# Patient Record
Sex: FEMALE | Race: WHITE | NOT HISPANIC OR LATINO | Employment: OTHER | URBAN - METROPOLITAN AREA
[De-identification: names, ages, dates, MRNs, and addresses within clinical notes are randomized per-mention and may not be internally consistent; named-entity substitution may affect disease eponyms.]

---

## 2017-01-12 RX ORDER — RIVAROXABAN 20 MG/1
TABLET, FILM COATED ORAL
Qty: 90 TABLET | Refills: 3 | Status: SHIPPED | OUTPATIENT
Start: 2017-01-12 | End: 2018-01-08 | Stop reason: SDUPTHER

## 2017-03-09 RX ORDER — RAMIPRIL 5 MG/1
CAPSULE ORAL
Qty: 180 CAPSULE | Refills: 1 | Status: SHIPPED | OUTPATIENT
Start: 2017-03-09 | End: 2019-06-17 | Stop reason: SDUPTHER

## 2017-03-29 ENCOUNTER — OFFICE VISIT (OUTPATIENT)
Dept: CARDIOLOGY | Facility: CLINIC | Age: 82
End: 2017-03-29

## 2017-03-29 VITALS
DIASTOLIC BLOOD PRESSURE: 76 MMHG | BODY MASS INDEX: 29.55 KG/M2 | SYSTOLIC BLOOD PRESSURE: 118 MMHG | HEIGHT: 68 IN | HEART RATE: 70 BPM | WEIGHT: 195 LBS

## 2017-03-29 DIAGNOSIS — I50.22 CHRONIC SYSTOLIC CONGESTIVE HEART FAILURE (HCC): ICD-10-CM

## 2017-03-29 DIAGNOSIS — I44.30 AVB (ATRIOVENTRICULAR BLOCK): ICD-10-CM

## 2017-03-29 DIAGNOSIS — I10 ESSENTIAL HYPERTENSION: ICD-10-CM

## 2017-03-29 DIAGNOSIS — I48.20 CHRONIC ATRIAL FIBRILLATION (HCC): Primary | ICD-10-CM

## 2017-03-29 PROCEDURE — 99214 OFFICE O/P EST MOD 30 MIN: CPT | Performed by: INTERNAL MEDICINE

## 2017-03-29 PROCEDURE — 93288 INTERROG EVL PM/LDLS PM IP: CPT | Performed by: INTERNAL MEDICINE

## 2017-03-29 RX ORDER — AMLODIPINE BESYLATE 2.5 MG/1
2.5 TABLET ORAL DAILY
COMMUNITY
End: 2017-10-11

## 2017-03-29 NOTE — PROGRESS NOTES
Jayda Lopes  1935  [phone]      03/29/2017    [facility] CARDIOLOGY     Akilah Rueda MD  [address] [id number]    Chief Complaint   Patient presents with   • Atrial Fibrillation   • Congestive Heart Failure       Problem List:  PROBLEM LIST:  1. Atrial fibrillation:  a. Hospitalization with external cardioversion on 07/26/2004 with initiation of Rythmol therapy.   b. Palpitations with event recorder, December 2005, consistent with PACs.   c. Episode of atrial fibrillation approximately 60-90 minutes on Christmas 2009 with subsequent ER visit.  d. External cardioversion to normal sinus rhythm, 06/08/2012.   e. Hospitalization with initiation of Tikosyn, August 2012.   f. Pulmonary vein isolation procedure with extensive ablation of multiple sites and subsequent significant bradyarrhythmias after internal cardioversion to normal sinus rhythm with discontinuation of Tikosyn on 03/25/2014.  g. Unsuccessful external cardioversion, 05/15/2014, and subsequent successful internal cardioversion with early recurrence of atrial fibrillation, 05/15/2014.   h. Echocardiogram, 08/12/2014: EF less than 20% with severe global hypokinesis. Right ventricle mild to moderately dilated. Mild MR and mild TR.   i. 09/08/2014: Implantation of a [employer] Allure Quadra biventricular pacemaker, serial #[id number], and AV node ablation.   2. CHF class III:  a. Holter monitor in February 2002 with frequent PACs and PVCs.   b. Echocardiogram in March 2000 with normal LV size and function: LVEF of 60%. Mild TR, mild MR.   c. Echocardiogram on 07/26/2004: Normal LV size and function. No significant regurgitation from the mitral valve or tricuspid valve.   d. Echocardiogram, 02/06/2008: Normal LV size and function, mild MR.   e. Echocardiogram, 08/22/2012: Left ventricular ejection fraction 35% to 40%. Moderate MR, mild TR.  f. Echocardiogram, 02/13/2013: Left ventricular  ejection fraction of 50% to 55%. Mild TR, mild-to-moderate MR. LA 5.2.  g. Echocardiogram, 03/23/2015: EF 35%. Mild-to-moderate AI. Mild MR.  h. Echocardiogram, 07/22/2015: Limited for EF only 50% to 55%.  3. Atypical chest pain:  a. Acceptable nuclear GXT with normal LV size and function and no ischemia in August 2000 and April 2004.  b. UMAIR, 03/25/2014: Ejection fraction of 50% to 55%. Mild mitral regurgitation.  4. Hypertension.   5. Hyperlipidemia.   6. Mitral valve prolapse.   7. Anxiety.   8. Gastroesophageal reflux disease.   9. Macular degeneration.   10. Surgical history:  a. Hysterectomy.  b. Partial thyroidectomy.  c. Ovarian cystectomy.     Allergies  No Known Allergies    Current Medications    Current Outpatient Prescriptions:   •  Aflibercept (EYLEA IO), Inject  into the eye as needed., Disp: , Rfl:   •  amLODIPine (NORVASC) 2.5 MG tablet, Take 2.5 mg by mouth Daily., Disp: , Rfl:   •  atorvastatin (LIPITOR) 20 MG tablet, Take 20 mg by mouth daily., Disp: , Rfl:   •  azelastine (ASTELIN) 0.1 % nasal spray, 2 sprays into each nostril 2 (two) times a day. Use in each nostril as directed, Disp: , Rfl:   •  carvedilol (COREG) 25 MG tablet, TAKE 1 TABLET TWICE A DAY, Disp: 180 tablet, Rfl: 1  •  cholecalciferol (VITAMIN D3) 1000 UNITS tablet, Take 1,000 Units by mouth daily., Disp: , Rfl:   •  esomeprazole (NexIUM) 20 MG capsule, Take 20 mg by mouth every morning before breakfast., Disp: , Rfl:   •  furosemide (LASIX) 20 MG tablet, TAKE 1 TABLET DAILY AS DIRECTED, Disp: 90 tablet, Rfl: 3  •  Multiple Vitamins-Minerals (PRESERVISION AREDS 2 PO), Take  by mouth daily., Disp: , Rfl:   •  potassium chloride (K-DUR) 10 MEQ CR tablet, TAKE 1 TABLET DAILY, Disp: 90 tablet, Rfl: 2  •  promethazine (PHENERGAN) 6.25 MG/5ML syrup, Take 6.25 mg by mouth 4 (four) times a day as needed for nausea or vomiting., Disp: , Rfl:   •  ramipril (ALTACE) 5 MG capsule, TAKE 1 CAPSULE TWICE A DAY (Patient taking differently: 5mg  "in the morning, 10mg nightly), Disp: 180 capsule, Rfl: 1  •  sertraline (ZOLOFT) 100 MG tablet, Take 100 mg by mouth daily., Disp: , Rfl:   •  XARELTO 20 MG tablet, TAKE 1 TABLET DAILY AS DIRECTED, Disp: 90 tablet, Rfl: 3    History of Present Illness   HPI    Pt presents for follow up of AF/HTN/CHF .Since the pt has seen us in clinic last, pt denies any syncope, SOB, CP, LH, and dizziness. Denies any hospitalizations, ER visits, bleeding, or TIA/CVA symptoms. Overall feels well. BP better after meds adjusted by Dr Rueda.    ROS:  General:  + fatigue, No weight gain or loss  Cardiovascular:  Denies CP, PND, syncope, near syncope, edema or palpitations.  Pulmonary:  Denies ZAFAR, cough, or wheezing    Vitals:    03/29/17 1438   BP: 118/76   BP Location: Right arm   Patient Position: Sitting   Pulse: 70   Weight: 195 lb (88.5 kg)   Height: 68\" (172.7 cm)       PE:  General: NAD  Neck: no JVD, no carotid bruits, no TM  Heart RRR, NL S1, S2, no rubs, murmurs  Lungs: CTA, no wheezes, rhonchi, or rales  Abd: soft, non-tender, NL BS  Ext: No musculoskeletal deformities, no edema, cyanosis, or clubbing  Psych: normal mood and affect    Diagnostic Data:  Procedures      1. Chronic atrial fibrillation    2. AVB (atrioventricular block)    3. Chronic systolic congestive heart failure    4. Essential hypertension        PM Interrogation: NL PM fxn, Nl battery fxn, CAF, 99% BiV paced     Plan:  1) CHF class I doing well with NL Biv PM  Continue present medications. NL pacemaker function.  2) CAF/AVB  3) Anticoagulation  Continue NOAC  4) HTN: better on meds  Wt loss, exercise, salt reduction    F/up in 6 months      "

## 2017-06-05 RX ORDER — CARVEDILOL 25 MG/1
TABLET ORAL
Qty: 180 TABLET | Refills: 3 | Status: SHIPPED | OUTPATIENT
Start: 2017-06-05 | End: 2018-05-31 | Stop reason: SDUPTHER

## 2017-07-12 ENCOUNTER — TRANSCRIBE ORDERS (OUTPATIENT)
Dept: BONE DENSITY | Facility: HOSPITAL | Age: 82
End: 2017-07-12

## 2017-07-12 DIAGNOSIS — N95.9 MENOPAUSAL AND POSTMENOPAUSAL DISORDER: Primary | ICD-10-CM

## 2017-08-14 ENCOUNTER — HOSPITAL ENCOUNTER (OUTPATIENT)
Dept: BONE DENSITY | Facility: HOSPITAL | Age: 82
Discharge: HOME | End: 2017-08-14
Attending: INTERNAL MEDICINE | Admitting: INTERNAL MEDICINE

## 2017-08-14 DIAGNOSIS — N95.9 MENOPAUSAL AND POSTMENOPAUSAL DISORDER: ICD-10-CM

## 2017-08-14 PROCEDURE — 77080 DXA BONE DENSITY AXIAL: CPT

## 2017-08-14 RX ORDER — FUROSEMIDE 20 MG/1
TABLET ORAL
Qty: 90 TABLET | Refills: 2 | Status: SHIPPED | OUTPATIENT
Start: 2017-08-14 | End: 2019-06-24 | Stop reason: SDUPTHER

## 2017-10-11 ENCOUNTER — OFFICE VISIT (OUTPATIENT)
Dept: CARDIOLOGY | Facility: CLINIC | Age: 82
End: 2017-10-11

## 2017-10-11 VITALS
HEIGHT: 68 IN | HEART RATE: 79 BPM | BODY MASS INDEX: 30.01 KG/M2 | SYSTOLIC BLOOD PRESSURE: 148 MMHG | WEIGHT: 198 LBS | DIASTOLIC BLOOD PRESSURE: 89 MMHG

## 2017-10-11 DIAGNOSIS — I10 ESSENTIAL HYPERTENSION: ICD-10-CM

## 2017-10-11 DIAGNOSIS — I50.22 CHRONIC SYSTOLIC CONGESTIVE HEART FAILURE (HCC): ICD-10-CM

## 2017-10-11 DIAGNOSIS — I48.20 CHRONIC ATRIAL FIBRILLATION (HCC): Primary | ICD-10-CM

## 2017-10-11 DIAGNOSIS — I44.30 AVB (ATRIOVENTRICULAR BLOCK): ICD-10-CM

## 2017-10-11 PROCEDURE — 93288 INTERROG EVL PM/LDLS PM IP: CPT | Performed by: INTERNAL MEDICINE

## 2017-10-11 PROCEDURE — 99213 OFFICE O/P EST LOW 20 MIN: CPT | Performed by: INTERNAL MEDICINE

## 2017-10-11 NOTE — PROGRESS NOTES
Jayda Lopes  1935  [phone]      10/11/2017    [facility] CARDIOLOGY     Akilah Rueda MD  [address] [address]    Chief Complaint   Patient presents with   • Atrial Fibrillation   • Congestive Heart Failure       Problem List:  PROBLEM LIST:  1. Atrial fibrillation:  a. Hospitalization with external cardioversion on 07/26/2004 with initiation of Rythmol therapy.   b. Palpitations with event recorder, December 2005, consistent with PACs.   c. Episode of atrial fibrillation approximately 60-90 minutes on Christmas 2009 with subsequent ER visit.  d. External cardioversion to normal sinus rhythm, 06/08/2012.   e. Hospitalization with initiation of Tikosyn, August 2012.   f. Pulmonary vein isolation procedure with extensive ablation of multiple sites and subsequent significant bradyarrhythmias after internal cardioversion to normal sinus rhythm with discontinuation of Tikosyn on 03/25/2014.  g. Unsuccessful external cardioversion, 05/15/2014, and subsequent successful internal cardioversion with early recurrence of atrial fibrillation, 05/15/2014.   h. Echocardiogram, 08/12/2014: EF less than 20% with severe global hypokinesis. Right ventricle mild to moderately dilated. Mild MR and mild TR.   i. 09/08/2014: Implantation of a [employer] Allure Quadra biventricular pacemaker, serial #[id number], and AV node ablation.   2. CHF class III:  a. Holter monitor in February 2002 with frequent PACs and PVCs.   b. Echocardiogram in March 2000 with normal LV size and function: LVEF of 60%. Mild TR, mild MR.   c. Echocardiogram on 07/26/2004: Normal LV size and function. No significant regurgitation from the mitral valve or tricuspid valve.   d. Echocardiogram, 02/06/2008: Normal LV size and function, mild MR.   e. Echocardiogram, 08/22/2012: Left ventricular ejection fraction 35% to 40%. Moderate MR, mild TR.  f. Echocardiogram, 02/13/2013: Left ventricular  ejection fraction of 50% to 55%. Mild TR, mild-to-moderate MR. LA 5.2.  g. Echocardiogram, 03/23/2015: EF 35%. Mild-to-moderate AI. Mild MR.  h. Echocardiogram, 07/22/2015: Limited for EF only 50% to 55%.  3. Atypical chest pain:  a. Acceptable nuclear GXT with normal LV size and function and no ischemia in August 2000 and April 2004.  b. UMAIR, 03/25/2014: Ejection fraction of 50% to 55%. Mild mitral regurgitation.  4. Hypertension.   5. Hyperlipidemia.   6. Mitral valve prolapse.   7. Anxiety.   8. Gastroesophageal reflux disease.   9. Macular degeneration.   10. Surgical history:  a. Hysterectomy.  b. Partial thyroidectomy.  c. Ovarian cystectomy.     Allergies  No Known Allergies    Current Medications    Current Outpatient Prescriptions:   •  Aflibercept (EYLEA IO), Inject  into the eye as needed., Disp: , Rfl:   •  atorvastatin (LIPITOR) 20 MG tablet, Take 20 mg by mouth daily., Disp: , Rfl:   •  azelastine (ASTELIN) 0.1 % nasal spray, 2 sprays into each nostril 2 (two) times a day. Use in each nostril as directed, Disp: , Rfl:   •  carvedilol (COREG) 25 MG tablet, TAKE 1 TABLET TWICE A DAY, Disp: 180 tablet, Rfl: 3  •  cholecalciferol (VITAMIN D3) 1000 UNITS tablet, Take 1,000 Units by mouth daily., Disp: , Rfl:   •  esomeprazole (NexIUM) 20 MG capsule, Take 20 mg by mouth every morning before breakfast., Disp: , Rfl:   •  furosemide (LASIX) 20 MG tablet, TAKE 1 TABLET DAILY AS DIRECTED, Disp: 90 tablet, Rfl: 2  •  Multiple Vitamins-Minerals (PRESERVISION AREDS 2 PO), Take  by mouth daily., Disp: , Rfl:   •  potassium chloride (K-DUR) 10 MEQ CR tablet, TAKE 1 TABLET DAILY, Disp: 90 tablet, Rfl: 2  •  promethazine (PHENERGAN) 6.25 MG/5ML syrup, Take 6.25 mg by mouth 4 (four) times a day as needed for nausea or vomiting., Disp: , Rfl:   •  ramipril (ALTACE) 5 MG capsule, TAKE 1 CAPSULE TWICE A DAY (Patient taking differently: 5mg in the morning, 10mg nightly), Disp: 180 capsule, Rfl: 1  •  sertraline (ZOLOFT) 100  "MG tablet, Take 100 mg by mouth daily., Disp: , Rfl:   •  XARELTO 20 MG tablet, TAKE 1 TABLET DAILY AS DIRECTED, Disp: 90 tablet, Rfl: 3    History of Present Illness   HPI    Pt presents for follow up of AF/AVB/HTN. Since the pt has seen us in clinic last, pt denies any syncope,  CP, LH, and dizziness. Denies any hospitalizations, ER visits, bleeding, or TIA/CVA symptoms. Overall feels well. Mild ZAFAR.    ROS:  General:  + fatigue, No weight gain or loss  Cardiovascular:  Denies CP, PND, syncope, near syncope, edema + palpitations.  Pulmonary:  Mild ZAFAR, No cough, or wheezing    Vitals:    10/11/17 1501   BP: 148/89   BP Location: Left arm   Patient Position: Sitting   Pulse: 79   Weight: 198 lb (89.8 kg)   Height: 68\" (172.7 cm)       PE:  General: NAD  Neck: no JVD, no carotid bruits, no TM  Heart RRR, NL S1, S2, S4 present, no rubs, murmurs  Lungs: CTA, no wheezes, rhonchi, or rales  Abd: soft, non-tender, NL BS  Ext: No musculoskeletal deformities, no edema, cyanosis, or clubbing  Psych: normal mood and affect    Diagnostic Data:  Procedures      1. Chronic atrial fibrillation    2. AVB (atrioventricular block)    3. Chronic systolic congestive heart failure    4. Essential hypertension        PM Interrogation: NL PM fxn, Nl battery fxn, No AF     Plan:  1) CHF class I doing well with NL BiV PM  Continue present medications. NL pacemaker function.  2) CAF/AVB  3) Anticoagulation  Continue NOAC  4) HTN: better on meds  Wt loss, exercise, salt reduction       F/up in 6 months      "

## 2017-10-18 ENCOUNTER — TRANSCRIBE ORDERS (OUTPATIENT)
Dept: ADMINISTRATIVE | Facility: HOSPITAL | Age: 82
End: 2017-10-18

## 2017-10-18 DIAGNOSIS — Z12.31 VISIT FOR SCREENING MAMMOGRAM: Primary | ICD-10-CM

## 2017-11-15 ENCOUNTER — OFFICE VISIT (OUTPATIENT)
Dept: CARDIOLOGY | Facility: HOSPITAL | Age: 82
End: 2017-11-15

## 2017-11-15 ENCOUNTER — HOSPITAL ENCOUNTER (OUTPATIENT)
Dept: CARDIOLOGY | Facility: HOSPITAL | Age: 82
Discharge: HOME | End: 2017-11-15

## 2017-11-15 VITALS
HEIGHT: 68 IN | DIASTOLIC BLOOD PRESSURE: 88 MMHG | OXYGEN SATURATION: 96 % | TEMPERATURE: 97.6 F | WEIGHT: 195 LBS | RESPIRATION RATE: 19 BRPM | BODY MASS INDEX: 29.55 KG/M2 | SYSTOLIC BLOOD PRESSURE: 158 MMHG | HEART RATE: 71 BPM

## 2017-11-15 DIAGNOSIS — I48.20 CHRONIC ATRIAL FIBRILLATION (HCC): Primary | ICD-10-CM

## 2017-11-15 DIAGNOSIS — I48.20 CHRONIC ATRIAL FIBRILLATION (HCC): ICD-10-CM

## 2017-11-15 DIAGNOSIS — I10 ESSENTIAL HYPERTENSION: ICD-10-CM

## 2017-11-15 PROCEDURE — 99214 OFFICE O/P EST MOD 30 MIN: CPT | Performed by: NURSE PRACTITIONER

## 2017-11-15 NOTE — PROGRESS NOTES
Encounter Date:11/15/2017      Patient ID: Jayda Lopes is a 82 y.o. female.        Subjective:     Chief Complaint: Follow-up and Atrial Fibrillation     History of Present Illness  [facility] with complaints of heart rate going up to 90 when she walks. She has a history of persistent AF s/p AVN ablation and dual chamber PPP in 2014. She says at rest her HR is 70. Last device check was normal. She is planning to go to [place] for the winter and wanted to be checked out. Otherwise she has no other cardiac complaints and feels well. She does report that BP has been higher lately. She says at home this morning SBP was 148 which is average for her. It has gotten as high as 170-180 but this is not common for her.     Patient Active Problem List   Diagnosis   • Atrial fibrillation   • Palpitation   • Atypical chest pain   • Hypertension   • Hyperlipidemia   • Mitral valve prolapse   • GERD (gastroesophageal reflux disease)   • Macular degeneration         Past Surgical History:   Procedure Laterality Date   • CYSTECTOMY      h/o Ovarian   • HYSTERECTOMY Bilateral    • THYROIDECTOMY      h/o thyroid surgery sub-total        No Known Allergies      Current Outpatient Prescriptions:   •  Aflibercept (EYLEA IO), Inject  into the eye as needed., Disp: , Rfl:   •  atorvastatin (LIPITOR) 20 MG tablet, Take 20 mg by mouth daily., Disp: , Rfl:   •  carvedilol (COREG) 25 MG tablet, TAKE 1 TABLET TWICE A DAY, Disp: 180 tablet, Rfl: 3  •  cholecalciferol (VITAMIN D3) 1000 UNITS tablet, Take 2,000 Units by mouth Daily., Disp: , Rfl:   •  esomeprazole (NexIUM) 20 MG capsule, Take 20 mg by mouth every morning before breakfast., Disp: , Rfl:   •  furosemide (LASIX) 20 MG tablet, TAKE 1 TABLET DAILY AS DIRECTED (Patient taking differently: TAKE 1 TABLET DAILY AS NEEDED), Disp: 90 tablet, Rfl: 2  •  Multiple Vitamins-Minerals (PRESERVISION AREDS 2 PO), Take 2 tablets by mouth Daily., Disp: , Rfl:   •   potassium chloride (K-DUR) 10 MEQ CR tablet, TAKE 1 TABLET DAILY (Patient taking differently: TAKE 1 TABLET DAILY AS NEEDED), Disp: 90 tablet, Rfl: 2  •  ramipril (ALTACE) 5 MG capsule, TAKE 1 CAPSULE TWICE A DAY (Patient taking differently: 5mg in the morning, 10mg nightly), Disp: 180 capsule, Rfl: 1  •  sertraline (ZOLOFT) 100 MG tablet, Take 100 mg by mouth daily., Disp: , Rfl:   •  XARELTO 20 MG tablet, TAKE 1 TABLET DAILY AS DIRECTED, Disp: 90 tablet, Rfl: 3    The following portions of the chart were reviewed and updated as appropriate: Allergies, current medications, past family history, social history, past medical history.     Review of Systems   Constitution: Negative for chills, decreased appetite, diaphoresis, fever, weakness, malaise/fatigue, night sweats, weight gain and weight loss.   HENT: Negative for congestion, hearing loss, hoarse voice and nosebleeds.    Eyes: Negative for blurred vision, visual disturbance and visual halos.   Cardiovascular: Negative for chest pain, claudication, cyanosis, dyspnea on exertion, irregular heartbeat, leg swelling, near-syncope, orthopnea, palpitations, paroxysmal nocturnal dyspnea and syncope.   Respiratory: Positive for cough and sputum production. Negative for hemoptysis, shortness of breath, sleep disturbances due to breathing, snoring and wheezing.    Hematologic/Lymphatic: Negative for bleeding problem. Does not bruise/bleed easily.   Skin: Negative for dry skin, itching and rash.   Musculoskeletal: Negative for arthritis, joint pain, joint swelling and myalgias.   Gastrointestinal: Negative for bloating, abdominal pain, constipation, diarrhea, flatus, heartburn, hematemesis, hematochezia, melena, nausea and vomiting.   Genitourinary: Positive for nocturia. Negative for dysuria, frequency, hematuria and urgency.        Tachypalpitations   Neurological: Positive for excessive daytime sleepiness. Negative for dizziness, headaches, light-headedness and loss of  "balance.   Psychiatric/Behavioral: Negative for depression. The patient is nervous/anxious. The patient does not have insomnia.            Objective:     Vitals:    11/15/17 1131 11/15/17 1135 11/15/17 1136   BP: 170/99 173/87 171/87   BP Location: Right arm Left arm Left arm   Patient Position: Sitting Sitting Standing   Cuff Size: Adult     Pulse: 70 72 71   Resp: 19     Temp: 97.6 °F (36.4 °C)     TempSrc: Temporal Artery      SpO2: 96%     Weight: 195 lb (88.5 kg)     Height: 68\" (172.7 cm)           Physical Exam   Constitutional: She is oriented to person, place, and time. She appears well-developed and well-nourished. She is active and cooperative. No distress.   HENT:   Head: Normocephalic and atraumatic.   Mouth/Throat: Oropharynx is clear and moist.   Eyes: Conjunctivae and EOM are normal. Pupils are equal, round, and reactive to light.   Neck: Normal range of motion. Neck supple. No JVD present. No tracheal deviation present. No thyromegaly present.   Cardiovascular: Normal rate, regular rhythm, normal heart sounds and intact distal pulses.    Pulmonary/Chest: Effort normal and breath sounds normal.   Abdominal: Soft. Bowel sounds are normal. She exhibits no distension. There is no tenderness.   Musculoskeletal: Normal range of motion.   Neurological: She is alert and oriented to person, place, and time.   Skin: Skin is warm, dry and intact.   Psychiatric: She has a normal mood and affect. Her behavior is normal.   Nursing note and vitals reviewed.      Lab and Diagnostic Review:    Reviewed device today in clinic and she has had no high ventricular rates since her last interrogation and no other alerts    Assessment and Plan:         1. Chronic atrial fibrillation  - s/p AVN ablation and Bi-V PPM  - Continue Xarelto  - Reassured her that HR increase when she walks is normal finding. No abnormal findings on device interrogation    2. Essential hypertension  - Elevated today but she is nervous  -Advised to " check BP twice a day at home and call if SBP consistently >140    It has been a pleasure to participate in the care of this patient.  Patient was instructed to call the [facility] with any questions, concerns, or worsening symptoms.          * Please note that portions of this note were completed with a voice recognition program. Efforts were made to edit the dictation but occasionally words are transcribed.

## 2017-12-04 ENCOUNTER — HOSPITAL ENCOUNTER (OUTPATIENT)
Dept: MAMMOGRAPHY | Facility: HOSPITAL | Age: 82
Discharge: HOME | End: 2017-12-04
Attending: INTERNAL MEDICINE | Admitting: INTERNAL MEDICINE

## 2017-12-04 DIAGNOSIS — Z12.31 VISIT FOR SCREENING MAMMOGRAM: ICD-10-CM

## 2017-12-04 PROCEDURE — G0202 SCR MAMMO BI INCL CAD: HCPCS | Performed by: RADIOLOGY

## 2017-12-04 PROCEDURE — 77063 BREAST TOMOSYNTHESIS BI: CPT

## 2017-12-04 PROCEDURE — G0202 SCR MAMMO BI INCL CAD: HCPCS

## 2017-12-04 PROCEDURE — 77063 BREAST TOMOSYNTHESIS BI: CPT | Performed by: RADIOLOGY

## 2017-12-25 ENCOUNTER — HOSPITAL ENCOUNTER (EMERGENCY)
Facility: HOSPITAL | Age: 82
Discharge: HOME | End: 2017-12-25
Attending: EMERGENCY MEDICINE | Admitting: EMERGENCY MEDICINE

## 2017-12-25 ENCOUNTER — APPOINTMENT (OUTPATIENT)
Dept: GENERAL RADIOLOGY | Facility: HOSPITAL | Age: 82
End: 2017-12-25

## 2017-12-25 VITALS
HEIGHT: 68 IN | TEMPERATURE: 99.4 F | SYSTOLIC BLOOD PRESSURE: 178 MMHG | WEIGHT: 195 LBS | BODY MASS INDEX: 29.55 KG/M2 | DIASTOLIC BLOOD PRESSURE: 74 MMHG | OXYGEN SATURATION: 93 % | RESPIRATION RATE: 22 BRPM | HEART RATE: 69 BPM

## 2017-12-25 DIAGNOSIS — J11.1 INFLUENZA: Primary | ICD-10-CM

## 2017-12-25 LAB
FLUAV AG NPH QL: POSITIVE
FLUBV AG NPH QL IA: NEGATIVE

## 2017-12-25 PROCEDURE — 99283 EMERGENCY DEPT VISIT LOW MDM: CPT

## 2017-12-25 PROCEDURE — 87804 INFLUENZA ASSAY W/OPTIC: CPT | Performed by: PHYSICIAN ASSISTANT

## 2017-12-25 PROCEDURE — 71020 HC CHEST PA AND LATERAL: CPT

## 2017-12-25 RX ORDER — BENZONATATE 200 MG/1
200 CAPSULE ORAL 3 TIMES DAILY PRN
Qty: 15 CAPSULE | Refills: 0 | Status: SHIPPED | OUTPATIENT
Start: 2017-12-25 | End: 2019-02-21

## 2017-12-25 RX ORDER — AMLODIPINE BESYLATE 2.5 MG/1
2.5 TABLET ORAL DAILY
COMMUNITY
End: 2019-05-22 | Stop reason: SDUPTHER

## 2018-01-08 RX ORDER — RIVAROXABAN 20 MG/1
TABLET, FILM COATED ORAL
Qty: 90 TABLET | Refills: 3 | Status: SHIPPED | OUTPATIENT
Start: 2018-01-08 | End: 2019-01-06 | Stop reason: SDUPTHER

## 2018-03-14 ENCOUNTER — CLINICAL SUPPORT NO REQUIREMENTS (OUTPATIENT)
Dept: CARDIOLOGY | Facility: CLINIC | Age: 83
End: 2018-03-14

## 2018-03-14 DIAGNOSIS — I48.20 CHRONIC ATRIAL FIBRILLATION (HCC): Primary | ICD-10-CM

## 2018-03-14 PROCEDURE — 93296 REM INTERROG EVL PM/IDS: CPT | Performed by: INTERNAL MEDICINE

## 2018-03-14 PROCEDURE — 93294 REM INTERROG EVL PM/LDLS PM: CPT | Performed by: INTERNAL MEDICINE

## 2018-03-30 ENCOUNTER — HOSPITAL ENCOUNTER (OUTPATIENT)
Dept: GENERAL RADIOLOGY | Facility: HOSPITAL | Age: 83
Discharge: HOME | End: 2018-03-30
Attending: INTERNAL MEDICINE | Admitting: INTERNAL MEDICINE

## 2018-03-30 ENCOUNTER — TRANSCRIBE ORDERS (OUTPATIENT)
Dept: ADMINISTRATIVE | Facility: HOSPITAL | Age: 83
End: 2018-03-30

## 2018-03-30 DIAGNOSIS — M54.50 ACUTE MIDLINE LOW BACK PAIN WITHOUT SCIATICA: Primary | ICD-10-CM

## 2018-03-30 PROCEDURE — 72220 X-RAY EXAM SACRUM TAILBONE: CPT

## 2018-03-30 PROCEDURE — 72110 X-RAY EXAM L-2 SPINE 4/>VWS: CPT

## 2018-05-31 RX ORDER — CARVEDILOL 25 MG/1
TABLET ORAL
Qty: 180 TABLET | Refills: 3 | Status: SHIPPED | OUTPATIENT
Start: 2018-05-31 | End: 2019-05-27 | Stop reason: SDUPTHER

## 2018-06-13 ENCOUNTER — OFFICE VISIT (OUTPATIENT)
Dept: CARDIOLOGY | Facility: CLINIC | Age: 83
End: 2018-06-13

## 2018-06-13 VITALS
DIASTOLIC BLOOD PRESSURE: 90 MMHG | HEART RATE: 70 BPM | WEIGHT: 189 LBS | HEIGHT: 68 IN | BODY MASS INDEX: 28.64 KG/M2 | SYSTOLIC BLOOD PRESSURE: 150 MMHG

## 2018-06-13 DIAGNOSIS — I10 ESSENTIAL HYPERTENSION: ICD-10-CM

## 2018-06-13 DIAGNOSIS — I44.30 AVB (ATRIOVENTRICULAR BLOCK): ICD-10-CM

## 2018-06-13 DIAGNOSIS — I48.20 CHRONIC ATRIAL FIBRILLATION (HCC): Primary | ICD-10-CM

## 2018-06-13 DIAGNOSIS — I50.22 CHRONIC SYSTOLIC CONGESTIVE HEART FAILURE (HCC): ICD-10-CM

## 2018-06-13 PROBLEM — R00.1 BRADYCARDIA: Status: ACTIVE | Noted: 2018-06-13

## 2018-06-13 PROCEDURE — 99213 OFFICE O/P EST LOW 20 MIN: CPT | Performed by: INTERNAL MEDICINE

## 2018-06-13 PROCEDURE — 93281 PM DEVICE PROGR EVAL MULTI: CPT | Performed by: INTERNAL MEDICINE

## 2018-06-13 RX ORDER — LATANOPROST 50 UG/ML
SOLUTION/ DROPS OPHTHALMIC DAILY
COMMUNITY
Start: 2018-06-12 | End: 2019-02-10 | Stop reason: SDUPTHER

## 2018-06-13 NOTE — PROGRESS NOTES
Jayda Lopes  1935  [phone]      06/13/2018    [facility] CARDIOLOGY     Akilah Rueda MD  [address] [id number]    Chief Complaint   Patient presents with   • Atrial Fibrillation       Problem List:   1. Atrial fibrillation:  a. Hospitalization with external cardioversion on 07/26/2004 with initiation of Rythmol therapy.   b. Palpitations with event recorder, December 2005, consistent with PACs.   c. Episode of atrial fibrillation approximately 60-90 minutes on Christmas 2009 with subsequent ER visit.  d. External cardioversion to normal sinus rhythm, 06/08/2012.   e. Hospitalization with initiation of Tikosyn, August 2012.   f. Pulmonary vein isolation procedure with extensive ablation of multiple sites and subsequent significant bradyarrhythmias after internal cardioversion to normal sinus rhythm with discontinuation of Tikosyn on 03/25/2014.  g. Unsuccessful external cardioversion, 05/15/2014, and subsequent successful internal cardioversion with early recurrence of atrial fibrillation, 05/15/2014.   h. Echocardiogram, 08/12/2014: EF less than 20% with severe global hypokinesis. Right ventricle mild to moderately dilated. Mild MR and mild TR.   i. 09/08/2014: Implantation of a [employer] Allure Quadra biventricular pacemaker, serial #[id number], and AV node ablation.   2. CHF class III:  a. Holter monitor in February 2002 with frequent PACs and PVCs.   b. Echocardiogram in March 2000 with normal LV size and function: LVEF of 60%. Mild TR, mild MR.   c. Echocardiogram on 07/26/2004: Normal LV size and function. No significant regurgitation from the mitral valve or tricuspid valve.   d. Echocardiogram, 02/06/2008: Normal LV size and function, mild MR.   e. Echocardiogram, 08/22/2012: Left ventricular ejection fraction 35% to 40%. Moderate MR, mild TR.  f. Echocardiogram, 02/13/2013: Left ventricular ejection fraction of 50% to 55%. Mild TR,  mild-to-moderate MR. LA 5.2.  g. Echocardiogram, 03/23/2015: EF 35%. Mild-to-moderate AI. Mild MR.  h. Echocardiogram, 07/22/2015: Limited for EF only 50% to 55%.  3. Atypical chest pain:  a. Acceptable nuclear GXT with normal LV size and function and no ischemia in August 2000 and April 2004.  b. UMAIR, 03/25/2014: Ejection fraction of 50% to 55%. Mild mitral regurgitation.  4. Hypertension.   5. Hyperlipidemia.   6. Mitral valve prolapse.   7. Anxiety.   8. Gastroesophageal reflux disease.   9. Macular degeneration.   10. Surgical history:  a. Hysterectomy.  b. Partial thyroidectomy.  c. Ovarian cystectomy.   d. Cataract surgery    Allergies  No Known Allergies    Current Medications    Current Outpatient Prescriptions:   •  Aflibercept (EYLEA IO), Inject  into the eye as needed., Disp: , Rfl:   •  amLODIPine (NORVASC) 2.5 MG tablet, Take 2.5 mg by mouth Daily., Disp: , Rfl:   •  atorvastatin (LIPITOR) 20 MG tablet, Take 20 mg by mouth daily., Disp: , Rfl:   •  carvedilol (COREG) 25 MG tablet, TAKE 1 TABLET TWICE A DAY, Disp: 180 tablet, Rfl: 3  •  cholecalciferol (VITAMIN D3) 1000 UNITS tablet, Take 2,000 Units by mouth Daily., Disp: , Rfl:   •  esomeprazole (NexIUM) 20 MG capsule, Take 20 mg by mouth every morning before breakfast., Disp: , Rfl:   •  furosemide (LASIX) 20 MG tablet, TAKE 1 TABLET DAILY AS DIRECTED (Patient taking differently: TAKE 1 TABLET DAILY AS NEEDED), Disp: 90 tablet, Rfl: 2  •  Multiple Vitamins-Calcium (VIACTIV MULTI-VITAMIN) chewable tablet, Chew 2 tablets Daily., Disp: , Rfl:   •  Multiple Vitamins-Minerals (PRESERVISION AREDS 2 PO), Take 2 tablets by mouth Daily., Disp: , Rfl:   •  potassium chloride (K-DUR) 10 MEQ CR tablet, TAKE 1 TABLET DAILY (Patient taking differently: TAKE 1 TABLET DAILY AS NEEDED), Disp: 90 tablet, Rfl: 2  •  ramipril (ALTACE) 5 MG capsule, TAKE 1 CAPSULE TWICE A DAY (Patient taking differently: 5mg in the morning, 10mg nightly), Disp: 180 capsule, Rfl: 1  •   "sertraline (ZOLOFT) 100 MG tablet, Take 100 mg by mouth daily., Disp: , Rfl:   •  XARELTO 20 MG tablet, TAKE 1 TABLET DAILY AS DIRECTED, Disp: 90 tablet, Rfl: 3  •  benzonatate (TESSALON) 200 MG capsule, Take 1 capsule by mouth 3 (Three) Times a Day As Needed for Cough., Disp: 15 capsule, Rfl: 0  •  latanoprost (XALATAN) 0.005 % ophthalmic solution, Daily., Disp: , Rfl:     History of Present Illness   HPI    Pt presents for follow up of atrial fibrillation s/p AVN/Bi-V PPM, CHF. Since we last saw the pt, pt denies any SOB, CP, LH, and dizziness. Denies any hospitalizations, ER visits, bleeding on Xarelto, or TIA/CVA symptoms. Staying very active and overall feels well.  Blood pressure normally runs 115-120/60's.      ROS:  General:  + fatigue, No weight gain or loss  Cardiovascular:  Denies CP, PND, syncope, near syncope, edema or palpitations.  Pulmonary:  Denies ZAFAR, cough, or wheezing      Vitals:    06/13/18 1506   BP: 150/90   BP Location: Left arm   Patient Position: Sitting   Pulse: 70   Weight: 85.7 kg (189 lb)   Height: 172.7 cm (68\")     PE:  General: NAD  Neck: no JVD, no carotid bruits, no TM  Heart RRR, NL S1, S2, no rubs, murmurs  Lungs: CTA, no wheezes, rhonchi, or rales  Abd: soft, non-tender, NL BS  Ext: No musculoskeletal deformities, no edema, cyanosis, or clubbing  Psych: normal mood and affect    Diagnostic Data:  Device interrogation showing Bi-V PPM with normal function, 99% Bi-V paced, 9 years battery life    Procedures    1. Chronic atrial fibrillation    2. Chronic systolic congestive heart failure    3. AVB (atrioventricular block)    4. Essential hypertension          Plan:  1) CAF:  - Chronic in nature, rate controlled and asymptomatic s/p AVN ablation  2) Anticoagulation  - Continue Xarelto  3) CHF: prsently Class I on meds  4) AVB:  - S/p AVN, Bi-V PPM with normal function  5) HTN  - Well controlled on current regimen  - Wt loss, exercise, salt reduction    F/up in 6 months    Scribed " for Hiren Kaufman MD by Maren Prather, ZULAY. 6/13/2018  3:51 PM    I, Hiren Kaufman MD, personally performed the services described in this documentation as scribed by the above named individual in my presence, and it is both accurate and complete.  6/13/2018  3:51 PM

## 2018-09-26 ENCOUNTER — CLINICAL SUPPORT NO REQUIREMENTS (OUTPATIENT)
Dept: CARDIOLOGY | Facility: CLINIC | Age: 83
End: 2018-09-26

## 2018-09-26 DIAGNOSIS — I48.91 ATRIAL FIBRILLATION, UNSPECIFIED TYPE (HCC): ICD-10-CM

## 2018-09-26 PROCEDURE — 93296 REM INTERROG EVL PM/IDS: CPT | Performed by: INTERNAL MEDICINE

## 2018-09-26 PROCEDURE — 93294 REM INTERROG EVL PM/LDLS PM: CPT | Performed by: INTERNAL MEDICINE

## 2018-12-20 ENCOUNTER — HOSPITAL ENCOUNTER (OUTPATIENT)
Dept: GENERAL RADIOLOGY | Facility: HOSPITAL | Age: 83
Discharge: HOME | End: 2018-12-20
Attending: INTERNAL MEDICINE | Admitting: INTERNAL MEDICINE

## 2018-12-20 ENCOUNTER — TRANSCRIBE ORDERS (OUTPATIENT)
Dept: ADMINISTRATIVE | Facility: HOSPITAL | Age: 83
End: 2018-12-20

## 2018-12-20 DIAGNOSIS — R05.9 COUGH: Primary | ICD-10-CM

## 2018-12-20 PROCEDURE — 71046 X-RAY EXAM CHEST 2 VIEWS: CPT

## 2019-01-03 ENCOUNTER — TELEPHONE (OUTPATIENT)
Dept: INTERNAL MEDICINE | Facility: CLINIC | Age: 84
End: 2019-01-03

## 2019-01-03 NOTE — TELEPHONE ENCOUNTER
----- Message from Akilah Rueda MD sent at 12/29/2018  1:18 PM EST -----  Let pt know that CXR is acceptable. BC

## 2019-01-07 ENCOUNTER — TELEPHONE (OUTPATIENT)
Dept: INTERNAL MEDICINE | Facility: CLINIC | Age: 84
End: 2019-01-07

## 2019-01-07 RX ORDER — RIVAROXABAN 20 MG/1
TABLET, FILM COATED ORAL
Qty: 90 TABLET | Refills: 3 | Status: SHIPPED | OUTPATIENT
Start: 2019-01-07 | End: 2020-01-06

## 2019-01-10 PROBLEM — M19.90 OSTEOARTHRITIS: Status: ACTIVE | Noted: 2019-01-10

## 2019-01-10 PROBLEM — J20.9 ACUTE BRONCHITIS DUE TO INFECTION: Status: ACTIVE | Noted: 2019-01-10

## 2019-01-10 PROBLEM — I83.93 VARICOSE VEINS OF BOTH LOWER EXTREMITIES WITHOUT ULCER OR INFLAMMATION: Status: ACTIVE | Noted: 2019-01-10

## 2019-01-10 PROBLEM — F10.90 ALCOHOL USE: Status: ACTIVE | Noted: 2019-01-10

## 2019-01-10 PROBLEM — F41.1 GENERALIZED ANXIETY DISORDER: Status: ACTIVE | Noted: 2019-01-10

## 2019-01-10 PROBLEM — I42.0 DILATED CARDIOMYOPATHY: Status: ACTIVE | Noted: 2019-01-10

## 2019-01-10 PROBLEM — E53.8 B12 DEFICIENCY: Status: ACTIVE | Noted: 2019-01-10

## 2019-01-10 PROBLEM — F33.1 MODERATE EPISODE OF RECURRENT MAJOR DEPRESSIVE DISORDER (HCC): Status: ACTIVE | Noted: 2019-01-10

## 2019-01-10 PROBLEM — I10 BENIGN ESSENTIAL HYPERTENSION: Status: ACTIVE | Noted: 2019-01-10

## 2019-01-10 PROBLEM — J30.1 SEASONAL ALLERGIC RHINITIS DUE TO POLLEN: Status: ACTIVE | Noted: 2019-01-10

## 2019-01-10 PROBLEM — H25.013 CORTICAL AGE-RELATED CATARACT OF BOTH EYES: Status: ACTIVE | Noted: 2019-01-10

## 2019-01-10 PROBLEM — I34.0 NON-RHEUMATIC MITRAL REGURGITATION: Status: ACTIVE | Noted: 2019-01-10

## 2019-01-10 PROBLEM — Z78.9 ALCOHOL USE: Status: ACTIVE | Noted: 2019-01-10

## 2019-01-10 PROBLEM — E55.9 VITAMIN D DEFICIENCY: Status: ACTIVE | Noted: 2019-01-10

## 2019-01-10 PROBLEM — I50.9 CONGESTIVE HEART FAILURE, NYHA CLASS 2 AND ACC/AHA STAGE C (HCC): Status: ACTIVE | Noted: 2019-01-10

## 2019-01-10 PROBLEM — M85.89 OSTEOPENIA OF MULTIPLE SITES: Status: ACTIVE | Noted: 2019-01-10

## 2019-01-10 PROBLEM — R05.3 CHRONIC COUGH: Status: ACTIVE | Noted: 2019-01-10

## 2019-01-10 PROBLEM — H40.1131 PRIMARY OPEN ANGLE GLAUCOMA (POAG) OF BOTH EYES, MILD STAGE: Status: ACTIVE | Noted: 2019-01-10

## 2019-02-09 ENCOUNTER — CLINICAL SUPPORT NO REQUIREMENTS (OUTPATIENT)
Dept: CARDIOLOGY | Facility: CLINIC | Age: 84
End: 2019-02-09

## 2019-02-09 DIAGNOSIS — R00.1 BRADYCARDIA: ICD-10-CM

## 2019-02-09 DIAGNOSIS — I42.0 DILATED CARDIOMYOPATHY (HCC): ICD-10-CM

## 2019-02-09 PROCEDURE — 93294 REM INTERROG EVL PM/LDLS PM: CPT | Performed by: INTERNAL MEDICINE

## 2019-02-09 PROCEDURE — 93296 REM INTERROG EVL PM/IDS: CPT | Performed by: INTERNAL MEDICINE

## 2019-02-13 PROBLEM — F32.1 MAJOR DEPRESSIVE DISORDER, SINGLE EPISODE, MODERATE: Status: ACTIVE | Noted: 2019-02-13

## 2019-02-13 PROBLEM — F32.A DEPRESSIVE DISORDER: Status: ACTIVE | Noted: 2019-02-13

## 2019-02-18 PROBLEM — I44.30 AV BLOCK: Status: ACTIVE | Noted: 2019-02-18

## 2019-02-18 NOTE — PROGRESS NOTES
Jayda ROSALIA Lopes  1935    There is no work phone number on file.      02/21/2019    [facility] CARDIOLOGY     Akilah Rueda MD  [address] [id number]    Chief Complaint   Patient presents with   • Atrial Fibrillation         Problem List:   1. Atrial fibrillation:  a. Hospitalization with external cardioversion on 07/26/2004 with initiation of Rythmol therapy.   b. Palpitations with event recorder, December 2005, consistent with PACs.   c. Episode of atrial fibrillation approximately 60-90 minutes on Christmas 2009 with subsequent ER visit.  d. External cardioversion to normal sinus rhythm, 06/08/2012.   e. Hospitalization with initiation of Tikosyn, August 2012.   f. Pulmonary vein isolation procedure with extensive ablation of multiple sites and subsequent significant bradyarrhythmias after internal cardioversion to normal sinus rhythm with discontinuation of Tikosyn on 03/25/2014.  g. Unsuccessful external cardioversion, 05/15/2014, and subsequent successful internal cardioversion with early recurrence of atrial fibrillation, 05/15/2014.   h. Echocardiogram, 08/12/2014: EF less than 20% with severe global hypokinesis. Right ventricle mild to moderately dilated. Mild MR and mild TR.   i. 09/08/2014: Implantation of a [employer] Allure Quadra biventricular pacemaker, serial #[id number], and AV node ablation.   2. CHF class III:  a. Holter monitor in February 2002 with frequent PACs and PVCs.   b. Echocardiogram in March 2000 with normal LV size and function: LVEF of 60%. Mild TR, mild MR.   c. Echocardiogram on 07/26/2004: Normal LV size and function. No significant regurgitation from the mitral valve or tricuspid valve.   d. Echocardiogram, 02/06/2008: Normal LV size and function, mild MR.   e. Echocardiogram, 08/22/2012: Left ventricular ejection fraction 35% to 40%. Moderate MR, mild TR.  f. Echocardiogram, 02/13/2013: Left ventricular ejection fraction  of 50% to 55%. Mild TR, mild-to-moderate MR. LA 5.2.  g. Echocardiogram, 03/23/2015: EF 30-35%. Mild concentric LVH. Mild-to-moderate AI. Mild MR. Mild to moderate TR  h. Echocardiogram, 07/22/2015: Limited for EF only 50% to 55%.  3. Atypical chest pain:  a. Acceptable nuclear GXT with normal LV size and function and no ischemia in August 2000 and April 2004.  b. UMAIR, 03/25/2014: Ejection fraction of 50% to 55%. Mild mitral regurgitation.  4. Hypertension.   5. Hyperlipidemia.   6. Mitral valve prolapse.   7. Anxiety.   8. Gastroesophageal reflux disease.   9. Macular degeneration.   10. Surgical history:  a. Hysterectomy.  b. Partial thyroidectomy.  c. Ovarian cystectomy.   d. Cataract surgery      Allergies  Allergies   Allergen Reactions   • Phenazopyridine Hcl Unknown (See Comments)     Pyridium       Current Medications    Current Outpatient Medications:   •  Aflibercept (EYLEA IO), Inject  into the eye Every 3 (Three) Months., Disp: , Rfl:   •  amLODIPine (NORVASC) 2.5 MG tablet, Take 2.5 mg by mouth Daily., Disp: , Rfl:   •  atorvastatin (LIPITOR) 20 MG tablet, Take 20 mg by mouth daily., Disp: , Rfl:   •  carvedilol (COREG) 25 MG tablet, TAKE 1 TABLET TWICE A DAY, Disp: 180 tablet, Rfl: 3  •  Cholecalciferol (VITAMIN D3) 2000 units capsule, Take 1 tablet by mouth Daily., Disp: , Rfl:   •  esomeprazole (NexIUM) 20 MG capsule, Take 22.3 mg by mouth Every Morning Before Breakfast., Disp: , Rfl:   •  furosemide (LASIX) 20 MG tablet, TAKE 1 TABLET DAILY AS DIRECTED (Patient taking differently: TAKE 1 TABLET AS NEEDED), Disp: 90 tablet, Rfl: 2  •  latanoprost (XALATAN) 0.005 % ophthalmic solution, Apply 1 drop to eye(s) as directed by provider Daily., Disp: , Rfl:   •  Multiple Vitamins-Calcium (VIACTIV MULTI-VITAMIN) chewable tablet, Chew 2 tablets Daily., Disp: , Rfl:   •  Multiple Vitamins-Minerals (PRESERVISION AREDS 2 PO), Take 2 tablets by mouth Daily., Disp: , Rfl:   •  potassium chloride (K-DUR) 10 MEQ CR  "tablet, TAKE 1 TABLET DAILY (Patient taking differently: TAKE 1 TABLET AS NEEDED), Disp: 90 tablet, Rfl: 2  •  ramipril (ALTACE) 5 MG capsule, TAKE 1 CAPSULE TWICE A DAY (Patient taking differently: 5mg in the morning, 10mg nightly), Disp: 180 capsule, Rfl: 1  •  sertraline (ZOLOFT) 100 MG tablet, Take 1 tablet by mouth Daily., Disp: , Rfl:   •  XARELTO 20 MG tablet, TAKE 1 TABLET DAILY AS DIRECTED, Disp: 90 tablet, Rfl: 3    History of Present Illness   HPI    Pt presents for follow up of atrial fibrillation s/p AVN ablation/Bi-V PPM implant, CHF, HTN. Since we last saw the pt, pt denies any awareness of AF episodes, SOB, CP, LH, or dizziness. BP's running 120's systolic at home.  Has been under more stress lately due to the passing of her daughter in law and with her  fracturing his hip a few months ago.  Considering, she feels quite well.  Did develop a right thigh hematoma/bruising secondary to a fall as she was trying to sit down on the toilet.  No dizziness/LH/syncope.  Reports the toilet was shorter than she thought and fell on her way down.  This occurred approximately 2 weeks ago and her right leg bruising and swelling is improving.  She states she did not hold her Xarelto at all during this time period.  X-ray negative for fracture.      ROS:  General:  Denies fatigue, weight gain or loss  Cardiovascular:  Denies CP, PND, syncope, near syncope, edema or palpitations.  Pulmonary:  Denies ZAFAR, cough, or wheezing      Vitals:    02/21/19 1354   BP: 154/74   BP Location: Left arm   Patient Position: Sitting   Pulse: 73   SpO2: 98%   Weight: 88 kg (194 lb)   Height: 172.7 cm (68\")     PE:  General: NAD  Neck: no JVD, no carotid bruits, no TM  Heart RRR, NL S1, S2, no rubs, murmurs  Lungs: CTA, no wheezes, rhonchi, or rales  Abd: soft, non-tender, NL BS  Ext: No musculoskeletal deformities, no edema, cyanosis, or clubbing.  Right leg ecchymosis.  Psych: normal mood and affect    Diagnostic " Data:      Procedures    1. Chronic atrial fibrillation (CMS/HCC)    2. Chronic systolic (congestive) heart failure (CMS/HCC)    3. Dilated cardiomyopathy (CMS/HCC)    4. AV block    5. Benign essential hypertension      Device interrogation 2/21/19: [employer] Bi-V PPM with normal function, set at VVIR, chronic atrial fibrillation, Bi-V paced >99%, 8.6-9.4 years battery life    Plan:  1) Chronic atrial fibrillation:  - S/p AVN ablation, asymptomatic and rate controlled.  2) Anticoagulation:  - CHADSVASc = 5, on Xarelto.  Did discuss if she ever has any future bleeding issues while on Xarelto, she should call us and we can instruct her whether to hold the medication.  At this time, injury occurred 2 weeks ago and bruising is improving so will continue Xarelto at this time.  3) Chronic systolic CHF, NYHA class I/DCM:  - EF improved to 50-55% in 2015  - On BBL/ACE  4) AV block:  - S/p AVN ablation, Bi-V PPM implant, device with normal function.  5) HTN:  - Slightly elevated today, normal BP readings at home.  Continue to monitor.  - Wt loss, exercise, salt reduction    F/up in 6 months    [facility]  2/21/2019  2:17 PM

## 2019-02-19 ENCOUNTER — OFFICE VISIT (OUTPATIENT)
Dept: INTERNAL MEDICINE | Facility: CLINIC | Age: 84
End: 2019-02-19

## 2019-02-19 VITALS
SYSTOLIC BLOOD PRESSURE: 144 MMHG | HEIGHT: 68 IN | DIASTOLIC BLOOD PRESSURE: 84 MMHG | TEMPERATURE: 97.6 F | HEART RATE: 81 BPM | BODY MASS INDEX: 29.4 KG/M2 | WEIGHT: 194 LBS

## 2019-02-19 DIAGNOSIS — E78.2 MIXED HYPERLIPIDEMIA: Chronic | ICD-10-CM

## 2019-02-19 DIAGNOSIS — I50.9 CONGESTIVE HEART FAILURE, NYHA CLASS 2 AND ACC/AHA STAGE C (HCC): ICD-10-CM

## 2019-02-19 DIAGNOSIS — I48.20 CHRONIC ATRIAL FIBRILLATION (HCC): ICD-10-CM

## 2019-02-19 DIAGNOSIS — I10 BENIGN ESSENTIAL HYPERTENSION: ICD-10-CM

## 2019-02-19 DIAGNOSIS — F33.1 MODERATE EPISODE OF RECURRENT MAJOR DEPRESSIVE DISORDER (HCC): ICD-10-CM

## 2019-02-19 DIAGNOSIS — S76.301A HAMSTRING INJURY, RIGHT, INITIAL ENCOUNTER: Primary | ICD-10-CM

## 2019-02-19 DIAGNOSIS — K21.9 GERD WITHOUT ESOPHAGITIS: ICD-10-CM

## 2019-02-19 DIAGNOSIS — M79.89 RIGHT LEG SWELLING: ICD-10-CM

## 2019-02-19 LAB
ALBUMIN SERPL-MCNC: 3.52 G/DL (ref 3.2–4.8)
ALBUMIN/GLOB SERPL: 1.7 G/DL (ref 1.5–2.5)
ALP SERPL-CCNC: 80 U/L (ref 25–100)
ALT SERPL W P-5'-P-CCNC: 19 U/L (ref 7–40)
ANION GAP SERPL CALCULATED.3IONS-SCNC: 6 MMOL/L (ref 3–11)
ARTICHOKE IGE QN: 91 MG/DL (ref 0–130)
AST SERPL-CCNC: 19 U/L (ref 0–33)
BILIRUB SERPL-MCNC: 1.9 MG/DL (ref 0.3–1.2)
BUN BLD-MCNC: 15 MG/DL (ref 9–23)
BUN/CREAT SERPL: 21.7 (ref 7–25)
CALCIUM SPEC-SCNC: 8.5 MG/DL (ref 8.7–10.4)
CHLORIDE SERPL-SCNC: 107 MMOL/L (ref 99–109)
CHOLEST SERPL-MCNC: 170 MG/DL (ref 0–200)
CO2 SERPL-SCNC: 31 MMOL/L (ref 20–31)
CREAT BLD-MCNC: 0.69 MG/DL (ref 0.6–1.3)
GFR SERPL CREATININE-BSD FRML MDRD: 81 ML/MIN/1.73
GLOBULIN UR ELPH-MCNC: 2.1 GM/DL
GLUCOSE BLD-MCNC: 97 MG/DL (ref 70–100)
HDLC SERPL-MCNC: 56 MG/DL (ref 40–60)
POTASSIUM BLD-SCNC: 4 MMOL/L (ref 3.5–5.5)
PROT SERPL-MCNC: 5.6 G/DL (ref 5.7–8.2)
SODIUM BLD-SCNC: 144 MMOL/L (ref 132–146)
TRIGL SERPL-MCNC: 77 MG/DL (ref 0–150)

## 2019-02-19 PROCEDURE — 99214 OFFICE O/P EST MOD 30 MIN: CPT | Performed by: INTERNAL MEDICINE

## 2019-02-19 PROCEDURE — 82043 UR ALBUMIN QUANTITATIVE: CPT | Performed by: INTERNAL MEDICINE

## 2019-02-19 PROCEDURE — 36415 COLL VENOUS BLD VENIPUNCTURE: CPT | Performed by: INTERNAL MEDICINE

## 2019-02-19 PROCEDURE — 82570 ASSAY OF URINE CREATININE: CPT | Performed by: INTERNAL MEDICINE

## 2019-02-19 PROCEDURE — 80061 LIPID PANEL: CPT | Performed by: INTERNAL MEDICINE

## 2019-02-19 PROCEDURE — 80053 COMPREHEN METABOLIC PANEL: CPT | Performed by: INTERNAL MEDICINE
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
## 2019-02-19 NOTE — PATIENT INSTRUCTIONS
For right hamstring injury, ecchymoses and swelling of the leg, apply moist heat.  Continue to elevate the legs when sitting.  Order was sent to [facility] at [place].  Would like therapy to start next week.  May take Advil occasionally for more severe pain.  Also may take Tylenol.    Continue current medications.  Continue to monitor blood pressure at home.  Continue regular follow-up with Dr. Kaufman.  Continue low-fat and low sugar diet.  Gradually increase exercise as the right hamstring improves.    Continue current medication for GERD.  Continue reflux precautions including avoiding eating close to bedtime.    Continue medication for depression.  Getting out of the house and seeing friends helps with stress and depression symptoms.  Exercise also helps.

## 2019-02-20 ENCOUNTER — TELEPHONE (OUTPATIENT)
Dept: INTERNAL MEDICINE | Facility: CLINIC | Age: 84
End: 2019-02-20

## 2019-02-20 LAB
CREAT 24H UR-MCNC: 159.5 MG/DL
MICROALBUMIN UR-MCNC: 32.4 UG/ML
MICROALBUMIN/CREAT UR: 20.3 MG/G CREAT (ref 0–30)

## 2019-02-20 NOTE — TELEPHONE ENCOUNTER
Reason for Call:   PT CALLED CONCERNED ABOUT ON HER  AVS IT STATES SHE HAS CHF HER CARDIOLOGIST HAS NEVER TOLD HER THAT SO WANTED TO KNOW WHAT THAT MEANS  PT STATES SHE HAS HAD AFIB AND HAS A PACEMAKER SHES SAYS ITS A LITTLE ALARMING

## 2019-02-20 NOTE — TELEPHONE ENCOUNTER
She does not have active CHF now.  She does have diastolic dysfunction (heart does not relax as well as it used to do between beats). It is a DX that we will always note

## 2019-02-21 ENCOUNTER — OFFICE VISIT (OUTPATIENT)
Dept: CARDIOLOGY | Facility: CLINIC | Age: 84
End: 2019-02-21

## 2019-02-21 VITALS
DIASTOLIC BLOOD PRESSURE: 74 MMHG | SYSTOLIC BLOOD PRESSURE: 154 MMHG | BODY MASS INDEX: 29.4 KG/M2 | OXYGEN SATURATION: 98 % | WEIGHT: 194 LBS | HEIGHT: 68 IN | HEART RATE: 73 BPM

## 2019-02-21 DIAGNOSIS — I10 BENIGN ESSENTIAL HYPERTENSION: ICD-10-CM

## 2019-02-21 DIAGNOSIS — I50.22 CHRONIC SYSTOLIC (CONGESTIVE) HEART FAILURE (HCC): ICD-10-CM

## 2019-02-21 DIAGNOSIS — I42.0 DILATED CARDIOMYOPATHY (HCC): ICD-10-CM

## 2019-02-21 DIAGNOSIS — I44.30 AV BLOCK: ICD-10-CM

## 2019-02-21 DIAGNOSIS — I48.20 CHRONIC ATRIAL FIBRILLATION (HCC): Primary | ICD-10-CM

## 2019-02-21 PROCEDURE — 93280 PM DEVICE PROGR EVAL DUAL: CPT | Performed by: NURSE PRACTITIONER

## 2019-02-21 PROCEDURE — 99213 OFFICE O/P EST LOW 20 MIN: CPT | Performed by: NURSE PRACTITIONER

## 2019-03-18 RX ORDER — SERTRALINE HYDROCHLORIDE 100 MG/1
TABLET, FILM COATED ORAL
Qty: 90 TABLET | Refills: 1 | Status: SHIPPED | OUTPATIENT
Start: 2019-03-18 | End: 2019-09-14 | Stop reason: SDUPTHER

## 2019-04-08 RX ORDER — ATORVASTATIN CALCIUM 20 MG/1
TABLET, FILM COATED ORAL
Qty: 90 TABLET | Refills: 2 | Status: SHIPPED | OUTPATIENT
Start: 2019-04-08 | End: 2020-01-06

## 2019-05-14 ENCOUNTER — CLINICAL SUPPORT NO REQUIREMENTS (OUTPATIENT)
Dept: CARDIOLOGY | Facility: CLINIC | Age: 84
End: 2019-05-14

## 2019-05-14 DIAGNOSIS — I50.22 CHRONIC SYSTOLIC (CONGESTIVE) HEART FAILURE (HCC): ICD-10-CM

## 2019-05-14 DIAGNOSIS — I48.20 CHRONIC ATRIAL FIBRILLATION (HCC): ICD-10-CM

## 2019-05-14 PROCEDURE — 93296 REM INTERROG EVL PM/IDS: CPT | Performed by: INTERNAL MEDICINE

## 2019-05-14 PROCEDURE — 93294 REM INTERROG EVL PM/LDLS PM: CPT | Performed by: INTERNAL MEDICINE

## 2019-05-20 ENCOUNTER — TELEPHONE (OUTPATIENT)
Dept: INTERNAL MEDICINE | Facility: CLINIC | Age: 84
End: 2019-05-20

## 2019-05-20 ENCOUNTER — OFFICE VISIT (OUTPATIENT)
Dept: INTERNAL MEDICINE | Facility: CLINIC | Age: 84
End: 2019-05-20

## 2019-05-20 VITALS
TEMPERATURE: 97.9 F | BODY MASS INDEX: 28.64 KG/M2 | SYSTOLIC BLOOD PRESSURE: 140 MMHG | DIASTOLIC BLOOD PRESSURE: 80 MMHG | HEART RATE: 76 BPM | WEIGHT: 189 LBS | HEIGHT: 68 IN

## 2019-05-20 DIAGNOSIS — I10 BENIGN ESSENTIAL HYPERTENSION: ICD-10-CM

## 2019-05-20 DIAGNOSIS — J20.9 ACUTE BRONCHITIS DUE TO INFECTION: ICD-10-CM

## 2019-05-20 DIAGNOSIS — J30.1 SEASONAL ALLERGIC RHINITIS DUE TO POLLEN: Primary | ICD-10-CM

## 2019-05-20 PROCEDURE — 99213 OFFICE O/P EST LOW 20 MIN: CPT | Performed by: NURSE PRACTITIONER

## 2019-05-20 RX ORDER — PROMETHAZINE HYDROCHLORIDE AND PHENYLEPHRINE HYDROCHLORIDE 6.25; 5 MG/5ML; MG/5ML
5 SYRUP ORAL EVERY 8 HOURS PRN
Qty: 90 ML | Refills: 0 | Status: SHIPPED | OUTPATIENT
Start: 2019-05-20 | End: 2019-05-20 | Stop reason: SDUPTHER

## 2019-05-20 RX ORDER — AMLODIPINE BESYLATE 2.5 MG/1
TABLET ORAL
Qty: 120 TABLET | Refills: 0 | Status: CANCELLED | OUTPATIENT
Start: 2019-05-20

## 2019-05-20 RX ORDER — GUAIFENESIN AND DEXTROMETHORPHAN HYDROBROMIDE 600; 30 MG/1; MG/1
2 TABLET, EXTENDED RELEASE ORAL 2 TIMES DAILY PRN
Qty: 30 EACH | Refills: 0 | Status: SHIPPED | OUTPATIENT
Start: 2019-05-20 | End: 2019-05-20 | Stop reason: SDUPTHER

## 2019-05-20 RX ORDER — AMOXICILLIN AND CLAVULANATE POTASSIUM 875; 125 MG/1; MG/1
1 TABLET, FILM COATED ORAL 2 TIMES DAILY
Qty: 14 TABLET | Refills: 0 | Status: SHIPPED | OUTPATIENT
Start: 2019-05-20 | End: 2019-05-20 | Stop reason: SDUPTHER

## 2019-05-20 RX ORDER — PROMETHAZINE HYDROCHLORIDE AND PHENYLEPHRINE HYDROCHLORIDE 6.25; 5 MG/5ML; MG/5ML
5 SYRUP ORAL EVERY 8 HOURS PRN
Qty: 90 ML | Refills: 0 | Status: SHIPPED | OUTPATIENT
Start: 2019-05-20 | End: 2019-07-01

## 2019-05-20 RX ORDER — GUAIFENESIN AND DEXTROMETHORPHAN HYDROBROMIDE 600; 30 MG/1; MG/1
2 TABLET, EXTENDED RELEASE ORAL 2 TIMES DAILY PRN
Qty: 30 EACH | Refills: 0 | Status: SHIPPED | OUTPATIENT
Start: 2019-05-20 | End: 2019-06-03

## 2019-05-20 RX ORDER — AMOXICILLIN AND CLAVULANATE POTASSIUM 875; 125 MG/1; MG/1
1 TABLET, FILM COATED ORAL 2 TIMES DAILY
Qty: 14 TABLET | Refills: 0 | Status: SHIPPED | OUTPATIENT
Start: 2019-05-20 | End: 2019-06-03

## 2019-05-20 NOTE — TELEPHONE ENCOUNTER
PATIENT 'S  AT LOCAL PHARMACY [facility] WAITING FOR RX.  THEY WERE SENT TO [facility] BY MISTAKE.    RESENT RX TO [facility] AND CALLED PATIENT OF THE ERROR AND THEY WERE BEING RESENT TO LOCAL PHARMACY.

## 2019-05-20 NOTE — PATIENT INSTRUCTIONS
If new or worse symptoms return to office and we will re-eval and consider cxr.    Continue medications as prescribed.  Check BP at home and keep a log for your next visit.    •In general, adults should engage in aerobic physical activity 3-4 times a week with each session lasting an average of 40 minutes.  Goal for 150 minutes per week total.  •Moderate (brisk walking or jogging) to vigorous (running or biking) physical activity is recommended.  •There are many helpful strategies for heart-healthy eating, including the DASH diet and the [employer] Choose My Plate.   •Patients with high blood pressure should consume no more than 2,400 mg of sodium a day, ideally reducing sodium intake to 1,500 mg a day. However, even reducing sodium intake in one's current diet by 1,000 mg each day can help lower blood pressure.    Limit alcohol consumption.    Information obtained from the American College of Cardiology and American Heart Association.

## 2019-05-20 NOTE — PROGRESS NOTES
Jayda Lopes  1935  [id number]  Patient Care Team:  Akilah Rueda MD as PCP - General  Akilah Rueda MD as PCP - Family Medicine    Jayda Lopes is a pleasant 83 y.o. female who presents for evaluation of Cough (productive x7days) and Nasal Congestion      Chief Complaint   Patient presents with   • Cough     productive x7days   • Nasal Congestion       HPI:   Started Tuesday with cough  Dyspnea not uncommon with her cardiac hx.  No home tx PTA    Cough  Patient complains of dyspnea, nasal congestion, productive cough with sputum described as clear, rhinorrhea ., sneezing and wheezing. Symptoms began 6 day ago. Symptoms have been stable since that time.The cough is productive and is aggravated by  lying down. Associated symptoms include: postnasal drip, sputum production and wheezing. Patient does not have new pets. Patient does not have a history of asthma. Patient does have a history of environmental allergens. Patient has not traveled recently. Patient does not have a history of smoking. Patient has not had a previous chest x-ray.     Has been given inhalers in the past but never used them.    Elevated bp today:  Normal at home with checks, compliant with meds    Past Medical History:   Diagnosis Date   • Anxiety    • Atrial fibrillation (CMS/HCC)    • Atypical chest pain    • Bradycardia    • GERD (gastroesophageal reflux disease)    • Hyperlipidemia    • Hypertension    • Macular degeneration    • Mitral valve prolapse    • Palpitation        Past Surgical History:   Procedure Laterality Date   • CYSTECTOMY      h/o Ovarian   • HYSTERECTOMY Bilateral    • OOPHORECTOMY Bilateral 1993   • THYROIDECTOMY      h/o thyroid surgery sub-total        Family History   Problem Relation Age of Onset   • Colon cancer Mother    • Other Mother         cardiac arrhythmia   • Heart failure Mother    • Cancer Mother    • Lung cancer Father    • Cancer Father    • No Known Problems Brother    • Hypertension  "Maternal Grandmother    • Breast cancer Neg Hx    • Ovarian cancer Neg Hx        Social History     Tobacco Use   Smoking Status Never Smoker   Smokeless Tobacco Never Used       Allergies   Allergen Reactions   • Phenazopyridine Hcl Unknown (See Comments)     Pyridium       Review of Systems   Constitutional: Positive for fatigue. Negative for chills and fever.   HENT: Positive for sore throat. Negative for congestion, ear pain and sinus pressure.    Respiratory: Positive for cough and wheezing. Negative for chest tightness and shortness of breath.    Cardiovascular: Negative for chest pain and palpitations.   Gastrointestinal: Negative for abdominal pain, blood in stool and constipation.   Skin: Negative for color change.   Allergic/Immunologic: Negative for environmental allergies.   Neurological: Negative for dizziness, speech difficulty and headache.   Psychiatric/Behavioral: Negative for decreased concentration. The patient is not nervous/anxious.        Vitals:    05/20/19 1339 05/20/19 1401   BP: 150/90 140/80   BP Location: Left arm    Patient Position: Sitting    Cuff Size: Adult    Pulse: 76    Temp: 97.9 °F (36.6 °C)    TempSrc: Temporal    Weight: 85.7 kg (189 lb)  Comment: verbal per patient    Height: 172.7 cm (68\")          Current Outpatient Medications:   •  Aflibercept (EYLEA IO), Inject  into the eye Every 3 (Three) Months., Disp: , Rfl:   •  amLODIPine (NORVASC) 2.5 MG tablet, Take 2.5 mg by mouth Daily., Disp: , Rfl:   •  atorvastatin (LIPITOR) 20 MG tablet, TAKE 1 TABLET EVERY EVENING, Disp: 90 tablet, Rfl: 2  •  carvedilol (COREG) 25 MG tablet, TAKE 1 TABLET TWICE A DAY, Disp: 180 tablet, Rfl: 3  •  Cholecalciferol (VITAMIN D3) 2000 units capsule, Take 1 tablet by mouth Daily., Disp: , Rfl:   •  esomeprazole (NexIUM) 20 MG capsule, Take 22.3 mg by mouth Every Morning Before Breakfast., Disp: , Rfl:   •  furosemide (LASIX) 20 MG tablet, TAKE 1 TABLET DAILY AS DIRECTED (Patient taking " differently: TAKE 1 TABLET AS NEEDED), Disp: 90 tablet, Rfl: 2  •  Multiple Vitamins-Calcium (VIACTIV MULTI-VITAMIN) chewable tablet, Chew 2 tablets Daily., Disp: , Rfl:   •  Multiple Vitamins-Minerals (PRESERVISION AREDS 2 PO), Take 2 tablets by mouth Daily., Disp: , Rfl:   •  potassium chloride (K-DUR) 10 MEQ CR tablet, TAKE 1 TABLET DAILY (Patient taking differently: TAKE 1 TABLET AS NEEDED), Disp: 90 tablet, Rfl: 2  •  ramipril (ALTACE) 5 MG capsule, TAKE 1 CAPSULE TWICE A DAY (Patient taking differently: 5mg in the morning, 10mg nightly), Disp: 180 capsule, Rfl: 1  •  sertraline (ZOLOFT) 100 MG tablet, TAKE 1 TABLET DAILY, Disp: 90 tablet, Rfl: 1  •  XARELTO 20 MG tablet, TAKE 1 TABLET DAILY AS DIRECTED, Disp: 90 tablet, Rfl: 3  •  amoxicillin-clavulanate (AUGMENTIN) 875-125 MG per tablet, Take 1 tablet by mouth 2 (Two) Times a Day., Disp: 14 tablet, Rfl: 0  •  guaifenesin-dextromethorphan (MUCINEX DM)  MG tablet sustained-release 12 hour tablet, Take 2 tablets by mouth 2 (Two) Times a Day As Needed (congestion and drainage)., Disp: 30 each, Rfl: 0  •  promethazine-phenylephrine (promethazine-phenylephrine) 6.25-5 MG/5ML syrup syrup, Take 5 mL by mouth Every 8 (Eight) Hours As Needed (cough)., Disp: 90 mL, Rfl: 0    Physical Exam   Constitutional: She appears well-developed and well-nourished.   HENT:   Head: Normocephalic and atraumatic.   Right Ear: External ear normal.   Left Ear: External ear normal. Tympanic membrane mobility is abnormal.   Mouth/Throat: Mucous membranes are normal. Oropharyngeal exudate and posterior oropharyngeal erythema present.   Eyes: EOM are normal. Pupils are equal, round, and reactive to light. Right eye exhibits discharge. Left eye exhibits discharge. Right conjunctiva is injected. Left conjunctiva is injected.   Clear eye d/c bilat   Neck: Normal range of motion. Neck supple.   Cardiovascular: Normal rate, regular rhythm and normal heart sounds.   Pulmonary/Chest: Effort  normal and breath sounds normal.   Abdominal: Soft. Bowel sounds are normal.   Musculoskeletal: Normal range of motion.   Lymphadenopathy:     She has no cervical adenopathy.   Neurological: She is alert.   Skin: Skin is warm and dry.   Psychiatric: She has a normal mood and affect. Her behavior is normal. Thought content normal.       Results Review:    None    Assessment/Plan:    Problem List Items Addressed This Visit        Cardiovascular and Mediastinum    Benign essential hypertension    Relevant Medications    ramipril (ALTACE) 5 MG capsule    furosemide (LASIX) 20 MG tablet    amLODIPine (NORVASC) 2.5 MG tablet    carvedilol (COREG) 25 MG tablet       Respiratory    Acute bronchitis due to infection    Relevant Medications    promethazine-phenylephrine (promethazine-phenylephrine) 6.25-5 MG/5ML syrup syrup    amoxicillin-clavulanate (AUGMENTIN) 875-125 MG per tablet    guaifenesin-dextromethorphan (MUCINEX DM)  MG tablet sustained-release 12 hour tablet    Seasonal allergic rhinitis due to pollen - Primary          Plan of care reviewed with patient at the conclusion of today's visit. Education was provided regarding diagnosis, management and any prescribed or recommended OTC medications.  Patient verbalizes understanding of and agreement with management plan.    Return in about 2 weeks (around 6/3/2019).    *Note that portions of this note were completed with a voice recognition program.  Efforts were made to edit the dictation but occasionally words are transcribed.    ZULAY Salinas

## 2019-05-22 DIAGNOSIS — I10 BENIGN ESSENTIAL HYPERTENSION: Primary | ICD-10-CM

## 2019-05-22 RX ORDER — AMLODIPINE BESYLATE 2.5 MG/1
2.5 TABLET ORAL 2 TIMES DAILY
Qty: 180 TABLET | Refills: 1 | Status: SHIPPED | OUTPATIENT
Start: 2019-05-22 | End: 2019-12-09 | Stop reason: SDUPTHER

## 2019-05-28 RX ORDER — CARVEDILOL 25 MG/1
TABLET ORAL
Qty: 180 TABLET | Refills: 3 | Status: SHIPPED | OUTPATIENT
Start: 2019-05-28 | End: 2020-05-22

## 2019-06-03 ENCOUNTER — OFFICE VISIT (OUTPATIENT)
Dept: INTERNAL MEDICINE | Facility: CLINIC | Age: 84
End: 2019-06-03

## 2019-06-03 VITALS — HEART RATE: 72 BPM | SYSTOLIC BLOOD PRESSURE: 156 MMHG | DIASTOLIC BLOOD PRESSURE: 84 MMHG

## 2019-06-03 DIAGNOSIS — J20.9 ACUTE BRONCHITIS DUE TO INFECTION: Primary | ICD-10-CM

## 2019-06-03 PROCEDURE — 99213 OFFICE O/P EST LOW 20 MIN: CPT | Performed by: NURSE PRACTITIONER

## 2019-06-03 NOTE — PROGRESS NOTES
Jayda Lopes  1935  [id number]  Patient Care Team:  Akilah Rueda MD as PCP - General  Akilah Rueda MD as PCP - Family Medicine    Jayda Lopes is a pleasant 83 y.o. female who presents for evaluation of Hypertension (Patient states BP is up and down at home) and Bronchitis (Productive cough, but improved )        Chief Complaint   Patient presents with   • Hypertension     Patient states BP is up and down at home   • Bronchitis     Productive cough, but improved        HPI:   Overall improved.  Reports she is 75% better than 2 weeks ago.  Still having periodic coughing epidsodes with mucous production throughout the day.  No nighttime cough, out of promethazine cough syrup.  No fevver, no sig whezing.  Has stopped the mucinex D  Finished Augmentin    HTN:  Home BP avg 118/67, compliant with meds.  Usually high here.    Past Medical History:   Diagnosis Date   • Anxiety    • Atrial fibrillation (CMS/HCC)    • Atypical chest pain    • Bradycardia    • GERD (gastroesophageal reflux disease)    • Hyperlipidemia    • Hypertension    • Macular degeneration    • Mitral valve prolapse    • Palpitation        Past Surgical History:   Procedure Laterality Date   • CYSTECTOMY      h/o Ovarian   • HYSTERECTOMY Bilateral    • OOPHORECTOMY Bilateral 1993   • THYROIDECTOMY      h/o thyroid surgery sub-total        Family History   Problem Relation Age of Onset   • Colon cancer Mother    • Other Mother         cardiac arrhythmia   • Heart failure Mother    • Cancer Mother    • Lung cancer Father    • Cancer Father    • No Known Problems Brother    • Hypertension Maternal Grandmother    • Breast cancer Neg Hx    • Ovarian cancer Neg Hx        Social History     Tobacco Use   Smoking Status Never Smoker   Smokeless Tobacco Never Used       Allergies   Allergen Reactions   • Phenazopyridine Hcl Unknown (See Comments)     Pyridium       Review of Systems   Constitutional: Negative for chills, fatigue and fever.    HENT: Negative for congestion, ear pain and sinus pressure.    Respiratory: Positive for cough, shortness of breath and wheezing. Negative for chest tightness.    Cardiovascular: Negative for chest pain and palpitations.   Gastrointestinal: Negative for abdominal pain, blood in stool and constipation.   Skin: Negative for color change.   Allergic/Immunologic: Negative for environmental allergies.   Neurological: Negative for dizziness, speech difficulty and headache.   Psychiatric/Behavioral: Negative for decreased concentration. The patient is not nervous/anxious.        Vitals:    06/03/19 1108   BP: 156/84   BP Location: Left arm   Patient Position: Sitting   Cuff Size: Adult   Pulse: 72   Weight: Comment: patient refused         Current Outpatient Medications:   •  Aflibercept (EYLEA IO), Inject  into the eye Every 3 (Three) Months., Disp: , Rfl:   •  amLODIPine (NORVASC) 2.5 MG tablet, Take 1 tablet by mouth 2 (Two) Times a Day., Disp: 180 tablet, Rfl: 1  •  atorvastatin (LIPITOR) 20 MG tablet, TAKE 1 TABLET EVERY EVENING, Disp: 90 tablet, Rfl: 2  •  carvedilol (COREG) 25 MG tablet, TAKE 1 TABLET TWICE A DAY, Disp: 180 tablet, Rfl: 3  •  Cholecalciferol (VITAMIN D3) 2000 units capsule, Take 1 tablet by mouth Daily., Disp: , Rfl:   •  esomeprazole (NexIUM) 20 MG capsule, Take 22.3 mg by mouth Every Morning Before Breakfast., Disp: , Rfl:   •  furosemide (LASIX) 20 MG tablet, TAKE 1 TABLET DAILY AS DIRECTED (Patient taking differently: TAKE 1 TABLET AS NEEDED), Disp: 90 tablet, Rfl: 2  •  Multiple Vitamins-Calcium (VIACTIV MULTI-VITAMIN) chewable tablet, Chew 2 tablets Daily., Disp: , Rfl:   •  Multiple Vitamins-Minerals (PRESERVISION AREDS 2 PO), Take 2 tablets by mouth Daily., Disp: , Rfl:   •  potassium chloride (K-DUR) 10 MEQ CR tablet, TAKE 1 TABLET DAILY (Patient taking differently: TAKE 1 TABLET AS NEEDED), Disp: 90 tablet, Rfl: 2  •  ramipril (ALTACE) 5 MG capsule, TAKE 1 CAPSULE TWICE A DAY (Patient  taking differently: 5mg in the morning, 10mg nightly), Disp: 180 capsule, Rfl: 1  •  sertraline (ZOLOFT) 100 MG tablet, TAKE 1 TABLET DAILY, Disp: 90 tablet, Rfl: 1  •  XARELTO 20 MG tablet, TAKE 1 TABLET DAILY AS DIRECTED, Disp: 90 tablet, Rfl: 3  •  promethazine-phenylephrine (promethazine-phenylephrine) 6.25-5 MG/5ML syrup syrup, Take 5 mL by mouth Every 8 (Eight) Hours As Needed (cough)., Disp: 90 mL, Rfl: 0    Physical Exam   Constitutional: She appears well-developed and well-nourished.   HENT:   Head: Normocephalic and atraumatic.   Right Ear: External ear and ear canal normal.   Left Ear: External ear and ear canal normal.   Mouth/Throat: Oropharynx is clear and moist.   Eyes: Conjunctivae and EOM are normal.   Neck: Normal range of motion. Neck supple.   Cardiovascular: Normal rate, regular rhythm and normal heart sounds.   Pulmonary/Chest: Effort normal and breath sounds normal.   Abdominal: Soft. Bowel sounds are normal.   Musculoskeletal: Normal range of motion.   Lymphadenopathy:     She has no cervical adenopathy.   Neurological: She is alert.   Skin: Skin is warm and dry.   Psychiatric: She has a normal mood and affect. Her behavior is normal. Thought content normal.       Results Review:    None    Assessment/Plan:    Problem List Items Addressed This Visit        Respiratory    Acute bronchitis due to infection - Primary    Relevant Medications    promethazine-phenylephrine (promethazine-phenylephrine) 6.25-5 MG/5ML syrup syrup      Ok to continue plain Mucinex for congestion and drainage.    Plan of care reviewed with patient at the conclusion of today's visit. Education was provided regarding diagnosis, management and any prescribed or recommended OTC medications.  Patient verbalizes understanding of and agreement with management plan.    Return if symptoms worsen or fail to improve.    *Note that portions of this note were completed with a voice recognition program.  Efforts were made to edit the  dictation but occasionally words are transcribed.    Yoanna Howard, APRN

## 2019-06-17 RX ORDER — RAMIPRIL 5 MG/1
CAPSULE ORAL
Qty: 270 CAPSULE | Refills: 4 | Status: SHIPPED | OUTPATIENT
Start: 2019-06-17 | End: 2020-09-09

## 2019-06-17 NOTE — TELEPHONE ENCOUNTER
The sig on RF request is different from what was already in [facility] but is the same as what is in [facility]. Please send.

## 2019-06-24 ENCOUNTER — OFFICE VISIT (OUTPATIENT)
Dept: INTERNAL MEDICINE | Facility: CLINIC | Age: 84
End: 2019-06-24

## 2019-06-24 ENCOUNTER — HOSPITAL ENCOUNTER (OUTPATIENT)
Dept: GENERAL RADIOLOGY | Facility: HOSPITAL | Age: 84
Discharge: HOME | End: 2019-06-24
Admitting: INTERNAL MEDICINE

## 2019-06-24 ENCOUNTER — TELEPHONE (OUTPATIENT)
Dept: INTERNAL MEDICINE | Facility: CLINIC | Age: 84
End: 2019-06-24

## 2019-06-24 VITALS
SYSTOLIC BLOOD PRESSURE: 140 MMHG | HEART RATE: 74 BPM | OXYGEN SATURATION: 96 % | DIASTOLIC BLOOD PRESSURE: 84 MMHG | BODY MASS INDEX: 28.74 KG/M2 | HEIGHT: 68 IN

## 2019-06-24 DIAGNOSIS — Z12.11 ENCOUNTER FOR SCREENING COLONOSCOPY: ICD-10-CM

## 2019-06-24 DIAGNOSIS — I10 BENIGN ESSENTIAL HYPERTENSION: ICD-10-CM

## 2019-06-24 DIAGNOSIS — K13.0 LESION OF LIP: ICD-10-CM

## 2019-06-24 DIAGNOSIS — K59.01 CONSTIPATION, SLOW TRANSIT: ICD-10-CM

## 2019-06-24 DIAGNOSIS — R05.3 CHRONIC COUGH: Primary | ICD-10-CM

## 2019-06-24 DIAGNOSIS — I48.20 CHRONIC ATRIAL FIBRILLATION (HCC): ICD-10-CM

## 2019-06-24 DIAGNOSIS — R10.30 LOWER ABDOMINAL PAIN: ICD-10-CM

## 2019-06-24 DIAGNOSIS — R04.2 BLOOD-TINGED SPUTUM: ICD-10-CM

## 2019-06-24 DIAGNOSIS — R05.3 CHRONIC COUGH: ICD-10-CM

## 2019-06-24 PROBLEM — J20.9 ACUTE BRONCHITIS DUE TO INFECTION: Status: RESOLVED | Noted: 2019-01-10 | Resolved: 2019-06-24

## 2019-06-24 PROCEDURE — 99214 OFFICE O/P EST MOD 30 MIN: CPT | Performed by: INTERNAL MEDICINE

## 2019-06-24 PROCEDURE — 71046 X-RAY EXAM CHEST 2 VIEWS: CPT

## 2019-06-24 RX ORDER — POTASSIUM CHLORIDE 750 MG/1
10 TABLET, FILM COATED, EXTENDED RELEASE ORAL DAILY PRN
Qty: 30 TABLET | Refills: 5
Start: 2019-06-24 | End: 2019-07-18

## 2019-06-24 RX ORDER — FUROSEMIDE 20 MG/1
20 TABLET ORAL DAILY PRN
Qty: 30 TABLET | Refills: 5
Start: 2019-06-24 | End: 2019-07-18

## 2019-06-24 NOTE — TELEPHONE ENCOUNTER
Patient called to inquire about the information that was given to her about drug interactions for her most recent prescription.

## 2019-06-24 NOTE — PROGRESS NOTES
"[facility]     Jayda Lopes  1935   [id number]      Patient Care Team:  Akilah Rueda MD as PCP - General  Akilah Rueda MD as PCP - Family Medicine  Bernadette Almodovar MD as Consulting Physician (Dermatology)    Chief Complaint;:   Chief Complaint   Patient presents with   • Cough     this has been going on for about 6 weeks, starting yesterday there are spots of red in the mucus that comes up   • Wheezing     more so at night            HPI  Patient is a 83 y.o. female presents with cough productive. Onset of symptoms was gradual starting 6 weeks ago.  Chronicityacute. Severity moderate .  Symptoms are associated with grayish sputum with pin tinged areas and now looking redder but not bright red. Also wheezing and mild shortness of breath.Also sneezing and runny nose.hoarseness Pertinent negatives no fever/chills/head congestion/sore throat.   Symptoms are aggravated by  Worse at night, kept her awake and had to sit up at night.   Symptoms improve with Promethazine VC syrup helped a little.  She remembers taking Tussionex in the past and it had helped her more with the nighttime cough..     HPI#2  Lower abdominal pain for several months related to constipation. Pain and constipation are helped by eating more fiber and drinking lots of fluids.  She has not tried anything else for constipation.  She is worried since her mother had colon cancer and she has personally had polyps.    HPI#3  \"blood blisters\" on lower lip for several months.  She does not feel they are changing.  They have not improved either.  Nothing aggravates and nothing helps.  No pain or irritation.  No known injury.    CHRONIC CONDITIONS  For hypertension, she takes her medications regularly in the blood pressures have been controlled.    Atrial fibrillation has been fairly well controlled on current medications.  She also takes her Xarelto anticoagulant regularly.    Past Medical History:   Diagnosis Date   • " Acute bronchitis due to infection 1/10/2019   • Anxiety    • Atrial fibrillation (CMS/HCC)    • Atypical chest pain    • Bradycardia    • GERD (gastroesophageal reflux disease)    • Hyperlipidemia    • Hypertension    • Macular degeneration    • Mitral valve prolapse    • Palpitation        Past Surgical History:   Procedure Laterality Date   • CYSTECTOMY      h/o Ovarian   • HYSTERECTOMY Bilateral    • OOPHORECTOMY Bilateral 1993   • THYROIDECTOMY      h/o thyroid surgery sub-total        Family History   Problem Relation Age of Onset   • Colon cancer Mother    • Other Mother         cardiac arrhythmia   • Heart failure Mother    • Cancer Mother    • Lung cancer Father    • Cancer Father    • No Known Problems Brother    • Hypertension Maternal Grandmother    • Breast cancer Neg Hx    • Ovarian cancer Neg Hx        Social History     Socioeconomic History   • Marital status:      Spouse name: Not on file   • Number of children: Not on file   • Years of education: Not on file   • Highest education level: Not on file   Tobacco Use   • Smoking status: Never Smoker   • Smokeless tobacco: Never Used   Substance and Sexual Activity   • Alcohol use: Yes     Comment: occasional    • Drug use: Defer   • Sexual activity: Defer       Allergies   Allergen Reactions   • Phenazopyridine Hcl Unknown (See Comments)     Pyridium       Review of Systems:     Review of Systems   Constitutional: Negative for chills, fatigue and fever.   HENT: Positive for congestion, postnasal drip and voice change. Negative for ear pain, sinus pressure and sore throat.    Respiratory: Positive for cough and wheezing. Negative for chest tightness and shortness of breath.    Cardiovascular: Negative for chest pain, palpitations and leg swelling.   Gastrointestinal: Positive for abdominal pain and constipation. Negative for blood in stool and diarrhea.   Genitourinary: Positive for frequency. Negative for dysuria.   Musculoskeletal: Negative for  "arthralgias and back pain.       Vital Signs  Vitals:    06/24/19 1245   BP: 140/84   BP Location: Left arm   Patient Position: Sitting   Cuff Size: Adult   Pulse: 74   SpO2: 96%   Weight: Comment: pt refused   Height: 172.7 cm (68\")     Body mass index is 28.74 kg/m².      Current Outpatient Medications:   •  Aflibercept (EYLEA IO), Inject  into the eye Every 3 (Three) Months., Disp: , Rfl:   •  amLODIPine (NORVASC) 2.5 MG tablet, Take 1 tablet by mouth 2 (Two) Times a Day., Disp: 180 tablet, Rfl: 1  •  atorvastatin (LIPITOR) 20 MG tablet, TAKE 1 TABLET EVERY EVENING, Disp: 90 tablet, Rfl: 2  •  carvedilol (COREG) 25 MG tablet, TAKE 1 TABLET TWICE A DAY, Disp: 180 tablet, Rfl: 3  •  Cholecalciferol (VITAMIN D3) 2000 units capsule, Take 1 tablet by mouth Daily., Disp: , Rfl:   •  esomeprazole (nexIUM) 20 MG capsule, TAKE 1 CAPSULE DAILY, Disp: 90 capsule, Rfl: 1  •  furosemide (LASIX) 20 MG tablet, Take 1 tablet by mouth Daily As Needed (swelling feet)., Disp: 30 tablet, Rfl: 5  •  Multiple Vitamins-Calcium (VIACTIV MULTI-VITAMIN) chewable tablet, Chew 2 tablets Daily., Disp: , Rfl:   •  Multiple Vitamins-Minerals (PRESERVISION AREDS 2 PO), Take 2 tablets by mouth Daily., Disp: , Rfl:   •  potassium chloride (K-DUR) 10 MEQ CR tablet, Take 1 tablet by mouth Daily As Needed (as needed with lasix)., Disp: 30 tablet, Rfl: 5  •  promethazine-phenylephrine (promethazine-phenylephrine) 6.25-5 MG/5ML syrup syrup, Take 5 mL by mouth Every 8 (Eight) Hours As Needed (cough)., Disp: 90 mL, Rfl: 0  •  ramipril (ALTACE) 5 MG capsule, TAKE 1 CAPSULE EVERY MORNING AND 2 CAPSULES EVERY EVENING, Disp: 270 capsule, Rfl: 4  •  sertraline (ZOLOFT) 100 MG tablet, TAKE 1 TABLET DAILY, Disp: 90 tablet, Rfl: 1  •  XARELTO 20 MG tablet, TAKE 1 TABLET DAILY AS DIRECTED, Disp: 90 tablet, Rfl: 3  •  HYDROcod Polst-CPM Polst ER (TUSSIONEX PENNKINETIC ER) 10-8 MG/5ML ER suspension, Take 5 mL by mouth Every 12 (Twelve) Hours As Needed for Cough., " Disp: 115 mL, Rfl: 0    Physical Exam:    Physical Exam   Constitutional: She is oriented to person, place, and time. She appears well-developed and well-nourished.   HENT:   Head: Normocephalic.   Right Ear: Tympanic membrane and ear canal normal.   Left Ear: Tympanic membrane and ear canal normal.   Nose: Mucosal edema present.   Mouth/Throat: Uvula is midline and oropharynx is clear and moist.   Eyes: Conjunctivae and EOM are normal. Pupils are equal, round, and reactive to light.   Neck: Normal range of motion. Neck supple. No thyromegaly present.   Cardiovascular: Normal rate, regular rhythm, normal heart sounds and intact distal pulses.   Pulmonary/Chest: Effort normal and breath sounds normal.   Abdominal: Soft. Normal appearance and bowel sounds are normal. There is no hepatosplenomegaly. There is no tenderness.   Musculoskeletal: Normal range of motion. She exhibits no edema.   Lymphadenopathy:     She has no cervical adenopathy.   Neurological: She is alert and oriented to person, place, and time.   Skin: Lesion noted.   To bluish-black lesions on the lower lip that do not mariola to pressure.  Not tender.   Psychiatric: She has a normal mood and affect. Thought content normal.   Nursing note and vitals reviewed.       ACE III MINI        Results Review:    None    CMP:  Lab Results   Component Value Date    BUN 15 02/19/2019    CREATININE 0.69 02/19/2019    EGFRIFNONA 81 02/19/2019    BCR 21.7 02/19/2019     02/19/2019    K 4.0 02/19/2019    CO2 31.0 02/19/2019    CALCIUM 8.5 (L) 02/19/2019    ALBUMIN 3.52 02/19/2019    BILITOT 1.9 (H) 02/19/2019    ALKPHOS 80 02/19/2019    AST 19 02/19/2019    ALT 19 02/19/2019     HbA1c:  Lab Results   Component Value Date    HGBA1C 6.0 09/07/2014    HGBA1C 6.1 (H) 05/14/2014     Microalbumin:  Lab Results   Component Value Date    MICROALBUR 32.4 02/19/2019     Lipid Panel  Lab Results   Component Value Date    CHOL 170 02/19/2019    TRIG 77 02/19/2019    HDL 56  02/19/2019    LDL 91 02/19/2019    AST 19 02/19/2019    ALT 19 02/19/2019       Medication Review: Medications reviewed and noted    Problem List Items Addressed This Visit        Cardiovascular and Mediastinum    Chronic atrial fibrillation (CMS/HCC)    Relevant Medications    amLODIPine (NORVASC) 2.5 MG tablet    carvedilol (COREG) 25 MG tablet    Benign essential hypertension    Relevant Medications    amLODIPine (NORVASC) 2.5 MG tablet    carvedilol (COREG) 25 MG tablet    ramipril (ALTACE) 5 MG capsule    furosemide (LASIX) 20 MG tablet       Respiratory    Chronic cough - Primary    Relevant Medications    HYDROcod Polst-CPM Polst ER (TUSSIONEX PENNKINETIC ER) 10-8 MG/5ML ER suspension    Other Relevant Orders    XR Chest PA & Lateral (Completed)       Digestive    Constipation, slow transit      Other Visit Diagnoses     Blood-tinged sputum        Lower abdominal pain        She has a family history of colon cancer in her mother and a personal history of tubular adenomatous polyps in 2015 which was her last colonoscopy.      Lesion of lip        2 lesions on lower lip that appear to be vascular but I think we should rule out melanoma.  The patient sees Dr. Almodovar and will call her office for an appointme    Encounter for screening colonoscopy        Relevant Orders    Ambulatory Referral For Screening Colonoscopy (Completed)           Patient Instructions   For chronic cough with what sounds like blood-tinged sputum, we will get a chest x-ray today.  Try Tussionex syrup in the evenings to try to help the cough so you can sleep.    For lower abdominal pain with constipation, continue using your high-fiber cereal and also use Citrucel every day.  If constipation is not improving, add a dose of MiraLAX every day and hot coffee or tea.  We will refer to Dr. Marie for colonoscopy considering the family history of colon cancer in your mother and the personal history of tubular adenomatous colon polyps on the last  colonoscopy in 2015.    For the bluish-black lesions on the lower lip, make an appointment with Dr. humphrey so to check them out.  They are probably vascular but I would like for her to make sure they are not melanoma.    For atrial fibrillation, continue current medications and Xarelto.    For hypertension, continue current medications including amlodipine and carvedilol and ramipril and low-salt diet.        Plan of care reviewed with patient at the conclusion of today's visit. Education was provided regarding diagnosis, management, and any prescribed or recommended OTC medications.Patient verbalizes understanding of and agreement with management plan.         Akilah Rueda MD

## 2019-07-01 ENCOUNTER — OFFICE VISIT (OUTPATIENT)
Dept: INTERNAL MEDICINE | Facility: CLINIC | Age: 84
End: 2019-07-01

## 2019-07-01 VITALS
SYSTOLIC BLOOD PRESSURE: 122 MMHG | DIASTOLIC BLOOD PRESSURE: 74 MMHG | TEMPERATURE: 98.7 F | HEIGHT: 68 IN | HEART RATE: 72 BPM | BODY MASS INDEX: 28.64 KG/M2 | WEIGHT: 189 LBS

## 2019-07-01 DIAGNOSIS — M54.31 RIGHT SIDED SCIATICA: Primary | ICD-10-CM

## 2019-07-01 DIAGNOSIS — I48.20 CHRONIC ATRIAL FIBRILLATION (HCC): ICD-10-CM

## 2019-07-01 DIAGNOSIS — I83.93 VARICOSE VEINS OF BOTH LOWER EXTREMITIES WITHOUT ULCER OR INFLAMMATION: ICD-10-CM

## 2019-07-01 PROCEDURE — 96372 THER/PROPH/DIAG INJ SC/IM: CPT | Performed by: NURSE PRACTITIONER

## 2019-07-01 PROCEDURE — 99213 OFFICE O/P EST LOW 20 MIN: CPT | Performed by: NURSE PRACTITIONER

## 2019-07-01 RX ORDER — TRIAMCINOLONE ACETONIDE 40 MG/ML
80 INJECTION, SUSPENSION INTRA-ARTICULAR; INTRAMUSCULAR ONCE
Status: COMPLETED | OUTPATIENT
Start: 2019-07-01 | End: 2019-07-01

## 2019-07-01 RX ORDER — PREDNISONE 10 MG/1
TABLET ORAL
Qty: 30 TABLET | Refills: 0 | Status: SHIPPED | OUTPATIENT
Start: 2019-07-01 | End: 2019-07-18

## 2019-07-01 RX ADMIN — TRIAMCINOLONE ACETONIDE 80 MG: 40 INJECTION, SUSPENSION INTRA-ARTICULAR; INTRAMUSCULAR at 16:39

## 2019-07-01 NOTE — PATIENT INSTRUCTIONS
Kenalog injection given in office today.  Start prednisone taper tomorrow.  Use generic Tylenol arthritis 2 tablets 3 times a day.  If not improving by the end of the week call and we will order physical therapy.  Do not take ibuprofen (Advil)    Sciatica Rehab  Ask your health care provider which exercises are safe for you. Do exercises exactly as told by your health care provider and adjust them as directed. It is normal to feel mild stretching, pulling, tightness, or discomfort as you do these exercises, but you should stop right away if you feel sudden pain or your pain gets worse. Do not begin these exercises until told by your health care provider.  Stretching and range of motion exercises  These exercises warm up your muscles and joints and improve the movement and flexibility of your hips and your back. These exercises also help to relieve pain, numbness, and tingling.  Exercise A: Sciatic nerve glide  1. Sit in a chair with your head facing down toward your chest. Place your hands behind your back. Let your shoulders slump forward.  2. Slowly straighten one of your knees while you tilt your head back as if you are looking toward the ceiling. Only straighten your leg as far as you can without making your symptoms worse.  3. Hold for __________ seconds.  4. Slowly return to the starting position.  5. Repeat with your other leg.  Repeat __________ times. Complete this exercise __________ times a day.  Exercise B: Knee to chest with hip adduction and internal rotation    1. Lie on your back on a firm surface with both legs straight.  2. Bend one of your knees and move it up toward your chest until you feel a gentle stretch in your lower back and buttock. Then, move your knee toward the shoulder that is on the opposite side from your leg.  ? Hold your leg in this position by holding onto the front of your knee.  3. Hold for __________ seconds.  4. Slowly return to the starting position.  5. Repeat with your  other leg.  Repeat __________ times. Complete this exercise __________ times a day.  Exercise C: Prone extension on elbows    1. Lie on your abdomen on a firm surface. A bed may be too soft for this exercise.  2. Prop yourself up on your elbows.  3. Use your arms to help lift your chest up until you feel a gentle stretch in your abdomen and your lower back.  ? This will place some of your body weight on your elbows. If this is uncomfortable, try stacking pillows under your chest.  ? Your hips should stay down, against the surface that you are lying on. Keep your hip and back muscles relaxed.  4. Hold for __________ seconds.  5. Slowly relax your upper body and return to the starting position.  Repeat __________ times. Complete this exercise __________ times a day.  Strengthening exercises  These exercises build strength and endurance in your back. Endurance is the ability to use your muscles for a long time, even after they get tired.  Exercise D: Pelvic tilt  1. Lie on your back on a firm surface. Bend your knees and keep your feet flat.  2. Tense your abdominal muscles. Tip your pelvis up toward the ceiling and flatten your lower back into the floor.  ? To help with this exercise, you may place a small towel under your lower back and try to push your back into the towel.  3. Hold for __________ seconds.  4. Let your muscles relax completely before you repeat this exercise.  Repeat __________ times. Complete this exercise __________ times a day.  Exercise E: Alternating arm and leg raises    1. Get on your hands and knees on a firm surface. If you are on a hard floor, you may want to use padding to cushion your knees, such as an exercise mat.  2. Line up your arms and legs. Your hands should be below your shoulders, and your knees should be below your hips.  3. Lift your left leg behind you. At the same time, raise your right arm and straighten it in front of you.  ? Do not lift your leg higher than your hip.  ? Do  not lift your arm higher than your shoulder.  ? Keep your abdominal and back muscles tight.  ? Keep your hips facing the ground.  ? Do not arch your back.  ? Keep your balance carefully, and do not hold your breath.  4. Hold for __________ seconds.  5. Slowly return to the starting position and repeat with your right leg and your left arm.  Repeat __________ times. Complete this exercise __________ times a day.  Posture and body mechanics    Body mechanics refers to the movements and positions of your body while you do your daily activities. Posture is part of body mechanics. Good posture and healthy body mechanics can help to relieve stress in your body's tissues and joints. Good posture means that your spine is in its natural S-curve position (your spine is neutral), your shoulders are pulled back slightly, and your head is not tipped forward. The following are general guidelines for applying improved posture and body mechanics to your everyday activities.  Standing    · When standing, keep your spine neutral and your feet about hip-width apart. Keep a slight bend in your knees. Your ears, shoulders, and hips should line up.  · When you do a task in which you  one place for a long time, place one foot up on a stable object that is 2-4 inches (5-10 cm) high, such as a footstool. This helps keep your spine neutral.  Sitting    · When sitting, keep your spine neutral and keep your feet flat on the floor. Use a footrest, if necessary, and keep your thighs parallel to the floor. Avoid rounding your shoulders, and avoid tilting your head forward.  · When working at a desk or a computer, keep your desk at a height where your hands are slightly lower than your elbows. Slide your chair under your desk so you are close enough to maintain good posture.  · When working at a computer, place your monitor at a height where you are looking straight ahead and you do not have to tilt your head forward or downward to look at  the screen.  Resting    · When lying down and resting, avoid positions that are most painful for you.  · If you have pain with activities such as sitting, bending, stooping, or squatting (flexion-based activities), lie in a position in which your body does not bend very much. For example, avoid curling up on your side with your arms and knees near your chest (fetal position).  · If you have pain with activities such as standing for a long time or reaching with your arms (extension-based activities), lie with your spine in a neutral position and bend your knees slightly. Try the following positions:  ? Lying on your side with a pillow between your knees.  ? Lying on your back with a pillow under your knees.  Lifting    · When lifting objects, keep your feet at least shoulder-width apart and tighten your abdominal muscles.  · Bend your knees and hips and keep your spine neutral. It is important to lift using the strength of your legs, not your back. Do not lock your knees straight out.  · Always ask for help to lift heavy or awkward objects.  This information is not intended to replace advice given to you by your health care provider. Make sure you discuss any questions you have with your health care provider.  Document Released: 12/18/2006 Document Revised: 08/24/2017 Document Reviewed: 09/02/2016  [employer] Interactive Patient Education © 2019 [employer]

## 2019-07-01 NOTE — PROGRESS NOTES
"Jayda Lopes  1935  [id number]  Patient Care Team:  Akilah Rueda MD as PCP - General  Akilah Rueda MD as PCP - Family Medicine  Bernadette Almodovar MD as Consulting Physician (Dermatology)    Jayda Lopes is a pleasant 83 y.o. female who presents for evaluation of Pain (Pain that starts on the right hip and shoots down her leg )      Chief Complaint   Patient presents with   • Pain     Pain that starts on the right hip and shoots down her leg        HPI:   Sharp, burning pain in right buttocks started Friday.  No new activity or injury. Radiates to posterior leg and calf, no swelling, redness, heat worse with weight bearing, sometimes in calf sensation fells like someone is pouring \"ice cold water on her leg\".  Has used her 's cane the last couple of days.  Tried Advil 400 mg 2 different times on Saturday with no improvement.  Denies back pain.  Turning over on right side in bed is more uncomfortable.  She reports this is not similar to any arthritis pain she has had in the past.    Past Medical History:   Diagnosis Date   • Acute bronchitis due to infection 1/10/2019   • Anxiety    • Atrial fibrillation (CMS/HCC)    • Atypical chest pain    • Bradycardia    • GERD (gastroesophageal reflux disease)    • Hyperlipidemia    • Hypertension    • Macular degeneration    • Mitral valve prolapse    • Palpitation      Past Surgical History:   Procedure Laterality Date   • CYSTECTOMY      h/o Ovarian   • HYSTERECTOMY Bilateral    • OOPHORECTOMY Bilateral 1993   • THYROIDECTOMY      h/o thyroid surgery sub-total      Family History   Problem Relation Age of Onset   • Colon cancer Mother    • Other Mother         cardiac arrhythmia   • Heart failure Mother    • Cancer Mother    • Lung cancer Father    • Cancer Father    • No Known Problems Brother    • Hypertension Maternal Grandmother    • Breast cancer Neg Hx    • Ovarian cancer Neg Hx      Social History     Tobacco Use   Smoking Status Never " Smoker   Smokeless Tobacco Never Used     Allergies   Allergen Reactions   • Phenazopyridine Hcl Unknown (See Comments)     Pyridium       Current Outpatient Medications:   •  Aflibercept (EYLEA IO), Inject  into the eye Every 3 (Three) Months., Disp: , Rfl:   •  amLODIPine (NORVASC) 2.5 MG tablet, Take 1 tablet by mouth 2 (Two) Times a Day., Disp: 180 tablet, Rfl: 1  •  atorvastatin (LIPITOR) 20 MG tablet, TAKE 1 TABLET EVERY EVENING, Disp: 90 tablet, Rfl: 2  •  carvedilol (COREG) 25 MG tablet, TAKE 1 TABLET TWICE A DAY, Disp: 180 tablet, Rfl: 3  •  Cholecalciferol (VITAMIN D3) 2000 units capsule, Take 1 tablet by mouth Daily., Disp: , Rfl:   •  esomeprazole (nexIUM) 20 MG capsule, TAKE 1 CAPSULE DAILY, Disp: 90 capsule, Rfl: 1  •  furosemide (LASIX) 20 MG tablet, Take 1 tablet by mouth Daily As Needed (swelling feet)., Disp: 30 tablet, Rfl: 5  •  Multiple Vitamins-Calcium (VIACTIV MULTI-VITAMIN) chewable tablet, Chew 2 tablets Daily., Disp: , Rfl:   •  Multiple Vitamins-Minerals (PRESERVISION AREDS 2 PO), Take 2 tablets by mouth Daily., Disp: , Rfl:   •  potassium chloride (K-DUR) 10 MEQ CR tablet, Take 1 tablet by mouth Daily As Needed (as needed with lasix)., Disp: 30 tablet, Rfl: 5  •  ramipril (ALTACE) 5 MG capsule, TAKE 1 CAPSULE EVERY MORNING AND 2 CAPSULES EVERY EVENING, Disp: 270 capsule, Rfl: 4  •  sertraline (ZOLOFT) 100 MG tablet, TAKE 1 TABLET DAILY, Disp: 90 tablet, Rfl: 1  •  XARELTO 20 MG tablet, TAKE 1 TABLET DAILY AS DIRECTED, Disp: 90 tablet, Rfl: 3  •  predniSONE (DELTASONE) 10 MG tablet, Take 4 tablets every am for 4 days, then 3 tablets every am for 3 days, then 2 tablets every am for 2 days, then 1 tablet the last am., Disp: 30 tablet, Rfl: 0  No current facility-administered medications for this visit.     Review of Systems   Constitutional: Negative for chills, fatigue and fever.   HENT: Negative for congestion, ear pain and sinus pressure.    Respiratory: Positive for cough. Negative for  "chest tightness, shortness of breath and wheezing.    Cardiovascular: Negative for chest pain and palpitations.   Gastrointestinal: Negative for abdominal pain, blood in stool and constipation.   Skin: Negative for color change.   Allergic/Immunologic: Negative for environmental allergies.   Neurological: Negative for dizziness, speech difficulty and headache.   Psychiatric/Behavioral: Negative for decreased concentration. The patient is not nervous/anxious.      /74 (BP Location: Left arm, Patient Position: Sitting, Cuff Size: Adult)   Pulse 72   Temp 98.7 °F (37.1 °C) (Temporal)   Ht 172.7 cm (67.99\")   Wt 85.7 kg (189 lb) Comment: Patient reported  LMP  (LMP Unknown)   BMI 28.74 kg/m²     Physical Exam   Constitutional: She appears well-developed and well-nourished.   Pulmonary/Chest: Effort normal.   Musculoskeletal:   Using cane.  No pain on palpation, right mid buttocks pain reported.  No leg swelling.  Palpable pedal pulse.   Skin: Skin is warm and dry.   Psychiatric: She has a normal mood and affect. Her behavior is normal.   Vitals reviewed.    Assessment/Plan:  Jayda was seen today for pain.    Diagnoses and all orders for this visit:    Right sided sciatica  -     triamcinolone acetonide (KENALOG-40) injection 80 mg  -     predniSONE (DELTASONE) 10 MG tablet; Take 4 tablets every am for 4 days, then 3 tablets every am for 3 days, then 2 tablets every am for 2 days, then 1 tablet the last am.    Chronic atrial fibrillation (CMS/HCC)  Comments:  On Xarelto.    Varicose veins of both lower extremities without ulcer or inflammation       Patient Instructions   Kenalog injection given in office today.  Start prednisone taper tomorrow.  Use generic Tylenol arthritis 2 tablets 3 times a day.  If not improving by the end of the week call and we will order physical therapy.  Do not take ibuprofen (Advil)    Sciatica Rehab  Ask your health care provider which exercises are safe for you. Do exercises " exactly as told by your health care provider and adjust them as directed. It is normal to feel mild stretching, pulling, tightness, or discomfort as you do these exercises, but you should stop right away if you feel sudden pain or your pain gets worse. Do not begin these exercises until told by your health care provider.  Stretching and range of motion exercises  These exercises warm up your muscles and joints and improve the movement and flexibility of your hips and your back. These exercises also help to relieve pain, numbness, and tingling.  Exercise A: Sciatic nerve glide  1. Sit in a chair with your head facing down toward your chest. Place your hands behind your back. Let your shoulders slump forward.  2. Slowly straighten one of your knees while you tilt your head back as if you are looking toward the ceiling. Only straighten your leg as far as you can without making your symptoms worse.  3. Hold for __________ seconds.  4. Slowly return to the starting position.  5. Repeat with your other leg.  Repeat __________ times. Complete this exercise __________ times a day.  Exercise B: Knee to chest with hip adduction and internal rotation    1. Lie on your back on a firm surface with both legs straight.  2. Bend one of your knees and move it up toward your chest until you feel a gentle stretch in your lower back and buttock. Then, move your knee toward the shoulder that is on the opposite side from your leg.  ? Hold your leg in this position by holding onto the front of your knee.  3. Hold for __________ seconds.  4. Slowly return to the starting position.  5. Repeat with your other leg.  Repeat __________ times. Complete this exercise __________ times a day.  Exercise C: Prone extension on elbows    1. Lie on your abdomen on a firm surface. A bed may be too soft for this exercise.  2. Prop yourself up on your elbows.  3. Use your arms to help lift your chest up until you feel a gentle stretch in your abdomen and  your lower back.  ? This will place some of your body weight on your elbows. If this is uncomfortable, try stacking pillows under your chest.  ? Your hips should stay down, against the surface that you are lying on. Keep your hip and back muscles relaxed.  4. Hold for __________ seconds.  5. Slowly relax your upper body and return to the starting position.  Repeat __________ times. Complete this exercise __________ times a day.  Strengthening exercises  These exercises build strength and endurance in your back. Endurance is the ability to use your muscles for a long time, even after they get tired.  Exercise D: Pelvic tilt  1. Lie on your back on a firm surface. Bend your knees and keep your feet flat.  2. Tense your abdominal muscles. Tip your pelvis up toward the ceiling and flatten your lower back into the floor.  ? To help with this exercise, you may place a small towel under your lower back and try to push your back into the towel.  3. Hold for __________ seconds.  4. Let your muscles relax completely before you repeat this exercise.  Repeat __________ times. Complete this exercise __________ times a day.  Exercise E: Alternating arm and leg raises    1. Get on your hands and knees on a firm surface. If you are on a hard floor, you may want to use padding to cushion your knees, such as an exercise mat.  2. Line up your arms and legs. Your hands should be below your shoulders, and your knees should be below your hips.  3. Lift your left leg behind you. At the same time, raise your right arm and straighten it in front of you.  ? Do not lift your leg higher than your hip.  ? Do not lift your arm higher than your shoulder.  ? Keep your abdominal and back muscles tight.  ? Keep your hips facing the ground.  ? Do not arch your back.  ? Keep your balance carefully, and do not hold your breath.  4. Hold for __________ seconds.  5. Slowly return to the starting position and repeat with your right leg and your left  arm.  Repeat __________ times. Complete this exercise __________ times a day.  Posture and body mechanics    Body mechanics refers to the movements and positions of your body while you do your daily activities. Posture is part of body mechanics. Good posture and healthy body mechanics can help to relieve stress in your body's tissues and joints. Good posture means that your spine is in its natural S-curve position (your spine is neutral), your shoulders are pulled back slightly, and your head is not tipped forward. The following are general guidelines for applying improved posture and body mechanics to your everyday activities.  Standing    · When standing, keep your spine neutral and your feet about hip-width apart. Keep a slight bend in your knees. Your ears, shoulders, and hips should line up.  · When you do a task in which you  one place for a long time, place one foot up on a stable object that is 2-4 inches (5-10 cm) high, such as a footstool. This helps keep your spine neutral.  Sitting    · When sitting, keep your spine neutral and keep your feet flat on the floor. Use a footrest, if necessary, and keep your thighs parallel to the floor. Avoid rounding your shoulders, and avoid tilting your head forward.  · When working at a desk or a computer, keep your desk at a height where your hands are slightly lower than your elbows. Slide your chair under your desk so you are close enough to maintain good posture.  · When working at a computer, place your monitor at a height where you are looking straight ahead and you do not have to tilt your head forward or downward to look at the screen.  Resting    · When lying down and resting, avoid positions that are most painful for you.  · If you have pain with activities such as sitting, bending, stooping, or squatting (flexion-based activities), lie in a position in which your body does not bend very much. For example, avoid curling up on your side with your arms  and knees near your chest (fetal position).  · If you have pain with activities such as standing for a long time or reaching with your arms (extension-based activities), lie with your spine in a neutral position and bend your knees slightly. Try the following positions:  ? Lying on your side with a pillow between your knees.  ? Lying on your back with a pillow under your knees.  Lifting    · When lifting objects, keep your feet at least shoulder-width apart and tighten your abdominal muscles.  · Bend your knees and hips and keep your spine neutral. It is important to lift using the strength of your legs, not your back. Do not lock your knees straight out.  · Always ask for help to lift heavy or awkward objects.  This information is not intended to replace advice given to you by your health care provider. Make sure you discuss any questions you have with your health care provider.  Document Released: 12/18/2006 Document Revised: 08/24/2017 Document Reviewed: 09/02/2016  [employer] Patient Education © 2019 [employer]      Plan of care reviewed with patient at the conclusion of today's visit. Education was provided regarding diagnosis, management and any prescribed or recommended OTC medications.  Patient verbalizes understanding of and agreement with management plan.    Return if symptoms worsen or fail to improve.    *Note that portions of this note were completed with a voice recognition program.  Efforts were made to edit the dictation but occasionally words are transcribed.    ZULAY Salinas

## 2019-07-18 ENCOUNTER — LAB (OUTPATIENT)
Dept: LAB | Facility: HOSPITAL | Age: 84
End: 2019-07-18

## 2019-07-18 ENCOUNTER — OFFICE VISIT (OUTPATIENT)
Dept: INTERNAL MEDICINE | Facility: CLINIC | Age: 84
End: 2019-07-18

## 2019-07-18 ENCOUNTER — HOSPITAL ENCOUNTER (OUTPATIENT)
Dept: GENERAL RADIOLOGY | Facility: HOSPITAL | Age: 84
Discharge: HOME | End: 2019-07-18
Admitting: PHYSICIAN ASSISTANT

## 2019-07-18 VITALS
BODY MASS INDEX: 28.64 KG/M2 | HEIGHT: 68 IN | DIASTOLIC BLOOD PRESSURE: 80 MMHG | WEIGHT: 189 LBS | SYSTOLIC BLOOD PRESSURE: 130 MMHG | HEART RATE: 70 BPM | TEMPERATURE: 98.4 F

## 2019-07-18 DIAGNOSIS — E78.2 MIXED HYPERLIPIDEMIA: Chronic | ICD-10-CM

## 2019-07-18 DIAGNOSIS — E55.9 VITAMIN D DEFICIENCY: ICD-10-CM

## 2019-07-18 DIAGNOSIS — M54.31 SCIATICA OF RIGHT SIDE: ICD-10-CM

## 2019-07-18 DIAGNOSIS — E53.8 B12 DEFICIENCY: ICD-10-CM

## 2019-07-18 DIAGNOSIS — M25.551 RIGHT HIP PAIN: ICD-10-CM

## 2019-07-18 DIAGNOSIS — I48.20 CHRONIC ATRIAL FIBRILLATION (HCC): ICD-10-CM

## 2019-07-18 DIAGNOSIS — M54.31 SCIATICA OF RIGHT SIDE: Primary | ICD-10-CM

## 2019-07-18 LAB
25(OH)D3 SERPL-MCNC: 53.4 NG/ML (ref 30–100)
ALBUMIN SERPL-MCNC: 4.2 G/DL (ref 3.5–5.2)
ALBUMIN UR-MCNC: 5.7 MG/L
ALBUMIN/GLOB SERPL: 1.4 G/DL
ALP SERPL-CCNC: 83 U/L (ref 39–117)
ALT SERPL W P-5'-P-CCNC: 22 U/L (ref 1–33)
ANION GAP SERPL CALCULATED.3IONS-SCNC: 12 MMOL/L (ref 5–15)
AST SERPL-CCNC: 25 U/L (ref 1–32)
BASOPHILS # BLD AUTO: 0.07 10*3/MM3 (ref 0–0.2)
BASOPHILS NFR BLD AUTO: 0.7 % (ref 0–1.5)
BILIRUB SERPL-MCNC: 1.3 MG/DL (ref 0.2–1.2)
BUN BLD-MCNC: 15 MG/DL (ref 8–23)
BUN/CREAT SERPL: 19.2 (ref 7–25)
CALCIUM SPEC-SCNC: 9.4 MG/DL (ref 8.6–10.5)
CHLORIDE SERPL-SCNC: 102 MMOL/L (ref 98–107)
CHOLEST SERPL-MCNC: 204 MG/DL (ref 0–200)
CO2 SERPL-SCNC: 28 MMOL/L (ref 22–29)
CREAT BLD-MCNC: 0.78 MG/DL (ref 0.57–1)
DEPRECATED RDW RBC AUTO: 62 FL (ref 37–54)
EOSINOPHIL # BLD AUTO: 0.13 10*3/MM3 (ref 0–0.4)
EOSINOPHIL NFR BLD AUTO: 1.3 % (ref 0.3–6.2)
ERYTHROCYTE [DISTWIDTH] IN BLOOD BY AUTOMATED COUNT: 17.9 % (ref 12.3–15.4)
ERYTHROCYTE [SEDIMENTATION RATE] IN BLOOD: 12 MM/HR (ref 0–30)
GFR SERPL CREATININE-BSD FRML MDRD: 71 ML/MIN/1.73
GLOBULIN UR ELPH-MCNC: 3 GM/DL
GLUCOSE BLD-MCNC: 97 MG/DL (ref 65–99)
HCT VFR BLD AUTO: 43.6 % (ref 34–46.6)
HDLC SERPL-MCNC: 76 MG/DL (ref 40–60)
HGB BLD-MCNC: 13.3 G/DL (ref 12–15.9)
IMM GRANULOCYTES # BLD AUTO: 0.04 10*3/MM3 (ref 0–0.05)
IMM GRANULOCYTES NFR BLD AUTO: 0.4 % (ref 0–0.5)
LDLC SERPL CALC-MCNC: 112 MG/DL (ref 0–100)
LDLC/HDLC SERPL: 1.47 {RATIO}
LYMPHOCYTES # BLD AUTO: 1.61 10*3/MM3 (ref 0.7–3.1)
LYMPHOCYTES NFR BLD AUTO: 15.9 % (ref 19.6–45.3)
MCH RBC QN AUTO: 28.9 PG (ref 26.6–33)
MCHC RBC AUTO-ENTMCNC: 30.5 G/DL (ref 31.5–35.7)
MCV RBC AUTO: 94.8 FL (ref 79–97)
MONOCYTES # BLD AUTO: 0.73 10*3/MM3 (ref 0.1–0.9)
MONOCYTES NFR BLD AUTO: 7.2 % (ref 5–12)
NEUTROPHILS # BLD AUTO: 7.55 10*3/MM3 (ref 1.7–7)
NEUTROPHILS NFR BLD AUTO: 74.5 % (ref 42.7–76)
NRBC BLD AUTO-RTO: 0 /100 WBC (ref 0–0.2)
PLATELET # BLD AUTO: 211 10*3/MM3 (ref 140–450)
PMV BLD AUTO: 10.9 FL (ref 6–12)
POTASSIUM BLD-SCNC: 3.8 MMOL/L (ref 3.5–5.2)
PROT SERPL-MCNC: 7.2 G/DL (ref 6–8.5)
RBC # BLD AUTO: 4.6 10*6/MM3 (ref 3.77–5.28)
SODIUM BLD-SCNC: 142 MMOL/L (ref 136–145)
TRIGL SERPL-MCNC: 80 MG/DL (ref 0–150)
VIT B12 BLD-MCNC: 646 PG/ML (ref 211–946)
VLDLC SERPL-MCNC: 16 MG/DL (ref 5–40)
WBC NRBC COR # BLD: 10.13 10*3/MM3 (ref 3.4–10.8)

## 2019-07-18 PROCEDURE — 72100 X-RAY EXAM L-S SPINE 2/3 VWS: CPT

## 2019-07-18 PROCEDURE — 80061 LIPID PANEL: CPT

## 2019-07-18 PROCEDURE — G0439 PPPS, SUBSEQ VISIT: HCPCS | Performed by: PHYSICIAN ASSISTANT

## 2019-07-18 PROCEDURE — 82306 VITAMIN D 25 HYDROXY: CPT

## 2019-07-18 PROCEDURE — 82607 VITAMIN B-12: CPT

## 2019-07-18 PROCEDURE — 82043 UR ALBUMIN QUANTITATIVE: CPT

## 2019-07-18 PROCEDURE — 73502 X-RAY EXAM HIP UNI 2-3 VIEWS: CPT

## 2019-07-18 PROCEDURE — 85025 COMPLETE CBC W/AUTO DIFF WBC: CPT

## 2019-07-18 PROCEDURE — 85652 RBC SED RATE AUTOMATED: CPT

## 2019-07-18 PROCEDURE — 80053 COMPREHEN METABOLIC PANEL: CPT

## 2019-07-18 NOTE — PATIENT INSTRUCTIONS
Advance Directive  Advance directives are legal documents that let you make choices ahead of time about your health care and medical treatment in case you become unable to communicate for yourself. Advance directives are a way for you to communicate your wishes to family, friends, and health care providers. This can help convey your decisions about end-of-life care if you become unable to communicate.  Discussing and writing advance directives should happen over time rather than all at once. Advance directives can be changed depending on your situation and what you want, even after you have signed the advance directives.  If you do not have an advance directive, some states assign family decision makers to act on your behalf based on how closely you are related to them. Each state has its own laws regarding advance directives. You may want to check with your health care provider, , or state representative about the laws in your state. There are different types of advance directives, such as:  · Medical power of .  · Living will.  · Do not resuscitate (DNR) or do not attempt resuscitation (DNAR) order.    Health care proxy and medical power of   A health care proxy, also called a health care agent, is a person who is appointed to make medical decisions for you in cases in which you are unable to make the decisions yourself. Generally, people choose someone they know well and trust to represent their preferences. Make sure to ask this person for an agreement to act as your proxy. A proxy may have to exercise judgment in the event of a medical decision for which your wishes are not known.  A medical power of  is a legal document that names your health care proxy. Depending on the laws in your state, after the document is written, it may also need to be:  · Signed.  · Notarized.  · Dated.  · Copied.  · Witnessed.  · Incorporated into your medical record.    You may also want to appoint  someone to manage your financial affairs in a situation in which you are unable to do so. This is called a durable power of  for finances. It is a separate legal document from the durable power of  for health care. You may choose the same person or someone different from your health care proxy to act as your agent in financial matters.  If you do not appoint a proxy, or if there is a concern that the proxy is not acting in your best interests, a court-appointed guardian may be designated to act on your behalf.  Living will  A living will is a set of instructions documenting your wishes about medical care when you cannot express them yourself. Health care providers should keep a copy of your living will in your medical record. You may want to give a copy to family members or friends. To alert caregivers in case of an emergency, you can place a card in your wallet to let them know that you have a living will and where they can find it. A living will is used if you become:  · Terminally ill.  · Incapacitated.  · Unable to communicate or make decisions.    Items to consider in your living will include:  · The use or non-use of life-sustaining equipment, such as dialysis machines and breathing machines (ventilators).  · A DNR or DNAR order, which is the instruction not to use cardiopulmonary resuscitation (CPR) if breathing or heartbeat stops.  · The use or non-use of tube feeding.  · Withholding of food and fluids.  · Comfort (palliative) care when the goal becomes comfort rather than a cure.  · Organ and tissue donation.    A living will does not give instructions for distributing your money and property if you should pass away. It is recommended that you seek the advice of a  when writing a will. Decisions about taxes, beneficiaries, and asset distribution will be legally binding. This process can relieve your family and friends of any concerns surrounding disputes or questions that may come up  about the distribution of your assets.  DNR or DNAR  A DNR or DNAR order is a request not to have CPR in the event that your heart stops beating or you stop breathing. If a DNR or DNAR order has not been made and shared, a health care provider will try to help any patient whose heart has stopped or who has stopped breathing. If you plan to have surgery, talk with your health care provider about how your DNR or DNAR order will be followed if problems occur.  Summary  · Advance directives are the legal documents that allow you to make choices ahead of time about your health care and medical treatment in case you become unable to communicate for yourself.  · The process of discussing and writing advance directives should happen over time. You can change the advance directives, even after you have signed them.  · Advance directives include DNR or DNAR orders, living bermeo, and designating an agent as your medical power of .  This information is not intended to replace advice given to you by your health care provider. Make sure you discuss any questions you have with your health care provider.  Document Released: 03/26/2009 Document Revised: 11/06/2017 Document Reviewed: 11/06/2017  [employer] Interactive Patient Education © 2019 [employer]    Fall Prevention in the Home, Adult  Falls can cause injuries. They can happen to people of all ages. There are many things you can do to make your home safe and to help prevent falls. Ask for help when making these changes, if needed.  What actions can I take to prevent falls?  General Instructions  · Use good lighting in all rooms. Replace any light bulbs that burn out.  · Turn on the lights when you go into a dark area. Use night-lights.  · Keep items that you use often in easy-to-reach places. Lower the shelves around your home if necessary.  · Set up your furniture so you have a clear path. Avoid moving your furniture around.  · Do not have throw rugs and other things  on the floor that can make you trip.  · Avoid walking on wet floors.  · If any of your floors are uneven, fix them.  · Add color or contrast paint or tape to clearly fito and help you see:  ? Any grab bars or handrails.  ? First and last steps of stairways.  ? Where the edge of each step is.  · If you use a stepladder:  ? Make sure that it is fully opened. Do not climb a closed stepladder.  ? Make sure that both sides of the stepladder are locked into place.  ? Ask someone to hold the stepladder for you while you use it.  · If there are any pets around you, be aware of where they are.  What can I do in the bathroom?  · Keep the floor dry. Clean up any water that spills onto the floor as soon as it happens.  · Remove soap buildup in the tub or shower regularly.  · Use non-skid mats or decals on the floor of the tub or shower.  · Attach bath mats securely with double-sided, non-slip rug tape.  · If you need to sit down in the shower, use a plastic, non-slip stool.  · Install grab bars by the toilet and in the tub and shower. Do not use towel bars as grab bars.  What can I do in the bedroom?  · Make sure that you have a light by your bed that is easy to reach.  · Do not use any sheets or blankets that are too big for your bed. They should not hang down onto the floor.  · Have a firm chair that has side arms. You can use this for support while you get dressed.  What can I do in the kitchen?  · Clean up any spills right away.  · If you need to reach something above you, use a strong step stool that has a grab bar.  · Keep electrical cords out of the way.  · Do not use floor polish or wax that makes floors slippery. If you must use wax, use non-skid floor wax.  What can I do with my stairs?  · Do not leave any items on the stairs.  · Make sure that you have a light switch at the top of the stairs and the bottom of the stairs. If you do not have them, ask someone to add them for you.  · Make sure that there are handrails  on both sides of the stairs, and use them. Fix handrails that are broken or loose. Make sure that handrails are as long as the stairways.  · Install non-slip stair treads on all stairs in your home.  · Avoid having throw rugs at the top or bottom of the stairs. If you do have throw rugs, attach them to the floor with carpet tape.  · Choose a carpet that does not hide the edge of the steps on the stairway.  · Check any carpeting to make sure that it is firmly attached to the stairs. Fix any carpet that is loose or worn.  What can I do on the outside of my home?  · Use bright outdoor lighting.  · Regularly fix the edges of walkways and driveways and fix any cracks.  · Remove anything that might make you trip as you walk through a door, such as a raised step or threshold.  · Trim any bushes or trees on the path to your home.  · Regularly check to see if handrails are loose or broken. Make sure that both sides of any steps have handrails.  · Install guardrails along the edges of any raised decks and porches.  · Clear walking paths of anything that might make someone trip, such as tools or rocks.  · Have any leaves, snow, or ice cleared regularly.  · Use sand or salt on walking paths during winter.  · Clean up any spills in your garage right away. This includes grease or oil spills.  What other actions can I take?  · Wear shoes that:  ? Have a low heel. Do not wear high heels.  ? Have rubber bottoms.  ? Are comfortable and fit you well.  ? Are closed at the toe. Do not wear open-toe sandals.  · Use tools that help you move around (mobility aids) if they are needed. These include:  ? Canes.  ? Walkers.  ? Scooters.  ? Crutches.  · Review your medicines with your doctor. Some medicines can make you feel dizzy. This can increase your chance of falling.  Ask your doctor what other things you can do to help prevent falls.  Where to find more information  · Centers for Disease Control and PreventionKAYA:  https://cdc.gov  · [employer]: https://nr5byxv.rudolph.nih.gov  Contact a doctor if:  · You are afraid of falling at home.  · You feel weak, drowsy, or dizzy at home.  · You fall at home.  Summary  · There are many simple things that you can do to make your home safe and to help prevent falls.  · Ways to make your home safe include removing tripping hazards and installing grab bars in the bathroom.  · Ask for help when making these changes in your home.  This information is not intended to replace advice given to you by your health care provider. Make sure you discuss any questions you have with your health care provider.  Document Released: 10/14/2010 Document Revised: 08/02/2018 Document Reviewed: 08/02/2018  [employer] Interactive Patient Education © 2019 [employer]  BMI for Adults  Body mass index (BMI) is a number that is calculated from a person's weight and height. In most adults, the number is used to find how much of an adult's weight is made up of fat. BMI is not as accurate as a direct measure of body fat.  How is BMI calculated?  BMI is calculated by dividing weight in kilograms by height in meters squared. It can also be calculated by dividing weight in pounds by height in inches squared, then multiplying the resulting number by 703. Charts are available to help you find your BMI quickly and easily without doing this calculation.  How is BMI interpreted?  Health care professionals use BMI charts to identify whether an adult is underweight, at a normal weight, or overweight based on the following guidelines:  · Underweight: BMI less than 18.5.  · Normal weight: BMI between 18.5 and 24.9.  · Overweight: BMI between 25 and 29.9.  · Obese: BMI of 30 and above.    BMI is usually interpreted the same for males and females.  Weight includes both fat and muscle, so someone with a muscular build, such as an athlete, may have a BMI that is higher than 24.9. In cases like these, BMI may not  accurately depict body fat. To determine if excess body fat is the cause of a BMI of 25 or higher, further assessments may need to be done by a health care provider.  Why is BMI a useful tool?  BMI is used to identify a possible weight problem that may be related to a medical problem or may increase the risk for medical problems. BMI can also be used to promote changes to reach a healthy weight.  This information is not intended to replace advice given to you by your health care provider. Make sure you discuss any questions you have with your health care provider.  Document Released: 2005 Document Revised: 2017 Document Reviewed: 05/15/2015  [employer] Interactive Patient Education © 2018 [employer]    Medicare Wellness  Personal Prevention Plan of Service     Date of Office Visit:  2019  Encounter Provider:  Dilma Baires PA-C  Place of Service:  [facility]  Patient Name: Jayda Lopes  :  1935    As part of the Medicare Wellness portion of your visit today, we are providing you with this personalized preventive plan of services (PPPS). This plan is based upon recommendations of the [place] Preventive Services Task Force (USPSTF) and the Advisory Committee on Immunization Practices (ACIP).    This lists the preventive care services that should be considered, and provides dates of when you are due. Items listed as completed are up-to-date and do not require any further intervention.    Health Maintenance   Topic Date Due   • ZOSTER VACCINE (2 of 3) 2015   • MEDICARE ANNUAL WELLNESS  2019   • INFLUENZA VACCINE  2019   • LIPID PANEL  2020   • DXA SCAN  2020   • TDAP/TD VACCINES (2 - Td) 12/15/2020   • PNEUMOCOCCAL VACCINES (65+ LOW/MEDIUM RISK)  Completed       Orders Placed This Encounter   Procedures   • XR Spine Lumbar 2 or 3 View     Standing Status:   Future     Standing Expiration Date:   2020      Order Specific Question:   Reason for Exam:     Answer:   sciatica   • XR Hip With or Without Pelvis 2 - 3 View Right     Standing Status:   Future     Standing Expiration Date:   7/18/2020     Order Specific Question:   Reason for Exam:     Answer:   right hip pain   • Comprehensive Metabolic Panel     Standing Status:   Future     Standing Expiration Date:   7/18/2020   • Lipid Panel     Standing Status:   Future     Standing Expiration Date:   7/18/2020   • MicroAlbumin, Urine, Random - Urine, Clean Catch     Standing Status:   Future     Standing Expiration Date:   7/18/2020   • Vitamin D 25 Hydroxy     Standing Status:   Future     Standing Expiration Date:   7/18/2020   • Vitamin B12     Standing Status:   Future     Standing Expiration Date:   7/18/2020   • CBC & Differential     Standing Status:   Future     Standing Expiration Date:   7/18/2020     Order Specific Question:   Manual Differential     Answer:   No       Return for Next scheduled follow up.

## 2019-07-18 NOTE — PROGRESS NOTES
QUICK REFERENCE INFORMATION:  The ABCs of the Annual Wellness Visit    Subsequent Medicare Wellness Visit    HEALTH RISK ASSESSMENT    1935    Recent Hospitalizations:  No hospitalization(s) within the last year..        Current Medical Providers:  Patient Care Team:  Akilah Rueda MD as PCP - General  Akilah Rueda MD as PCP - Family Medicine  Bernadette Almodovar MD as Consulting Physician (Dermatology)        Smoking Status:  Social History     Tobacco Use   Smoking Status Never Smoker   Smokeless Tobacco Never Used       Alcohol Consumption:  Social History     Substance and Sexual Activity   Alcohol Use Yes    Comment: occasional        Depression Screen:   No flowsheet data found.    Health Habits and Functional and Cognitive Screening:  Functional & Cognitive Status 7/18/2019   Do you have difficulty preparing food and eating? No   Do you have difficulty bathing yourself, getting dressed or grooming yourself? No   Do you have difficulty using the toilet? No   Do you have difficulty moving around from place to place? No   Do you have trouble with steps or getting out of a bed or a chair? No   Current Diet Well Balanced Diet   Dental Exam Up to date   Eye Exam Up to date   Exercise (times per week) 0 times per week   Current Exercise Activities Include Walking   Do you need help using the phone?  No   Are you deaf or do you have serious difficulty hearing?  No   Do you need help with transportation? No   Do you need help shopping? No   Do you need help preparing meals?  No   Do you need help with housework?  No   Do you need help with laundry? No   Do you need help taking your medications? No   Do you need help managing money? No   Do you ever drive or ride in a car without wearing a seat belt? No   Have you felt unusual stress, anger or loneliness in the last month? No   Who do you live with? Spouse   If you need help, do you have trouble finding someone available to you? No   Have you been  bothered in the last four weeks by sexual problems? No   Do you have difficulty concentrating, remembering or making decisions? No           Does the patient have evidence of cognitive impairment? No    ATTENTION  What is the year: correct  What is the month of the year: correct  What is the day of the week?: correct  What is the date?: correct  MEMORY  Repeat address three times, only score third attempt: [address], [place]: 6  HOW MANY ANIMALS DID THE PATIENT NAME  Verbal Fluency -- Animal Names (0-25): 22+  CLOCK DRAWING  Clock Drawing: All Correct  MEMORY RECALL  Tell me what you remember about that name and address we were repeating at the beginnin  ACE TOTAL SCORE  Total ACE Score - <25/30 strongly suggests cognitive impairment; <21/30 almost certainly shows dementia: 29       Aspirin use counseling: Does not need ASA (and currently is not on it)      Recent Lab Results:  CMP:  Lab Results   Component Value Date    BUN 15 2019    CREATININE 0.69 2019    EGFRIFNONA 81 2019    BCR 21.7 2019     2019    K 4.0 2019    CO2 31.0 2019    CALCIUM 8.5 (L) 2019    ALBUMIN 3.52 2019    BILITOT 1.9 (H) 2019    ALKPHOS 80 2019    AST 2019    ALT 2019     HbA1c:  Lab Results   Component Value Date    HGBA1C 6.0 2014    HGBA1C 6.1 (H) 2014     Microalbumin:  Lab Results   Component Value Date    MICROALBUR 32.4 2019     Lipid Panel  Lab Results   Component Value Date    CHOL 170 2019    TRIG 77 2019    HDL 56 2019    LDL 91 2019    AST 2019    ALT 2019       Visual Acuity:  No exam data present    Age-appropriate Screening Schedule:  Refer to the list below for future screening recommendations based on patient's age, sex and/or medical conditions. Orders for these recommended tests are listed in the plan section. The patient has been provided  with a written plan.    Health Maintenance   Topic Date Due   • ZOSTER VACCINE (2 of 3) 09/18/2015   • INFLUENZA VACCINE  08/01/2019   • LIPID PANEL  02/19/2020   • DXA SCAN  08/14/2020   • TDAP/TD VACCINES (2 - Td) 12/15/2020   • PNEUMOCOCCAL VACCINES (65+ LOW/MEDIUM RISK)  Completed        Subjective   History of Present Illness    Jayda Lopes is a 83 y.o. female who presents for a Subsequent Wellness Visit.    CHRONIC CONDITIONS    The following portions of the patient's history were reviewed and updated as appropriate: allergies, current medications, past family history, past medical history, past social history, past surgical history and problem list.    Outpatient Medications Prior to Visit   Medication Sig Dispense Refill   • Aflibercept (EYLEA IO) Inject  into the eye Every 3 (Three) Months.     • amLODIPine (NORVASC) 2.5 MG tablet Take 1 tablet by mouth 2 (Two) Times a Day. 180 tablet 1   • atorvastatin (LIPITOR) 20 MG tablet TAKE 1 TABLET EVERY EVENING 90 tablet 2   • carvedilol (COREG) 25 MG tablet TAKE 1 TABLET TWICE A  tablet 3   • Cholecalciferol (VITAMIN D3) 2000 units capsule Take 1 tablet by mouth Daily.     • esomeprazole (nexIUM) 20 MG capsule TAKE 1 CAPSULE DAILY 90 capsule 1   • Multiple Vitamins-Calcium (VIACTIV MULTI-VITAMIN) chewable tablet Chew 2 tablets Daily.     • Multiple Vitamins-Minerals (PRESERVISION AREDS 2 PO) Take 2 tablets by mouth Daily.     • ramipril (ALTACE) 5 MG capsule TAKE 1 CAPSULE EVERY MORNING AND 2 CAPSULES EVERY EVENING 270 capsule 4   • sertraline (ZOLOFT) 100 MG tablet TAKE 1 TABLET DAILY 90 tablet 1   • XARELTO 20 MG tablet TAKE 1 TABLET DAILY AS DIRECTED 90 tablet 3   • furosemide (LASIX) 20 MG tablet Take 1 tablet by mouth Daily As Needed (swelling feet). 30 tablet 5   • potassium chloride (K-DUR) 10 MEQ CR tablet Take 1 tablet by mouth Daily As Needed (as needed with lasix). 30 tablet 5   • predniSONE (DELTASONE) 10 MG tablet Take 4 tablets every am for  4 days, then 3 tablets every am for 3 days, then 2 tablets every am for 2 days, then 1 tablet the last am. 30 tablet 0     No facility-administered medications prior to visit.        Patient Active Problem List   Diagnosis   • Chronic atrial fibrillation (CMS/HCC)   • Palpitation   • Atypical chest pain   • GERD without esophagitis   • Macular degeneration (senile) of retina   • Bradycardia   • Alcohol use   • Cortical age-related cataract of both eyes   • Chronic cough   • Non-rheumatic mitral regurgitation   • Seasonal allergic rhinitis due to pollen   • Chronic systolic (congestive) heart failure (CMS/HCC)   • Dilated cardiomyopathy (CMS/HCC)   • Primary open angle glaucoma (POAG) of both eyes, mild stage   • Moderate episode of recurrent major depressive disorder (CMS/HCC)   • Generalized anxiety disorder   • B12 deficiency   • Osteopenia of multiple sites   • Osteoarthritis   • Varicose veins of both lower extremities without ulcer or inflammation   • Vitamin D deficiency   • Benign essential hypertension   • Major depressive disorder, single episode, moderate (CMS/HCC)   • AV block   • Mixed hyperlipidemia   • Constipation, slow transit       Advance Care Planning:  Patient does not have an advance directive - information provided to the patient today    Identification of Risk Factors:  Risk factors include: Fall Risk.    Review of Systems   Constitutional: Negative for chills, fatigue and fever.   HENT: Negative for congestion, ear pain and sinus pressure.    Respiratory: Negative for chest tightness, shortness of breath and wheezing.    Cardiovascular: Negative for chest pain and palpitations.   Gastrointestinal: Negative for abdominal pain and constipation.   Allergic/Immunologic: Negative for environmental allergies.   Neurological: Positive for dizziness.       Compared to one year ago, the patient feels her physical health is the same.  Compared to one year ago, the patient feels her mental health is the  "same.    Objective     Physical Exam     Procedures     Vitals:    07/18/19 1015   BP: 130/80   BP Location: Left arm   Patient Position: Sitting   Cuff Size: Adult   Pulse: 70   Temp: 98.4 °F (36.9 °C)   TempSrc: Temporal   Weight: 85.7 kg (189 lb)  Comment: patient reported.  refused to weigh   Height: 172.7 cm (67.99\")   PainSc: 8  Comment: leg pain       Patient's Body mass index is 28.74 kg/m². BMI is within normal parameters. No follow-up required..      Assessment/Plan   Problem List Items Addressed This Visit        Cardiovascular and Mediastinum    Mixed hyperlipidemia (Chronic)    Relevant Medications    atorvastatin (LIPITOR) 20 MG tablet    Other Relevant Orders    Comprehensive Metabolic Panel    Lipid Panel    MicroAlbumin, Urine, Random - Urine, Clean Catch    Chronic atrial fibrillation (CMS/HCC)    Relevant Medications    amLODIPine (NORVASC) 2.5 MG tablet    carvedilol (COREG) 25 MG tablet    Other Relevant Orders    CBC & Differential    MicroAlbumin, Urine, Random - Urine, Clean Catch       Digestive    B12 deficiency    Relevant Orders    Vitamin B12    Vitamin D deficiency    Relevant Orders    Vitamin D 25 Hydroxy      Other Visit Diagnoses     Sciatica of right side    -  Primary    Relevant Orders    XR Spine Lumbar 2 or 3 View    Sedimentation Rate    Right hip pain        Relevant Orders    XR Hip With or Without Pelvis 2 - 3 View Right        Patient Self-Management and Personalized Health Advice  The patient has been provided with information about: fall prevention and preventive services including:   · Annual Wellness Visit (AWV).    Outpatient Encounter Medications as of 7/18/2019   Medication Sig Dispense Refill   • Aflibercept (EYLEA IO) Inject  into the eye Every 3 (Three) Months.     • amLODIPine (NORVASC) 2.5 MG tablet Take 1 tablet by mouth 2 (Two) Times a Day. 180 tablet 1   • atorvastatin (LIPITOR) 20 MG tablet TAKE 1 TABLET EVERY EVENING 90 tablet 2   • carvedilol (COREG) 25 " MG tablet TAKE 1 TABLET TWICE A  tablet 3   • Cholecalciferol (VITAMIN D3) 2000 units capsule Take 1 tablet by mouth Daily.     • esomeprazole (nexIUM) 20 MG capsule TAKE 1 CAPSULE DAILY 90 capsule 1   • Multiple Vitamins-Calcium (VIACTIV MULTI-VITAMIN) chewable tablet Chew 2 tablets Daily.     • Multiple Vitamins-Minerals (PRESERVISION AREDS 2 PO) Take 2 tablets by mouth Daily.     • ramipril (ALTACE) 5 MG capsule TAKE 1 CAPSULE EVERY MORNING AND 2 CAPSULES EVERY EVENING 270 capsule 4   • sertraline (ZOLOFT) 100 MG tablet TAKE 1 TABLET DAILY 90 tablet 1   • XARELTO 20 MG tablet TAKE 1 TABLET DAILY AS DIRECTED 90 tablet 3   • furosemide (LASIX) 20 MG tablet Take 1 tablet by mouth Daily As Needed (swelling feet). 30 tablet 5   • potassium chloride (K-DUR) 10 MEQ CR tablet Take 1 tablet by mouth Daily As Needed (as needed with lasix). 30 tablet 5   • predniSONE (DELTASONE) 10 MG tablet Take 4 tablets every am for 4 days, then 3 tablets every am for 3 days, then 2 tablets every am for 2 days, then 1 tablet the last am. 30 tablet 0     No facility-administered encounter medications on file as of 7/18/2019.        Reviewed use of high risk medication in the elderly: yes  Reviewed for potential of harmful drug interactions in the elderly: yes    Follow Up:  Return for Next scheduled follow up.     An After Visit Summary and PPPS with all of these plans were given to the patient.

## 2019-07-31 ENCOUNTER — OFFICE VISIT (OUTPATIENT)
Dept: INTERNAL MEDICINE | Facility: CLINIC | Age: 84
End: 2019-07-31

## 2019-07-31 VITALS
HEIGHT: 68 IN | BODY MASS INDEX: 28.64 KG/M2 | SYSTOLIC BLOOD PRESSURE: 140 MMHG | DIASTOLIC BLOOD PRESSURE: 80 MMHG | WEIGHT: 189 LBS | HEART RATE: 82 BPM

## 2019-07-31 DIAGNOSIS — M85.89 OSTEOPENIA OF MULTIPLE SITES: Chronic | ICD-10-CM

## 2019-07-31 DIAGNOSIS — I10 BENIGN ESSENTIAL HYPERTENSION: Chronic | ICD-10-CM

## 2019-07-31 DIAGNOSIS — I83.93 VARICOSE VEINS OF BOTH LOWER EXTREMITIES WITHOUT ULCER OR INFLAMMATION: Chronic | ICD-10-CM

## 2019-07-31 DIAGNOSIS — E78.2 MIXED HYPERLIPIDEMIA: Chronic | ICD-10-CM

## 2019-07-31 DIAGNOSIS — K21.9 GERD WITHOUT ESOPHAGITIS: Chronic | ICD-10-CM

## 2019-07-31 DIAGNOSIS — M54.41 ACUTE RIGHT-SIDED LOW BACK PAIN WITH RIGHT-SIDED SCIATICA: Primary | ICD-10-CM

## 2019-07-31 DIAGNOSIS — F32.1 MAJOR DEPRESSIVE DISORDER, SINGLE EPISODE, MODERATE (HCC): Chronic | ICD-10-CM

## 2019-07-31 PROCEDURE — 99214 OFFICE O/P EST MOD 30 MIN: CPT | Performed by: INTERNAL MEDICINE

## 2019-07-31 NOTE — PROGRESS NOTES
[facility]     Jayda Lopes  1935   [id number]      Patient Care Team:  Akilah Rueda MD as PCP - Internal Medicine (Internal Medicine)  Bernadette Almodovar MD as Consulting Physician (Dermatology)  Hiren Kaufman MD as Consulting Physician (Cardiac Electrophysiology)    Chief Complaint;:   Chief Complaint   Patient presents with   • Hyperlipidemia     f/u   • Hypertension     f/u            HPI  Patient is a 83 y.o. female presents with Right low back pain with sciatica with pain down to R lateral ankle. Cold helps some. Tylenol no help.Worse if lie on R side.worse with walking.       CHRONIC CONDITIONS  Usually walks and uses bike for exercise. None over last 4 wks since back pain started.  Takes atorvastatin every evening for hyperlipidemia.  Tries to lose weight.    BP's about 120/70. Takes meds regularly.      No afib episodes and palpitations. No chest pain/increase in dyspnea/or edema.  Takes carvedilol and Xarelto regularly.    GERD controlled on nexium.     Constipation controlled by eating fiber.     Depression and anxiety controlled with sertraline.     Past Medical History:   Diagnosis Date   • Acute bronchitis due to infection 1/10/2019   • Anxiety    • Atrial fibrillation (CMS/HCC)    • Atypical chest pain    • Bradycardia    • GERD (gastroesophageal reflux disease)    • Hyperlipidemia    • Hypertension    • Macular degeneration    • Mitral valve prolapse    • Palpitation        Past Surgical History:   Procedure Laterality Date   • CYSTECTOMY      h/o Ovarian   • HYSTERECTOMY Bilateral    • OOPHORECTOMY Bilateral 1993   • THYROIDECTOMY      h/o thyroid surgery sub-total        Family History   Problem Relation Age of Onset   • Colon cancer Mother    • Other Mother         cardiac arrhythmia   • Heart failure Mother    • Cancer Mother    • Lung cancer Father    • Cancer Father    • No Known Problems Brother    • Hypertension Maternal Grandmother    • Breast cancer Neg Hx  "   • Ovarian cancer Neg Hx        Social History     Socioeconomic History   • Marital status:      Spouse name: Not on file   • Number of children: Not on file   • Years of education: Not on file   • Highest education level: Not on file   Tobacco Use   • Smoking status: Never Smoker   • Smokeless tobacco: Never Used   Substance and Sexual Activity   • Alcohol use: Yes     Comment: occasional    • Drug use: Defer   • Sexual activity: Defer       Allergies   Allergen Reactions   • Phenazopyridine Hcl Unknown (See Comments)     Pyridium       Review of Systems:     Review of Systems   Constitutional: Positive for fatigue. Negative for chills and fever.   HENT: Negative for sore throat.    Eyes: Negative for visual disturbance.   Respiratory: Negative for cough, shortness of breath and wheezing.    Cardiovascular: Negative for chest pain, palpitations and leg swelling.   Gastrointestinal: Negative for abdominal pain, blood in stool, constipation, diarrhea and GERD.   Endocrine: Negative for cold intolerance, heat intolerance and polydipsia.   Genitourinary: Negative for dysuria, frequency and hematuria.   Musculoskeletal: Positive for back pain. Negative for arthralgias and gait problem.   Allergic/Immunologic: Positive for environmental allergies. Negative for food allergies and immunocompromised state.   Neurological: Negative for weakness, light-headedness and numbness.   Hematological: Negative for adenopathy. Does not bruise/bleed easily.   Psychiatric/Behavioral: Positive for stress. Negative for dysphoric mood and suicidal ideas. The patient is not nervous/anxious.        Vital Signs  Vitals:    07/31/19 1029   BP: 140/80   BP Location: Left arm   Patient Position: Sitting   Cuff Size: Adult   Pulse: 82   Weight: 85.7 kg (189 lb)   Height: 172.7 cm (67.99\")   PainSc:   5   PainLoc: Ankle   Body mass index is 28.75 kg/m².      Physical Exam:    Physical Exam   Constitutional: She is oriented to person, " place, and time. She appears well-developed and well-nourished. She appears overweight.   Eyes: Conjunctivae and EOM are normal. Pupils are equal, round, and reactive to light.   Neck: Normal range of motion. Neck supple. No thyromegaly present.   Cardiovascular: Normal rate, regular rhythm, normal heart sounds and intact distal pulses.   No murmur heard.  Bilateral lower extremity varicose veins.   Pulmonary/Chest: Effort normal and breath sounds normal. She has no wheezes. Right breast exhibits no inverted nipple, no mass, no nipple discharge, no skin change and no tenderness. Left breast exhibits no inverted nipple, no mass, no nipple discharge, no skin change and no tenderness.   Abdominal: Soft. Bowel sounds are normal. She exhibits no distension and no mass. There is no tenderness.   Musculoskeletal: Normal range of motion. She exhibits no edema or tenderness.        Lumbar back: She exhibits no tenderness and no spasm.   Lymphadenopathy:        Head (right side): No submental, no submandibular, no preauricular, no posterior auricular and no occipital adenopathy present.        Head (left side): No submental, no submandibular, no preauricular, no posterior auricular and no occipital adenopathy present.     She has no cervical adenopathy.     She has no axillary adenopathy.   Neurological: She is alert and oriented to person, place, and time. She has normal strength. No cranial nerve deficit or sensory deficit. Coordination and gait normal.   Skin: Skin is warm and dry. No rash noted.   Psychiatric: She has a normal mood and affect. Her speech is normal and behavior is normal. Judgment and thought content normal. Cognition and memory are normal.   Nursing note and vitals reviewed.       ACE III MINI        Results Review:    I reviewed the patient's new clinical results.  Reviewed her lab results from last week in detail.    CMP:  Lab Results   Component Value Date    BUN 15 07/18/2019    CREATININE 0.78  07/18/2019    EGFRIFNONA 71 07/18/2019    BCR 19.2 07/18/2019     07/18/2019    K 3.8 07/18/2019    CO2 28.0 07/18/2019    CALCIUM 9.4 07/18/2019    ALBUMIN 4.20 07/18/2019    BILITOT 1.3 (H) 07/18/2019    ALKPHOS 83 07/18/2019    AST 25 07/18/2019    ALT 22 07/18/2019     HbA1c:  Lab Results   Component Value Date    HGBA1C 6.0 09/07/2014    HGBA1C 6.1 (H) 05/14/2014     Microalbumin:  Lab Results   Component Value Date    MICROALBUR 5.7 07/18/2019     Lipid Panel  Lab Results   Component Value Date    CHOL 204 (H) 07/18/2019    TRIG 80 07/18/2019    HDL 76 (H) 07/18/2019     (H) 07/18/2019    AST 25 07/18/2019    ALT 22 07/18/2019       Medication Review: Medications reviewed and noted    Problem List Items Addressed This Visit        Cardiovascular and Mediastinum    Mixed hyperlipidemia (Chronic)    Relevant Medications    atorvastatin (LIPITOR) 20 MG tablet    Varicose veins of both lower extremities without ulcer or inflammation    Benign essential hypertension    Relevant Medications    amLODIPine (NORVASC) 2.5 MG tablet    carvedilol (COREG) 25 MG tablet    ramipril (ALTACE) 5 MG capsule       Digestive    GERD without esophagitis    Relevant Medications    esomeprazole (nexIUM) 20 MG capsule       Musculoskeletal and Integument    Osteopenia of multiple sites       Other    Major depressive disorder, single episode, moderate (CMS/HCC)    Relevant Medications    sertraline (ZOLOFT) 100 MG tablet      Other Visit Diagnoses     Acute right-sided low back pain with right-sided sciatica    -  Primary    Referred to [facility] [place].  Do some back stretches every day.  Use moist heat on the low back and buttock area to decrease muscle spasm.    Relevant Orders    Ambulatory Referral to Physical Therapy Evaluate and treat            Diagnosis Plan   1. Acute right-sided low back pain with right-sided sciatica  Ambulatory Referral to Physical Therapy Evaluate and treat    Referred to [facility] at  [place].  Do some back stretches every day.  Use moist heat on the low back and buttock area to decrease muscle spasm.   2. Benign essential hypertension      Continue amlodipine, ramipril, carvedilol.  Continue to avoid salt in the diet.   3. Mixed hyperlipidemia      Continue atorvastatin every evening.  Continue to improve your low-fat diet and regular exercise.   4. Varicose veins of both lower extremities without ulcer or inflammation      Elevate feet when sitting.  Drink plenty of fluids and avoid salt in the diet.  Wear support hose whenever possible.   5. GERD without esophagitis      Continue use omeprazole daily.  To avoid eating close to bedtime and avoid large meals.   6. Osteopenia of multiple sites      Continue weightbearing exercises daily with hand weights at home and with walking.  Continue vitamin D.   7. Major depressive disorder, single episode, moderate (CMS/HCC)      Continue sertraline daily.  Exercise and walking also helps with stress and depression and anxiety.       Patient Instructions       Mediterranean Diet  A Mediterranean diet refers to food and lifestyle choices that are based on the traditions of countries located on the [place]. This way of eating has been shown to help prevent certain conditions and improve outcomes for people who have chronic diseases, like kidney disease and heart disease.  What are tips for following this plan?  Lifestyle  · Cook and eat meals together with your family, when possible.  · Drink enough fluid to keep your urine clear or pale yellow.  · Be physically active every day. This includes:  ? Aerobic exercise like running or swimming.  ? Leisure activities like gardening, walking, or housework.  · Get 7-8 hours of sleep each night.  · If recommended by your health care provider, drink red wine in moderation. This means 1 glass a day for nonpregnant women and 2 glasses a day for men. A glass of wine equals 5 oz (150 mL).  Reading food  labels    · Check the serving size of packaged foods. For foods such as rice and pasta, the serving size refers to the amount of cooked product, not dry.  · Check the total fat in packaged foods. Avoid foods that have saturated fat or trans fats.  · Check the ingredients list for added sugars, such as corn syrup.  Shopping  · At the grocery store, buy most of your food from the areas near the walls of the store. This includes:  ? Fresh fruits and vegetables (produce).  ? Grains, beans, nuts, and seeds. Some of these may be available in unpackaged forms or large amounts (in bulk).  ? Fresh seafood.  ? Poultry and eggs.  ? Low-fat dairy products.  · Buy whole ingredients instead of prepackaged foods.  · Buy fresh fruits and vegetables in-season from local [employer] markets.  · Buy frozen fruits and vegetables in resealable bags.  · If you do not have access to quality fresh seafood, buy precooked frozen shrimp or canned fish, such as tuna, salmon, or sardines.  · Buy small amounts of raw or cooked vegetables, salads, or olives from the deli or salad bar at your store.  · Stock your pantry so you always have certain foods on hand, such as olive oil, canned tuna, canned tomatoes, rice, pasta, and beans.  Cooking  · Cook foods with extra-virgin olive oil instead of using butter or other vegetable oils.  · Have meat as a side dish, and have vegetables or grains as your main dish. This means having meat in small portions or adding small amounts of meat to foods like pasta or stew.  · Use beans or vegetables instead of meat in common dishes like chili or lasagna.  · Throop with different cooking methods. Try roasting or broiling vegetables instead of steaming or sautéeing them.  · Add frozen vegetables to soups, stews, pasta, or rice.  · Add nuts or seeds for added healthy fat at each meal. You can add these to yogurt, salads, or vegetable dishes.  · Marinate fish or vegetables using olive oil, lemon juice, garlic, and  fresh herbs.  Meal planning    · Plan to eat 1 vegetarian meal one day each week. Try to work up to 2 vegetarian meals, if possible.  · Eat seafood 2 or more times a week.  · Have healthy snacks readily available, such as:  ? Vegetable sticks with hummus.  ? Greek yogurt.  ? Fruit and nut trail mix.  · Eat balanced meals throughout the week. This includes:  ? Fruit: 2-3 servings a day  ? Vegetables: 4-5 servings a day  ? Low-fat dairy: 2 servings a day  ? Fish, poultry, or lean meat: 1 serving a day  ? Beans and legumes: 2 or more servings a week  ? Nuts and seeds: 1-2 servings a day  ? Whole grains: 6-8 servings a day  ? Extra-virgin olive oil: 3-4 servings a day  · Limit red meat and sweets to only a few servings a month  What are my food choices?  · Mediterranean diet  ? Recommended  ? Grains: Whole-grain pasta. Brown rice. Bulgar wheat. Polenta. Couscous. Whole-wheat bread. Oatmeal. Quinoa.  ? Vegetables: Artichokes. Beets. Broccoli. Cabbage. Carrots. Eggplant. Green beans. Chard. Kale. Spinach. Onions. Leeks. Peas. Squash. Tomatoes. Peppers. Radishes.  ? Fruits: Apples. Apricots. Avocado. Berries. Bananas. Cherries. Dates. Figs. Grapes. Yesika. Melon. Oranges. Peaches. Plums. Pomegranate.  ? Meats and other protein foods: Beans. Almonds. Sunflower seeds. Pine nuts. Peanuts. Cod. [place]. Scallops. Shrimp. Tuna. Tilapia. Clams. Oysters. Eggs.  ? Dairy: Low-fat milk. Cheese. Greek yogurt.  ? Beverages: Water. Red wine. Herbal tea.  ? Fats and oils: Extra virgin olive oil. Avocado oil. Grape seed oil.  ? Sweets and desserts: Greek yogurt with honey. Baked apples. Poached pears. Trail mix.  ? Seasoning and other foods: Basil. Cilantro. Coriander. Cumin. Mint. Parsley. [place]. Rosemary. Tarragon. Garlic. Oregano. Thyme. Pepper. Balsalmic vinegar. Tahini. Hummus. Tomato sauce. Olives. Mushrooms.  ? Limit these  ? Grains: Prepackaged pasta or rice dishes. Prepackaged cereal with added sugar.  ? Vegetables: Deep fried  potatoes (french fries).  ? Fruits: Fruit canned in syrup.  ? Meats and other protein foods: Beef. Pork. Lamb. Poultry with skin. Hot dogs. Vuong.  ? Dairy: Ice cream. Sour cream. Whole milk.  ? Beverages: Juice. Sugar-sweetened soft drinks. Beer. Liquor and spirits.  ? Fats and oils: Butter. Canola oil. Vegetable oil. Beef fat (tallow). Lard.  ? Sweets and desserts: Cookies. Cakes. Pies. Candy.  ? Seasoning and other foods: Mayonnaise. Premade sauces and marinades.  ? The items listed may not be a complete list. Talk with your dietitian about what dietary choices are right for you.  Summary  · The Mediterranean diet includes both food and lifestyle choices.  · Eat a variety of fresh fruits and vegetables, beans, nuts, seeds, and whole grains.  · Limit the amount of red meat and sweets that you eat.  · Talk with your health care provider about whether it is safe for you to drink red wine in moderation. This means 1 glass a day for nonpregnant women and 2 glasses a day for men. A glass of wine equals 5 oz (150 mL).  This information is not intended to replace advice given to you by your health care provider. Make sure you discuss any questions you have with your health care provider.  Document Released: 08/10/2017 Document Revised: 09/12/2017 Document Reviewed: 08/10/2017  [employer] Interactive Patient Education © 2019 [employer]    Exercising to Stay Healthy  To become healthy and stay healthy, it is recommended that you do moderate-intensity and vigorous-intensity exercise. You can tell that you are exercising at a moderate intensity if your heart starts beating faster and you start breathing faster but can still hold a conversation. You can tell that you are exercising at a vigorous intensity if you are breathing much harder and faster and cannot hold a conversation while exercising.  Exercising regularly is important. It has many health benefits, such as:  · Improving overall fitness, flexibility, and  endurance.  · Increasing bone density.  · Helping with weight control.  · Decreasing body fat.  · Increasing muscle strength.  · Reducing stress and tension.  · Improving overall health.  How often should I exercise?  Choose an activity that you enjoy, and set realistic goals. Your health care provider can help you make an activity plan that works for you.  Exercise regularly as told by your health care provider. This may include:  · Doing strength training two times a week, such as:  ? Lifting weights.  ? Using resistance bands.  ? Push-ups.  ? Sit-ups.  ? Yoga.  · Doing a certain intensity of exercise for a given amount of time. Choose from these options:  ? A total of 150 minutes of moderate-intensity exercise every week.  ? A total of 75 minutes of vigorous-intensity exercise every week.  ? A mix of moderate-intensity and vigorous-intensity exercise every week.  Children, pregnant women, people who have not exercised regularly, people who are overweight, and older adults may need to talk with a health care provider about what activities are safe to do. If you have a medical condition, be sure to talk with your health care provider before you start a new exercise program.  What are some exercise ideas?  Moderate-intensity exercise ideas include:  · Walking 1 mile (1.6 km) in about 15 minutes.  · Biking.  · Hiking.  · Golfing.  · Dancing.  · Water aerobics.  Vigorous-intensity exercise ideas include:  · Walking 4.5 miles (7.2 km) or more in about 1 hour.  · Jogging or running 5 miles (8 km) in about 1 hour.  · Biking 10 miles (16.1 km) or more in about 1 hour.  · Lap swimming.  · Roller-skating or in-line skating.  · Cross-country skiing.  · Vigorous competitive sports, such as football, basketball, and soccer.  · Jumping rope.  · Aerobic dancing.  What are some everyday activities that can help me to get exercise?  · Yard work, such as:  ? Pushing a .  ? Raking and bagging leaves.  · Washing your  car.  · Pushing a stroller.  · Shoveling snow.  · Gardening.  · Washing windows or floors.  How can I be more active in my day-to-day activities?  · Use stairs instead of an elevator.  · Take a walk during your lunch break.  · If you drive, park your car farther away from your work or school.  · If you take public transportation, get off one stop early and walk the rest of the way.  · Stand up or walk around during all of your indoor phone calls.  · Get up, stretch, and walk around every 30 minutes throughout the day.  · Enjoy exercise with a friend. Support to continue exercising will help you keep a regular routine of activity.  What guidelines can I follow while exercising?  · Before you start a new exercise program, talk with your health care provider.  · Do not exercise so much that you hurt yourself, feel dizzy, or get very short of breath.  · Wear comfortable clothes and wear shoes with good support.  · Drink plenty of water while you exercise to prevent dehydration or heat stroke.  · Work out until your breathing and your heartbeat get faster.  Where to find more information  · [place] Department of Health and Human Services: www.hhs.gov  · Centers for Disease Control and Prevention (CDC): www.cdc.gov  Summary  · Exercising regularly is important. It will improve your overall fitness, flexibility, and endurance.  · Regular exercise also will improve your overall health. It can help you control your weight, reduce stress, and improve your bone density.  · Do not exercise so much that you hurt yourself, feel dizzy, or get very short of breath.  · Before you start a new exercise program, talk with your health care provider.  This information is not intended to replace advice given to you by your health care provider. Make sure you discuss any questions you have with your health care provider.  Document Released: 01/20/2012 Document Revised: 11/08/2018 Document Reviewed: 11/08/2018  [employer] Interactive Patient  Education © 2019 [employer]        Plan of care reviewed with patient at the conclusion of today's visit. Education was provided regarding diagnosis, management, and any prescribed or recommended OTC medications.Patient verbalizes understanding of and agreement with management plan.         Akilah Rueda MD

## 2019-08-13 ENCOUNTER — CLINICAL SUPPORT NO REQUIREMENTS (OUTPATIENT)
Dept: CARDIOLOGY | Facility: CLINIC | Age: 84
End: 2019-08-13

## 2019-08-13 DIAGNOSIS — I44.30 AV BLOCK: ICD-10-CM

## 2019-08-13 PROCEDURE — 93294 REM INTERROG EVL PM/LDLS PM: CPT | Performed by: INTERNAL MEDICINE

## 2019-08-13 PROCEDURE — 93296 REM INTERROG EVL PM/IDS: CPT | Performed by: INTERNAL MEDICINE

## 2019-08-19 ENCOUNTER — TREATMENT (OUTPATIENT)
Dept: PHYSICAL THERAPY | Facility: CLINIC | Age: 84
End: 2019-08-19

## 2019-08-19 DIAGNOSIS — M54.50 CHRONIC BILATERAL LOW BACK PAIN WITHOUT SCIATICA: Primary | ICD-10-CM

## 2019-08-19 DIAGNOSIS — G89.29 CHRONIC BILATERAL LOW BACK PAIN WITHOUT SCIATICA: Primary | ICD-10-CM

## 2019-08-19 PROCEDURE — 97162 PT EVAL MOD COMPLEX 30 MIN: CPT | Performed by: PHYSICAL THERAPIST

## 2019-08-19 PROCEDURE — 97110 THERAPEUTIC EXERCISES: CPT | Performed by: PHYSICAL THERAPIST

## 2019-08-19 PROCEDURE — G0283 ELEC STIM OTHER THAN WOUND: HCPCS | Performed by: PHYSICAL THERAPIST

## 2019-08-19 NOTE — PROGRESS NOTES
Physical Therapy Initial Evaluation and Plan of Care    TOTAL TIME: 70 MINUTES    Subjective Evaluation    History of Present Illness  Mechanism of injury: Patient reports low back pain for several weeks, extending into right leg; strained hamstring in January and feels this may be related; states her whole posterior leg turned bruised after that injury  Pain increases with walking , lie on right side ; hurts early in the am; sleeps fine    States she was given exercises by MD including : quadruped bird dog, SKC, and prone on elbows that did not seem to help    States she loses balance when pain increased  Used cane for a couple of weeks  Pain increases with carrying heavy bag or purse     Oswestry 24% with lifting being most difficult     Quality of life: fair    Pain  Current pain ratin  At best pain ratin  At worst pain ratin  Quality: dull ache and discomfort  Relieving factors: change in position    Patient Goals  Patient goals for therapy: independence with ADLs/IADLs, return to sport/leisure activities, increased strength, increased motion and decreased pain             Objective       Postural Observations  Seated posture: fair  Standing posture: fair        Palpation     Additional Palpation Details  Right lateral hip, greater trochanter, piriformis mm, ITB      Neurological Testing     Sensation     Lumbar   Left   Intact: light touch    Right   Intact: light touch    Reflexes   Left   Patellar (L4): normal (2+)  Achilles (S1): normal (2+)    Right   Patellar (L4): normal (2+)  Achilles (S1): normal (2+)    Active Range of Motion     Lumbar   Normal active range of motion    Strength/Myotome Testing     Lumbar   Left   Heel walk: normal  Toe walk: normal    Right   Heel walk: normal  Toe walk: normal    Left Hip   Planes of Motion   Flexion: 4  Abduction: 4-  External rotation: 4-  Internal rotation: 4    Right Hip   Planes of Motion   Flexion: 4-  Abduction: 4-  External rotation: 4-  Internal  rotation: 4    Left Ankle/Foot   Dorsiflexion: 5  Plantar flexion: 5    Right Ankle/Foot   Dorsiflexion: 5  Plantar flexion: 5    Tests     Lumbar     Left   Negative crossed SLR, passive SLR and quadrant.     Right   Negative crossed SLR, passive SLR and quadrant.     Left Pelvic Girdle/Sacrum   Negative: sacrum compression, gapping and sacral spring.     Right Pelvic Girdle/Sacrum   Negative: sacrum compression, gapping and sacral spring.          Assessment & Plan     Assessment  Impairments: abnormal or restricted ROM, activity intolerance, impaired physical strength, lacks appropriate home exercise program and pain with function  Assessment details: Patient presents with low back and right hip pain, chronic in nature; pain increased early in the am, with lifting and with prolonged positions ; patient may benefit from PT to restore full functional ROM, strength in order to perform ADL's without pain  Prognosis: fair  Functional Limitations: carrying objects, lifting, walking, pulling, uncomfortable because of pain, moving in bed, sitting and stooping  Goals  Plan Goals: 4 weeks:  1. IND with HEP  2. Patient to have 50% fewer complaints of pain  3. Oswestry decreased to < 15 %  4. Patient able to perform ADL's without pain    Plan  Therapy options: will be seen for skilled physical therapy services  Planned modality interventions: iontophoresis, TENS, thermotherapy (hydrocollator packs), traction, ultrasound, high voltage pulsed current (pain management), electrical stimulation/Russian stimulation and cryotherapy  Planned therapy interventions: manual therapy, neuromuscular re-education, postural training, soft tissue mobilization, spinal/joint mobilization, strengthening, stretching, therapeutic activities, joint mobilization, home exercise program, functional ROM exercises, gait training, flexibility, body mechanics training and balance/weight-bearing training  Frequency: 2x week  Duration in visits:  8  Treatment plan discussed with: patient        Manual Therapy:         mins  84833;  Therapeutic Exercise:    15     mins  05351;     Neuromuscular Eber:        mins  86813;    Therapeutic Activity:          mins  16397;     Gait Training:           mins  62996;     Ultrasound:          mins  15531;    Electrical Stimulation:   15      mins  02514 ( );  Dry Needling          mins self-pay    Timed Treatment:   30   mins   Total Treatment:     70   mins    PT SIGNATURE: Brendon Jcainto, PT   DATE TREATMENT INITIATED: 8/19/2019    Initial Certification  Certification Period: 11/17/2019  I certify that the therapy services are furnished while this patient is under my care.  The services outlined above are required by this patient, and will be reviewed every 90 days.     PHYSICIAN: Akilah Rueda MD      DATE:     Please sign and return via fax to  .. Thank you, [facility].

## 2019-08-21 ENCOUNTER — TELEPHONE (OUTPATIENT)
Dept: CARDIOLOGY | Facility: CLINIC | Age: 84
End: 2019-08-21

## 2019-08-21 NOTE — TELEPHONE ENCOUNTER
Patient called to ask how long she could hold her Xarelto prior to her colonoscopy next week. Per Dr. Kaufman: Patient may hold 48 hours.

## 2019-09-04 ENCOUNTER — TELEPHONE (OUTPATIENT)
Dept: INTERNAL MEDICINE | Facility: CLINIC | Age: 84
End: 2019-09-04

## 2019-09-04 NOTE — TELEPHONE ENCOUNTER
Make appointment with [facility] for this week.  In the meantime continue stretching and using moist heat.

## 2019-09-04 NOTE — TELEPHONE ENCOUNTER
PATIENT COMPLAINS OF CONTINUED SCIATIC PAIN ON THE RIGHT SIDE OF BUTTOCKS THAT RADIATES DOWN THE RIGHT LEG.    SHE HAS TRIED ADVIL WITHOUT RELIEF.  SHE WANTS TO KNOW WHAT ELSE CAN BE DONE.  LAST SEEN 7/31/19 FOR THIS.

## 2019-09-05 ENCOUNTER — TELEPHONE (OUTPATIENT)
Dept: INTERNAL MEDICINE | Facility: CLINIC | Age: 84
End: 2019-09-05

## 2019-09-05 ENCOUNTER — OFFICE VISIT (OUTPATIENT)
Dept: INTERNAL MEDICINE | Facility: CLINIC | Age: 84
End: 2019-09-05

## 2019-09-05 VITALS — DIASTOLIC BLOOD PRESSURE: 80 MMHG | SYSTOLIC BLOOD PRESSURE: 138 MMHG | HEART RATE: 76 BPM

## 2019-09-05 DIAGNOSIS — M54.41 ACUTE RIGHT-SIDED LOW BACK PAIN WITH RIGHT-SIDED SCIATICA: Primary | ICD-10-CM

## 2019-09-05 PROCEDURE — 99214 OFFICE O/P EST MOD 30 MIN: CPT | Performed by: PHYSICIAN ASSISTANT

## 2019-09-05 RX ORDER — HYDROCODONE BITARTRATE AND ACETAMINOPHEN 5; 325 MG/1; MG/1
1 TABLET ORAL EVERY 6 HOURS PRN
Qty: 30 TABLET | Refills: 0 | Status: SHIPPED | OUTPATIENT
Start: 2019-09-05 | End: 2019-12-17

## 2019-09-05 RX ORDER — GABAPENTIN 300 MG/1
300 CAPSULE ORAL
Qty: 30 CAPSULE | Refills: 2 | Status: SHIPPED | OUTPATIENT
Start: 2019-09-05 | End: 2019-09-18

## 2019-09-05 RX ORDER — PREDNISONE 10 MG/1
TABLET ORAL
Qty: 14 TABLET | Refills: 0 | Status: SHIPPED | OUTPATIENT
Start: 2019-09-05 | End: 2019-09-18

## 2019-09-05 NOTE — TELEPHONE ENCOUNTER
Pharmacy called to clarify the quantity and dosing of the Prednisone.  They do not correlate.  Please clarify and call the pharmacy

## 2019-09-05 NOTE — TELEPHONE ENCOUNTER
Call pharmacy, it should be 40 mg prednisone once a day for 4 days.   I only sent in 14 pills, she needs 16.

## 2019-09-05 NOTE — PROGRESS NOTES
Patient Care Team:  Akilah Rueda MD as PCP - General (Internal Medicine)  Akilah Rueda MD as PCP - Internal Medicine (Internal Medicine)  Bernadette Almodovar MD as Consulting Physician (Dermatology)  Hiren Kaufman MD as Consulting Physician (Cardiac Electrophysiology)    Chief Complaint;:   Chief Complaint   Patient presents with   • Back Pain     Siatic down right leg        Subjective     HPI  82 yo white female presents with worsening right sided back pain.   Pain radiates down to right foot.   Symptoms started 2 months ago, she has seen Dilma DALLAS and Dr. Rueda without any improvement.   She went to PT but did not like the Therapist.   Her  had seen a PT at [facility] and she would like referral.   She has been doing the exercises prescribed.   She denies any leg weakness.  Past Medical History:   Diagnosis Date   • Acute bronchitis due to infection 1/10/2019   • Anxiety    • Atrial fibrillation (CMS/HCC)    • Atypical chest pain    • Bradycardia    • GERD (gastroesophageal reflux disease)    • Hyperlipidemia    • Hypertension    • Macular degeneration    • Mitral valve prolapse    • Palpitation        Social History     Socioeconomic History   • Marital status:      Spouse name: Not on file   • Number of children: Not on file   • Years of education: Not on file   • Highest education level: Not on file   Tobacco Use   • Smoking status: Never Smoker   • Smokeless tobacco: Never Used   Substance and Sexual Activity   • Alcohol use: Yes     Comment: occasional    • Drug use: Defer   • Sexual activity: Defer       Allergies   Allergen Reactions   • Phenazopyridine Hcl Unknown (See Comments)     Pyridium       Review of Systems:     Review of Systems   Constitutional: Negative.    Respiratory: Negative for cough and shortness of breath.    Cardiovascular: Negative for chest pain, palpitations and leg swelling.   Musculoskeletal: Positive for back pain and gait problem.       Vital  Signs  Vitals:    09/05/19 1253   BP: 138/80   BP Location: Left arm   Patient Position: Sitting   Cuff Size: Adult   Pulse: 76   Weight: Comment: pt. refused   PainSc: 10-Worst pain ever   PainLoc: Back         Current Outpatient Medications:   •  Aflibercept (EYLEA IO), Inject  into the eye Every 3 (Three) Months., Disp: , Rfl:   •  amLODIPine (NORVASC) 2.5 MG tablet, Take 1 tablet by mouth 2 (Two) Times a Day., Disp: 180 tablet, Rfl: 1  •  atorvastatin (LIPITOR) 20 MG tablet, TAKE 1 TABLET EVERY EVENING, Disp: 90 tablet, Rfl: 2  •  carvedilol (COREG) 25 MG tablet, TAKE 1 TABLET TWICE A DAY, Disp: 180 tablet, Rfl: 3  •  Cholecalciferol (VITAMIN D3) 2000 units capsule, Take 1 tablet by mouth Daily., Disp: , Rfl:   •  esomeprazole (nexIUM) 20 MG capsule, TAKE 1 CAPSULE DAILY, Disp: 90 capsule, Rfl: 1  •  Multiple Vitamins-Calcium (VIACTIV MULTI-VITAMIN) chewable tablet, Chew 2 tablets Daily., Disp: , Rfl:   •  Multiple Vitamins-Minerals (PRESERVISION AREDS 2 PO), Take 2 tablets by mouth Daily., Disp: , Rfl:   •  ramipril (ALTACE) 5 MG capsule, TAKE 1 CAPSULE EVERY MORNING AND 2 CAPSULES EVERY EVENING, Disp: 270 capsule, Rfl: 4  •  sertraline (ZOLOFT) 100 MG tablet, TAKE 1 TABLET DAILY, Disp: 90 tablet, Rfl: 1  •  XARELTO 20 MG tablet, TAKE 1 TABLET DAILY AS DIRECTED, Disp: 90 tablet, Rfl: 3  •  gabapentin (NEURONTIN) 300 MG capsule, Take 1 capsule by mouth every night at bedtime., Disp: 30 capsule, Rfl: 2  •  HYDROcodone-acetaminophen (NORCO) 5-325 MG per tablet, Take 1 tablet by mouth Every 6 (Six) Hours As Needed for Moderate Pain ., Disp: 30 tablet, Rfl: 0  •  predniSONE (DELTASONE) 10 MG tablet, 4 po qd for 4 days, Disp: 14 tablet, Rfl: 0    Physical Exam:    Physical Exam   Constitutional: She is oriented to person, place, and time. She appears well-developed and well-nourished.   Cardiovascular: Normal rate, regular rhythm and normal heart sounds.   Pulmonary/Chest: Effort normal and breath sounds normal.    Musculoskeletal:   Right SI tenderness   Neurological: She is alert and oriented to person, place, and time. She displays normal reflexes. No sensory deficit. She exhibits normal muscle tone. Coordination normal.   Psychiatric: She has a normal mood and affect. Her behavior is normal. Judgment and thought content normal.   Nursing note and vitals reviewed.      Procedures      Assessment/Plan   Problem List Items Addressed This Visit     None      Visit Diagnoses     Acute right-sided low back pain with right-sided sciatica    -  Primary    PT.  Prednisone 40 mg once a day for 4 days.  Gabapentin at bedtime.   Hydrocodone three times a day as needed for moderate pain.    Relevant Orders    Ambulatory Referral to Physical Therapy Evaluate and treat        Patient Instructions   Refer to Physical Therapy.   Prednisone 40 mg once a day for 4 days.   Gabapentin at bedtime.   Hydrocodone for moderate to severe pain.   Call if PT not helping.      Plan of care reviewed with patient at the conclusion of today's visit. Education was provided regarding diagnosis, management, and any prescribed or recommended OTC medications.Patient verbalizes understanding of and agreement with management plan.     Kesha Sanchez PA-C

## 2019-09-05 NOTE — PATIENT INSTRUCTIONS
Refer to Physical Therapy.   Prednisone 40 mg once a day for 4 days.   Gabapentin at bedtime.   Hydrocodone for moderate to severe pain.   Call if PT not helping.

## 2019-09-11 ENCOUNTER — OFFICE VISIT (OUTPATIENT)
Dept: PHYSICAL THERAPY | Facility: CLINIC | Age: 84
End: 2019-09-11

## 2019-09-11 DIAGNOSIS — M54.41 CHRONIC BILATERAL LOW BACK PAIN WITH RIGHT-SIDED SCIATICA: Primary | ICD-10-CM

## 2019-09-11 DIAGNOSIS — G89.29 CHRONIC BILATERAL LOW BACK PAIN WITH RIGHT-SIDED SCIATICA: Primary | ICD-10-CM

## 2019-09-11 PROCEDURE — 97110 THERAPEUTIC EXERCISES: CPT | Performed by: PHYSICAL THERAPIST

## 2019-09-11 NOTE — PROGRESS NOTES
Physical Therapy Initial Evaluation and Plan of Care        Subjective Evaluation    History of Present Illness  Mechanism of injury: Patient reports about a 6 weeks duration that is in the right buttocks with a radiation into the right leg with some associated foot numbness, specifically lateral to plantar foot.  She has no recollection of an injury history.  She notes that she had 3-4 days of complete symptoms releif about 3 weeks ago without a known cause.  That pain is severe and worse when getting out of bed with a little improvement with getting up and moving.  Most of the time she has no pain in sitting but she notes an increase in pain upon first standing.  She began using a cane around the onset of symptoms.    Quality of life: fair    Pain  Current pain ratin  At best pain ratin  At worst pain ratin  Location: right buttock to foot  Quality: dull ache, discomfort and sharp  Relieving factors: change in position and rest (sitting)  Aggravating factors: ambulation, sleeping, prolonged positioning, standing and stairs  Progression: no change    Social Support  Lives in: multiple-level home  Lives with: spouse    Diagnostic Tests  X-ray: normal (anterolisthesis mild)    Treatments  Previous treatment: physical therapy, medication and injection treatment  Current treatment: medication and physical therapy  Current treatment comments: hydrocodone.     Patient Goals  Patient goals for therapy: independence with ADLs/IADLs, return to sport/leisure activities, increased strength, increased motion and decreased pain             Objective       Postural Observations  Seated posture: fair  Standing posture: fair    Additional Postural Observation Details  Forward head and shoulder, slightly flexed at the hips.    Palpation     Additional Palpation Details  Tender along the SIJ, minimally tender at the greater trochanter.  Guarding and tenderness noted along the piriformis.      Neurological Testing      Sensation     Lumbar   Left   Intact: light touch    Right   Intact: light touch    Reflexes   Left   Patellar (L4): normal (2+)  Achilles (S1): normal (2+)    Right   Patellar (L4): normal (2+)  Achilles (S1): normal (2+)    Active Range of Motion     Lumbar   Normal active range of motion    Strength/Myotome Testing     Lumbar   Left   Heel walk: normal  Toe walk: normal    Right   Heel walk: normal  Toe walk: normal    Left Hip   Planes of Motion   Flexion: 4  Abduction: 3  External rotation: 3+  Internal rotation: 4    Right Hip   Planes of Motion   Flexion: 3+  Abduction: 3+  External rotation: 3+  Internal rotation: 4    Left Ankle/Foot   Dorsiflexion: 5  Plantar flexion: 5    Right Ankle/Foot   Dorsiflexion: 5  Plantar flexion: 5    Tests     Lumbar   Negative repeated extension.     Left   Negative crossed SLR, passive SLR and quadrant.     Right   Negative crossed SLR, passive SLR and quadrant.     Left Pelvic Girdle/Sacrum   Negative: sacrum compression, gapping and sacral spring.     Right Pelvic Girdle/Sacrum   Negative: sacrum compression, gapping and sacral spring.     Additional Tests Details  + right OLIVER         Assessment & Plan     Assessment  Impairments: abnormal or restricted ROM, activity intolerance, impaired physical strength, lacks appropriate home exercise program and pain with function  Assessment details: Patient returns to PT with similar complaints of a month ago.  She had extended absence due to a change in clinic.  She continues to have right sided low back/sacral pain with RLE symptoms posterolateral to the foot ; patient may benefit from PT to restore full functional ROM, strength in order to perform ADL's without pain  Prognosis: fair  Functional Limitations: carrying objects, lifting, walking, pulling, uncomfortable because of pain, moving in bed, sitting and stooping  Goals  Plan Goals: STG 2 weeks:  1. IND with HEP  2. Patient to have 50% fewer complaints of pain  3.  Oswestry decreased to < 15 %  4. Patient able to perform ADL's without pain    All ongoing.    NEW: LTG 4 weeks  1. Patient to report 50% reduction in morning pain.  2. Patient to demonstrate minimal piriformis tenderness  3. Patient to report no symptoms distal to the buttocks.      Plan  Therapy options: will be seen for skilled physical therapy services  Planned modality interventions: TENS, thermotherapy (hydrocollator packs), traction, ultrasound, electrical stimulation/Russian stimulation and cryotherapy  Planned therapy interventions: manual therapy, neuromuscular re-education, postural training, soft tissue mobilization, spinal/joint mobilization, strengthening, stretching, therapeutic activities, joint mobilization, home exercise program, functional ROM exercises, gait training, flexibility, body mechanics training and balance/weight-bearing training  Frequency: 2x week  Duration in visits: 8  Treatment plan discussed with: patient  Plan details: Sacral manual with generalized hip and core stabilization, flexion based exercise, manual and modalities as indicated based on pain.        Manual Therapy:         mins  65749;  Therapeutic Exercise:    40     mins  10937;     Neuromuscular Eber:        mins  46374;    Therapeutic Activity:          mins  50632;     Gait Training:           mins  89570;     Ultrasound:          mins  58417;    Electrical Stimulation:         mins  60780 ( );  Dry Needling          mins self-pay    Timed Treatment:   40   mins   Total Treatment:     40   mins    PT SIGNATURE: Abdifatah Story, PT   DATE TREATMENT INITIATED: 9/11/2019    Initial Certification  Certification Period: 12/10/2019  I certify that the therapy services are furnished while this patient is under my care.  The services outlined above are required by this patient, and will be reviewed every 90 days.     PHYSICIAN: Akilah Rueda MD      DATE:     Please sign and return via fax to [phone].. Thank  you, [facility].

## 2019-09-16 ENCOUNTER — OFFICE VISIT (OUTPATIENT)
Dept: PHYSICAL THERAPY | Facility: CLINIC | Age: 84
End: 2019-09-16

## 2019-09-16 DIAGNOSIS — G89.29 CHRONIC BILATERAL LOW BACK PAIN WITH RIGHT-SIDED SCIATICA: Primary | ICD-10-CM

## 2019-09-16 DIAGNOSIS — M54.41 CHRONIC BILATERAL LOW BACK PAIN WITH RIGHT-SIDED SCIATICA: Primary | ICD-10-CM

## 2019-09-16 PROCEDURE — 97110 THERAPEUTIC EXERCISES: CPT | Performed by: PHYSICAL THERAPIST

## 2019-09-16 RX ORDER — SERTRALINE HYDROCHLORIDE 100 MG/1
TABLET, FILM COATED ORAL
Qty: 90 TABLET | Refills: 1 | Status: SHIPPED | OUTPATIENT
Start: 2019-09-16 | End: 2020-03-16

## 2019-09-16 NOTE — PROGRESS NOTES
Physical Therapy Daily Progress Note        Patient: Jayda Lopes   : 1935  Diagnosis/ICD-10 Code:  Chronic bilateral low back pain with right-sided sciatica [M54.41, G89.29]  Referring practitioner: No ref. provider found  Date of Initial Visit: Type: THERAPY  Noted: 2019  Today's Date: 2019  Patient seen for 3 sessions         Jayda Lopes reports that she feels about the same overall.  She notes her pain is a little higher on the side of her back.  Continues to hurt a lot upon waking.  Concurrent pain along the lateral hip and lower leg.   Her pain level is 9/10 upon arrival.          Objective   Uses SC, slightly shifted to the left, moderate genu valgus bilaterally    See Exercise, Manual, and Modality Logs for complete treatment.       Assessment & Plan     Assessment  Assessment details: Patient tolerates treatment well overall and reduces her pain during flexion based activity.  Her pain remains easily irritable which is a barrier at times.    Plan  Plan details: Attempt S/L hip abd, ITB stretching.                  Timed:  Manual Therapy:    0     mins  68962;  Therapeutic Exercise:    33     mins  94592;     Neuromuscular Eber:    0    mins  49291;    Therapeutic Activity:     0     mins  15007;     Gait Trainin     mins  45778;     Ultrasound:     0     mins  07761;    Electrical Stimulation:    0     mins  61871 ( );    Untimed:  Electrical Stimulation:    0     mins  59576 ( );  Mechanical Traction:    0     mins  92876;     Timed Treatment:   33   mins   Total Treatment:     33   mins  Abdifatah Story, PT  Physical Therapist

## 2019-09-18 ENCOUNTER — OFFICE VISIT (OUTPATIENT)
Dept: CARDIOLOGY | Facility: CLINIC | Age: 84
End: 2019-09-18

## 2019-09-18 VITALS
BODY MASS INDEX: 28.04 KG/M2 | WEIGHT: 185 LBS | SYSTOLIC BLOOD PRESSURE: 138 MMHG | HEIGHT: 68 IN | OXYGEN SATURATION: 97 % | HEART RATE: 86 BPM | DIASTOLIC BLOOD PRESSURE: 80 MMHG

## 2019-09-18 DIAGNOSIS — I44.30 AVB (ATRIOVENTRICULAR BLOCK): ICD-10-CM

## 2019-09-18 DIAGNOSIS — I50.22 CHRONIC SYSTOLIC (CONGESTIVE) HEART FAILURE (HCC): ICD-10-CM

## 2019-09-18 DIAGNOSIS — I10 ESSENTIAL HYPERTENSION: ICD-10-CM

## 2019-09-18 DIAGNOSIS — I48.20 CHRONIC ATRIAL FIBRILLATION (HCC): Primary | ICD-10-CM

## 2019-09-18 PROCEDURE — 99213 OFFICE O/P EST LOW 20 MIN: CPT | Performed by: INTERNAL MEDICINE

## 2019-09-18 PROCEDURE — 93281 PM DEVICE PROGR EVAL MULTI: CPT | Performed by: INTERNAL MEDICINE

## 2019-09-18 NOTE — PROGRESS NOTES
Jayda Lopes  1935  [phone]    09/18/2019    [facility] CARDIOLOGY     Akilah Rueda MD  [address] [address]    Chief Complaint   Patient presents with   • Atrial Fibrillation     Problem List:   1. Atrial fibrillation:  a. Hospitalization with external cardioversion on 07/26/2004 with initiation of Rythmol therapy.   b. Palpitations with event recorder, December 2005, consistent with PACs.   c. Episode of atrial fibrillation approximately 60-90 minutes on Christmas 2009 with subsequent ER visit.  d. External cardioversion to normal sinus rhythm, 06/08/2012.   e. Hospitalization with initiation of Tikosyn, August 2012.   f. Pulmonary vein isolation procedure with extensive ablation of multiple sites and subsequent significant bradyarrhythmias after internal cardioversion to normal sinus rhythm with discontinuation of Tikosyn on 03/25/2014.  g. Unsuccessful external cardioversion, 05/15/2014, and subsequent successful internal cardioversion with early recurrence of atrial fibrillation, 05/15/2014.   h. Echocardiogram, 08/12/2014: EF less than 20% with severe global hypokinesis. Right ventricle mild to moderately dilated. Mild MR and mild TR.   i. 09/08/2014: Implantation of a [employer] Allure Quadra biventricular pacemaker, serial #[id number], and AV node ablation.   2. CHF class III:  a. Holter monitor in February 2002 with frequent PACs and PVCs.   b. Echocardiogram in March 2000 with normal LV size and function: LVEF of 60%. Mild TR, mild MR.   c. Echocardiogram on 07/26/2004: Normal LV size and function. No significant regurgitation from the mitral valve or tricuspid valve.   d. Echocardiogram, 02/06/2008: Normal LV size and function, mild MR.   e. Echocardiogram, 08/22/2012: Left ventricular ejection fraction 35% to 40%. Moderate MR, mild TR.  f. Echocardiogram, 02/13/2013: Left ventricular ejection fraction of 50% to 55%. Mild TR,  mild-to-moderate MR. LA 5.2.  g. Echocardiogram, 03/23/2015: EF 30-35%. Mild concentric LVH. Mild-to-moderate AI. Mild MR. Mild to moderate TR  h. Echocardiogram, 07/22/2015: Limited for EF only 50% to 55%.  3. Atypical chest pain:  a. Acceptable nuclear GXT with normal LV size and function and no ischemia in August 2000 and April 2004.  b. UMAIR, 03/25/2014: Ejection fraction of 50% to 55%. Mild mitral regurgitation.  4. Hypertension.   5. Hyperlipidemia.   6. Mitral valve prolapse.   7. Anxiety.   8. Gastroesophageal reflux disease.   9. Macular degeneration.   10. Surgical history:  a. Hysterectomy.  b. Partial thyroidectomy.  c. Ovarian cystectomy.   d. Cataract surgery    Allergies  Allergies   Allergen Reactions   • Phenazopyridine Hcl Unknown (See Comments)     Pyridium       Current Medications    Current Outpatient Medications:   •  Aflibercept (EYLEA IO), Inject  into the eye Every 3 (Three) Months., Disp: , Rfl:   •  amLODIPine (NORVASC) 2.5 MG tablet, Take 1 tablet by mouth 2 (Two) Times a Day., Disp: 180 tablet, Rfl: 1  •  atorvastatin (LIPITOR) 20 MG tablet, TAKE 1 TABLET EVERY EVENING, Disp: 90 tablet, Rfl: 2  •  carvedilol (COREG) 25 MG tablet, TAKE 1 TABLET TWICE A DAY, Disp: 180 tablet, Rfl: 3  •  Cholecalciferol (VITAMIN D3) 2000 units capsule, Take 1 tablet by mouth Daily., Disp: , Rfl:   •  esomeprazole (nexIUM) 20 MG capsule, TAKE 1 CAPSULE DAILY, Disp: 90 capsule, Rfl: 1  •  HYDROcodone-acetaminophen (NORCO) 5-325 MG per tablet, Take 1 tablet by mouth Every 6 (Six) Hours As Needed for Moderate Pain ., Disp: 30 tablet, Rfl: 0  •  Multiple Vitamins-Calcium (VIACTIV MULTI-VITAMIN) chewable tablet, Chew 2 tablets Daily., Disp: , Rfl:   •  Multiple Vitamins-Minerals (PRESERVISION AREDS 2 PO), Take 2 tablets by mouth Daily., Disp: , Rfl:   •  ramipril (ALTACE) 5 MG capsule, TAKE 1 CAPSULE EVERY MORNING AND 2 CAPSULES EVERY EVENING, Disp: 270 capsule, Rfl: 4  •  sertraline (ZOLOFT) 100 MG tablet, TAKE  "1 TABLET DAILY, Disp: 90 tablet, Rfl: 1  •  XARELTO 20 MG tablet, TAKE 1 TABLET DAILY AS DIRECTED, Disp: 90 tablet, Rfl: 3    History of Present Illness     Pt presents for follow up of chronic atrial fibrillation, CHF, DCM, and BiV PM check along with HTN. Since we last saw the pt, pt denies any awareness of palps, denies SOB, CP, LH, and dizziness. Denies any hospitalizations, ER visits, bleeding on Xarelto, or TIA/CVA symptoms. Overall feels well. BP is controlled. She recently had colonoscopy but has not gotten report on her polyps yet. Energy level is good. Her main complaint is that she is suffering from sciatica pain.     ROS:  General: +  fatigue, No weight gain or loss  Cardiovascular:  Denies CP, PND, syncope, near syncope, edema or palpitations.  Pulmonary:  Denies ZAFAR, cough, or wheezing      Vitals:    09/18/19 1434   BP: 138/80   BP Location: Left arm   Patient Position: Sitting   Pulse: 86   SpO2: 97%   Weight: 83.9 kg (185 lb)   Height: 172.7 cm (68\")     Body mass index is 28.13 kg/m².  PE:  General: NAD. A & O x 3  Neck: no JVD, no carotid bruits, no TM  Heart RRR, NL S1, S2, S4 present, no rubs, murmurs  Lungs: CTA, no wheezes, rhonchi, or rales  Abd: soft, non-tender, NL BS  Ext: No musculoskeletal deformities, no edema, cyanosis, or clubbing  Psych: normal mood and affect    Diagnostic Data:    BiV PM Manual Interrogation: [employer] Bi-V PPM with normal function, set at VVIR, chronic atrial fibrillation, Bi-V paced >99%. 9 years on battery.     Procedures    1. Chronic atrial fibrillation (CMS/HCC)    2. Chronic systolic (congestive) heart failure (CMS/HCC)    3. AVB (atrioventricular block)    4. Essential hypertension        Plan:  1) Chronic atrial fibrillation:  - S/p AVN ablation, asymptomatic and rate controlled.  2) Anticoagulation:  - CHADSVASc = 5, on Xarelto.  3) Chronic systolic CHF, NYHA class I/DCM:  - EF improved to 50-55% in 2015  - On BBL/ACE  4) AV block:  - S/p AVN ablation, " Bi-V PPM implant, device with normal function.  5) HTN:  - controlled.     F/up in 6 months    Scribed for Hiren Kaufman MD by Doris Romeo PA-C. 9/18/2019  2:38 PM     I, Hiren Kaufman MD, personally performed the services described in this documentation as scribed by the above named individual in my presence, and it is both accurate and complete.  9/18/2019  3:04 PM

## 2019-09-19 ENCOUNTER — TREATMENT (OUTPATIENT)
Dept: PHYSICAL THERAPY | Facility: CLINIC | Age: 84
End: 2019-09-19

## 2019-09-19 DIAGNOSIS — M54.41 CHRONIC BILATERAL LOW BACK PAIN WITH RIGHT-SIDED SCIATICA: Primary | ICD-10-CM

## 2019-09-19 DIAGNOSIS — G89.29 CHRONIC BILATERAL LOW BACK PAIN WITH RIGHT-SIDED SCIATICA: Primary | ICD-10-CM

## 2019-09-19 PROCEDURE — 97140 MANUAL THERAPY 1/> REGIONS: CPT | Performed by: PHYSICAL THERAPIST

## 2019-09-19 PROCEDURE — 97110 THERAPEUTIC EXERCISES: CPT | Performed by: PHYSICAL THERAPIST

## 2019-09-19 NOTE — PROGRESS NOTES
Physical Therapy Daily Progress Note        Patient: Jayda Lopes   : 1935  Diagnosis/ICD-10 Code:  No primary diagnosis found.  Referring practitioner: No ref. provider found  Date of Initial Visit: Type: THERAPY  Noted: 2019  Today's Date: 2019  Patient seen for 4 sessions         Patient reports that she had a good day on Tuesday but her pain is elevated today. Continues in the right buttock and right lateral lower leg.. Her pain level is 9-10/10 upon arrival.          Objective   Flexed posture, uses SC to ambulate in but not in the clinic, significant pes planus and genu valgus.    See Exercise, Manual, and Modality Logs for complete treatment.       Assessment & Plan     Assessment  Assessment details: Patient experiences centralization with belted distraction from hook lying and flexion based exercises.  She is progressing well but is intermittently highly irritable.    Plan  Plan details: Trial mechanical traction, add standing strengthening to tolerance.                  Timed:  Manual Therapy:    10     mins  43018;  Therapeutic Exercise:    22     mins  68927;     Neuromuscular Eber:    0    mins  47276;    Therapeutic Activity:     0     mins  58051;     Gait Trainin     mins  66420;     Ultrasound:     0     mins  99546;    Electrical Stimulation:    0     mins  56305 ( );    Untimed:  Electrical Stimulation:    0     mins  66777 ( );  Mechanical Traction:    0     mins  93411;     Timed Treatment:   32   mins   Total Treatment:     32   mins  Abdifatah Story, PT  Physical Therapist

## 2019-09-24 ENCOUNTER — TREATMENT (OUTPATIENT)
Dept: PHYSICAL THERAPY | Facility: CLINIC | Age: 84
End: 2019-09-24

## 2019-09-24 DIAGNOSIS — M54.41 CHRONIC BILATERAL LOW BACK PAIN WITH RIGHT-SIDED SCIATICA: Primary | ICD-10-CM

## 2019-09-24 DIAGNOSIS — G89.29 CHRONIC BILATERAL LOW BACK PAIN WITH RIGHT-SIDED SCIATICA: Primary | ICD-10-CM

## 2019-09-24 PROCEDURE — 97012 MECHANICAL TRACTION THERAPY: CPT | Performed by: PHYSICAL THERAPIST

## 2019-09-24 PROCEDURE — 97110 THERAPEUTIC EXERCISES: CPT | Performed by: PHYSICAL THERAPIST

## 2019-09-24 NOTE — PROGRESS NOTES
Physical Therapy Daily Progress Note        Patient: Jayda Lopes   : 1935  Diagnosis/ICD-10 Code:  Chronic bilateral low back pain with right-sided sciatica [M54.41, G89.29]  Referring practitioner: Kesha Sanchez PA-C  Date of Initial Visit: Type: THERAPY  Noted: 2019  Today's Date: 2019  Patient seen for 5 sessions         Patient reports that she continues to have pain in the mornings upon rising  The pain seems to be localizing more to the hip and the pain along the lateral lower leg appears to be more intermittent. Her pain level is 7/10 upon arrival.          Objective   Patient experiences relief with repeated flexion and distraction.  She drags her right foot intermittently with gait.      See Exercise, Manual, and Modality Logs for complete treatment.       Assessment & Plan     Assessment  Assessment details: Patient responds favorably to traction and flexion based mobility.  Her pain is improving in clinic but she is not maintaining improvements at home between sessions.  Despite this, her pain is able to be alleviated and her symptoms centralize appropriately with treatment.    Plan  Plan details: Continue mechanical traction and progression of appropriate strengthening.                Timed:  Manual Therapy:    5     mins  91048;  Therapeutic Exercise:    22     mins  37597;     Neuromuscular Eber:    0    mins  71676;    Therapeutic Activity:     0     mins  27381;     Gait Trainin     mins  92959;     Ultrasound:     0     mins  01584;    Electrical Stimulation:    0     mins  55873 ( );    Untimed:  Electrical Stimulation:    0     mins  30267 ( );  Mechanical Traction:    12     mins  54900;     Timed Treatment:  27   mins   Total Treatment:     39   mins  Abdifatah Story, PT  Physical Therapist

## 2019-09-27 ENCOUNTER — TREATMENT (OUTPATIENT)
Dept: PHYSICAL THERAPY | Facility: CLINIC | Age: 84
End: 2019-09-27

## 2019-09-27 DIAGNOSIS — G89.29 CHRONIC BILATERAL LOW BACK PAIN WITH RIGHT-SIDED SCIATICA: Primary | ICD-10-CM

## 2019-09-27 DIAGNOSIS — M54.41 CHRONIC BILATERAL LOW BACK PAIN WITH RIGHT-SIDED SCIATICA: Primary | ICD-10-CM

## 2019-09-27 PROCEDURE — 97110 THERAPEUTIC EXERCISES: CPT | Performed by: PHYSICAL THERAPIST

## 2019-09-27 PROCEDURE — 97012 MECHANICAL TRACTION THERAPY: CPT | Performed by: PHYSICAL THERAPIST

## 2019-09-27 NOTE — PROGRESS NOTES
Physical Therapy Daily Progress Note    Date: 2019    Patient: Jayda Lopes  Medical Record #: [id number]  Referring Provider: Kesha Sanchez PA-C    Visit Diagnosis/ICD-10 Code: No primary diagnosis found.  Patient seen for Visit count could not be calculated. Make sure you are using a visit which is associated with an episode. sessions    Subjective   Patient states she continues to have sacral and RLE pain that is limiting her activity and overall mobility. She states she didn't notice much change with traction last visit.   Pain Scale (0-10): 5-6/10      Objective     Objective     Treatment/Procedures/Modalities:   Manual Therapy: 0 Minutes  Therapeutic Exercise: 27 Minutes   Neuromuscular Re-Education: 0 Minutes   Therapeutic Activity: 0 Minutes  Gait Trainin Minutes   Moist Heat:    Ice:    E-Stim:    Ultrasound:    Ionto:   Traction:      Timed Code Treatment: 39 Minutes  Total Treatment Time: 39 Minutes    Assessment / Plan:   Assessment/Plan  Patient unable to tolerate progression of standing CKC therex without increased RLE symptoms. Increased weight with lumbar traction for increased distraction. She verbalized compliance with current HEP and questioned about f/u with PCP for possible pain mgmt referral.     Progress per Plan of Care. Assess response from traction and if f/u with PCP.       Corinne E. Perkins, PT  Physical Therapist

## 2019-09-30 ENCOUNTER — TREATMENT (OUTPATIENT)
Dept: PHYSICAL THERAPY | Facility: CLINIC | Age: 84
End: 2019-09-30

## 2019-09-30 DIAGNOSIS — M54.41 CHRONIC BILATERAL LOW BACK PAIN WITH RIGHT-SIDED SCIATICA: Primary | ICD-10-CM

## 2019-09-30 DIAGNOSIS — G89.29 CHRONIC BILATERAL LOW BACK PAIN WITH RIGHT-SIDED SCIATICA: Primary | ICD-10-CM

## 2019-09-30 PROCEDURE — 97012 MECHANICAL TRACTION THERAPY: CPT | Performed by: PHYSICAL THERAPIST

## 2019-09-30 PROCEDURE — 97110 THERAPEUTIC EXERCISES: CPT | Performed by: PHYSICAL THERAPIST

## 2019-09-30 NOTE — PROGRESS NOTES
Physical Therapy Daily Progress Note        Patient: Jayda Lopes   : 1935  Diagnosis/ICD-10 Code:  Chronic bilateral low back pain with right-sided sciatica [M54.41, G89.29]  Referring practitioner: Kesha Sanchez PA-C  Date of Initial Visit: Type: THERAPY  Noted: 2019  Today's Date: 2019  Patient seen for 7 sessions    REASSESSMENT DUE BEFORE 10/11/2019.     Patient reports she had a temporary increase in pain the day following her previous treatment but the last two days she has felt considerably better.  She has had minimal to no pain in the lower leg for two days but her hip is hurting her a little more. Her pain level is 1/10 upon arrival.          Objective   Carrying SC for ambulation but not using.    See Exercise, Manual, and Modality Logs for complete treatment.       Assessment & Plan     Assessment  Assessment details: Patient's symptoms are centralizing and are noted to be along the hip only today.  She has minimal to no symptoms in the lower leg throughout the entire session.  She is able to alleviate the hip pain with self mobilization.    Plan  Plan details: Progress strengthening exercises to tolerance.                  Timed:  Manual Therapy:    0     mins  03390;  Therapeutic Exercise:    30     mins  48325;     Neuromuscular Eber:    0    mins  28692;    Therapeutic Activity:     0     mins  84060;     Gait Trainin     mins  29897;     Ultrasound:     0     mins  34845;    Electrical Stimulation:    0     mins  12028 ( );    Untimed:  Electrical Stimulation:    0     mins  85202 ( );  Mechanical Traction:    15     mins  92069;     Timed Treatment:   30   mins   Total Treatment:     45   mins  Abdifatah Story, PT  Physical Therapist

## 2019-10-04 ENCOUNTER — TREATMENT (OUTPATIENT)
Dept: PHYSICAL THERAPY | Facility: CLINIC | Age: 84
End: 2019-10-04

## 2019-10-04 DIAGNOSIS — M54.41 CHRONIC BILATERAL LOW BACK PAIN WITH RIGHT-SIDED SCIATICA: Primary | ICD-10-CM

## 2019-10-04 DIAGNOSIS — G89.29 CHRONIC BILATERAL LOW BACK PAIN WITH RIGHT-SIDED SCIATICA: Primary | ICD-10-CM

## 2019-10-04 PROCEDURE — 97110 THERAPEUTIC EXERCISES: CPT | Performed by: PHYSICAL THERAPIST

## 2019-10-04 PROCEDURE — 97012 MECHANICAL TRACTION THERAPY: CPT | Performed by: PHYSICAL THERAPIST

## 2019-10-04 NOTE — PROGRESS NOTES
Physical Therapy Daily Progress Note        Patient: Jayda Lopes   : 1935  Diagnosis/ICD-10 Code:  Chronic bilateral low back pain with right-sided sciatica [M54.41, G89.29]  Referring practitioner: Kesha Sanchez PA-C  Date of Initial Visit: Type: THERAPY  Noted: 2019  Today's Date: 10/4/2019  Patient seen for 8 sessions         Patient reports that she is progressing well and hasn't had lower lateral leg pain in about a week to ten days.  She is left with left hip pain and tightness that increases with walking.  She has stopped using her cane for daily ambulation successfully. Her pain level is 3/10 upon arrival.          Objective   No AD with ambulation.  Guarding with tenderness noted along the gluteal region.    See Exercise, Manual, and Modality Logs for complete treatment.       Assessment & Plan     Assessment  Assessment details: Patient is centralizing her pain and most of her pain the remains seems to be more related to muscle guarding from altered gait rather than from the lumbar spine.    Plan  Plan details: Begin to wean traction, progress and finalize HEP over the next 1-2 weeks with anticipated transition soon.              Timed:  Manual Therapy:    5     mins  98649;  Therapeutic Exercise:    25     mins  42790;     Neuromuscular Eber:    0    mins  98800;    Therapeutic Activity:     0     mins  65065;     Gait Trainin     mins  37014;     Ultrasound:     0     mins  28090;    Electrical Stimulation:    0     mins  38799 ( );    Untimed:  Electrical Stimulation:    0     mins  87107 ( );  Mechanical Traction:    15     mins  99865;     Timed Treatment:   30   mins   Total Treatment:     45   mins  Abdifatah Story, PT  Physical Therapist

## 2019-10-08 ENCOUNTER — TREATMENT (OUTPATIENT)
Dept: PHYSICAL THERAPY | Facility: CLINIC | Age: 84
End: 2019-10-08

## 2019-10-08 DIAGNOSIS — G89.29 CHRONIC BILATERAL LOW BACK PAIN WITH RIGHT-SIDED SCIATICA: Primary | ICD-10-CM

## 2019-10-08 DIAGNOSIS — M54.41 CHRONIC BILATERAL LOW BACK PAIN WITH RIGHT-SIDED SCIATICA: Primary | ICD-10-CM

## 2019-10-08 PROCEDURE — 97110 THERAPEUTIC EXERCISES: CPT | Performed by: PHYSICAL THERAPIST

## 2019-10-08 PROCEDURE — 97140 MANUAL THERAPY 1/> REGIONS: CPT | Performed by: PHYSICAL THERAPIST

## 2019-10-08 NOTE — PROGRESS NOTES
Physical Therapy Daily Progress Note        Patient: Jayda Lopes   : 1935  Diagnosis/ICD-10 Code:  Chronic bilateral low back pain with right-sided sciatica [M54.41, G89.29]  Referring practitioner: Kesha Sanchez PA-C  Date of Initial Visit: Type: THERAPY  Noted: 2019  Today's Date: 10/8/2019  Patient seen for 9 sessions         Patient reports that her leg has been minimally symptomatic but she remains reactive along the right glute/SIJ region.  The pain is worse upon waking for about 20 minutes but aggravates as she stands up to walk after prolonged sitting. Her pain level is 2-3/10 upon arrival.          Objective   TTP along right SIJ with guarding along the piriformis    See Exercise, Manual, and Modality Logs for complete treatment.       Assessment & Plan     Assessment  Assessment details: Patient's pain minimally changes with manual techniques but seems to improve with glute strengthening exercises.  Her pain has seemingly plateaued in the right hip region but her LE is significantly improved as well as her overall improvement in irritability.    Plan  Plan details: Check on pain changes, with no change could trial DN, with no additional response or change could hold PT and consider referral for possible injection.                  Timed:  Manual Therapy:    9     mins  53938;  Therapeutic Exercise:    21     mins  43783;     Neuromuscular Eber:    0    mins  39899;    Therapeutic Activity:     0     mins  76892;     Gait Trainin     mins  72743;     Ultrasound:     0     mins  65757;    Electrical Stimulation:    0     mins  63805 ( );    Untimed:  Electrical Stimulation:    0     mins  19838 ( );  Mechanical Traction:    0     mins  35996;     Timed Treatment:   30   mins   Total Treatment:     30   mins  Abdifatah Story, PT  Physical Therapist

## 2019-10-15 ENCOUNTER — TREATMENT (OUTPATIENT)
Dept: PHYSICAL THERAPY | Facility: CLINIC | Age: 84
End: 2019-10-15

## 2019-10-15 DIAGNOSIS — G89.29 CHRONIC BILATERAL LOW BACK PAIN WITH RIGHT-SIDED SCIATICA: Primary | ICD-10-CM

## 2019-10-15 DIAGNOSIS — M54.41 CHRONIC BILATERAL LOW BACK PAIN WITH RIGHT-SIDED SCIATICA: Primary | ICD-10-CM

## 2019-10-15 PROCEDURE — 97140 MANUAL THERAPY 1/> REGIONS: CPT | Performed by: PHYSICAL THERAPIST

## 2019-10-15 PROCEDURE — 97110 THERAPEUTIC EXERCISES: CPT | Performed by: PHYSICAL THERAPIST

## 2019-10-15 NOTE — PROGRESS NOTES
Physical Therapy Daily Progress Note-DISCHARGE        Patient: Jayda Lopes   : 1935  Diagnosis/ICD-10 Code:  Chronic bilateral low back pain with right-sided sciatica [M54.41, G89.29]  Referring practitioner: Kesha Sanchez PA-C  Date of Initial Visit: Type: THERAPY  Noted: 2019  Today's Date: 10/15/2019  Patient seen for 10 sessions         Patient reports that PT has helped to reduce her overall irritability but her pain is still present, just not significant.  She expresses concern because this is a pain that wasn't present prior to onset. Her pain level is 1/10 upon arrival.          Objective   Tenderness at lateral glute  See Exercise, Manual, and Modality Logs for complete treatment.       Assessment & Plan     Assessment  Assessment details: Patient's symptoms have plateaued in overall progress and she will be d/c to an I HEP. She attended 10/10 visits.    Plan  Plan details: D/C to I HEP.                  Timed:  Manual Therapy:    10     mins  05419;  Therapeutic Exercise:    15     mins  87848;     Neuromuscular Eber:    0    mins  31984;    Therapeutic Activity:     0     mins  74970;     Gait Trainin     mins  45886;     Ultrasound:     0     mins  28066;    Electrical Stimulation:    0     mins  29687 ( );    Untimed:  Electrical Stimulation:    0     mins  82286 ( );  Mechanical Traction:    0     mins  77822;     Timed Treatment:   25   mins   Total Treatment:     30   mins  Abdifatah Story PT  Physical Therapist

## 2019-11-08 ENCOUNTER — FLU SHOT (OUTPATIENT)
Dept: INTERNAL MEDICINE | Facility: CLINIC | Age: 84
End: 2019-11-08

## 2019-11-08 DIAGNOSIS — Z23 NEED FOR INFLUENZA VACCINATION: ICD-10-CM

## 2019-11-08 PROCEDURE — 90653 IIV ADJUVANT VACCINE IM: CPT | Performed by: INTERNAL MEDICINE

## 2019-11-08 PROCEDURE — G0008 ADMIN INFLUENZA VIRUS VAC: HCPCS | Performed by: INTERNAL MEDICINE

## 2019-12-09 DIAGNOSIS — I10 BENIGN ESSENTIAL HYPERTENSION: ICD-10-CM

## 2019-12-09 RX ORDER — AMLODIPINE BESYLATE 2.5 MG/1
TABLET ORAL
Qty: 180 TABLET | Refills: 0 | Status: SHIPPED | OUTPATIENT
Start: 2019-12-09 | End: 2020-03-09 | Stop reason: SDUPTHER

## 2019-12-10 DIAGNOSIS — I10 BENIGN ESSENTIAL HYPERTENSION: ICD-10-CM

## 2019-12-10 RX ORDER — AMLODIPINE BESYLATE 2.5 MG/1
TABLET ORAL
Qty: 180 TABLET | Refills: 0 | Status: SHIPPED | OUTPATIENT
Start: 2019-12-10 | End: 2019-12-17

## 2019-12-16 ENCOUNTER — TELEPHONE (OUTPATIENT)
Dept: INTERNAL MEDICINE | Facility: CLINIC | Age: 84
End: 2019-12-16

## 2019-12-16 ENCOUNTER — TRANSCRIBE ORDERS (OUTPATIENT)
Dept: ADMINISTRATIVE | Facility: HOSPITAL | Age: 84
End: 2019-12-16

## 2019-12-16 DIAGNOSIS — Z12.31 VISIT FOR SCREENING MAMMOGRAM: Primary | ICD-10-CM

## 2019-12-16 NOTE — TELEPHONE ENCOUNTER
She does not need a referral or order for a mammogram.  Just give her the phone number to call she can schedule it herself.

## 2019-12-17 ENCOUNTER — OFFICE VISIT (OUTPATIENT)
Dept: INTERNAL MEDICINE | Facility: CLINIC | Age: 84
End: 2019-12-17

## 2019-12-17 ENCOUNTER — TELEPHONE (OUTPATIENT)
Dept: INTERNAL MEDICINE | Facility: CLINIC | Age: 84
End: 2019-12-17

## 2019-12-17 ENCOUNTER — CLINICAL SUPPORT NO REQUIREMENTS (OUTPATIENT)
Dept: CARDIOLOGY | Facility: CLINIC | Age: 84
End: 2019-12-17

## 2019-12-17 VITALS
BODY MASS INDEX: 28.14 KG/M2 | SYSTOLIC BLOOD PRESSURE: 158 MMHG | DIASTOLIC BLOOD PRESSURE: 98 MMHG | TEMPERATURE: 98.8 F | HEIGHT: 68 IN | HEART RATE: 72 BPM

## 2019-12-17 DIAGNOSIS — I48.0 AF (PAROXYSMAL ATRIAL FIBRILLATION) (HCC): ICD-10-CM

## 2019-12-17 DIAGNOSIS — J06.9 ACUTE URI: Primary | ICD-10-CM

## 2019-12-17 PROCEDURE — 93296 REM INTERROG EVL PM/IDS: CPT | Performed by: INTERNAL MEDICINE

## 2019-12-17 PROCEDURE — 99213 OFFICE O/P EST LOW 20 MIN: CPT | Performed by: NURSE PRACTITIONER

## 2019-12-17 PROCEDURE — 93294 REM INTERROG EVL PM/LDLS PM: CPT | Performed by: INTERNAL MEDICINE

## 2019-12-17 RX ORDER — GUAIFENESIN 600 MG/1
1200 TABLET, EXTENDED RELEASE ORAL 2 TIMES DAILY
Qty: 30 TABLET | Refills: 0 | Status: SHIPPED | OUTPATIENT
Start: 2019-12-17 | End: 2020-03-05

## 2019-12-17 RX ORDER — PROMETHAZINE HYDROCHLORIDE AND PHENYLEPHRINE HYDROCHLORIDE 6.25; 5 MG/5ML; MG/5ML
5 SYRUP ORAL EVERY 4 HOURS PRN
Qty: 118 ML | Refills: 0 | Status: SHIPPED | OUTPATIENT
Start: 2019-12-17 | End: 2019-12-26

## 2019-12-17 RX ORDER — PROMETHAZINE HYDROCHLORIDE 6.25 MG/5ML
6.25 SYRUP ORAL 4 TIMES DAILY PRN
Qty: 118 ML | Refills: 0 | Status: SHIPPED | OUTPATIENT
Start: 2019-12-17 | End: 2019-12-26

## 2019-12-17 NOTE — PROGRESS NOTES
Jayda Lopes  1935  [id number]  Patient Care Team:  Akilah Rueda MD as PCP - General (Internal Medicine)  Akilah Rueda MD as PCP - Internal Medicine (Internal Medicine)  Bernadette Almodovar MD as Consulting Physician (Dermatology)  Hiren Kaufman MD as Consulting Physician (Cardiac Electrophysiology)    Jayda Lopes is a pleasant 84 y.o. female who presents for evaluation of Cough (productive cough )      Chief Complaint   Patient presents with   • Cough     productive cough        HPI:   productive cough since yest, no wheezing sob or fever, no body aches.  Some sore throat, up last night coughing until 4am  Year round allergies  Past Medical History:   Diagnosis Date   • Acute bronchitis due to infection 1/10/2019   • Anxiety    • Atrial fibrillation (CMS/HCC)    • Atypical chest pain    • Bradycardia    • GERD (gastroesophageal reflux disease)    • Hyperlipidemia    • Hypertension    • Macular degeneration    • Mitral valve prolapse    • Palpitation      Past Surgical History:   Procedure Laterality Date   • CYSTECTOMY      h/o Ovarian   • HYSTERECTOMY Bilateral    • OOPHORECTOMY Bilateral 1993   • THYROIDECTOMY      h/o thyroid surgery sub-total      Family History   Problem Relation Age of Onset   • Colon cancer Mother    • Other Mother         cardiac arrhythmia   • Heart failure Mother    • Cancer Mother    • Lung cancer Father    • Cancer Father    • No Known Problems Brother    • Hypertension Maternal Grandmother    • Breast cancer Neg Hx    • Ovarian cancer Neg Hx      Social History     Tobacco Use   Smoking Status Never Smoker   Smokeless Tobacco Never Used     Allergies   Allergen Reactions   • Phenazopyridine Hcl Unknown (See Comments)     Pyridium       Current Outpatient Medications:   •  Aflibercept (EYLEA IO), Inject  into the eye Every 3 (Three) Months., Disp: , Rfl:   •  amLODIPine (NORVASC) 2.5 MG tablet, TAKE 1 TABLET BY MOUTH TWO TIMES A DAY , Disp: 180 tablet, Rfl:  0  •  atorvastatin (LIPITOR) 20 MG tablet, TAKE 1 TABLET EVERY EVENING, Disp: 90 tablet, Rfl: 2  •  carvedilol (COREG) 25 MG tablet, TAKE 1 TABLET TWICE A DAY, Disp: 180 tablet, Rfl: 3  •  Cholecalciferol (VITAMIN D3) 2000 units capsule, Take 1 tablet by mouth Daily., Disp: , Rfl:   •  esomeprazole (nexIUM) 20 MG capsule, TAKE 1 CAPSULE DAILY, Disp: 90 capsule, Rfl: 2  •  Multiple Vitamins-Calcium (VIACTIV MULTI-VITAMIN) chewable tablet, Chew 2 tablets Daily., Disp: , Rfl:   •  Multiple Vitamins-Minerals (PRESERVISION AREDS 2 PO), Take 2 tablets by mouth Daily., Disp: , Rfl:   •  ramipril (ALTACE) 5 MG capsule, TAKE 1 CAPSULE EVERY MORNING AND 2 CAPSULES EVERY EVENING, Disp: 270 capsule, Rfl: 4  •  sertraline (ZOLOFT) 100 MG tablet, TAKE 1 TABLET DAILY, Disp: 90 tablet, Rfl: 1  •  XARELTO 20 MG tablet, TAKE 1 TABLET DAILY AS DIRECTED, Disp: 90 tablet, Rfl: 3  •  guaiFENesin (MUCINEX) 600 MG 12 hr tablet, Take 2 tablets by mouth 2 (Two) Times a Day., Disp: 30 tablet, Rfl: 0  •  nystatin (MYCOSTATIN) [id number] UNIT/ML suspension, Swish and swallow 5 mL 4 (Four) Times a Day., Disp: 60 mL, Rfl: 0  •  promethazine-phenylephrine (promethazine-phenylephrine) 6.25-5 MG/5ML syrup syrup, Take 5 mL by mouth Every 4 (Four) Hours As Needed (cough)., Disp: 118 mL, Rfl: 0    Review of Systems   Constitutional: Positive for fatigue. Negative for chills and fever.   HENT: Positive for congestion and ear pain. Negative for sinus pressure.    Respiratory: Positive for cough, shortness of breath and wheezing. Negative for chest tightness.    Cardiovascular: Negative for chest pain and palpitations.   Gastrointestinal: Positive for constipation. Negative for abdominal pain and blood in stool.   Skin: Negative for color change.   Allergic/Immunologic: Positive for environmental allergies.   Neurological: Negative for dizziness, speech difficulty and headache.   Psychiatric/Behavioral: Negative for decreased concentration. The patient is not  "nervous/anxious.      /98 (BP Location: Left arm, Patient Position: Sitting, Cuff Size: Adult)   Pulse 72   Temp 98.8 °F (37.1 °C) (Temporal)   Ht 172.7 cm (67.99\")   LMP  (LMP Unknown)   BMI 28.14 kg/m²     Physical Exam   Constitutional: She appears well-developed and well-nourished.   HENT:   Head: Normocephalic and atraumatic.   Right Ear: External ear normal.   Left Ear: External ear normal.   Mouth/Throat: Oropharynx is clear and moist.   Oral candida coating buccal mucosa and tongue   Eyes: Conjunctivae and EOM are normal.   Neck: Normal range of motion. Neck supple.   Cardiovascular: Normal rate, regular rhythm and normal heart sounds.   Pulmonary/Chest: Effort normal and breath sounds normal. She has no decreased breath sounds. She has no wheezes. She has no rhonchi.   Abdominal: Soft. Bowel sounds are normal.   Musculoskeletal: Normal range of motion.   Neurological: She is alert.   Skin: Skin is warm and dry.   Psychiatric: She has a normal mood and affect. Her behavior is normal. Thought content normal.       Results Review:  None    Assessment/Plan:  Jayda was seen today for cough.    Diagnoses and all orders for this visit:    Acute URI    Other orders  -     nystatin (MYCOSTATIN) [id number] UNIT/ML suspension; Swish and swallow 5 mL 4 (Four) Times a Day.  -     guaiFENesin (MUCINEX) 600 MG 12 hr tablet; Take 2 tablets by mouth 2 (Two) Times a Day.  -     promethazine-phenylephrine (promethazine-phenylephrine) 6.25-5 MG/5ML syrup syrup; Take 5 mL by mouth Every 4 (Four) Hours As Needed (cough).       Patient Instructions   Start generic mucinex twice daily and cough syrup as needed    Use nystatin for oral yeast infection.    Plan of care reviewed with patient at the conclusion of today's visit. Education was provided regarding diagnosis, management and any prescribed or recommended OTC medications.  Patient verbalizes understanding of and agreement with management plan.    Return if symptoms " worsen or fail to improve.    *Note that portions of this note were completed with a voice recognition program.  Efforts were made to edit the dictation but occasionally words are transcribed.    Yoanna Howard, APRN

## 2019-12-17 NOTE — PATIENT INSTRUCTIONS
Start generic mucinex twice daily and cough syrup as needed    Use nystatin for oral yeast infection.

## 2019-12-17 NOTE — TELEPHONE ENCOUNTER
Gamaliel with [facility] says the promethazine-phenylephrine is on back order and is needing something else sent in.  He says he has plain promethazine and have her get the phenylephrine OTC if that is an option.  Gamaliel is requesting new prescription be sent to the [facility] on [address].

## 2019-12-20 ENCOUNTER — TELEPHONE (OUTPATIENT)
Dept: INTERNAL MEDICINE | Facility: CLINIC | Age: 84
End: 2019-12-20

## 2019-12-20 DIAGNOSIS — J40 BRONCHITIS: Primary | ICD-10-CM

## 2019-12-20 NOTE — TELEPHONE ENCOUNTER
Yes, that is what I sent her.  Sorry if that was not clear.  I think she had used some Tussionex before the appointment that she had a little left over from a long time ago.  I typically am reluctant to send since it can cause sedation and dizziness and is expensive.  If she was using and tolerates we could try a few days of that.

## 2019-12-20 NOTE — TELEPHONE ENCOUNTER
Pt says she was seen in office on Tuesday and the cough medicine that was prescribed for her is not working and is requesting something else be called in to the [facility] on [address].

## 2019-12-20 NOTE — TELEPHONE ENCOUNTER
I think she had used some tussionex she had left over prior to our visit.  Can you ask her if this is correct.

## 2019-12-26 ENCOUNTER — HOSPITAL ENCOUNTER (OUTPATIENT)
Dept: GENERAL RADIOLOGY | Facility: HOSPITAL | Age: 84
Discharge: HOME | End: 2019-12-26
Admitting: INTERNAL MEDICINE

## 2019-12-26 ENCOUNTER — OFFICE VISIT (OUTPATIENT)
Dept: INTERNAL MEDICINE | Facility: CLINIC | Age: 84
End: 2019-12-26

## 2019-12-26 VITALS
DIASTOLIC BLOOD PRESSURE: 70 MMHG | BODY MASS INDEX: 28.14 KG/M2 | SYSTOLIC BLOOD PRESSURE: 132 MMHG | HEART RATE: 76 BPM | HEIGHT: 68 IN | TEMPERATURE: 98.8 F

## 2019-12-26 DIAGNOSIS — R05.9 COUGH: ICD-10-CM

## 2019-12-26 DIAGNOSIS — J40 BRONCHITIS: ICD-10-CM

## 2019-12-26 DIAGNOSIS — R05.9 COUGH: Primary | ICD-10-CM

## 2019-12-26 DIAGNOSIS — R06.2 WHEEZE: ICD-10-CM

## 2019-12-26 PROCEDURE — 71046 X-RAY EXAM CHEST 2 VIEWS: CPT

## 2019-12-26 PROCEDURE — 96372 THER/PROPH/DIAG INJ SC/IM: CPT | Performed by: INTERNAL MEDICINE

## 2019-12-26 PROCEDURE — 99214 OFFICE O/P EST MOD 30 MIN: CPT | Performed by: INTERNAL MEDICINE

## 2019-12-26 RX ORDER — AMOXICILLIN AND CLAVULANATE POTASSIUM 875; 125 MG/1; MG/1
1 TABLET, FILM COATED ORAL 2 TIMES DAILY
Qty: 20 TABLET | Refills: 0 | Status: SHIPPED | OUTPATIENT
Start: 2019-12-26 | End: 2020-01-28

## 2019-12-26 RX ORDER — CEFTRIAXONE 1 G/1
1 INJECTION, POWDER, FOR SOLUTION INTRAMUSCULAR; INTRAVENOUS ONCE
Status: COMPLETED | OUTPATIENT
Start: 2019-12-26 | End: 2019-12-26

## 2019-12-26 RX ADMIN — CEFTRIAXONE 1 G: 1 INJECTION, POWDER, FOR SOLUTION INTRAMUSCULAR; INTRAVENOUS at 11:50

## 2019-12-26 NOTE — PROGRESS NOTES
[facility]     Jayda Lopes  1935   [id number]      Patient Care Team:  Akilah Rueda MD as PCP - General (Internal Medicine)  Akilah Rueda MD as PCP - Internal Medicine (Internal Medicine)  Bernadette Almodovar MD as Consulting Physician (Dermatology)  Hiren Kaufman MD as Consulting Physician (Cardiac Electrophysiology)    Chief Complaint   Patient presents with   • Cough     nonstop   • Fatigue     achy            HPI  Patient is a 84 y.o. female presents with cough is no better.  She actually feels she has gradually gotten worse.  Onset of symptoms was abrupt starting 2 weeks ago.  Chronicity acute. Severity severe.  Symptoms are associated with short of breath, poor appetite,fatigue and weakness,wheezing when lie down at night. Pertinent negatives pleuritic pain, no edema,no fever and chills.   Symptoms are aggravated by lying down and activity.   Symptoms improve with no help from promethazine syrup.  Tussionex syrup caused her to be too sleepy so she stopped it.  Context no known ill contacts. quality cough with production of copious amounts of white sputum. recent history saw Mariel on 12/17/2019.  She was given cough syrup and Mucinex      CHRONIC CONDITIONS  Blood pressure controlled.  Taking medications regularly.  A. fib is controlled.    Past Medical History:   Diagnosis Date   • Acute bronchitis due to infection 1/10/2019   • Anxiety    • Atrial fibrillation (CMS/HCC)    • Atypical chest pain    • Bradycardia    • GERD (gastroesophageal reflux disease)    • Hyperlipidemia    • Hypertension    • Macular degeneration    • Mitral valve prolapse    • Palpitation        Past Surgical History:   Procedure Laterality Date   • CYSTECTOMY      h/o Ovarian   • HYSTERECTOMY Bilateral    • OOPHORECTOMY Bilateral 1993   • THYROIDECTOMY      h/o thyroid surgery sub-total        Family History   Problem Relation Age of Onset   • Colon cancer Mother    • Other Mother          "cardiac arrhythmia   • Heart failure Mother    • Cancer Mother    • Lung cancer Father    • Cancer Father    • No Known Problems Brother    • Hypertension Maternal Grandmother    • Breast cancer Neg Hx    • Ovarian cancer Neg Hx        Social History     Socioeconomic History   • Marital status:      Spouse name: Not on file   • Number of children: Not on file   • Years of education: Not on file   • Highest education level: Not on file   Tobacco Use   • Smoking status: Never Smoker   • Smokeless tobacco: Never Used   Substance and Sexual Activity   • Alcohol use: Yes     Comment: rarely   • Drug use: Defer   • Sexual activity: Defer       Allergies   Allergen Reactions   • Phenazopyridine Hcl Unknown (See Comments)     Pyridium       Review of Systems:     Review of Systems   Constitutional: Positive for fatigue. Negative for chills and fever.   HENT: Positive for rhinorrhea. Negative for sinus pressure, sore throat and swollen glands.    Respiratory: Positive for cough, shortness of breath and wheezing.    Cardiovascular: Negative for chest pain, palpitations and leg swelling.   Gastrointestinal: Negative for abdominal pain, blood in stool, constipation and diarrhea.   Genitourinary: Negative for dysuria and frequency.   Musculoskeletal: Negative for arthralgias.       Vital Signs  Vitals:    12/26/19 1022   BP: 132/70   BP Location: Left arm   Patient Position: Sitting   Cuff Size: Adult   Pulse: 76   Temp: 98.8 °F (37.1 °C)   TempSrc: Temporal   Weight: Comment: pt declined   Height: 172.7 cm (67.99\")   PainSc: 0-No pain     Body mass index is 28.14 kg/m².      Current Outpatient Medications:   •  Aflibercept (EYLEA IO), Inject  into the eye Every 3 (Three) Months., Disp: , Rfl:   •  amLODIPine (NORVASC) 2.5 MG tablet, TAKE 1 TABLET BY MOUTH TWO TIMES A DAY , Disp: 180 tablet, Rfl: 0  •  atorvastatin (LIPITOR) 20 MG tablet, TAKE 1 TABLET EVERY EVENING, Disp: 90 tablet, Rfl: 2  •  carvedilol (COREG) 25 MG " tablet, TAKE 1 TABLET TWICE A DAY, Disp: 180 tablet, Rfl: 3  •  Cholecalciferol (VITAMIN D3) 2000 units capsule, Take 1 tablet by mouth Daily., Disp: , Rfl:   •  esomeprazole (nexIUM) 20 MG capsule, TAKE 1 CAPSULE DAILY, Disp: 90 capsule, Rfl: 2  •  guaiFENesin (MUCINEX) 600 MG 12 hr tablet, Take 2 tablets by mouth 2 (Two) Times a Day., Disp: 30 tablet, Rfl: 0  •  Multiple Vitamins-Calcium (VIACTIV MULTI-VITAMIN) chewable tablet, Chew 2 tablets Daily., Disp: , Rfl:   •  Multiple Vitamins-Minerals (PRESERVISION AREDS 2 PO), Take 2 tablets by mouth Daily., Disp: , Rfl:   •  nystatin (MYCOSTATIN) 494456 UNIT/ML suspension, Swish and swallow 5 mL 4 (Four) Times a Day., Disp: 60 mL, Rfl: 0  •  ramipril (ALTACE) 5 MG capsule, TAKE 1 CAPSULE EVERY MORNING AND 2 CAPSULES EVERY EVENING, Disp: 270 capsule, Rfl: 4  •  sertraline (ZOLOFT) 100 MG tablet, TAKE 1 TABLET DAILY, Disp: 90 tablet, Rfl: 1  •  XARELTO 20 MG tablet, TAKE 1 TABLET DAILY AS DIRECTED, Disp: 90 tablet, Rfl: 3  •  amoxicillin-clavulanate (AUGMENTIN) 875-125 MG per tablet, Take 1 tablet by mouth 2 (Two) Times a Day., Disp: 20 tablet, Rfl: 0  •  Dextromethorphan-guaiFENesin 5-100 MG/5ML liquid, Take 5 mL by mouth 3 (Three) Times a Day As Needed (cough and congestion)., Disp: 118 mL, Rfl: 0  No current facility-administered medications for this visit.     Physical Exam:    Physical Exam   Constitutional: She is oriented to person, place, and time. She appears well-developed and well-nourished. She appears ill.   HENT:   Head: Normocephalic.   Eyes: Pupils are equal, round, and reactive to light. Conjunctivae and EOM are normal.   Neck: Normal range of motion. Neck supple. No thyromegaly present.   Cardiovascular: Normal rate, regular rhythm, normal heart sounds and intact distal pulses.   Pulmonary/Chest: Effort normal. She has wheezes. She has rhonchi.   Musculoskeletal: Normal range of motion. She exhibits no edema.   Lymphadenopathy:     She has no cervical  adenopathy.   Neurological: She is alert and oriented to person, place, and time.   Psychiatric: She has a normal mood and affect. Thought content normal.   Nursing note and vitals reviewed.       ACE III MINI        Results Review:    None    CMP:  Lab Results   Component Value Date    BUN 15 07/18/2019    CREATININE 0.78 07/18/2019    EGFRIFNONA 71 07/18/2019    BCR 19.2 07/18/2019     07/18/2019    K 3.8 07/18/2019    CO2 28.0 07/18/2019    CALCIUM 9.4 07/18/2019    ALBUMIN 4.20 07/18/2019    BILITOT 1.3 (H) 07/18/2019    ALKPHOS 83 07/18/2019    AST 25 07/18/2019    ALT 22 07/18/2019     HbA1c:  Lab Results   Component Value Date    HGBA1C 6.0 09/07/2014    HGBA1C 6.1 (H) 05/14/2014     Microalbumin:  Lab Results   Component Value Date    MICROALBUR 5.7 07/18/2019     Lipid Panel  Lab Results   Component Value Date    CHOL 204 (H) 07/18/2019    TRIG 80 07/18/2019    HDL 76 (H) 07/18/2019     (H) 07/18/2019    AST 25 07/18/2019    ALT 22 07/18/2019       Medication Review: Medications reviewed and noted  Patient Instructions   Problem List Items Addressed This Visit        Respiratory    Cough - Primary    Overview     12/26/2019 Akilah Rueda MD    Patient most likely started with a viral illness then developed secondary bacterial illness.  Chest x-ray today to rule out pneumonia.    Rocephin injection in the office today.  Start Augmentin today and take it twice a day for 10 days.  Stop the present cough syrup and use generic Delsym instead.  Prescription sent to the pharmacy.  May continue Mucinex twice a day as needed.  May take Tylenol for myalgia and malaise.         Relevant Medications    cefTRIAXone (ROCEPHIN) injection 1 g (Completed) (Start on 12/26/2019 12:30 PM)    Other Relevant Orders    XR Chest PA & Lateral    Bronchitis    Relevant Medications    guaiFENesin (MUCINEX) 600 MG 12 hr tablet    Dextromethorphan-guaiFENesin 5-100 MG/5ML liquid    cefTRIAXone (ROCEPHIN) injection 1 g  (Completed) (Start on 12/26/2019 12:30 PM)    Other Relevant Orders    XR Chest PA & Lateral    Wheeze    Relevant Orders    XR Chest PA & Lateral             Diagnosis Plan   1. Cough  XR Chest PA & Lateral    cefTRIAXone (ROCEPHIN) injection 1 g   2. Bronchitis  Dextromethorphan-guaiFENesin 5-100 MG/5ML liquid    XR Chest PA & Lateral    cefTRIAXone (ROCEPHIN) injection 1 g    see above   3. Wheeze  XR Chest PA & Lateral    see above         Plan of care reviewed with patient at the conclusion of today's visit. Education was provided regarding diagnosis, management, and any prescribed or recommended OTC medications.Patient verbalizes understanding of and agreement with management plan.         Akilah Rueda MD

## 2020-01-02 ENCOUNTER — TELEPHONE (OUTPATIENT)
Dept: INTERNAL MEDICINE | Facility: CLINIC | Age: 85
End: 2020-01-02

## 2020-01-02 NOTE — TELEPHONE ENCOUNTER
Pt was seen 12/26/19 for cough that has not improved at all since visit. Pt was up throughout the night coughing and was told by Dr. Rueda to return if no improvement.   Pt's  stated that the medication prescribed has had no effect and is requesting another type of cough medicine be prescribed to [facility] until pt can be seen again. Appt was made for tomorrow morning (01/03/20) at 10:30AM.   Pt's  is also requesting a call back from clinical staff at [phone].

## 2020-01-03 ENCOUNTER — OFFICE VISIT (OUTPATIENT)
Dept: INTERNAL MEDICINE | Facility: CLINIC | Age: 85
End: 2020-01-03

## 2020-01-03 DIAGNOSIS — J40 BRONCHITIS: Primary | ICD-10-CM

## 2020-01-03 DIAGNOSIS — I48.20 CHRONIC ATRIAL FIBRILLATION (HCC): ICD-10-CM

## 2020-01-03 DIAGNOSIS — I10 BENIGN ESSENTIAL HYPERTENSION: ICD-10-CM

## 2020-01-03 PROCEDURE — 99214 OFFICE O/P EST MOD 30 MIN: CPT | Performed by: INTERNAL MEDICINE

## 2020-01-03 PROCEDURE — 96372 THER/PROPH/DIAG INJ SC/IM: CPT | Performed by: INTERNAL MEDICINE

## 2020-01-03 RX ORDER — TRIAMCINOLONE ACETONIDE 40 MG/ML
80 INJECTION, SUSPENSION INTRA-ARTICULAR; INTRAMUSCULAR ONCE
Status: COMPLETED | OUTPATIENT
Start: 2020-01-03 | End: 2020-01-03

## 2020-01-03 RX ORDER — BENZONATATE 100 MG/1
100 CAPSULE ORAL 3 TIMES DAILY PRN
Qty: 45 CAPSULE | Refills: 0 | Status: SHIPPED | OUTPATIENT
Start: 2020-01-03 | End: 2020-01-28

## 2020-01-03 RX ORDER — CEFDINIR 300 MG/1
300 CAPSULE ORAL 2 TIMES DAILY
Qty: 14 CAPSULE | Refills: 0 | Status: SHIPPED | OUTPATIENT
Start: 2020-01-03 | End: 2020-01-28

## 2020-01-03 RX ORDER — CEFTRIAXONE 1 G/1
1 INJECTION, POWDER, FOR SOLUTION INTRAMUSCULAR; INTRAVENOUS ONCE
Status: COMPLETED | OUTPATIENT
Start: 2020-01-03 | End: 2020-01-03

## 2020-01-03 RX ADMIN — CEFTRIAXONE 1 G: 1 INJECTION, POWDER, FOR SOLUTION INTRAMUSCULAR; INTRAVENOUS at 12:45

## 2020-01-03 RX ADMIN — TRIAMCINOLONE ACETONIDE 80 MG: 40 INJECTION, SUSPENSION INTRA-ARTICULAR; INTRAMUSCULAR at 12:46

## 2020-01-03 NOTE — PROGRESS NOTES
[facility]     Jayda Lopes  1935   [id number]      Patient Care Team:  Akilah Rueda MD as PCP - General (Internal Medicine)  Akilah Rueda MD as PCP - Internal Medicine (Internal Medicine)  Bernadette Almodovar MD as Consulting Physician (Dermatology)  Hiren Kaufman MD as Consulting Physician (Cardiac Electrophysiology)    Chief Complaint   Patient presents with   • URI     body aches - previously been on antibiotics since 12/17/19   • Cough     productive cough with white sputum   • Fatigue            HPI  Patient is a 84 y.o. female presents with cough with white sputum. Onset of symptoms was gradual starting 3 weeks ago.  Chronicity acute. Severity severe.  Symptoms are associated with shortness of breath and wheezing, and fatigue, aching all over,blows nose a lot. Pertinent negatives no fever/chills,sinus congestion or ear pain..   Symptoms are aggravated by lying down, has to sit up to cough.   Symptoms improve with -taking augmentin which she feels doesn't help, taking mucinex. And tried tussionex(too sedatin) and otc syrup(no help.).  Context no known ill contacts.  There is less sputum production now than there was.    CHRONIC CONDITIONS  Blood pressures controlled at home.  Taking medications regularly.    No A. fib symptoms or rapid heartbeat.  Taking medication regularly.    Past Medical History:   Diagnosis Date   • Acute bronchitis due to infection 1/10/2019   • Anxiety    • Atrial fibrillation (CMS/HCC)    • Atypical chest pain    • Bradycardia    • GERD (gastroesophageal reflux disease)    • Hyperlipidemia    • Hypertension    • Macular degeneration    • Mitral valve prolapse    • Palpitation        Past Surgical History:   Procedure Laterality Date   • CYSTECTOMY      h/o Ovarian   • HYSTERECTOMY Bilateral    • OOPHORECTOMY Bilateral 1993   • THYROIDECTOMY      h/o thyroid surgery sub-total        Family History   Problem Relation Age of Onset   • Colon cancer  "Mother    • Other Mother         cardiac arrhythmia   • Heart failure Mother    • Cancer Mother    • Lung cancer Father    • Cancer Father    • No Known Problems Brother    • Hypertension Maternal Grandmother    • Breast cancer Neg Hx    • Ovarian cancer Neg Hx        Social History     Socioeconomic History   • Marital status:      Spouse name: Not on file   • Number of children: Not on file   • Years of education: Not on file   • Highest education level: Not on file   Tobacco Use   • Smoking status: Never Smoker   • Smokeless tobacco: Never Used   Substance and Sexual Activity   • Alcohol use: Yes     Comment: rarely   • Drug use: Defer   • Sexual activity: Defer       Allergies   Allergen Reactions   • Phenazopyridine Hcl Unknown (See Comments)     Pyridium       Review of Systems:     Review of Systems   Constitutional: Positive for fatigue and fever. Negative for chills.   HENT: Positive for congestion. Negative for ear pain and sinus pressure.    Respiratory: Positive for cough, chest tightness, shortness of breath and wheezing.    Cardiovascular: Negative for chest pain and palpitations.   Gastrointestinal: Negative for abdominal pain, blood in stool and constipation.   Skin: Negative for color change.   Allergic/Immunologic: Negative for environmental allergies.   Neurological: Positive for dizziness. Negative for speech difficulty and headache.   Psychiatric/Behavioral: Negative for decreased concentration. The patient is not nervous/anxious.        Vital Signs  Vitals:    01/03/20 1029 01/04/20 0817   BP: 130/70    BP Location: Left arm    Patient Position: Sitting    Cuff Size: Adult    Pulse: 70    Temp: 99.6 °F (37.6 °C)    TempSrc: Temporal    SpO2: 96%    Weight: Comment: patient refused to weigh 83.9 kg (185 lb)   Height: 172.7 cm (67.99\")    PainSc: 0-No pain      Body mass index is 28.14 kg/m².      Current Outpatient Medications:   •  Aflibercept (EYLEA IO), Inject  into the eye Every 3 " (Three) Months., Disp: , Rfl:   •  amLODIPine (NORVASC) 2.5 MG tablet, TAKE 1 TABLET BY MOUTH TWO TIMES A DAY , Disp: 180 tablet, Rfl: 0  •  amoxicillin-clavulanate (AUGMENTIN) 875-125 MG per tablet, Take 1 tablet by mouth 2 (Two) Times a Day., Disp: 20 tablet, Rfl: 0  •  atorvastatin (LIPITOR) 20 MG tablet, TAKE 1 TABLET EVERY EVENING, Disp: 90 tablet, Rfl: 2  •  carvedilol (COREG) 25 MG tablet, TAKE 1 TABLET TWICE A DAY, Disp: 180 tablet, Rfl: 3  •  Cholecalciferol (VITAMIN D3) 2000 units capsule, Take 1 tablet by mouth Daily., Disp: , Rfl:   •  esomeprazole (nexIUM) 20 MG capsule, TAKE 1 CAPSULE DAILY, Disp: 90 capsule, Rfl: 2  •  guaiFENesin (MUCINEX) 600 MG 12 hr tablet, Take 2 tablets by mouth 2 (Two) Times a Day., Disp: 30 tablet, Rfl: 0  •  Multiple Vitamins-Calcium (VIACTIV MULTI-VITAMIN) chewable tablet, Chew 2 tablets Daily., Disp: , Rfl:   •  Multiple Vitamins-Minerals (PRESERVISION AREDS 2 PO), Take 2 tablets by mouth Daily., Disp: , Rfl:   •  nystatin (MYCOSTATIN) 064573 UNIT/ML suspension, Swish and swallow 5 mL 4 (Four) Times a Day., Disp: 60 mL, Rfl: 0  •  ramipril (ALTACE) 5 MG capsule, TAKE 1 CAPSULE EVERY MORNING AND 2 CAPSULES EVERY EVENING, Disp: 270 capsule, Rfl: 4  •  sertraline (ZOLOFT) 100 MG tablet, TAKE 1 TABLET DAILY, Disp: 90 tablet, Rfl: 1  •  XARELTO 20 MG tablet, TAKE 1 TABLET DAILY AS DIRECTED, Disp: 90 tablet, Rfl: 3  •  benzonatate (TESSALON PERLES) 100 MG capsule, Take 1 capsule by mouth 3 (Three) Times a Day As Needed for Cough., Disp: 45 capsule, Rfl: 0  •  cefdinir (OMNICEF) 300 MG capsule, Take 1 capsule by mouth 2 (Two) Times a Day., Disp: 14 capsule, Rfl: 0  No current facility-administered medications for this visit.     Physical Exam:    Physical Exam   Constitutional: She is oriented to person, place, and time. She appears well-developed and well-nourished. She appears ill.   HENT:   Head: Normocephalic.   Nose: Congestion present.   Mouth/Throat: Uvula is midline and  oropharynx is clear and moist.   Eyes: Pupils are equal, round, and reactive to light. Conjunctivae and EOM are normal.   Neck: Normal range of motion. Neck supple. No thyromegaly present.   Cardiovascular: Normal rate, regular rhythm, normal heart sounds and intact distal pulses.   Pulmonary/Chest: Effort normal. She has wheezes.   Expiratory wheezing.  No rhonchi or rales.   Musculoskeletal: Normal range of motion. She exhibits no edema.   Lymphadenopathy:     She has no cervical adenopathy.   Neurological: She is alert and oriented to person, place, and time.   Psychiatric: She has a normal mood and affect. Thought content normal.   Nursing note and vitals reviewed.       ACE III MINI        Results Review:    None    CMP:  Lab Results   Component Value Date    BUN 15 07/18/2019    CREATININE 0.78 07/18/2019    EGFRIFNONA 71 07/18/2019    BCR 19.2 07/18/2019     07/18/2019    K 3.8 07/18/2019    CO2 28.0 07/18/2019    CALCIUM 9.4 07/18/2019    ALBUMIN 4.20 07/18/2019    BILITOT 1.3 (H) 07/18/2019    ALKPHOS 83 07/18/2019    AST 25 07/18/2019    ALT 22 07/18/2019     HbA1c:  Lab Results   Component Value Date    HGBA1C 6.0 09/07/2014    HGBA1C 6.1 (H) 05/14/2014     Microalbumin:  Lab Results   Component Value Date    MICROALBUR 5.7 07/18/2019     Lipid Panel  Lab Results   Component Value Date    CHOL 204 (H) 07/18/2019    TRIG 80 07/18/2019    HDL 76 (H) 07/18/2019     (H) 07/18/2019    AST 25 07/18/2019    ALT 22 07/18/2019       Medication Review: Medications reviewed and noted  Patient Instructions   Problem List Items Addressed This Visit        Cardiovascular and Mediastinum    Chronic atrial fibrillation    Overview     1/3/2020 Akilah Rueda MD    Continue Xarelto and carvedilol.  Continue regular follow-up with the cardiologist.         Relevant Medications    carvedilol (COREG) 25 MG tablet    amLODIPine (NORVASC) 2.5 MG tablet    Benign essential hypertension    Overview     1/3/2020  Akilah Rueda MD    Continue amlodipine, carvedilol, and ramipril.  Continue to avoid salt in the diet.         Relevant Medications    carvedilol (COREG) 25 MG tablet    ramipril (ALTACE) 5 MG capsule    amLODIPine (NORVASC) 2.5 MG tablet       Respiratory    Bronchitis - Primary    Relevant Medications    guaiFENesin (MUCINEX) 600 MG 12 hr tablet    cefTRIAXone (ROCEPHIN) injection 1 g (Completed)    benzonatate (TESSALON PERLES) 100 MG capsule    cefTRIAXone (ROCEPHIN) injection 1 g (Completed)    triamcinolone acetonide (KENALOG-40) injection 80 mg (Completed)             Diagnosis Plan   1. Bronchitis  cefTRIAXone (ROCEPHIN) injection 1 g    triamcinolone acetonide (KENALOG-40) injection 80 mg    Rocephin,Kenalog injections today.Start Cefdinir in am.Benzonatate Perles for cough.Arnuity inhaler every morning to decrease bronchospasm/wheeze/cough.   2. Benign essential hypertension     3. Chronic atrial fibrillation         There are no Patient Instructions on file for this visit.    Plan of care reviewed with patient at the conclusion of today's visit. Education was provided regarding diagnosis, management, and any prescribed or recommended OTC medications.Patient verbalizes understanding of and agreement with management plan.         Akilah Rueda MD

## 2020-01-04 VITALS
BODY MASS INDEX: 28.04 KG/M2 | HEIGHT: 68 IN | WEIGHT: 185 LBS | HEART RATE: 70 BPM | TEMPERATURE: 99.6 F | DIASTOLIC BLOOD PRESSURE: 70 MMHG | OXYGEN SATURATION: 96 % | SYSTOLIC BLOOD PRESSURE: 130 MMHG

## 2020-01-06 RX ORDER — ATORVASTATIN CALCIUM 20 MG/1
TABLET, FILM COATED ORAL
Qty: 90 TABLET | Refills: 4 | Status: SHIPPED | OUTPATIENT
Start: 2020-01-06 | End: 2020-10-29 | Stop reason: SDUPTHER

## 2020-01-06 RX ORDER — RIVAROXABAN 20 MG/1
TABLET, FILM COATED ORAL
Qty: 90 TABLET | Refills: 4 | Status: SHIPPED | OUTPATIENT
Start: 2020-01-06 | End: 2020-10-29 | Stop reason: SDUPTHER

## 2020-01-10 ENCOUNTER — LAB (OUTPATIENT)
Dept: LAB | Facility: HOSPITAL | Age: 85
End: 2020-01-10

## 2020-01-10 ENCOUNTER — TELEPHONE (OUTPATIENT)
Dept: INTERNAL MEDICINE | Facility: CLINIC | Age: 85
End: 2020-01-10

## 2020-01-10 DIAGNOSIS — J40 BRONCHITIS: Primary | ICD-10-CM

## 2020-01-10 DIAGNOSIS — R05.3 COUGH, PERSISTENT: ICD-10-CM

## 2020-01-10 DIAGNOSIS — J40 BRONCHITIS: ICD-10-CM

## 2020-01-10 LAB
ALBUMIN SERPL-MCNC: 3.9 G/DL (ref 3.5–5.2)
ALBUMIN/GLOB SERPL: 1.3 G/DL
ALP SERPL-CCNC: 89 U/L (ref 39–117)
ALT SERPL W P-5'-P-CCNC: 12 U/L (ref 1–33)
ANION GAP SERPL CALCULATED.3IONS-SCNC: 13.2 MMOL/L (ref 5–15)
AST SERPL-CCNC: 18 U/L (ref 1–32)
BASOPHILS # BLD AUTO: 0.08 10*3/MM3 (ref 0–0.2)
BASOPHILS NFR BLD AUTO: 0.9 % (ref 0–1.5)
BILIRUB SERPL-MCNC: 0.6 MG/DL (ref 0.2–1.2)
BUN BLD-MCNC: 9 MG/DL (ref 8–23)
BUN/CREAT SERPL: 13.8 (ref 7–25)
CALCIUM SPEC-SCNC: 9.2 MG/DL (ref 8.6–10.5)
CHLORIDE SERPL-SCNC: 100 MMOL/L (ref 98–107)
CO2 SERPL-SCNC: 28.8 MMOL/L (ref 22–29)
CREAT BLD-MCNC: 0.65 MG/DL (ref 0.57–1)
DEPRECATED RDW RBC AUTO: 42.7 FL (ref 37–54)
EOSINOPHIL # BLD AUTO: 0.17 10*3/MM3 (ref 0–0.4)
EOSINOPHIL NFR BLD AUTO: 2 % (ref 0.3–6.2)
ERYTHROCYTE [DISTWIDTH] IN BLOOD BY AUTOMATED COUNT: 13.2 % (ref 12.3–15.4)
GFR SERPL CREATININE-BSD FRML MDRD: 87 ML/MIN/1.73
GLOBULIN UR ELPH-MCNC: 3.1 GM/DL
GLUCOSE BLD-MCNC: 118 MG/DL (ref 65–99)
HCT VFR BLD AUTO: 40.3 % (ref 34–46.6)
HGB BLD-MCNC: 13.1 G/DL (ref 12–15.9)
IMM GRANULOCYTES # BLD AUTO: 0.02 10*3/MM3 (ref 0–0.05)
IMM GRANULOCYTES NFR BLD AUTO: 0.2 % (ref 0–0.5)
LYMPHOCYTES # BLD AUTO: 1.48 10*3/MM3 (ref 0.7–3.1)
LYMPHOCYTES NFR BLD AUTO: 17.2 % (ref 19.6–45.3)
MCH RBC QN AUTO: 29 PG (ref 26.6–33)
MCHC RBC AUTO-ENTMCNC: 32.5 G/DL (ref 31.5–35.7)
MCV RBC AUTO: 89.4 FL (ref 79–97)
MONOCYTES # BLD AUTO: 0.61 10*3/MM3 (ref 0.1–0.9)
MONOCYTES NFR BLD AUTO: 7.1 % (ref 5–12)
NEUTROPHILS # BLD AUTO: 6.22 10*3/MM3 (ref 1.7–7)
NEUTROPHILS NFR BLD AUTO: 72.6 % (ref 42.7–76)
NRBC BLD AUTO-RTO: 0 /100 WBC (ref 0–0.2)
PLATELET # BLD AUTO: 296 10*3/MM3 (ref 140–450)
PMV BLD AUTO: 10.4 FL (ref 6–12)
POTASSIUM BLD-SCNC: 4.1 MMOL/L (ref 3.5–5.2)
PROT SERPL-MCNC: 7 G/DL (ref 6–8.5)
RBC # BLD AUTO: 4.51 10*6/MM3 (ref 3.77–5.28)
SODIUM BLD-SCNC: 142 MMOL/L (ref 136–145)
WBC NRBC COR # BLD: 8.58 10*3/MM3 (ref 3.4–10.8)

## 2020-01-10 PROCEDURE — 80053 COMPREHEN METABOLIC PANEL: CPT

## 2020-01-10 PROCEDURE — 86480 TB TEST CELL IMMUN MEASURE: CPT

## 2020-01-10 PROCEDURE — 85025 COMPLETE CBC W/AUTO DIFF WBC: CPT

## 2020-01-10 RX ORDER — ALBUTEROL SULFATE 1.25 MG/3ML
1 SOLUTION RESPIRATORY (INHALATION) 4 TIMES DAILY
Qty: 30 VIAL | Refills: 1 | Status: SHIPPED | OUTPATIENT
Start: 2020-01-10 | End: 2020-03-05

## 2020-01-10 RX ORDER — DOXYCYCLINE HYCLATE 100 MG
100 TABLET ORAL 2 TIMES DAILY
Qty: 20 TABLET | Refills: 0 | Status: SHIPPED | OUTPATIENT
Start: 2020-01-10 | End: 2020-01-28

## 2020-01-10 NOTE — TELEPHONE ENCOUNTER
Spoke with pt and she is still coughing and exhausted. She didn't sound well on the phone. She has been in three times for this and just wants a solution. She doesn't want to have to come in again.

## 2020-01-10 NOTE — TELEPHONE ENCOUNTER
I need some more information to decide our next steps without seeing her.    I need to know if the inhaler helps some?    Did the last antibiotic cefdinir helped some?  Or none at all?  If she still aching all over?  Does she still feels short of breath and wheezing?    Is she still getting up sputum?  What color?  Do the benzonatate Perles helps some with the cough?  Any fever or chills?

## 2020-01-10 NOTE — TELEPHONE ENCOUNTER
Pt says she has had bronchitis for 6 weeks and is wanting to discuss it with Dr. Rueda's nurse.  Pt request call back to discuss what options she may have.

## 2020-01-10 NOTE — TELEPHONE ENCOUNTER
I sent doxycycline antibiotic to pharmacy. Take twice a day for 10 days.    I sent albuterol neb soln to pharmacy. Also put in order for neb machine. Please call [employer] or other supplier to get that today if at all possible.    Also tell her to take tylenol 3 times a day for the aching and malaise.     Tell her to go to [facility] lab -I ordered CBC,CMP,TB test.

## 2020-01-10 NOTE — TELEPHONE ENCOUNTER
Pt advised per Dr. Rueda, she verbalized understanding. Order sent to [facility], trying to get the neb today.

## 2020-01-10 NOTE — TELEPHONE ENCOUNTER
Spoke with pt and she said she doesn't feel that the inhaler has helped at all. The cefdinir and benzonatate perles haven't helped either. Denies fever, chills, CAMILLA and wheezing. States she is still aching and exhausted. She states she isn't getting as much sputum up as she was and it is still white.

## 2020-01-13 ENCOUNTER — TELEPHONE (OUTPATIENT)
Dept: INTERNAL MEDICINE | Facility: CLINIC | Age: 85
End: 2020-01-13

## 2020-01-13 NOTE — TELEPHONE ENCOUNTER
Did labs last week since she was still ill and coughing and not improving to make sure it did not look like she had a new bacterial infection.  Her white blood cell count was normal, red cells were normal, kidney and liver function were normal.  Blood sugar was mildly elevated at 118.  The other test we did was to rule out TB and that one is still pending.

## 2020-01-13 NOTE — TELEPHONE ENCOUNTER
Patient called wanting to know what the blood work was for last week on the 10th.      Patient call back [phone]

## 2020-01-14 LAB
QUANTIFERON CRITERIA: NORMAL
QUANTIFERON MITOGEN VALUE: >10 IU/ML
QUANTIFERON NIL VALUE: 0.02 IU/ML
QUANTIFERON TB1 AG VALUE: 0.02 IU/ML
QUANTIFERON TB2 AG VALUE: 0.02 IU/ML
QUANTIFERON-TB GOLD PLUS: NEGATIVE

## 2020-01-17 ENCOUNTER — TELEPHONE (OUTPATIENT)
Dept: INTERNAL MEDICINE | Facility: CLINIC | Age: 85
End: 2020-01-17

## 2020-01-17 NOTE — TELEPHONE ENCOUNTER
----- Message from Akilah Rueda MD sent at 1/16/2020  7:43 PM EST -----  Let patient know that the TB test was negative.

## 2020-01-17 NOTE — TELEPHONE ENCOUNTER
Patient verbalized understanding. Patient is having problems with sciatica pain. She did 6 weeks of physical therapy. She would like to know if there is anything else she can do. She is aware you are out of the office until Wednesday and will wait for your response. I advised patient if she can not wait until then to give us a call back.

## 2020-01-19 NOTE — TELEPHONE ENCOUNTER
May resume PT.  Often helpful to go back after a hiatus.  If she wants to try something new, I could refer to Dr. Tamir DC.  Of course she should continue daily stretches they gave her and walking daily and moist heat.

## 2020-01-20 NOTE — TELEPHONE ENCOUNTER
Called patient back and she stated she will resume PT and needs another referral sent to [facility] at [place].  She also stated that she has been doing exercises at home and taking tylenol for pain without relief.  She wants to know if you could prescribe something for pain to help in conjunction with therapy.

## 2020-01-22 ENCOUNTER — TELEPHONE (OUTPATIENT)
Dept: INTERNAL MEDICINE | Facility: CLINIC | Age: 85
End: 2020-01-22

## 2020-01-22 DIAGNOSIS — M54.41 CHRONIC RIGHT-SIDED LOW BACK PAIN WITH RIGHT-SIDED SCIATICA: Primary | ICD-10-CM

## 2020-01-22 DIAGNOSIS — G89.29 CHRONIC RIGHT-SIDED LOW BACK PAIN WITH RIGHT-SIDED SCIATICA: Primary | ICD-10-CM

## 2020-01-22 NOTE — TELEPHONE ENCOUNTER
Spoke with pt and she has an appt with PT next Wednesday and we have already faxed an order. She is having a lot of pain and wants to know if you think she should see a chiropractor. She is trying to do the exercises she remembers from PT. She is using ice and heat. Hasn't taken diclofenac yet.

## 2020-01-22 NOTE — TELEPHONE ENCOUNTER
Go with PT first.  If they feel they are not making any headway with her, then we can do a referral to Dr. Jefferson Garnett DC.

## 2020-01-22 NOTE — TELEPHONE ENCOUNTER
I sent PT order to [facility].  She may call them and make her appointment.    I sent prescription for diclofenac with food twice a day as needed.  Remind her that NSAIDs with Xarelto can increase bleeding risk.  She should not take diclofenac  every day.  Take Tylenol most of the time and supplement with diclofenac when the pain is worse.

## 2020-01-22 NOTE — TELEPHONE ENCOUNTER
Patient called in requesting to have a nurse call her back regarding a lot of pain from her hip to ankle. Patient says she has done physical therapy which worked for a little bit but the pain is back.    She would like some advice from the nurse    Patient call back [phone]

## 2020-01-27 ENCOUNTER — TELEPHONE (OUTPATIENT)
Dept: INTERNAL MEDICINE | Facility: CLINIC | Age: 85
End: 2020-01-27

## 2020-01-27 NOTE — TELEPHONE ENCOUNTER
Patient called in asking for  Liana to give patient a call back at her convenience at  [phone]

## 2020-01-27 NOTE — TELEPHONE ENCOUNTER
Spoke with pt and she states that the pain from her sciatica is debilitating. She is afraid to take the diclofenac because the paper she got with it states that it could cause more issues with her heart. She has taken tylenol but that doesn't seem to touch it at all. She has an appt with PT on Wednesday. She also states she has a small, puffy, tender spot at her ankle. I advised that she should really give the diclofenac a shot but she wants your opinion.

## 2020-01-27 NOTE — TELEPHONE ENCOUNTER
I agree she should try the diclofenac.  She will be taking it I am sure sparingly and for a short time.  She may take it with Tylenol to get even better pain relief.  Starting physical therapy will help a lot.  I think she should come in so we can look at her tender spot on her ankle since that is something new.

## 2020-01-28 ENCOUNTER — OFFICE VISIT (OUTPATIENT)
Dept: INTERNAL MEDICINE | Facility: CLINIC | Age: 85
End: 2020-01-28

## 2020-01-28 ENCOUNTER — LAB (OUTPATIENT)
Dept: LAB | Facility: HOSPITAL | Age: 85
End: 2020-01-28

## 2020-01-28 VITALS
SYSTOLIC BLOOD PRESSURE: 128 MMHG | HEART RATE: 74 BPM | OXYGEN SATURATION: 97 % | TEMPERATURE: 97.7 F | WEIGHT: 185 LBS | BODY MASS INDEX: 28.04 KG/M2 | HEIGHT: 68 IN | DIASTOLIC BLOOD PRESSURE: 82 MMHG

## 2020-01-28 DIAGNOSIS — I50.22 CHRONIC SYSTOLIC (CONGESTIVE) HEART FAILURE (HCC): ICD-10-CM

## 2020-01-28 DIAGNOSIS — I50.22 CHRONIC SYSTOLIC (CONGESTIVE) HEART FAILURE (HCC): Primary | ICD-10-CM

## 2020-01-28 DIAGNOSIS — M25.551 PAIN OF RIGHT HIP JOINT: ICD-10-CM

## 2020-01-28 DIAGNOSIS — R06.02 SHORTNESS OF BREATH: ICD-10-CM

## 2020-01-28 LAB
ALBUMIN SERPL-MCNC: 3.9 G/DL (ref 3.5–5.2)
ALBUMIN/GLOB SERPL: 1.5 G/DL
ALP SERPL-CCNC: 78 U/L (ref 39–117)
ALT SERPL W P-5'-P-CCNC: 13 U/L (ref 1–33)
ANION GAP SERPL CALCULATED.3IONS-SCNC: 16.7 MMOL/L (ref 5–15)
AST SERPL-CCNC: 18 U/L (ref 1–32)
BASOPHILS # BLD AUTO: 0.06 10*3/MM3 (ref 0–0.2)
BASOPHILS NFR BLD AUTO: 0.6 % (ref 0–1.5)
BILIRUB SERPL-MCNC: 0.6 MG/DL (ref 0.2–1.2)
BUN BLD-MCNC: 13 MG/DL (ref 8–23)
BUN/CREAT SERPL: 15.1 (ref 7–25)
CALCIUM SPEC-SCNC: 9.3 MG/DL (ref 8.6–10.5)
CHLORIDE SERPL-SCNC: 98 MMOL/L (ref 98–107)
CO2 SERPL-SCNC: 25.3 MMOL/L (ref 22–29)
CREAT BLD-MCNC: 0.86 MG/DL (ref 0.57–1)
DEPRECATED RDW RBC AUTO: 45.8 FL (ref 37–54)
EOSINOPHIL # BLD AUTO: 0.12 10*3/MM3 (ref 0–0.4)
EOSINOPHIL NFR BLD AUTO: 1.2 % (ref 0.3–6.2)
ERYTHROCYTE [DISTWIDTH] IN BLOOD BY AUTOMATED COUNT: 13.7 % (ref 12.3–15.4)
ERYTHROCYTE [SEDIMENTATION RATE] IN BLOOD: 4 MM/HR (ref 0–30)
GFR SERPL CREATININE-BSD FRML MDRD: 63 ML/MIN/1.73
GLOBULIN UR ELPH-MCNC: 2.6 GM/DL
GLUCOSE BLD-MCNC: 126 MG/DL (ref 65–99)
HCT VFR BLD AUTO: 42.4 % (ref 34–46.6)
HGB BLD-MCNC: 14.2 G/DL (ref 12–15.9)
IMM GRANULOCYTES # BLD AUTO: 0.04 10*3/MM3 (ref 0–0.05)
IMM GRANULOCYTES NFR BLD AUTO: 0.4 % (ref 0–0.5)
LYMPHOCYTES # BLD AUTO: 1.73 10*3/MM3 (ref 0.7–3.1)
LYMPHOCYTES NFR BLD AUTO: 17.1 % (ref 19.6–45.3)
MCH RBC QN AUTO: 30.6 PG (ref 26.6–33)
MCHC RBC AUTO-ENTMCNC: 33.5 G/DL (ref 31.5–35.7)
MCV RBC AUTO: 91.4 FL (ref 79–97)
MONOCYTES # BLD AUTO: 0.69 10*3/MM3 (ref 0.1–0.9)
MONOCYTES NFR BLD AUTO: 6.8 % (ref 5–12)
NEUTROPHILS # BLD AUTO: 7.48 10*3/MM3 (ref 1.7–7)
NEUTROPHILS NFR BLD AUTO: 73.9 % (ref 42.7–76)
NRBC BLD AUTO-RTO: 0 /100 WBC (ref 0–0.2)
NT-PROBNP SERPL-MCNC: 715.9 PG/ML (ref 5–1800)
PLATELET # BLD AUTO: 239 10*3/MM3 (ref 140–450)
PMV BLD AUTO: 11.4 FL (ref 6–12)
POTASSIUM BLD-SCNC: 4.3 MMOL/L (ref 3.5–5.2)
PROT SERPL-MCNC: 6.5 G/DL (ref 6–8.5)
RBC # BLD AUTO: 4.64 10*6/MM3 (ref 3.77–5.28)
SODIUM BLD-SCNC: 140 MMOL/L (ref 136–145)
WBC NRBC COR # BLD: 10.12 10*3/MM3 (ref 3.4–10.8)

## 2020-01-28 PROCEDURE — 99214 OFFICE O/P EST MOD 30 MIN: CPT | Performed by: PHYSICIAN ASSISTANT

## 2020-01-28 PROCEDURE — 85652 RBC SED RATE AUTOMATED: CPT

## 2020-01-28 PROCEDURE — 85025 COMPLETE CBC W/AUTO DIFF WBC: CPT

## 2020-01-28 PROCEDURE — 83880 ASSAY OF NATRIURETIC PEPTIDE: CPT

## 2020-01-28 PROCEDURE — 80053 COMPREHEN METABOLIC PANEL: CPT

## 2020-01-28 NOTE — ASSESSMENT & PLAN NOTE
She does not have any low back pain today.  She has significant deep point tenderness noted along right gluteus muscles. Weak stabilizers for the right hip.  Targeted PT for massage of low back/glutes, and strengthening of the right hip stabilizers.

## 2020-01-28 NOTE — PROGRESS NOTES
Patient Care Team:  Akilah Rueda MD as PCP - General (Internal Medicine)  Akilah Rueda MD as PCP - Internal Medicine (Internal Medicine)  Bernadette Almodovar MD as Consulting Physician (Dermatology)  Hiren Kaufman MD as Consulting Physician (Cardiac Electrophysiology)    Chief Complaint::   Chief Complaint   Patient presents with   • Back Pain     Siatic pain is flaring   • Edema     swollen area around ankle        Subjective     HPI  Diclofenac and Tylenol for pain. [facility] PT appt tomorrow.    Leg Pain    The incident occurred more than 1 week ago. The pain is present in the right hip. The pain is severe. The pain has been fluctuating since onset. Associated symptoms include an inability to bear weight and muscle weakness. Pertinent negatives include no numbness. The symptoms are aggravated by movement. She has tried acetaminophen, non-weight bearing, NSAIDs and rest for the symptoms. The treatment provided no relief.   Shortness of Breath   This is a recurrent problem. The current episode started 1 to 4 weeks ago. The problem occurs constantly. The problem has been unchanged. Associated symptoms include leg pain. Pertinent negatives include no abdominal pain, chest pain, ear pain, fever, headaches, rhinorrhea or wheezing. The symptoms are aggravated by any activity. She has tried rest for the symptoms. The treatment provided mild relief. Her past medical history is significant for a heart failure.         The following portions of the patient's history were reviewed and updated as appropriate: active problem list, medication list, allergies, family history, social history    Review of Systems:   Review of Systems   Constitutional: Positive for activity change and fatigue. Negative for appetite change, chills and fever.   HENT: Negative for congestion, ear pain, rhinorrhea and sinus pressure.    Respiratory: Positive for cough. Negative for chest tightness, shortness of breath and  "wheezing.    Cardiovascular: Negative for chest pain and palpitations.   Gastrointestinal: Negative for abdominal pain, blood in stool and constipation.   Endocrine: Negative for cold intolerance and heat intolerance.   Musculoskeletal: Positive for arthralgias, gait problem and joint swelling. Negative for back pain.   Skin: Negative for color change.   Allergic/Immunologic: Negative for environmental allergies.   Neurological: Positive for light-headedness. Negative for dizziness, speech difficulty, numbness and headache.   Psychiatric/Behavioral: Negative for decreased concentration. The patient is not nervous/anxious.        Vital Signs  Vitals:    01/28/20 1252   BP: 128/82   BP Location: Left arm   Patient Position: Sitting   Cuff Size: Adult   Pulse: 74   Temp: 97.7 °F (36.5 °C)   TempSrc: Temporal   SpO2: 97%   Weight: 83.9 kg (185 lb)   Height: 172.7 cm (67.99\")   PainSc:   8   PainLoc: Back     Body mass index is 28.14 kg/m².    Labs  Lab on 01/10/2020   Component Date Value Ref Range Status   • Glucose 01/10/2020 118* 65 - 99 mg/dL Final   • BUN 01/10/2020 9  8 - 23 mg/dL Final   • Creatinine 01/10/2020 0.65  0.57 - 1.00 mg/dL Final   • Sodium 01/10/2020 142  136 - 145 mmol/L Final   • Potassium 01/10/2020 4.1  3.5 - 5.2 mmol/L Final   • Chloride 01/10/2020 100  98 - 107 mmol/L Final   • CO2 01/10/2020 28.8  22.0 - 29.0 mmol/L Final   • Calcium 01/10/2020 9.2  8.6 - 10.5 mg/dL Final   • Total Protein 01/10/2020 7.0  6.0 - 8.5 g/dL Final   • Albumin 01/10/2020 3.90  3.50 - 5.20 g/dL Final   • ALT (SGPT) 01/10/2020 12  1 - 33 U/L Final   • AST (SGOT) 01/10/2020 18  1 - 32 U/L Final   • Alkaline Phosphatase 01/10/2020 89  39 - 117 U/L Final   • Total Bilirubin 01/10/2020 0.6  0.2 - 1.2 mg/dL Final   • eGFR Non African Amer 01/10/2020 87  >60 mL/min/1.73 Final   • Globulin 01/10/2020 3.1  gm/dL Final   • A/G Ratio 01/10/2020 1.3  g/dL Final   • BUN/Creatinine Ratio 01/10/2020 13.8  7.0 - 25.0 Final   • Anion " Gap 01/10/2020 13.2  5.0 - 15.0 mmol/L Final   • QuantiFERON Criteria 01/10/2020 Comment   Final    The QuantiFERON-TB Gold Plus result is determined by subtracting  the Nil value from either TB antigen (Ag) tube. The mitogen tube  serves as a control for the test.   • QUANTIFERON TB1 AG VALUE 01/10/2020 0.02  IU/mL Final   • QUANTIFERON TB2 AG VALUE 01/10/2020 0.02  IU/mL Final   • QuantiFERON Nil Value 01/10/2020 0.02  IU/mL Final   • QuantiFERON Mitogen Value 01/10/2020 >10.00  IU/mL Final   • QUANTIFERON-TB GOLD PLUS 01/10/2020 Negative  Negative Final    The specimen received for QuantiFERON testing was incubated by the  ordering institution.  Specific procedures outlined in our Directory  of Services and in the package insert for the QuantiFERON Gold  (In Tube) test must be followed to enable for proper stimulation of  cells for the production of interferon gamma.   • WBC 01/10/2020 8.58  3.40 - 10.80 10*3/mm3 Final   • RBC 01/10/2020 4.51  3.77 - 5.28 10*6/mm3 Final   • Hemoglobin 01/10/2020 13.1  12.0 - 15.9 g/dL Final   • Hematocrit 01/10/2020 40.3  34.0 - 46.6 % Final   • MCV 01/10/2020 89.4  79.0 - 97.0 fL Final   • MCH 01/10/2020 29.0  26.6 - 33.0 pg Final   • MCHC 01/10/2020 32.5  31.5 - 35.7 g/dL Final   • RDW 01/10/2020 13.2  12.3 - 15.4 % Final   • RDW-SD 01/10/2020 42.7  37.0 - 54.0 fl Final   • MPV 01/10/2020 10.4  6.0 - 12.0 fL Final   • Platelets 01/10/2020 296  140 - 450 10*3/mm3 Final   • Neutrophil % 01/10/2020 72.6  42.7 - 76.0 % Final   • Lymphocyte % 01/10/2020 17.2* 19.6 - 45.3 % Final   • Monocyte % 01/10/2020 7.1  5.0 - 12.0 % Final   • Eosinophil % 01/10/2020 2.0  0.3 - 6.2 % Final   • Basophil % 01/10/2020 0.9  0.0 - 1.5 % Final   • Immature Grans % 01/10/2020 0.2  0.0 - 0.5 % Final   • Neutrophils, Absolute 01/10/2020 6.22  1.70 - 7.00 10*3/mm3 Final   • Lymphocytes, Absolute 01/10/2020 1.48  0.70 - 3.10 10*3/mm3 Final   • Monocytes, Absolute 01/10/2020 0.61  0.10 - 0.90 10*3/mm3  Final   • Eosinophils, Absolute 01/10/2020 0.17  0.00 - 0.40 10*3/mm3 Final   • Basophils, Absolute 01/10/2020 0.08  0.00 - 0.20 10*3/mm3 Final   • Immature Grans, Absolute 01/10/2020 0.02  0.00 - 0.05 10*3/mm3 Final   • nRBC 01/10/2020 0.0  0.0 - 0.2 /100 WBC Final       Imaging  No radiology results for the last 30 days.      Current Outpatient Medications:   •  Aflibercept (EYLEA IO), Inject  into the eye Every 3 (Three) Months., Disp: , Rfl:   •  albuterol (ACCUNEB) 1.25 MG/3ML nebulizer solution, Take 3 mL by nebulization 4 (Four) Times a Day., Disp: 30 vial, Rfl: 1  •  amLODIPine (NORVASC) 2.5 MG tablet, TAKE 1 TABLET BY MOUTH TWO TIMES A DAY , Disp: 180 tablet, Rfl: 0  •  atorvastatin (LIPITOR) 20 MG tablet, TAKE 1 TABLET EVERY EVENING, Disp: 90 tablet, Rfl: 4  •  carvedilol (COREG) 25 MG tablet, TAKE 1 TABLET TWICE A DAY, Disp: 180 tablet, Rfl: 3  •  Cholecalciferol (VITAMIN D3) 2000 units capsule, Take 1 tablet by mouth Daily., Disp: , Rfl:   •  diclofenac (VOLTAREN) 50 MG EC tablet, Take 1 tablet by mouth 2 (Two) Times a Day As Needed (pain)., Disp: 180 tablet, Rfl: 0  •  esomeprazole (nexIUM) 20 MG capsule, TAKE 1 CAPSULE DAILY, Disp: 90 capsule, Rfl: 2  •  guaiFENesin (MUCINEX) 600 MG 12 hr tablet, Take 2 tablets by mouth 2 (Two) Times a Day., Disp: 30 tablet, Rfl: 0  •  Multiple Vitamins-Calcium (VIACTIV MULTI-VITAMIN) chewable tablet, Chew 2 tablets Daily., Disp: , Rfl:   •  Multiple Vitamins-Minerals (PRESERVISION AREDS 2 PO), Take 2 tablets by mouth Daily., Disp: , Rfl:   •  nystatin (MYCOSTATIN) 226218 UNIT/ML suspension, Swish and swallow 5 mL 4 (Four) Times a Day., Disp: 60 mL, Rfl: 0  •  ramipril (ALTACE) 5 MG capsule, TAKE 1 CAPSULE EVERY MORNING AND 2 CAPSULES EVERY EVENING, Disp: 270 capsule, Rfl: 4  •  sertraline (ZOLOFT) 100 MG tablet, TAKE 1 TABLET DAILY, Disp: 90 tablet, Rfl: 1  •  XARELTO 20 MG tablet, TAKE 1 TABLET DAILY AS DIRECTED, Disp: 90 tablet, Rfl: 4    Physical Exam:    Physical  Exam   Constitutional: She appears well-developed and well-nourished.   HENT:   Head: Normocephalic and atraumatic.   Nose: Nose normal.   Mouth/Throat: Oropharynx is clear and moist.   Eyes: Pupils are equal, round, and reactive to light. Conjunctivae and EOM are normal.   Neck: Normal range of motion. Neck supple.   Cardiovascular: Normal rate, regular rhythm, normal heart sounds and intact distal pulses.   Pulmonary/Chest: Effort normal. She has decreased breath sounds.   Abdominal: Soft. Bowel sounds are normal.   Musculoskeletal:        Right ankle: She exhibits decreased range of motion and swelling.   Significant weaknesstenderness along right gluteus/piriformis.  Significant weakness during weight bearing of the right leg while left leg abducting.          Neurological: She is alert.   Skin: Skin is warm and dry.   Psychiatric: She has a normal mood and affect. Her behavior is normal. Judgment and thought content normal.   Nursing note and vitals reviewed.      Procedures        Assessment/Plan   Problem List Items Addressed This Visit        Cardiovascular and Mediastinum    Chronic systolic (congestive) heart failure (CMS/HCC) - Primary    Relevant Medications    carvedilol (COREG) 25 MG tablet    amLODIPine (NORVASC) 2.5 MG tablet    Other Relevant Orders    BNP       Respiratory    Shortness of breath    Relevant Orders    BNP    CBC & Differential    Comprehensive Metabolic Panel       Nervous and Auditory    Pain of right hip joint    Current Assessment & Plan     She does not have any low back pain today.  She has significant deep point tenderness noted along right gluteus muscles. Weak stabilizers for the right hip.  Targeted PT for massage of low back/glutes, and strengthening of the right hip stabilizers.         Relevant Orders    Sedimentation Rate        Call patient in 2 weeks to check on PT progress.  Continue taking Diclofenac and Tylenol as directed.  I will call patient with lab results.      Return in about 4 weeks (around 2/25/2020).    Plan of care reviewed with patient at the conclusion of today's visit. Education was provided regarding diagnosis, management, and any prescribed or recommended OTC medications.Patient verbalizes understanding of and agreement with management plan.       Dilma Baires PA-C    Please note that portions of this note were completed with a voice recognition program. Efforts were made to edit the dictations, but occasionally words are mistranscribed.

## 2020-02-04 ENCOUNTER — OFFICE VISIT (OUTPATIENT)
Dept: INTERNAL MEDICINE | Facility: CLINIC | Age: 85
End: 2020-02-04

## 2020-02-04 ENCOUNTER — TREATMENT (OUTPATIENT)
Dept: PHYSICAL THERAPY | Facility: CLINIC | Age: 85
End: 2020-02-04

## 2020-02-04 VITALS
SYSTOLIC BLOOD PRESSURE: 112 MMHG | DIASTOLIC BLOOD PRESSURE: 72 MMHG | OXYGEN SATURATION: 98 % | WEIGHT: 179 LBS | HEART RATE: 80 BPM | BODY MASS INDEX: 27.13 KG/M2 | HEIGHT: 68 IN

## 2020-02-04 DIAGNOSIS — G89.29 CHRONIC BILATERAL LOW BACK PAIN WITH RIGHT-SIDED SCIATICA: Primary | ICD-10-CM

## 2020-02-04 DIAGNOSIS — M54.41 CHRONIC BILATERAL LOW BACK PAIN WITH RIGHT-SIDED SCIATICA: Primary | ICD-10-CM

## 2020-02-04 DIAGNOSIS — G89.29 CHRONIC RIGHT-SIDED LOW BACK PAIN WITH RIGHT-SIDED SCIATICA: Primary | ICD-10-CM

## 2020-02-04 DIAGNOSIS — E78.2 MIXED HYPERLIPIDEMIA: Chronic | ICD-10-CM

## 2020-02-04 DIAGNOSIS — I10 BENIGN ESSENTIAL HYPERTENSION: ICD-10-CM

## 2020-02-04 DIAGNOSIS — M70.61 TROCHANTERIC BURSITIS OF RIGHT HIP: ICD-10-CM

## 2020-02-04 DIAGNOSIS — M54.41 CHRONIC RIGHT-SIDED LOW BACK PAIN WITH RIGHT-SIDED SCIATICA: Primary | ICD-10-CM

## 2020-02-04 DIAGNOSIS — I48.20 CHRONIC ATRIAL FIBRILLATION (HCC): ICD-10-CM

## 2020-02-04 PROCEDURE — 99214 OFFICE O/P EST MOD 30 MIN: CPT | Performed by: INTERNAL MEDICINE

## 2020-02-04 PROCEDURE — 97110 THERAPEUTIC EXERCISES: CPT | Performed by: PHYSICAL THERAPIST

## 2020-02-04 PROCEDURE — 97162 PT EVAL MOD COMPLEX 30 MIN: CPT | Performed by: PHYSICAL THERAPIST

## 2020-02-04 PROCEDURE — 96372 THER/PROPH/DIAG INJ SC/IM: CPT | Performed by: INTERNAL MEDICINE

## 2020-02-04 RX ORDER — TRIAMCINOLONE ACETONIDE 40 MG/ML
80 INJECTION, SUSPENSION INTRA-ARTICULAR; INTRAMUSCULAR ONCE
Status: COMPLETED | OUTPATIENT
Start: 2020-02-04 | End: 2020-02-04

## 2020-02-04 RX ADMIN — TRIAMCINOLONE ACETONIDE 80 MG: 40 INJECTION, SUSPENSION INTRA-ARTICULAR; INTRAMUSCULAR at 11:44

## 2020-02-04 NOTE — PROGRESS NOTES
[facility]     Jayda Lopes  1935   [id number]      Patient Care Team:  Akilah Rueda MD as PCP - General (Internal Medicine)  Akilah Rueda MD as PCP - Internal Medicine (Internal Medicine)  Bernadette Almodovar MD as Consulting Physician (Dermatology)  Hiren Kaufman MD as Consulting Physician (Cardiac Electrophysiology)    Chief Complaint   Patient presents with   • Hypertension     f/u   • Hyperlipidemia            HPI  Patient is a 84 y.o. female presents with R sciatica. Onset of symptoms was abrupt starting 3 weeks ago.  Chronicity acute. Severity severe.  Symptoms are associated with  R low back pain with R leg pain all the way to the foot.  Pertinent negatives no swelling or numbness or tingling. .   Symptoms are aggravated by standing and walking.   Symptoms improve with lying down. Sitting helps slightly.Heating pad helps a little .  Context She had same symptoms and did PT for about 6 weeks and symptoms resolved after that, but now resumed.   Recent history No known injury. Did have a hamstring injury last year but that resolved.     Tried  Diclofenac For pain, But it made her dizzy.    CHRONIC CONDITIONS  BP's at home run fairly low. Yesterday was 90/60 and felt lightheaded.     Past Medical History:   Diagnosis Date   • Acute bronchitis due to infection 1/10/2019   • Anxiety    • Atrial fibrillation (CMS/HCC)    • Atypical chest pain    • Bradycardia    • GERD (gastroesophageal reflux disease)    • Hyperlipidemia    • Hypertension    • Macular degeneration    • Mitral valve prolapse    • Palpitation        Past Surgical History:   Procedure Laterality Date   • CYSTECTOMY      h/o Ovarian   • HYSTERECTOMY Bilateral    • OOPHORECTOMY Bilateral 1993   • THYROIDECTOMY      h/o thyroid surgery sub-total        Family History   Problem Relation Age of Onset   • Colon cancer Mother    • Other Mother         cardiac arrhythmia   • Heart failure Mother    • Cancer Mother    •  "Lung cancer Father    • Cancer Father    • No Known Problems Brother    • Hypertension Maternal Grandmother    • Breast cancer Neg Hx    • Ovarian cancer Neg Hx        Social History     Socioeconomic History   • Marital status:      Spouse name: Not on file   • Number of children: Not on file   • Years of education: Not on file   • Highest education level: Not on file   Tobacco Use   • Smoking status: Never Smoker   • Smokeless tobacco: Never Used   Substance and Sexual Activity   • Alcohol use: Yes     Comment: rarely   • Drug use: Defer   • Sexual activity: Defer       Allergies   Allergen Reactions   • Phenazopyridine Hcl Unknown (See Comments)     Pyridium       Review of Systems:     Review of Systems   Constitutional: Positive for fatigue. Negative for chills and fever.   Respiratory: Negative for cough, shortness of breath and wheezing.    Cardiovascular: Negative for chest pain, palpitations and leg swelling.   Gastrointestinal: Positive for constipation. Negative for abdominal pain, blood in stool and diarrhea.   Genitourinary: Negative for dysuria and frequency.   Musculoskeletal: Positive for arthralgias and back pain.       Vital Signs  Vitals:    02/04/20 0955   BP: 112/72   BP Location: Left arm   Patient Position: Sitting   Cuff Size: Adult   Pulse: 80   SpO2: 98%   Weight: 81.2 kg (179 lb)   Height: 172.7 cm (67.99\")   PainSc:   8   PainLoc: Hip     Body mass index is 27.22 kg/m².      Current Outpatient Medications:   •  Aflibercept (EYLEA IO), Inject  into the eye Every 3 (Three) Months., Disp: , Rfl:   •  albuterol (ACCUNEB) 1.25 MG/3ML nebulizer solution, Take 3 mL by nebulization 4 (Four) Times a Day., Disp: 30 vial, Rfl: 1  •  amLODIPine (NORVASC) 2.5 MG tablet, TAKE 1 TABLET BY MOUTH TWO TIMES A DAY , Disp: 180 tablet, Rfl: 0  •  atorvastatin (LIPITOR) 20 MG tablet, TAKE 1 TABLET EVERY EVENING, Disp: 90 tablet, Rfl: 4  •  carvedilol (COREG) 25 MG tablet, TAKE 1 TABLET TWICE A DAY, " Disp: 180 tablet, Rfl: 3  •  Cholecalciferol (VITAMIN D3) 2000 units capsule, Take 1 tablet by mouth Daily., Disp: , Rfl:   •  esomeprazole (nexIUM) 20 MG capsule, TAKE 1 CAPSULE DAILY, Disp: 90 capsule, Rfl: 2  •  guaiFENesin (MUCINEX) 600 MG 12 hr tablet, Take 2 tablets by mouth 2 (Two) Times a Day., Disp: 30 tablet, Rfl: 0  •  Multiple Vitamins-Calcium (VIACTIV MULTI-VITAMIN) chewable tablet, Chew 2 tablets Daily., Disp: , Rfl:   •  Multiple Vitamins-Minerals (PRESERVISION AREDS 2 PO), Take 2 tablets by mouth Daily., Disp: , Rfl:   •  nystatin (MYCOSTATIN) 213106 UNIT/ML suspension, Swish and swallow 5 mL 4 (Four) Times a Day., Disp: 60 mL, Rfl: 0  •  ramipril (ALTACE) 5 MG capsule, TAKE 1 CAPSULE EVERY MORNING AND 2 CAPSULES EVERY EVENING, Disp: 270 capsule, Rfl: 4  •  sertraline (ZOLOFT) 100 MG tablet, TAKE 1 TABLET DAILY, Disp: 90 tablet, Rfl: 1  •  XARELTO 20 MG tablet, TAKE 1 TABLET DAILY AS DIRECTED, Disp: 90 tablet, Rfl: 4  No current facility-administered medications for this visit.     Physical Exam:    Physical Exam   Constitutional: She is oriented to person, place, and time. She appears well-developed and well-nourished.   HENT:   Head: Normocephalic.   Eyes: Pupils are equal, round, and reactive to light. Conjunctivae and EOM are normal.   Neck: Normal range of motion. Neck supple. No thyromegaly present.   Cardiovascular: Normal rate, regular rhythm, normal heart sounds and intact distal pulses.   Pulmonary/Chest: Effort normal and breath sounds normal.   Musculoskeletal: Normal range of motion. She exhibits no edema.        Right hip: She exhibits tenderness. She exhibits normal range of motion.        Lumbar back: She exhibits tenderness and spasm.   Lymphadenopathy:     She has no cervical adenopathy.   Neurological: She is alert and oriented to person, place, and time.   Psychiatric: She has a normal mood and affect. Thought content normal.   Nursing note and vitals reviewed.       ACE III MINI         Results Review:    None    CMP:  Lab Results   Component Value Date    BUN 13 01/28/2020    CREATININE 0.86 01/28/2020    EGFRIFNONA 63 01/28/2020    BCR 15.1 01/28/2020     01/28/2020    K 4.3 01/28/2020    CO2 25.3 01/28/2020    CALCIUM 9.3 01/28/2020    ALBUMIN 3.90 01/28/2020    BILITOT 0.6 01/28/2020    ALKPHOS 78 01/28/2020    AST 18 01/28/2020    ALT 13 01/28/2020     HbA1c:  Lab Results   Component Value Date    HGBA1C 6.0 09/07/2014    HGBA1C 6.1 (H) 05/14/2014     Microalbumin:  Lab Results   Component Value Date    MICROALBUR 5.7 07/18/2019     Lipid Panel  Lab Results   Component Value Date    CHOL 204 (H) 07/18/2019    TRIG 80 07/18/2019    HDL 76 (H) 07/18/2019     (H) 07/18/2019    AST 18 01/28/2020    ALT 13 01/28/2020       Medication Review: Medications reviewed and noted  Patient Instructions   Problem List Items Addressed This Visit        Cardiovascular and Mediastinum    Mixed hyperlipidemia (Chronic)    Overview     2/4/2020 Akilah Rueda MD    Continue atorvastatin every evening. Continue low fat diet. Increase regular exercise.         Relevant Medications    atorvastatin (LIPITOR) 20 MG tablet    Chronic atrial fibrillation    Overview     2/4/2020 Akilah Rueda MD    Continue Xarelto and carvedilol.  Continue regular follow-up with the cardiologist.         Relevant Medications    carvedilol (COREG) 25 MG tablet    amLODIPine (NORVASC) 2.5 MG tablet    Benign essential hypertension    Overview     2/4/2020 Akilah Rueda MD    Continue amlodipine, carvedilol, and ramipril.  Continue to avoid salt in the diet.         Relevant Medications    carvedilol (COREG) 25 MG tablet    ramipril (ALTACE) 5 MG capsule    amLODIPine (NORVASC) 2.5 MG tablet       Nervous and Auditory    Chronic right-sided low back pain with right-sided sciatica - Primary    Overview     2/4/2020 Akilah Rueda MD    Start PT today.  Do some daily back stretches. Use moist heat to relax  tight muscles. Take tylenol most of the time, then take ibuprofen with food sparingly for more severe pain;          Relevant Medications    triamcinolone acetonide (KENALOG-40) injection 80 mg (Completed)       Musculoskeletal and Integument    Trochanteric bursitis of right hip    Overview     2/4/2020 Akilah Rueda MD    Start PT today.  Do some daily back stretches. Use moist heat to relax tight muscles. Take tylenol most of the time, then take ibuprofen with food sparingly for more severe pain;            Relevant Medications    triamcinolone acetonide (KENALOG-40) injection 80 mg (Completed)             Diagnosis Plan   1. Chronic right-sided low back pain with right-sided sciatica  triamcinolone acetonide (KENALOG-40) injection 80 mg   2. Trochanteric bursitis of right hip  triamcinolone acetonide (KENALOG-40) injection 80 mg   3. Benign essential hypertension     4. Chronic atrial fibrillation     5. Mixed hyperlipidemia         Plan of care reviewed with patient at the conclusion of today's visit. Education was provided regarding diagnosis, management, and any prescribed or recommended OTC medications.Patient verbalizes understanding of and agreement with management plan.         Akilah Rueda MD

## 2020-02-04 NOTE — PROGRESS NOTES
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 She has struggled for the last few weeks. Her pain was from the center of her R bottom all the way down to the outside of her R ankle. Sitting with heat calms the pain. Standing and walking causes the pain to be worse. The pain is grabbing and sharp shooting. The worst pain she experiences during the day is when she gets up in the morning. She has been sleeping on her back since her pain started. She is not using pillows for comfort. She can stand approx 15-20 min at the grocery while holding on to cart. Pt's  has been doing the heavy household chores.     Quality of life: good    Pain  Current pain ratin  At best pain rating: 3  At worst pain rating: 10  Quality: sharp and throbbing  Relieving factors: heat and rest  Aggravating factors: movement, ambulation and stairs  Progression: no change    Social Support  Lives in: multiple-level home    Diagnostic Tests  X-ray: normal    Treatments  Current treatment: injection treatment  Patient Goals  Patient goals for therapy: decreased pain             Objective       Active Range of Motion     Additional Active Range of Motion Details  Flexion no pain   Extension pain  R side bending no pain   L side bending pain     Strength/Myotome Testing     Left Hip   Planes of Motion   Flexion: 4+  Extension: 3  Abduction: 4+    Right Hip   Planes of Motion   Flexion: 4+  Extension: 3  Abduction: 4-    Left Knee    Flexion: 5  Extension: 5    Right Knee   Flexion: 5  Extension: 5    Left Ankle/Foot   Dorsiflexion: 5    Right Ankle/Foot   Dorsiflexion: 5    Additional Strength Details  Pain with extension and R abduction     Ambulation     Observational Gait     Additional Observational Gait Details  Patient ambulates slow with shortened stance time on RLE.        PT Neuro  Exercise 1  Exercise Name 1: Si self mobilization   Exercise 2  Exercise Name 2: Sciatic nerve gliding   Exercise 3  Exercise Name 3: Review of older exercises and modifications for sleeping     Assessment & Plan     Assessment  Impairments: abnormal gait, abnormal or restricted ROM, activity intolerance, impaired balance, impaired physical strength, lacks appropriate home exercise program, pain with function and safety issue  Assessment details: Patient is a 84 YOF that presents with disc and SI involvement that are causing radicular symptoms down her R leg. She was administered a steroid injection by physician earlier today. She is limited with walking, standing and bed mobility. She will require skilled PT intervention to address deficits in order to improve pain and function.   Prognosis: fair  Functional Limitations: carrying objects, lifting, sleeping, walking, uncomfortable because of pain, standing and stooping  Goals  Plan Goals: STG (4 visits)  1. Patient will report compliance with initial HEP.   2. Patient will rate pain at worst with activity </= 7/10.   3. Oswestry will be </= 15%     LTG (8 visits)  1. Patient will be I with final HEP.   2. Patient will demonstrate improved B hip strength to >/= 4+/5 throughout   3. Patient will deny pain with walking, standing or sitting.     Plan  Therapy options: will be seen for skilled physical therapy services  Planned modality interventions: TENS, ultrasound and traction  Planned therapy interventions: ADL retraining, balance/weight-bearing training, flexibility, gait training, manual therapy,  neuromuscular re-education, motor coordination training, postural training, strengthening, stretching, therapeutic activities, transfer training, home exercise program, spinal/joint mobilization and soft tissue mobilization  Duration in visits: 8  Treatment plan discussed with: patient  Plan details: Patient will be seen 1x/wk x 8wks with treatment to include strengthening, stretching, manual therapy, neuromuscular re-education, balance, gait and endurance training, along with modalities as indicated for pain control.           Timed:  Manual Therapy:    0     mins  68220;  Therapeutic Exercise:    10     mins  08975;     Neuromuscular Eber:    0    mins  73565;    Therapeutic Activity:     0     mins  69635;     Gait Trainin     mins  23589;     Electrical Stimulation:    0     mins  34969 ( );    Untimed:  Canalith Repositioning    0     mins 23856    Timed Treatment:   10   mins   Total Treatment:     35   mins    PT SIGNATURE: Maren Glez PT, DPT, MSCS, CDP  [place] License #[id number]  DATE TREATMENT INITIATED: 2020    Initial Certification Certification Period: 2020  I certify that the therapy services are furnished while this patient is under my care.  The services outlined above are required by this patient, and will be reviewed every 90 days.     PHYSICIAN: Akilah Rueda MD      DATE:     Please sign and return via fax to [phone]..   Thank you,   [facility].

## 2020-02-12 ENCOUNTER — TREATMENT (OUTPATIENT)
Dept: PHYSICAL THERAPY | Facility: CLINIC | Age: 85
End: 2020-02-12

## 2020-02-12 DIAGNOSIS — M54.41 CHRONIC BILATERAL LOW BACK PAIN WITH RIGHT-SIDED SCIATICA: Primary | ICD-10-CM

## 2020-02-12 DIAGNOSIS — G89.29 CHRONIC BILATERAL LOW BACK PAIN WITH RIGHT-SIDED SCIATICA: Primary | ICD-10-CM

## 2020-02-12 PROCEDURE — L3485 SHOE HEEL PAD REMOVABLE FOR: HCPCS | Performed by: PHYSICAL THERAPIST

## 2020-02-12 PROCEDURE — 97110 THERAPEUTIC EXERCISES: CPT | Performed by: PHYSICAL THERAPIST

## 2020-02-12 NOTE — PROGRESS NOTES
Physical Therapy Daily Progress Note  Visit: 2  Date of Initial Visit: Type: THERAPY  Noted: 2020    Patient: Jayda Lopes   : 1935  Diagnosis/ICD-10 Code:  Chronic bilateral low back pain with right-sided sciatica [M54.41, G89.29]  Referring practitioner: Akilah Rueda MD  Date of Initial Visit: Type: THERAPY  Noted: 2020  Today's Date: 2020  Patient seen for 2 sessions      Subjective:   Patient reports: she is still in great pain when she wakes up in the morning, however by noon it is more manageable which is an improvement.   Pain: 3/10  Clinical Progress: improved  Home Program Compliance: Yes  Treatment has included: therapeutic exercise    Objective   See Exercise, Manual, and Modality Logs for complete treatment.    PT Neuro   Exercise 1  Exercise Name 1: Nu-Step   Equipment/Resistance 1: 4  Time: 5 min   Exercise 2  Exercise Name 2: attempted standing hip abd - inc symptoms down leg   Exercise 3  Exercise Name 3: standing side/ext glides   Sets/Reps 3: 10 - centralized symptoms   Exercise 4  Exercise Name 4: walking in clinic without pain - assessed gait with noted inc L lateral bending   Exercise 5  Exercise Name 5: added heel lift to L shoe with immediate improvement in gait   Exercise 6  Exercise Name 6: prone knee bends   Sets/Reps 6: 8  Exercise 7  Exercise Name 7: prone glute squeezes in prone and standing   Exercise 8  Exercise Name 8: R sidelying positioning with discussion of sleep modifications for pain control                    Assessment & Plan     Assessment  Assessment details: Patient has made improvements in pain over the course of the day but continues with high pain upon first standing in the morning. She continues to display mixed symptoms from SI and disc. Symptoms are easily provoked based on positioning. Sleeping position was adjusted, as well as added heel lift for improved gait and alignment.     Plan  Plan details: Patient to continue with PT services to  improve gait, balance, strength, transfers and overall functional mobility.          Timed:  Manual Therapy:            0     mins  56205;  Therapeutic Exercise:    40    mins  15970;     Neuromuscular Eber:    0    mins  66112;    Therapeutic Activity:      0     mins  43802;     Gait Trainin    mins  78096;     Electrical Stimulation:    0    mins  53787 ( );     Untimed:  Canalith Repositioning techniques _[id number]_ [id number]      Timed Treatment:   40   mins   Total Treatment:     40   mins      Maren Glez PT, DPT, MSCS, CDP  [place] License #: [id number]  Physical Therapist

## 2020-02-19 ENCOUNTER — TREATMENT (OUTPATIENT)
Dept: PHYSICAL THERAPY | Facility: CLINIC | Age: 85
End: 2020-02-19

## 2020-02-19 DIAGNOSIS — M54.41 CHRONIC BILATERAL LOW BACK PAIN WITH RIGHT-SIDED SCIATICA: Primary | ICD-10-CM

## 2020-02-19 DIAGNOSIS — G89.29 CHRONIC BILATERAL LOW BACK PAIN WITH RIGHT-SIDED SCIATICA: Primary | ICD-10-CM

## 2020-02-19 PROCEDURE — 97110 THERAPEUTIC EXERCISES: CPT | Performed by: PHYSICAL THERAPIST

## 2020-02-19 NOTE — PROGRESS NOTES
Physical Therapy Daily Progress Note  Visit: 3  Date of Initial Visit: Type: THERAPY  Noted: 2020    Patient: Jayda Lopes   : 1935  Diagnosis/ICD-10 Code:  Chronic bilateral low back pain with right-sided sciatica [M54.41, G89.29]  Referring practitioner: Akilah Rueda MD  Date of Initial Visit: Type: THERAPY  Noted: 2020  Today's Date: 2020  Patient seen for 3 sessions      Subjective:   Patient reports: her pain is unchanged since last week. Continues with pain upon   Pain: 8/10 in the morning, 5/10 in clinic - pre; 4/10 - post   Clinical Progress: unchanged  Home Program Compliance: Yes  Treatment has included: therapeutic exercise    Objective   See Exercise, Manual, and Modality Logs for complete treatment.    PT Neuro   Exercise 1  Exercise Name 1: Nu-Step   Equipment/Resistance 1: 4  Time: 5 min   Exercise 2  Exercise Name 2: sitting caused numbness into her ankle - placed patient in prone   Sets/Reps 2: 3-4 reps 2 min initially but decreased to 30 sec each time she had symptoms in her foot   Time 2: 2 min - resolved most symptoms - centralized to back   Exercise 3  Exercise Name 3: prone quad stretch   Time 3: 30 sec   Exercise 4  Exercise Name 4: standing side glides   Sets/Reps 4: 10 to R   Exercise 5  Exercise Name 5: weight shifting to R side with neutral     Assessment & Plan     Assessment  Assessment details: Patient is easily provoked with any L sidelying, pushing into sitting, as well as weight shifting and sitting. Prone did centralize symptoms to low back. She was instructed to roll to prone prior to getting out of bed in the morning in order to decrease the amount of pain she experiences. She will continue with pain decreasing measures, as well as neutral spine strengthening.     Plan  Plan details: Patient to continue with PT services to improve gait, balance, strength, transfers and overall functional mobility.          Timed:  Manual Therapy:            0     mins   54255;  Therapeutic Exercise:    40    mins  92254;     Neuromuscular Eber:    0    mins  16296;    Therapeutic Activity:      0     mins  16403;     Gait Trainin    mins  07375;     Electrical Stimulation:    0    mins  49300 ( );     Untimed:  Canalith Repositioning techniques _0_ [id number]      Timed Treatment:   40   mins   Total Treatment:     40   mins      Maren Glez PT, DPT, MSCS, CDP  [place] License #: [id number]  Physical Therapist

## 2020-02-20 ENCOUNTER — HOSPITAL ENCOUNTER (OUTPATIENT)
Dept: MAMMOGRAPHY | Facility: HOSPITAL | Age: 85
Discharge: HOME | End: 2020-02-20
Admitting: INTERNAL MEDICINE

## 2020-02-20 DIAGNOSIS — Z12.31 VISIT FOR SCREENING MAMMOGRAM: ICD-10-CM

## 2020-02-20 PROCEDURE — 77067 SCR MAMMO BI INCL CAD: CPT

## 2020-02-20 PROCEDURE — 77067 SCR MAMMO BI INCL CAD: CPT | Performed by: RADIOLOGY

## 2020-02-20 PROCEDURE — 77063 BREAST TOMOSYNTHESIS BI: CPT

## 2020-02-20 PROCEDURE — 77063 BREAST TOMOSYNTHESIS BI: CPT | Performed by: RADIOLOGY

## 2020-02-25 ENCOUNTER — TREATMENT (OUTPATIENT)
Dept: PHYSICAL THERAPY | Facility: CLINIC | Age: 85
End: 2020-02-25

## 2020-02-25 DIAGNOSIS — M54.41 CHRONIC BILATERAL LOW BACK PAIN WITH RIGHT-SIDED SCIATICA: Primary | ICD-10-CM

## 2020-02-25 DIAGNOSIS — G89.29 CHRONIC BILATERAL LOW BACK PAIN WITH RIGHT-SIDED SCIATICA: Primary | ICD-10-CM

## 2020-02-25 PROCEDURE — 97110 THERAPEUTIC EXERCISES: CPT | Performed by: PHYSICAL THERAPIST

## 2020-03-03 ENCOUNTER — TELEPHONE (OUTPATIENT)
Dept: INTERNAL MEDICINE | Facility: CLINIC | Age: 85
End: 2020-03-03

## 2020-03-03 NOTE — TELEPHONE ENCOUNTER
Patient is in a lot of pain due to her sciatica. Patient would like to talk with earlene about this and see if their is something to help with the pain. Please advise and call back.

## 2020-03-03 NOTE — TELEPHONE ENCOUNTER
Spoke with pt and offered her an appt with [facility] but she refused. She states nothing is helping her pain. She states she can barely walk. She wants pain medicine for the sciatica and a referral to neurology. I advised that you are out until Thursday 3/5 and she verbalized understanding.

## 2020-03-04 ENCOUNTER — TELEPHONE (OUTPATIENT)
Dept: INTERNAL MEDICINE | Facility: CLINIC | Age: 85
End: 2020-03-04

## 2020-03-04 DIAGNOSIS — G89.29 CHRONIC RIGHT-SIDED LOW BACK PAIN WITH RIGHT-SIDED SCIATICA: Primary | ICD-10-CM

## 2020-03-04 DIAGNOSIS — M54.41 CHRONIC RIGHT-SIDED LOW BACK PAIN WITH RIGHT-SIDED SCIATICA: Primary | ICD-10-CM

## 2020-03-05 ENCOUNTER — OFFICE VISIT (OUTPATIENT)
Dept: INTERNAL MEDICINE | Facility: CLINIC | Age: 85
End: 2020-03-05

## 2020-03-05 VITALS — SYSTOLIC BLOOD PRESSURE: 126 MMHG | HEART RATE: 75 BPM | DIASTOLIC BLOOD PRESSURE: 80 MMHG | OXYGEN SATURATION: 98 %

## 2020-03-05 DIAGNOSIS — M54.41 CHRONIC RIGHT-SIDED LOW BACK PAIN WITH RIGHT-SIDED SCIATICA: Primary | ICD-10-CM

## 2020-03-05 DIAGNOSIS — R60.0 LOCALIZED EDEMA: ICD-10-CM

## 2020-03-05 DIAGNOSIS — G89.29 CHRONIC RIGHT-SIDED LOW BACK PAIN WITH RIGHT-SIDED SCIATICA: Primary | ICD-10-CM

## 2020-03-05 DIAGNOSIS — M79.604 RIGHT LEG PAIN: Primary | ICD-10-CM

## 2020-03-05 PROCEDURE — 99214 OFFICE O/P EST MOD 30 MIN: CPT | Performed by: PHYSICIAN ASSISTANT

## 2020-03-05 RX ORDER — HYDROCODONE BITARTRATE AND ACETAMINOPHEN 5; 325 MG/1; MG/1
1 TABLET ORAL EVERY 6 HOURS PRN
Qty: 20 TABLET | Refills: 0 | Status: SHIPPED | OUTPATIENT
Start: 2020-03-05 | End: 2020-07-20

## 2020-03-05 NOTE — TELEPHONE ENCOUNTER
Let patient know that I sent some hydrocodone to her pharmacy to use as needed for pain.  She may also take ibuprofen with food as needed.  Neurology will not help her pain.  I put in a referral for chiropractor, Dr. Garnett.  She should also continue exercises from physical therapy.  Also continue moist heat to relax tight muscles.

## 2020-03-05 NOTE — PROGRESS NOTES
Patient Care Team:  Akilah Rueda MD as PCP - General (Internal Medicine)  Akilah Rueda MD as PCP - Internal Medicine (Internal Medicine)  Bernadette Almodovar MD as Consulting Physician (Dermatology)  Hiren Kaufman MD as Consulting Physician (Cardiac Electrophysiology)    Chief Complaint::   Chief Complaint   Patient presents with   • Back Pain     siatica         Subjective     HPI  Right leg pain started in October.  She completed physical therapy with improvement of symptoms for several months.  Pain returned suddenly ~5 weeks ago. The pain radiates down her right leg-starting mid right gluteus and radiates down the thigh laterally to her tibialis anterior and the top of her foot. There is numbness of the right toes. She has severe tenderness at her tibilais anterior.  There is edema. 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  She does not have pain at night, unless she has to get up and use the bathroom.  Then the pain starts and it is hard to calm it down. SHe has severe right leg weakness, that has progressed over the past three months. Pt was seen by PT yesterday and the physical therapist called me with concerns of her pain.  An order for neurosurgery was placed after that phone call. Tylenol and  Ibuprofen does not help the pain. She has some relief with hydrocodone.        The following portions of the patient's history were reviewed and updated as appropriate: active problem list, medication list, allergies, family history, social history    Review of Systems:   Review of Systems   Constitutional: Negative for chills, fatigue and fever.   HENT: Negative for congestion, ear pain and sinus pressure.    Respiratory: Negative for cough, chest tightness, shortness of breath and wheezing.    Cardiovascular: Negative for chest pain and palpitations.   Gastrointestinal: Negative for abdominal pain, blood in stool and constipation.   Endocrine: Negative for cold intolerance and heat intolerance.    Musculoskeletal: Positive for arthralgias and gait problem.   Skin: Negative for color change and dry skin.   Allergic/Immunologic: Negative for environmental allergies.   Neurological: Positive for weakness and numbness. Negative for dizziness, speech difficulty and headache.   Psychiatric/Behavioral: Negative for decreased concentration. The patient is not nervous/anxious.        Vital Signs  Vitals:    03/05/20 1359   BP: 126/80   BP Location: Left arm   Patient Position: Sitting   Cuff Size: Adult   Pulse: 75   SpO2: 98%   Weight: Comment: pt. refused   PainSc:   4   PainLoc: Back     There is no height or weight on file to calculate BMI.    Labs  Lab on 01/28/2020   Component Date Value Ref Range Status   • Sed Rate 01/28/2020 4  0 - 30 mm/hr Final   • proBNP 01/28/2020 715.9  5.0-1,800.0 pg/mL Final   • Glucose 01/28/2020 126* 65 - 99 mg/dL Final   • BUN 01/28/2020 13  8 - 23 mg/dL Final   • Creatinine 01/28/2020 0.86  0.57 - 1.00 mg/dL Final   • Sodium 01/28/2020 140  136 - 145 mmol/L Final   • Potassium 01/28/2020 4.3  3.5 - 5.2 mmol/L Final   • Chloride 01/28/2020 98  98 - 107 mmol/L Final   • CO2 01/28/2020 25.3  22.0 - 29.0 mmol/L Final   • Calcium 01/28/2020 9.3  8.6 - 10.5 mg/dL Final   • Total Protein 01/28/2020 6.5  6.0 - 8.5 g/dL Final   • Albumin 01/28/2020 3.90  3.50 - 5.20 g/dL Final   • ALT (SGPT) 01/28/2020 13  1 - 33 U/L Final   • AST (SGOT) 01/28/2020 18  1 - 32 U/L Final   • Alkaline Phosphatase 01/28/2020 78  39 - 117 U/L Final   • Total Bilirubin 01/28/2020 0.6  0.2 - 1.2 mg/dL Final   • eGFR Non African Amer 01/28/2020 63  >60 mL/min/1.73 Final   • Globulin 01/28/2020 2.6  gm/dL Final   • A/G Ratio 01/28/2020 1.5  g/dL Final   • BUN/Creatinine Ratio 01/28/2020 15.1  7.0 - 25.0 Final   • Anion Gap 01/28/2020 16.7* 5.0 - 15.0 mmol/L Final   • WBC 01/28/2020 10.12  3.40 - 10.80 10*3/mm3 Final   • RBC 01/28/2020 4.64  3.77 - 5.28 10*6/mm3 Final   • Hemoglobin 01/28/2020 14.2  12.0 - 15.9  g/dL Final   • Hematocrit 01/28/2020 42.4  34.0 - 46.6 % Final   • MCV 01/28/2020 91.4  79.0 - 97.0 fL Final   • MCH 01/28/2020 30.6  26.6 - 33.0 pg Final   • MCHC 01/28/2020 33.5  31.5 - 35.7 g/dL Final   • RDW 01/28/2020 13.7  12.3 - 15.4 % Final   • RDW-SD 01/28/2020 45.8  37.0 - 54.0 fl Final   • MPV 01/28/2020 11.4  6.0 - 12.0 fL Final   • Platelets 01/28/2020 239  140 - 450 10*3/mm3 Final   • Neutrophil % 01/28/2020 73.9  42.7 - 76.0 % Final   • Lymphocyte % 01/28/2020 17.1* 19.6 - 45.3 % Final   • Monocyte % 01/28/2020 6.8  5.0 - 12.0 % Final   • Eosinophil % 01/28/2020 1.2  0.3 - 6.2 % Final   • Basophil % 01/28/2020 0.6  0.0 - 1.5 % Final   • Immature Grans % 01/28/2020 0.4  0.0 - 0.5 % Final   • Neutrophils, Absolute 01/28/2020 7.48* 1.70 - 7.00 10*3/mm3 Final   • Lymphocytes, Absolute 01/28/2020 1.73  0.70 - 3.10 10*3/mm3 Final   • Monocytes, Absolute 01/28/2020 0.69  0.10 - 0.90 10*3/mm3 Final   • Eosinophils, Absolute 01/28/2020 0.12  0.00 - 0.40 10*3/mm3 Final   • Basophils, Absolute 01/28/2020 0.06  0.00 - 0.20 10*3/mm3 Final   • Immature Grans, Absolute 01/28/2020 0.04  0.00 - 0.05 10*3/mm3 Final   • nRBC 01/28/2020 0.0  0.0 - 0.2 /100 WBC Final   Lab on 01/10/2020   Component Date Value Ref Range Status   • Glucose 01/10/2020 118* 65 - 99 mg/dL Final   • BUN 01/10/2020 9  8 - 23 mg/dL Final   • Creatinine 01/10/2020 0.65  0.57 - 1.00 mg/dL Final   • Sodium 01/10/2020 142  136 - 145 mmol/L Final   • Potassium 01/10/2020 4.1  3.5 - 5.2 mmol/L Final   • Chloride 01/10/2020 100  98 - 107 mmol/L Final   • CO2 01/10/2020 28.8  22.0 - 29.0 mmol/L Final   • Calcium 01/10/2020 9.2  8.6 - 10.5 mg/dL Final   • Total Protein 01/10/2020 7.0  6.0 - 8.5 g/dL Final   • Albumin 01/10/2020 3.90  3.50 - 5.20 g/dL Final   • ALT (SGPT) 01/10/2020 12  1 - 33 U/L Final   • AST (SGOT) 01/10/2020 18  1 - 32 U/L Final   • Alkaline Phosphatase 01/10/2020 89  39 - 117 U/L Final   • Total Bilirubin 01/10/2020 0.6  0.2 - 1.2  mg/dL Final   • eGFR Non African Amer 01/10/2020 87  >60 mL/min/1.73 Final   • Globulin 01/10/2020 3.1  gm/dL Final   • A/G Ratio 01/10/2020 1.3  g/dL Final   • BUN/Creatinine Ratio 01/10/2020 13.8  7.0 - 25.0 Final   • Anion Gap 01/10/2020 13.2  5.0 - 15.0 mmol/L Final   • QuantiFERON Criteria 01/10/2020 Comment   Final    The QuantiFERON-TB Gold Plus result is determined by subtracting  the Nil value from either TB antigen (Ag) tube. The mitogen tube  serves as a control for the test.   • QUANTIFERON TB1 AG VALUE 01/10/2020 0.02  IU/mL Final   • QUANTIFERON TB2 AG VALUE 01/10/2020 0.02  IU/mL Final   • QuantiFERON Nil Value 01/10/2020 0.02  IU/mL Final   • QuantiFERON Mitogen Value 01/10/2020 >10.00  IU/mL Final   • QUANTIFERON-TB GOLD PLUS 01/10/2020 Negative  Negative Final    The specimen received for QuantiFERON testing was incubated by the  ordering institution.  Specific procedures outlined in our Directory  of Services and in the package insert for the QuantiFERON Gold  (In Tube) test must be followed to enable for proper stimulation of  cells for the production of interferon gamma.   • WBC 01/10/2020 8.58  3.40 - 10.80 10*3/mm3 Final   • RBC 01/10/2020 4.51  3.77 - 5.28 10*6/mm3 Final   • Hemoglobin 01/10/2020 13.1  12.0 - 15.9 g/dL Final   • Hematocrit 01/10/2020 40.3  34.0 - 46.6 % Final   • MCV 01/10/2020 89.4  79.0 - 97.0 fL Final   • MCH 01/10/2020 29.0  26.6 - 33.0 pg Final   • MCHC 01/10/2020 32.5  31.5 - 35.7 g/dL Final   • RDW 01/10/2020 13.2  12.3 - 15.4 % Final   • RDW-SD 01/10/2020 42.7  37.0 - 54.0 fl Final   • MPV 01/10/2020 10.4  6.0 - 12.0 fL Final   • Platelets 01/10/2020 296  140 - 450 10*3/mm3 Final   • Neutrophil % 01/10/2020 72.6  42.7 - 76.0 % Final   • Lymphocyte % 01/10/2020 17.2* 19.6 - 45.3 % Final   • Monocyte % 01/10/2020 7.1  5.0 - 12.0 % Final   • Eosinophil % 01/10/2020 2.0  0.3 - 6.2 % Final   • Basophil % 01/10/2020 0.9  0.0 - 1.5 % Final   • Immature Grans % 01/10/2020 0.2   0.0 - 0.5 % Final   • Neutrophils, Absolute 01/10/2020 6.22  1.70 - 7.00 10*3/mm3 Final   • Lymphocytes, Absolute 01/10/2020 1.48  0.70 - 3.10 10*3/mm3 Final   • Monocytes, Absolute 01/10/2020 0.61  0.10 - 0.90 10*3/mm3 Final   • Eosinophils, Absolute 01/10/2020 0.17  0.00 - 0.40 10*3/mm3 Final   • Basophils, Absolute 01/10/2020 0.08  0.00 - 0.20 10*3/mm3 Final   • Immature Grans, Absolute 01/10/2020 0.02  0.00 - 0.05 10*3/mm3 Final   • nRBC 01/10/2020 0.0  0.0 - 0.2 /100 WBC Final       Imaging  Mammo Screening Digital Tomosynthesis Bilateral With Cad    Result Date: 2/25/2020  Negative screening mammogram.  RECOMMENDATION:  Continue annual screening mammography.  BI-RADS CATEGORY 1, NEGATIVE.   CAD was utilized.  The standard false-negative rate of mammography is between 10% and 25%. Complex patterns or increased breast density will markedly elevate the false-negative rate of mammography.    A letter, in lay terminology, with the results of this exam will be mailed to the patient.   This report was finalized on 2/25/2020 12:04 PM by Dr. Jodi King MD.          Current Outpatient Medications:   •  Aflibercept (EYLEA IO), Inject  into the eye Every 3 (Three) Months., Disp: , Rfl:   •  amLODIPine (NORVASC) 2.5 MG tablet, TAKE 1 TABLET BY MOUTH TWO TIMES A DAY , Disp: 180 tablet, Rfl: 0  •  atorvastatin (LIPITOR) 20 MG tablet, TAKE 1 TABLET EVERY EVENING, Disp: 90 tablet, Rfl: 4  •  carvedilol (COREG) 25 MG tablet, TAKE 1 TABLET TWICE A DAY, Disp: 180 tablet, Rfl: 3  •  Cholecalciferol (VITAMIN D3) 2000 units capsule, Take 1 tablet by mouth Daily., Disp: , Rfl:   •  esomeprazole (nexIUM) 20 MG capsule, TAKE 1 CAPSULE DAILY, Disp: 90 capsule, Rfl: 2  •  HYDROcodone-acetaminophen (NORCO) 5-325 MG per tablet, Take 1 tablet by mouth Every 6 (Six) Hours As Needed for Severe Pain ., Disp: 20 tablet, Rfl: 0  •  Multiple Vitamins-Calcium (VIACTIV MULTI-VITAMIN) chewable tablet, Chew 2 tablets Daily., Disp: , Rfl:   •   Multiple Vitamins-Minerals (PRESERVISION AREDS 2 PO), Take 2 tablets by mouth Daily., Disp: , Rfl:   •  ramipril (ALTACE) 5 MG capsule, TAKE 1 CAPSULE EVERY MORNING AND 2 CAPSULES EVERY EVENING, Disp: 270 capsule, Rfl: 4  •  sertraline (ZOLOFT) 100 MG tablet, TAKE 1 TABLET DAILY, Disp: 90 tablet, Rfl: 1  •  XARELTO 20 MG tablet, TAKE 1 TABLET DAILY AS DIRECTED, Disp: 90 tablet, Rfl: 4    Physical Exam:    Physical Exam   Constitutional: She appears well-developed and well-nourished.   HENT:   Head: Normocephalic and atraumatic.   Cardiovascular: Normal rate, regular rhythm, normal heart sounds and intact distal pulses.   Musculoskeletal:   Pain centrally at right gluteus.  There is mild tenderness with palpation along illio-tibial band.    There is marked tenderness at lateral aspect of RLE and tibialis anterior.  Edema of the lateral right ankle.   Nursing note and vitals reviewed.      Procedures        Assessment/Plan   Problem List Items Addressed This Visit        Nervous and Auditory    Right leg pain - Primary    Current Assessment & Plan     She has some improvement with hydrocodone.  Due to concerns voiced by physical therapist and after discussing with patient and her spouse, we will proceed with neurology consultation. Due to edema and severe pain in RLE, will first obtain doppler to rule out any vascular component.  She is to continue pain medication for now with precautions. Gentle stretches.         Relevant Orders    Ambulatory Referral to Neurology    Duplex Venous Lower Extremity - Right CAR       Other    Localized edema    Relevant Orders    Duplex Venous Lower Extremity - Right CAR          Return in about 4 days (around 3/9/2020) for Recheck.    Plan of care reviewed with patient at the conclusion of today's visit. Education was provided regarding diagnosis, management, and any prescribed or recommended OTC medications.Patient verbalizes understanding of and agreement with management plan.        Dilma Baires PA-C    Please note that portions of this note were completed with a voice recognition program. Efforts were made to edit the dictations, but occasionally words are mistranscribed.

## 2020-03-06 ENCOUNTER — TELEPHONE (OUTPATIENT)
Dept: INTERNAL MEDICINE | Facility: CLINIC | Age: 85
End: 2020-03-06

## 2020-03-06 DIAGNOSIS — R60.0 LOCALIZED EDEMA: ICD-10-CM

## 2020-03-06 DIAGNOSIS — M79.604 RIGHT LEG PAIN: ICD-10-CM

## 2020-03-06 NOTE — ASSESSMENT & PLAN NOTE
She has some improvement with hydrocodone.  Due to concerns voiced by physical therapist and after discussing with patient and her spouse, we will proceed with neurology consultation. Due to edema and severe pain in RLE, will first obtain doppler to rule out any vascular component.  She is to continue pain medication for now with precautions. Gentle stretches.

## 2020-03-06 NOTE — TELEPHONE ENCOUNTER
----- Message from Dilma Baires PA-C sent at 3/6/2020 11:55 AM EST -----  Please let patient know that there is no clot in the lower leg.  I am waiting on neurology referral.

## 2020-03-09 ENCOUNTER — OFFICE VISIT (OUTPATIENT)
Dept: INTERNAL MEDICINE | Facility: CLINIC | Age: 85
End: 2020-03-09

## 2020-03-09 VITALS
HEART RATE: 70 BPM | BODY MASS INDEX: 26.52 KG/M2 | HEIGHT: 68 IN | WEIGHT: 175 LBS | DIASTOLIC BLOOD PRESSURE: 76 MMHG | SYSTOLIC BLOOD PRESSURE: 130 MMHG

## 2020-03-09 DIAGNOSIS — M70.61 TROCHANTERIC BURSITIS OF RIGHT HIP: ICD-10-CM

## 2020-03-09 DIAGNOSIS — F33.1 MODERATE EPISODE OF RECURRENT MAJOR DEPRESSIVE DISORDER (HCC): ICD-10-CM

## 2020-03-09 DIAGNOSIS — G89.29 CHRONIC RIGHT-SIDED LOW BACK PAIN WITH RIGHT-SIDED SCIATICA: Primary | ICD-10-CM

## 2020-03-09 DIAGNOSIS — I10 BENIGN ESSENTIAL HYPERTENSION: ICD-10-CM

## 2020-03-09 DIAGNOSIS — I48.20 CHRONIC ATRIAL FIBRILLATION (HCC): ICD-10-CM

## 2020-03-09 DIAGNOSIS — M76.811 ANTERIOR TIBIAL TENDONITIS, RIGHT: Chronic | ICD-10-CM

## 2020-03-09 DIAGNOSIS — M54.41 CHRONIC RIGHT-SIDED LOW BACK PAIN WITH RIGHT-SIDED SCIATICA: Primary | ICD-10-CM

## 2020-03-09 PROCEDURE — 99214 OFFICE O/P EST MOD 30 MIN: CPT | Performed by: INTERNAL MEDICINE

## 2020-03-09 RX ORDER — GABAPENTIN 100 MG/1
100 CAPSULE ORAL 2 TIMES DAILY
Qty: 60 CAPSULE | Refills: 2 | Status: SHIPPED | OUTPATIENT
Start: 2020-03-09 | End: 2020-07-20

## 2020-03-09 RX ORDER — AMLODIPINE BESYLATE 2.5 MG/1
2.5 TABLET ORAL 2 TIMES DAILY
Qty: 180 TABLET | Refills: 1 | Status: SHIPPED | OUTPATIENT
Start: 2020-03-09 | End: 2020-07-20 | Stop reason: SDUPTHER

## 2020-03-09 NOTE — PATIENT INSTRUCTIONS
Patient Instructions   Problem List Items Addressed This Visit        Cardiovascular and Mediastinum    Chronic atrial fibrillation    Overview     3/9/2020 Akilah Rueda MD    Continue Xarelto and carvedilol.  Continue regular follow-up with the cardiologist.         Relevant Medications    carvedilol (COREG) 25 MG tablet    amLODIPine (NORVASC) 2.5 MG tablet    Benign essential hypertension    Overview     3/9/2020 Akilah Rueda MD    Continue amlodipine, carvedilol, and ramipril.  Continue to avoid salt in the diet.         Relevant Medications    carvedilol (COREG) 25 MG tablet    ramipril (ALTACE) 5 MG capsule    amLODIPine (NORVASC) 2.5 MG tablet       Nervous and Auditory    Chronic right-sided low back pain with right-sided sciatica - Primary    Overview     3/9/2020 Akilah Rueda MD    Do some daily back and hip exercises and  stretches. Use moist heat pack on the R hip and low back  to relax tight muscles.     Use a cold pack on the R lateral knee.    Start gabapentin 1 tablet every evening for 2 nights then increase to 1 tablet twice a day. Take 2 hydrocodone to see if that helps some and let us know. May take 2 advil with food 3 times a day as needed.     New medication added today. Benefits and possible side effects discussed. Patient verbalized understanding.           Relevant Medications    triamcinolone acetonide (KENALOG-40) injection 80 mg (Completed)    HYDROcodone-acetaminophen (NORCO) 5-325 MG per tablet    gabapentin (NEURONTIN) 100 MG capsule       Musculoskeletal and Integument    Trochanteric bursitis of right hip    Overview     2/4/2020 Akilah Rueda MD     Do some daily back and hip exercises and  stretches. Use moist heat pack on the R hip and low back  to relax tight muscles.     Use a cold pack on the R lateral knee.    Start gabapentin 1 tablet every evening for 2 nights then increase to 1 tablet twice a day. Take 2 hydrocodone to see if that helps some and let us  know. May take 2 advil with food 3 times a day as needed.     New medication added today. Benefits and possible side effects discussed. Patient verbalized understanding.           Relevant Medications    triamcinolone acetonide (KENALOG-40) injection 80 mg (Completed)       Other    Moderate episode of recurrent major depressive disorder (CMS/HCC)    Overview     3/9/2020 Akilah Rueda MD    Continue sertraline daily.  Try to get out of the house and see friends and family often.         Relevant Medications    sertraline (ZOLOFT) 100 MG tablet           Patient Instructions   Problem List Items Addressed This Visit        Cardiovascular and Mediastinum    Chronic atrial fibrillation    Overview     3/9/2020 Akilah Rueda MD    Continue Xarelto and carvedilol.  Continue regular follow-up with the cardiologist.         Relevant Medications    carvedilol (COREG) 25 MG tablet    amLODIPine (NORVASC) 2.5 MG tablet    Benign essential hypertension    Overview     3/9/2020 Akilah Rueda MD    Continue amlodipine, carvedilol, and ramipril.  Continue to avoid salt in the diet.         Relevant Medications    carvedilol (COREG) 25 MG tablet    ramipril (ALTACE) 5 MG capsule    amLODIPine (NORVASC) 2.5 MG tablet       Nervous and Auditory    Chronic right-sided low back pain with right-sided sciatica - Primary    Overview     3/9/2020 Akilah Rueda MD    Do some daily back and hip exercises and  stretches. Use moist heat pack on the R hip and low back  to relax tight muscles.     Use a cold pack on the R lateral knee.    Start gabapentin 1 tablet every evening for 2 nights then increase to 1 tablet twice a day. Take 2 hydrocodone to see if that helps some and let us know. May take 2 advil with food 3 times a day as needed.     New medication added today. Benefits and possible side effects discussed. Patient verbalized understanding.           Relevant Medications    triamcinolone acetonide (KENALOG-40)  injection 80 mg (Completed)    HYDROcodone-acetaminophen (NORCO) 5-325 MG per tablet    gabapentin (NEURONTIN) 100 MG capsule       Musculoskeletal and Integument    Trochanteric bursitis of right hip    Overview     2/4/2020 Akilah Rueda MD     Do some daily back and hip exercises and  stretches. Use moist heat pack on the R hip and low back  to relax tight muscles.     Use a cold pack on the R lateral knee.    Start gabapentin 1 tablet every evening for 2 nights then increase to 1 tablet twice a day. Take 2 hydrocodone to see if that helps some and let us know. May take 2 advil with food 3 times a day as needed.     New medication added today. Benefits and possible side effects discussed. Patient verbalized understanding.           Relevant Medications    triamcinolone acetonide (KENALOG-40) injection 80 mg (Completed)       Other    Moderate episode of recurrent major depressive disorder (CMS/HCC)    Overview     3/9/2020 Akilah Rueda MD    Continue sertraline daily.  Try to get out of the house and see friends and family often.         Relevant Medications    sertraline (ZOLOFT) 100 MG tablet           Patient Instructions   Problem List Items Addressed This Visit        Cardiovascular and Mediastinum    Chronic atrial fibrillation    Overview     3/9/2020 Akilah Rueda MD    Continue Xarelto and carvedilol.  Continue regular follow-up with the cardiologist.         Relevant Medications    carvedilol (COREG) 25 MG tablet    amLODIPine (NORVASC) 2.5 MG tablet    Benign essential hypertension    Overview     3/9/2020 Akilah Rueda MD    Continue amlodipine, carvedilol, and ramipril.  Continue to avoid salt in the diet.         Relevant Medications    carvedilol (COREG) 25 MG tablet    ramipril (ALTACE) 5 MG capsule    amLODIPine (NORVASC) 2.5 MG tablet       Nervous and Auditory    Chronic right-sided low back pain with right-sided sciatica - Primary    Overview     3/9/2020 Akilah Rueda  MD    Do some daily back and hip exercises and  stretches. Use moist heat pack on the R hip and low back  to relax tight muscles.     Use a cold pack on the R lateral knee.    Start gabapentin 1 tablet every evening for 2 nights then increase to 1 tablet twice a day. Take 2 hydrocodone to see if that helps some and let us know. May take 2 advil with food 3 times a day as needed.     New medication added today. Benefits and possible side effects discussed. Patient verbalized understanding.           Relevant Medications    triamcinolone acetonide (KENALOG-40) injection 80 mg (Completed)    HYDROcodone-acetaminophen (NORCO) 5-325 MG per tablet    gabapentin (NEURONTIN) 100 MG capsule       Musculoskeletal and Integument    Trochanteric bursitis of right hip    Overview     2/4/2020 Akilah Rueda MD     Do some daily back and hip exercises and  stretches. Use moist heat pack on the R hip and low back  to relax tight muscles.     Use a cold pack on the R lateral knee.    Start gabapentin 1 tablet every evening for 2 nights then increase to 1 tablet twice a day. Take 2 hydrocodone to see if that helps some and let us know. May take 2 advil with food 3 times a day as needed.     New medication added today. Benefits and possible side effects discussed. Patient verbalized understanding.           Relevant Medications    triamcinolone acetonide (KENALOG-40) injection 80 mg (Completed)       Other    Moderate episode of recurrent major depressive disorder (CMS/HCC)    Overview     3/9/2020 Akilah Rueda MD    Continue sertraline daily.  Try to get out of the house and see friends and family often.         Relevant Medications    sertraline (ZOLOFT) 100 MG tablet           Patient Instructions   Problem List Items Addressed This Visit        Cardiovascular and Mediastinum    Chronic atrial fibrillation    Overview     3/9/2020 Akilah Rueda MD    Continue Xarelto and carvedilol.  Continue regular follow-up with the  cardiologist.         Relevant Medications    carvedilol (COREG) 25 MG tablet    amLODIPine (NORVASC) 2.5 MG tablet    Benign essential hypertension    Overview     3/9/2020 Akilah Rueda MD    Continue amlodipine, carvedilol, and ramipril.  Continue to avoid salt in the diet.         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 Tamri DC.  (He does not hear well.)  We have rescheduled the appointment with Dr. Garnett for Tuesday, 3/17/2020.  The appointment with Dr. Huertas, neurologist, is still pending.      Do some daily back and hip exercises and  stretches. Use moist heat pack on the R hip and low back  to relax tight muscles.     Use a cold pack on the R lateral knee.    Start gabapentin 1 tablet every evening for 2 nights then increase to 1 tablet twice a day. Take 2 hydrocodone to see if that helps some and let us know. May take 2 advil with food 3 times a day as needed.     New medication added today. Benefits and possible side effects discussed. Patient verbalized understanding.           Relevant Medications    triamcinolone acetonide (KENALOG-40) injection 80 mg (Completed)    HYDROcodone-acetaminophen (NORCO) 5-325 MG per tablet    gabapentin (NEURONTIN) 100 MG capsule       Musculoskeletal and Integument    Anterior tibial tendonitis, right (Chronic)    Overview     3/9/2020 Akilah Rueda MD     Do some daily back and hip exercises and  stretches. Use moist heat pack on the R hip and low back  to relax tight muscles.     Use a cold pack on the R lateral knee.    Start gabapentin 1 tablet every evening for 2 nights then increase to 1 tablet twice a day. Take 2  hydrocodone to see if that helps some and let us know. May take 2 advil with food 3 times a day as needed.     New medication added today. Benefits and possible side effects discussed. Patient verbalized understanding.           Trochanteric bursitis of right hip    Overview     2/4/2020 Akilah Rueda MD     Do some daily back and hip exercises and  stretches. Use moist heat pack on the R hip and low back  to relax tight muscles.     Use a cold pack on the R lateral knee.    Start gabapentin 1 tablet every evening for 2 nights then increase to 1 tablet twice a day. Take 2 hydrocodone to see if that helps some and let us know. May take 2 advil with food 3 times a day as needed.     New medication added today. Benefits and possible side effects discussed. Patient verbalized understanding.           Relevant Medications    triamcinolone acetonide (KENALOG-40) injection 80 mg (Completed)       Other    Moderate episode of recurrent major depressive disorder (CMS/HCC)    Overview     3/9/2020 Akilah Rueda MD    Continue sertraline daily.  Try to get out of the house and see friends and family often.         Relevant Medications    sertraline (ZOLOFT) 100 MG tablet           Patient Instructions   Problem List Items Addressed This Visit        Cardiovascular and Mediastinum    Chronic atrial fibrillation    Overview     3/9/2020 Akilah Rueda MD    Continue Xarelto and carvedilol.  Continue regular follow-up with the cardiologist.         Relevant Medications    carvedilol (COREG) 25 MG tablet    amLODIPine (NORVASC) 2.5 MG tablet    Benign essential hypertension    Overview     3/9/2020 Akilah Rueda MD    Continue amlodipine, carvedilol, and ramipril.  Continue to avoid salt in the diet.         Relevant Medications    carvedilol (COREG) 25 MG tablet    ramipril (ALTACE) 5 MG capsule    amLODIPine (NORVASC) 2.5 MG tablet       Nervous and Auditory    Chronic right-sided low back pain with right-sided  sciatica - Primary    Overview     3/9/2020 Akilah Rueda MD    Unfortunately, the patient's  inadvertently canceled their appointment for tomorrow with Dr. Tamir DC.  (He does not hear well.)  We have rescheduled the appointment with Dr. Garnett for Tuesday, 3/17/2020.  The appointment with Dr. Huertas, neurologist, is still pending.      Do some daily back and hip exercises and  stretches. Use moist heat pack on the R hip and low back  to relax tight muscles.     Use a cold pack on the R lateral knee.    Start gabapentin 1 tablet every evening for 2 nights then increase to 1 tablet twice a day. Take 2 hydrocodone to see if that helps some and let us know. May take 2 advil with food 3 times a day as needed.     New medication added today. Benefits and possible side effects discussed. Patient verbalized understanding.           Relevant Medications    triamcinolone acetonide (KENALOG-40) injection 80 mg (Completed)    HYDROcodone-acetaminophen (NORCO) 5-325 MG per tablet    gabapentin (NEURONTIN) 100 MG capsule       Musculoskeletal and Integument    Anterior tibial tendonitis, right (Chronic)    Overview     3/9/2020 Akilah Rueda MD     Do some daily back and hip exercises and  stretches. Use moist heat pack on the R hip and low back  to relax tight muscles.     Use a cold pack on the R lateral knee.    Start gabapentin 1 tablet every evening for 2 nights then increase to 1 tablet twice a day. Take 2 hydrocodone to see if that helps some and let us know. May take 2 advil with food 3 times a day as needed.     New medication added today. Benefits and possible side effects discussed. Patient verbalized understanding.           Trochanteric bursitis of right hip    Overview     2/4/2020 Akilah Rueda MD     Do some daily back and hip exercises and  stretches. Use moist heat pack on the R hip and low back  to relax tight muscles.     Use a cold pack on the R lateral knee.    Start gabapentin 1  tablet every evening for 2 nights then increase to 1 tablet twice a day. Take 2 hydrocodone to see if that helps some and let us know. May take 2 advil with food 3 times a day as needed.     New medication added today. Benefits and possible side effects discussed. Patient verbalized understanding.           Relevant Medications    triamcinolone acetonide (KENALOG-40) injection 80 mg (Completed)       Other    Moderate episode of recurrent major depressive disorder (CMS/HCC)    Overview     3/9/2020 Akilah Rueda MD    Continue sertraline daily.  Try to get out of the house and see friends and family often.         Relevant Medications    sertraline (ZOLOFT) 100 MG tablet           Patient Instructions   Problem List Items Addressed This Visit        Cardiovascular and Mediastinum    Chronic atrial fibrillation    Overview     3/9/2020 Akilah Rueda MD    Continue Xarelto and carvedilol.  Continue regular follow-up with the cardiologist.         Relevant Medications    carvedilol (COREG) 25 MG tablet    amLODIPine (NORVASC) 2.5 MG tablet    Benign essential hypertension    Overview     3/9/2020 Akilah Rueda MD    Continue amlodipine, carvedilol, and ramipril.  Continue to avoid salt in the diet.         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 Tamir DC.  (He does not hear well.)  We have rescheduled the appointment with Dr. Garnett for Tuesday, 3/17/2020.  The appointment with Dr. Huertas, neurologist, is still pending.      Do some daily back and hip exercises and  stretches. Use moist heat pack on the R hip and low back  to relax tight muscles.     Use a cold pack on the  R lateral knee.    Start gabapentin 1 tablet every evening for 2 nights then increase to 1 tablet twice a day. Take 2 hydrocodone to see if that helps some and let us know. May take 2 advil with food 3 times a day as needed.     New medication added today. Benefits and possible side effects discussed. Patient verbalized understanding.           Relevant Medications    triamcinolone acetonide (KENALOG-40) injection 80 mg (Completed)    HYDROcodone-acetaminophen (NORCO) 5-325 MG per tablet    gabapentin (NEURONTIN) 100 MG capsule       Musculoskeletal and Integument    Anterior tibial tendonitis, right (Chronic)    Overview     3/9/2020 Akilah Rueda MD     Do some daily back and hip exercises and  stretches. Use moist heat pack on the R hip and low back  to relax tight muscles.     Use a cold pack on the R lateral knee.    Start gabapentin 1 tablet every evening for 2 nights then increase to 1 tablet twice a day. Take 2 hydrocodone to see if that helps some and let us know. May take 2 advil with food 3 times a day as needed.     New medication added today. Benefits and possible side effects discussed. Patient verbalized understanding.           Trochanteric bursitis of right hip    Overview     2/4/2020 Akilah Rueda MD     Do some daily back and hip exercises and  stretches. Use moist heat pack on the R hip and low back  to relax tight muscles.     Use a cold pack on the R lateral knee.    Start gabapentin 1 tablet every evening for 2 nights then increase to 1 tablet twice a day. Take 2 hydrocodone to see if that helps some and let us know. May take 2 advil with food 3 times a day as needed.     New medication added today. Benefits and possible side effects discussed. Patient verbalized understanding.           Relevant Medications    triamcinolone acetonide (KENALOG-40) injection 80 mg (Completed)       Other    Moderate episode of recurrent major depressive disorder (CMS/[facility])    Overview     3/9/2020  Akilah Rueda MD    Continue sertraline daily.  Try to get out of the house and see friends and family often.         Relevant Medications    sertraline (ZOLOFT) 100 MG tablet

## 2020-03-09 NOTE — PROGRESS NOTES
[facility]     Jayda Lopes  1935   [id number]      Patient Care Team:  Akilah Rueda MD as PCP - General (Internal Medicine)  Akilah Rueda MD as PCP - Internal Medicine (Internal Medicine)  Bernadette Almodovar MD as Consulting Physician (Dermatology)  Hiren Kaufman MD as Consulting Physician (Cardiac Electrophysiology)    Chief Complaint   Patient presents with   • Sciatica     f/u, states hydrocodone hasn't touched her pain, the only thing that seems to help is Advil            HPI  Patient is a 84 y.o. female presents with follow-up sciatica, with right leg pain, and numbness and swelling in the right lower leg.      CHRONIC CONDITIONS  She states R low back pain and R leg pain are no better after doing PT. 1 hydrocodone no help at all. No side effects. 2 advil does help some, no stomach upset.  Pain is 10/10 at worst, and advil brings it down to 6-7/10.Act of standing up and sitting down make it the worst. Some days worse with walking.  Lying down helps.     Swelling in R lower leg some better.  RLE duplex showed no DVT but venous insufficiency.    Numbness in R toes started at some point after I saw her 2/4/20. She had no numbness or swelling or weakness at that time.     Right lateral knee pain and tenderness.  She does have knee arthritis as well.    She does not really complain of weakness in the right lower leg.  She states that the physical therapist has not felt she had weakness.    Blood pressure has been controlled.  Taking medication regularly.    Past Medical History:   Diagnosis Date   • Acute bronchitis due to infection 1/10/2019   • Anxiety    • Atrial fibrillation (CMS/HCC)    • Atypical chest pain    • Bradycardia    • GERD (gastroesophageal reflux disease)    • Hyperlipidemia    • Hypertension    • Macular degeneration    • Mitral valve prolapse    • Palpitation        Past Surgical History:   Procedure Laterality Date   • CYSTECTOMY      h/o Ovarian   •  "HYSTERECTOMY Bilateral    • OOPHORECTOMY Bilateral 1993   • THYROIDECTOMY      h/o thyroid surgery sub-total        Family History   Problem Relation Age of Onset   • Colon cancer Mother    • Other Mother         cardiac arrhythmia   • Heart failure Mother    • Cancer Mother    • Lung cancer Father    • Cancer Father    • No Known Problems Brother    • Hypertension Maternal Grandmother    • Breast cancer Neg Hx    • Ovarian cancer Neg Hx        Social History     Socioeconomic History   • Marital status:      Spouse name: Not on file   • Number of children: Not on file   • Years of education: Not on file   • Highest education level: Not on file   Tobacco Use   • Smoking status: Never Smoker   • Smokeless tobacco: Never Used   Substance and Sexual Activity   • Alcohol use: Yes     Comment: rarely   • Drug use: Defer   • Sexual activity: Defer       Allergies   Allergen Reactions   • Phenazopyridine Hcl Unknown (See Comments)     Pyridium       Review of Systems:     Review of Systems   Constitutional: Negative for chills, fatigue and fever.   Respiratory: Negative for cough, shortness of breath and wheezing.    Cardiovascular: Positive for leg swelling. Negative for chest pain and palpitations.   Gastrointestinal: Negative for abdominal pain, blood in stool, constipation and diarrhea.   Musculoskeletal: Positive for arthralgias, back pain and gait problem. Negative for joint swelling.   Neurological: Positive for numbness.       Vital Signs  Vitals:    03/09/20 1355   BP: 130/76   BP Location: Left arm   Patient Position: Sitting   Cuff Size: Adult   Pulse: 70   Weight: 79.4 kg (175 lb)  Comment: pt reported   Height: 172.7 cm (67.99\")   PainSc:   5     Body mass index is 26.62 kg/m².      Current Outpatient Medications:   •  Aflibercept (EYLEA IO), Inject  into the eye Every 3 (Three) Months., Disp: , Rfl:   •  amLODIPine (NORVASC) 2.5 MG tablet, Take 1 tablet by mouth 2 (Two) Times a Day., Disp: 180 tablet, " Rfl: 1  •  atorvastatin (LIPITOR) 20 MG tablet, TAKE 1 TABLET EVERY EVENING, Disp: 90 tablet, Rfl: 4  •  carvedilol (COREG) 25 MG tablet, TAKE 1 TABLET TWICE A DAY, Disp: 180 tablet, Rfl: 3  •  Cholecalciferol (VITAMIN D3) 2000 units capsule, Take 1 tablet by mouth Daily., Disp: , Rfl:   •  esomeprazole (nexIUM) 20 MG capsule, TAKE 1 CAPSULE DAILY, Disp: 90 capsule, Rfl: 2  •  HYDROcodone-acetaminophen (NORCO) 5-325 MG per tablet, Take 1 tablet by mouth Every 6 (Six) Hours As Needed for Severe Pain ., Disp: 20 tablet, Rfl: 0  •  Multiple Vitamins-Calcium (VIACTIV MULTI-VITAMIN) chewable tablet, Chew 2 tablets Daily., Disp: , Rfl:   •  Multiple Vitamins-Minerals (PRESERVISION AREDS 2 PO), Take 2 tablets by mouth Daily., Disp: , Rfl:   •  ramipril (ALTACE) 5 MG capsule, TAKE 1 CAPSULE EVERY MORNING AND 2 CAPSULES EVERY EVENING, Disp: 270 capsule, Rfl: 4  •  sertraline (ZOLOFT) 100 MG tablet, TAKE 1 TABLET DAILY, Disp: 90 tablet, Rfl: 1  •  XARELTO 20 MG tablet, TAKE 1 TABLET DAILY AS DIRECTED, Disp: 90 tablet, Rfl: 4  •  gabapentin (NEURONTIN) 100 MG capsule, Take 1 capsule by mouth 2 (Two) Times a Day., Disp: 60 capsule, Rfl: 2    Physical Exam:    Physical Exam   Constitutional: She is oriented to person, place, and time. She appears well-developed and well-nourished.   HENT:   Head: Normocephalic.   Eyes: Pupils are equal, round, and reactive to light. Conjunctivae and EOM are normal.   Neck: Normal range of motion. Neck supple. No thyromegaly present.   Cardiovascular: Normal rate, regular rhythm, normal heart sounds and intact distal pulses.   Pulmonary/Chest: Effort normal and breath sounds normal.   Musculoskeletal: Normal range of motion. She exhibits deformity.        Right hip: She exhibits tenderness. She exhibits no deformity.        Right knee: She exhibits deformity. She exhibits no swelling. Tenderness found.        Lumbar back: She exhibits tenderness and spasm.   Tender right anterior tibialis  proximal insertion.    Right trochanteric tenderness.    Tender muscle spasm in the right lower back at and inferior to the right SI joint.    Varus deformity of right knee.    Slight swelling of the right lower extremity mainly centered around the right lateral malleolus.  No pitting edema.   Lymphadenopathy:     She has no cervical adenopathy.   Neurological: She is alert and oriented to person, place, and time.   Psychiatric: She has a normal mood and affect. Thought content normal.   Nursing note and vitals reviewed.       ACE III MINI        Results Review:    None    CMP:  Lab Results   Component Value Date    BUN 13 01/28/2020    CREATININE 0.86 01/28/2020    EGFRIFNONA 63 01/28/2020    BCR 15.1 01/28/2020     01/28/2020    K 4.3 01/28/2020    CO2 25.3 01/28/2020    CALCIUM 9.3 01/28/2020    ALBUMIN 3.90 01/28/2020    BILITOT 0.6 01/28/2020    ALKPHOS 78 01/28/2020    AST 18 01/28/2020    ALT 13 01/28/2020     HbA1c:  Lab Results   Component Value Date    HGBA1C 6.0 09/07/2014    HGBA1C 6.1 (H) 05/14/2014     Microalbumin:  Lab Results   Component Value Date    MICROALBUR 5.7 07/18/2019     Lipid Panel  Lab Results   Component Value Date    CHOL 204 (H) 07/18/2019    TRIG 80 07/18/2019    HDL 76 (H) 07/18/2019     (H) 07/18/2019    AST 18 01/28/2020    ALT 13 01/28/2020       Medication Review: Medications reviewed and noted  Patient Instructions   Problem List Items Addressed This Visit        Cardiovascular and Mediastinum    Chronic atrial fibrillation    Overview     3/9/2020 Akilah Rueda MD    Continue Xarelto and carvedilol.  Continue regular follow-up with the cardiologist.         Relevant Medications    carvedilol (COREG) 25 MG tablet    amLODIPine (NORVASC) 2.5 MG tablet    Benign essential hypertension    Overview     3/9/2020 Akilah Rueda MD    Continue amlodipine, carvedilol, and ramipril.  Continue to avoid salt in the diet.         Relevant Medications    carvedilol  (COREG) 25 MG tablet    ramipril (ALTACE) 5 MG capsule    amLODIPine (NORVASC) 2.5 MG tablet       Nervous and Auditory    Chronic right-sided low back pain with right-sided sciatica - Primary    Overview     3/9/2020 Akilah Rueda MD    Unfortunately, the patient's  inadvertently canceled their appointment for tomorrow with Dr. Tamir DC.  (He does not hear well.)  We have rescheduled the appointment with Dr. Garnett for Tuesday, 3/17/2020.  The appointment with Dr. Huertas, neurologist, is still pending.      Do some daily back and hip exercises and  stretches. Use moist heat pack on the R hip and low back  to relax tight muscles.     Use a cold pack on the R lateral knee.    Start gabapentin 1 tablet every evening for 2 nights then increase to 1 tablet twice a day. Take 2 hydrocodone to see if that helps some and let us know. May take 2 advil with food 3 times a day as needed.     New medication added today. Benefits and possible side effects discussed. Patient verbalized understanding.           Relevant Medications    triamcinolone acetonide (KENALOG-40) injection 80 mg (Completed)    HYDROcodone-acetaminophen (NORCO) 5-325 MG per tablet    gabapentin (NEURONTIN) 100 MG capsule       Musculoskeletal and Integument    Anterior tibial tendonitis, right (Chronic)    Overview     3/9/2020 Akilah Rueda MD     Do some daily back and hip exercises and  stretches. Use moist heat pack on the R hip and low back  to relax tight muscles.     Use a cold pack on the R lateral knee.    Start gabapentin 1 tablet every evening for 2 nights then increase to 1 tablet twice a day. Take 2 hydrocodone to see if that helps some and let us know. May take 2 advil with food 3 times a day as needed.     New medication added today. Benefits and possible side effects discussed. Patient verbalized understanding.           Trochanteric bursitis of right hip    Overview     2/4/2020 Akilah Rueda MD     Do some daily back  and hip exercises and  stretches. Use moist heat pack on the R hip and low back  to relax tight muscles.     Use a cold pack on the R lateral knee.    Start gabapentin 1 tablet every evening for 2 nights then increase to 1 tablet twice a day. Take 2 hydrocodone to see if that helps some and let us know. May take 2 advil with food 3 times a day as needed.     New medication added today. Benefits and possible side effects discussed. Patient verbalized understanding.           Relevant Medications    triamcinolone acetonide (KENALOG-40) injection 80 mg (Completed)       Other    Moderate episode of recurrent major depressive disorder (CMS/HCC)    Overview     3/9/2020 Akilah Rueda MD    Continue sertraline daily.  Try to get out of the house and see friends and family often.         Relevant Medications    sertraline (ZOLOFT) 100 MG tablet             Diagnosis Plan   1. Chronic right-sided low back pain with right-sided sciatica  gabapentin (NEURONTIN) 100 MG capsule   2. Trochanteric bursitis of right hip     3. Chronic atrial fibrillation     4. Benign essential hypertension  amLODIPine (NORVASC) 2.5 MG tablet   5. Moderate episode of recurrent major depressive disorder (CMS/HCC)     6. Anterior tibial tendonitis, right         Plan of care reviewed with patient at the conclusion of today's visit. Education was provided regarding diagnosis, management, and any prescribed or recommended OTC medications.Patient verbalizes understanding of and agreement with management plan.         Akilah Rueda MD

## 2020-03-16 RX ORDER — SERTRALINE HYDROCHLORIDE 100 MG/1
TABLET, FILM COATED ORAL
Qty: 90 TABLET | Refills: 3 | Status: SHIPPED | OUTPATIENT
Start: 2020-03-16 | End: 2020-10-29 | Stop reason: SDUPTHER

## 2020-05-22 RX ORDER — CARVEDILOL 25 MG/1
TABLET ORAL
Qty: 180 TABLET | Refills: 3 | Status: SHIPPED | OUTPATIENT
Start: 2020-05-22 | End: 2020-10-29 | Stop reason: SDUPTHER

## 2020-06-02 ENCOUNTER — OFFICE VISIT (OUTPATIENT)
Dept: CARDIOLOGY | Facility: CLINIC | Age: 85
End: 2020-06-02

## 2020-06-02 VITALS
WEIGHT: 165 LBS | BODY MASS INDEX: 25.01 KG/M2 | DIASTOLIC BLOOD PRESSURE: 80 MMHG | SYSTOLIC BLOOD PRESSURE: 138 MMHG | HEIGHT: 68 IN

## 2020-06-02 DIAGNOSIS — I44.30 AV BLOCK: ICD-10-CM

## 2020-06-02 PROCEDURE — 99213 OFFICE O/P EST LOW 20 MIN: CPT | Performed by: PHYSICIAN ASSISTANT

## 2020-06-02 PROCEDURE — 93280 PM DEVICE PROGR EVAL DUAL: CPT | Performed by: PHYSICIAN ASSISTANT

## 2020-06-02 NOTE — PROGRESS NOTES
[facility]   OFFICE NOTE      Jayda Lopes  1935  PCP: Akilah Rueda MD    SUBJECTIVE:   Jayda Lopes is a 84 y.o. female seen for a follow up visit regarding the following:     CC:Afib    HPI:   Pleasant 84-year-old female presents today follow-up regarding permanent atrial fibrillation, hypertension, [employer] pacemaker.  Mrs. Lopes has not left her house since the pandemic this is the first visit outside the house.  She reports no medical issues during this time she is denying difficulty with palpitations dizziness near syncope or syncope.  She denies any chest pain.  She is little winded when she does physical activities but she tries to keep up with activities with exercising.  She does note that she is lost 30 pounds intentionally over the past several months by cutting her calories.    Cardiac PMH: (Old records have been reviewed and summarized below)  1. Atrial fibrillation:  a. Hospitalization with external cardioversion on 07/26/2004 with initiation of Rythmol therapy.   b. Palpitations with event recorder, December 2005, consistent with PACs.   c. Episode of atrial fibrillation approximately 60-90 minutes on Christmas 2009 with subsequent ER visit.  d. External cardioversion to normal sinus rhythm, 06/08/2012.   e. Hospitalization with initiation of Tikosyn, August 2012.   f. Pulmonary vein isolation procedure with extensive ablation of multiple sites and subsequent significant bradyarrhythmias after internal cardioversion to normal sinus rhythm with discontinuation of Tikosyn on 03/25/2014.  g. Unsuccessful external cardioversion, 05/15/2014, and subsequent successful internal cardioversion with early recurrence of atrial fibrillation, 05/15/2014.   h. Echocardiogram, 08/12/2014: EF less than 20% with severe global hypokinesis. Right ventricle mild to moderately dilated. Mild MR and mild TR.   i. 09/08/2014: Implantation of a [employer] Allure Quadra  biventricular pacemaker, serial #[id number], and AV node ablation.   2. CHF class III:  a. Holter monitor in February 2002 with frequent PACs and PVCs.   b. Echocardiogram in March 2000 with normal LV size and function: LVEF of 60%. Mild TR, mild MR.   c. Echocardiogram on 07/26/2004: Normal LV size and function. No significant regurgitation from the mitral valve or tricuspid valve.   d. Echocardiogram, 02/06/2008: Normal LV size and function, mild MR.   e. Echocardiogram, 08/22/2012: Left ventricular ejection fraction 35% to 40%. Moderate MR, mild TR.  f. Echocardiogram, 02/13/2013: Left ventricular ejection fraction of 50% to 55%. Mild TR, mild-to-moderate MR. LA 5.2.  g. Echocardiogram, 03/23/2015: EF 30-35%. Mild concentric LVH. Mild-to-moderate AI. Mild MR. Mild to moderate TR  h. Echocardiogram, 07/22/2015: Limited for EF only 50% to 55%.  3. Atypical chest pain:  a. Acceptable nuclear GXT with normal LV size and function and no ischemia in August 2000 and April 2004.  b. UMAIR, 03/25/2014: Ejection fraction of 50% to 55%. Mild mitral regurgitation.  4. Hypertension.   5. Hyperlipidemia.   6. Mitral valve prolapse.   7. Anxiety.   8. Gastroesophageal reflux disease.   9. Macular degeneration.   10. Surgical history:  a. Hysterectomy.  b. Partial thyroidectomy.  c. Ovarian cystectomy.   d. Cataract surgery    Past Medical History, Past Surgical History, Family history, Social History, and Medications were all reviewed with the patient today and updated as necessary.       Current Outpatient Medications:   •  Aflibercept (EYLEA IO), Inject  into the eye Every 3 (Three) Months., Disp: , Rfl:   •  amLODIPine (NORVASC) 2.5 MG tablet, Take 1 tablet by mouth 2 (Two) Times a Day., Disp: 180 tablet, Rfl: 1  •  atorvastatin (LIPITOR) 20 MG tablet, TAKE 1 TABLET EVERY EVENING, Disp: 90 tablet, Rfl: 4  •  carvedilol (COREG) 25 MG tablet, TAKE 1 TABLET TWICE A DAY, Disp: 180 tablet, Rfl: 3  •  Cholecalciferol (VITAMIN D3) 2000  units capsule, Take 1 tablet by mouth Daily., Disp: , Rfl:   •  esomeprazole (nexIUM) 20 MG capsule, TAKE 1 CAPSULE DAILY, Disp: 90 capsule, Rfl: 2  •  gabapentin (NEURONTIN) 100 MG capsule, Take 1 capsule by mouth 2 (Two) Times a Day., Disp: 60 capsule, Rfl: 2  •  HYDROcodone-acetaminophen (NORCO) 5-325 MG per tablet, Take 1 tablet by mouth Every 6 (Six) Hours As Needed for Severe Pain ., Disp: 20 tablet, Rfl: 0  •  Multiple Vitamins-Calcium (VIACTIV MULTI-VITAMIN) chewable tablet, Chew 2 tablets Daily., Disp: , Rfl:   •  Multiple Vitamins-Minerals (PRESERVISION AREDS 2 PO), Take 2 tablets by mouth Daily., Disp: , Rfl:   •  ramipril (ALTACE) 5 MG capsule, TAKE 1 CAPSULE EVERY MORNING AND 2 CAPSULES EVERY EVENING, Disp: 270 capsule, Rfl: 4  •  sertraline (ZOLOFT) 100 MG tablet, TAKE 1 TABLET DAILY, Disp: 90 tablet, Rfl: 3  •  XARELTO 20 MG tablet, TAKE 1 TABLET DAILY AS DIRECTED, Disp: 90 tablet, Rfl: 4      Allergies   Allergen Reactions   • Phenazopyridine Hcl Unknown (See Comments)     Pyridium     Patient Active Problem List   Diagnosis   • Chronic atrial fibrillation   • Palpitation   • Atypical chest pain   • GERD without esophagitis   • Macular degeneration (senile) of retina   • Bradycardia   • Alcohol use   • Cortical age-related cataract of both eyes   • Cough   • Non-rheumatic mitral regurgitation   • Seasonal allergic rhinitis due to pollen   • Chronic systolic (congestive) heart failure (CMS/HCC)   • Dilated cardiomyopathy (CMS/HCC)   • Primary open angle glaucoma (POAG) of both eyes, mild stage   • Moderate episode of recurrent major depressive disorder (CMS/HCC)   • Generalized anxiety disorder   • B12 deficiency   • Osteopenia of multiple sites   • Osteoarthritis   • Varicose veins of both lower extremities without ulcer or inflammation   • Vitamin D deficiency   • Benign essential hypertension   • AV block   • Mixed hyperlipidemia   • Constipation, slow transit   • Bronchitis   • Wheeze   • Chronic  right-sided low back pain with right-sided sciatica   • Shortness of breath   • Pain of right hip joint   • Trochanteric bursitis of right hip   • Right leg pain   • Localized edema   • Anterior tibial tendonitis, right     Past Medical History:   Diagnosis Date   • Acute bronchitis due to infection 1/10/2019   • Anxiety    • Atrial fibrillation (CMS/HCC)    • Atypical chest pain    • Bradycardia    • GERD (gastroesophageal reflux disease)    • Hyperlipidemia    • Hypertension    • Macular degeneration    • Mitral valve prolapse    • Palpitation      Past Surgical History:   Procedure Laterality Date   • CYSTECTOMY      h/o Ovarian   • HYSTERECTOMY Bilateral    • OOPHORECTOMY Bilateral 1993   • THYROIDECTOMY      h/o thyroid surgery sub-total      Family History   Problem Relation Age of Onset   • Colon cancer Mother    • Other Mother         cardiac arrhythmia   • Heart failure Mother    • Cancer Mother    • Lung cancer Father    • Cancer Father    • No Known Problems Brother    • Hypertension Maternal Grandmother    • Breast cancer Neg Hx    • Ovarian cancer Neg Hx      Social History     Tobacco Use   • Smoking status: Never Smoker   • Smokeless tobacco: Never Used   Substance Use Topics   • Alcohol use: Yes     Comment: rarely       ROS:  Review of Symptoms:  General: no recent weight loss/gain, weakness or fatigue  Skin: no rashes, lumps, or other skin changes  HEENT: no dizziness, lightheadedness, or vision changes  Respiratory: no cough or hemoptysis  Cardiovascular: no palpitations, and tachycardia  Gastrointestinal: no black/tarry stools or diarrhea  Urinary: no change in frequency or urgency  Peripheral Vascular: no claudication or leg cramps  Musculoskeletal: no muscle or joint pain/stiffness  Psychiatric: no depression or excessive stress  Neurological: no sensory or motor loss, no syncope  Hematologic: no anemia, easy bruising or bleeding  Endocrine: no thyroid problems, nor heat or cold  "intolerance    PHYSICAL EXAM:    /80 (BP Location: Left arm, Patient Position: Sitting)   Ht 172.7 cm (68\")   Wt 74.8 kg (165 lb)   LMP  (LMP Unknown)   BMI 25.09 kg/m²        Wt Readings from Last 5 Encounters:   06/02/20 74.8 kg (165 lb)   03/09/20 79.4 kg (175 lb)   02/04/20 81.2 kg (179 lb)   01/28/20 83.9 kg (185 lb)   01/04/20 83.9 kg (185 lb)       BP Readings from Last 5 Encounters:   06/02/20 138/80   03/09/20 130/76   03/05/20 126/80   02/04/20 112/72   01/28/20 128/82       General appearance - Alert, well appearing, and in no distress   Mental status - Affect appropriate to mood.  Eyes - Sclerae anicteric,  ENMT - Hearing grossly normal bilaterally, Dental hygiene good.  Neck - Carotids upstroke normal bilaterally, no bruits, no JVD.  Resp - Clear to auscultation, no wheezes, rales or rhonchi, symmetric air entry.  Heart - Normal rate, regular rhythm, normal S1, S2, no murmurs, rubs, clicks or gallops.  GI - Soft, nontender, nondistended, no masses or organomegaly.  Neurological - Grossly intact - normal speech, no focal findings  Musculoskeletal - No joint tenderness, deformity or swelling, no muscular tenderness noted.  Extremities - Peripheral pulses normal, no pedal edema, no clubbing or cyanosis.  Skin - Normal coloration and turgor.  Psych -  oriented to person, place, and time.    Medical problems and test results were reviewed with the patient today.     Device Interrogation:  Please see device interrogation form which has been signed and updated for full details.  [employer] by V pacemaker VVIR at 70.  RV paced 97%.  LV paced 97%.  Acceptable R waves thresholds impedances.  Battery voltage 2.98 V with over 5 years remaining.  No significant arrhythmias.    ASSESSMENT   1. Permanent Afib: AVN RFA. BIVPPM. Normal function.  2. HTN: Controled on BB and ACE.   3. Chronic SHF Class I, EF improved to 55%  4. Anticoagulation: Xarelto 20mg Daily.   5. Mild obesity, recent intentional 20lbs " weight loss.     PLAN  · Continue current medical therapy  · Return follow-up Dr. Kaufman as scheduled in October 6/2/2020  14:15    Will Thomas MUHAMMAD

## 2020-07-20 ENCOUNTER — OFFICE VISIT (OUTPATIENT)
Dept: INTERNAL MEDICINE | Facility: CLINIC | Age: 85
End: 2020-07-20

## 2020-07-20 ENCOUNTER — LAB (OUTPATIENT)
Dept: LAB | Facility: HOSPITAL | Age: 85
End: 2020-07-20

## 2020-07-20 VITALS
BODY MASS INDEX: 24.67 KG/M2 | TEMPERATURE: 98.2 F | HEART RATE: 74 BPM | DIASTOLIC BLOOD PRESSURE: 72 MMHG | WEIGHT: 162.8 LBS | HEIGHT: 68 IN | SYSTOLIC BLOOD PRESSURE: 130 MMHG

## 2020-07-20 DIAGNOSIS — I10 BENIGN ESSENTIAL HYPERTENSION: Primary | ICD-10-CM

## 2020-07-20 DIAGNOSIS — I42.0 DILATED CARDIOMYOPATHY (HCC): ICD-10-CM

## 2020-07-20 DIAGNOSIS — R26.89 POOR BALANCE: Chronic | ICD-10-CM

## 2020-07-20 DIAGNOSIS — F41.1 GENERALIZED ANXIETY DISORDER: ICD-10-CM

## 2020-07-20 DIAGNOSIS — E53.8 B12 DEFICIENCY: ICD-10-CM

## 2020-07-20 DIAGNOSIS — N95.9 MENOPAUSAL AND POSTMENOPAUSAL DISORDER: ICD-10-CM

## 2020-07-20 DIAGNOSIS — K21.9 GERD WITHOUT ESOPHAGITIS: ICD-10-CM

## 2020-07-20 DIAGNOSIS — L73.9 FOLLICULITIS: ICD-10-CM

## 2020-07-20 DIAGNOSIS — M85.89 OSTEOPENIA OF MULTIPLE SITES: ICD-10-CM

## 2020-07-20 DIAGNOSIS — E55.9 VITAMIN D DEFICIENCY: ICD-10-CM

## 2020-07-20 DIAGNOSIS — H61.21 IMPACTED CERUMEN OF RIGHT EAR: Chronic | ICD-10-CM

## 2020-07-20 DIAGNOSIS — E78.2 MIXED HYPERLIPIDEMIA: ICD-10-CM

## 2020-07-20 DIAGNOSIS — I50.22 CHRONIC SYSTOLIC (CONGESTIVE) HEART FAILURE (HCC): ICD-10-CM

## 2020-07-20 DIAGNOSIS — F33.1 MODERATE EPISODE OF RECURRENT MAJOR DEPRESSIVE DISORDER (HCC): ICD-10-CM

## 2020-07-20 DIAGNOSIS — I48.20 CHRONIC ATRIAL FIBRILLATION (HCC): ICD-10-CM

## 2020-07-20 DIAGNOSIS — E78.2 MIXED HYPERLIPIDEMIA: Chronic | ICD-10-CM

## 2020-07-20 DIAGNOSIS — H91.13 PRESBYCUSIS OF BOTH EARS: Chronic | ICD-10-CM

## 2020-07-20 DIAGNOSIS — K59.01 CONSTIPATION, SLOW TRANSIT: ICD-10-CM

## 2020-07-20 LAB
25(OH)D3 SERPL-MCNC: 45.4 NG/ML (ref 30–100)
ALBUMIN SERPL-MCNC: 3.9 G/DL (ref 3.5–5.2)
ALBUMIN UR-MCNC: 8.6 MG/DL
ALBUMIN/GLOB SERPL: 1.4 G/DL
ALP SERPL-CCNC: 82 U/L (ref 39–117)
ALT SERPL W P-5'-P-CCNC: 12 U/L (ref 1–33)
ANION GAP SERPL CALCULATED.3IONS-SCNC: 6.1 MMOL/L (ref 5–15)
AST SERPL-CCNC: 16 U/L (ref 1–32)
BACTERIA UR QL AUTO: ABNORMAL /HPF
BASOPHILS # BLD AUTO: 0.07 10*3/MM3 (ref 0–0.2)
BASOPHILS NFR BLD AUTO: 0.8 % (ref 0–1.5)
BILIRUB SERPL-MCNC: 0.8 MG/DL (ref 0–1.2)
BILIRUB UR QL STRIP: NEGATIVE
BUN SERPL-MCNC: 12 MG/DL (ref 8–23)
BUN/CREAT SERPL: 18.8 (ref 7–25)
CALCIUM SPEC-SCNC: 9.2 MG/DL (ref 8.6–10.5)
CHLORIDE SERPL-SCNC: 105 MMOL/L (ref 98–107)
CHOLEST SERPL-MCNC: 169 MG/DL (ref 0–200)
CLARITY UR: ABNORMAL
CO2 SERPL-SCNC: 30.9 MMOL/L (ref 22–29)
COLOR UR: ABNORMAL
CREAT SERPL-MCNC: 0.64 MG/DL (ref 0.57–1)
CREAT UR-MCNC: 205.1 MG/DL
DEPRECATED RDW RBC AUTO: 42.6 FL (ref 37–54)
EOSINOPHIL # BLD AUTO: 0.13 10*3/MM3 (ref 0–0.4)
EOSINOPHIL NFR BLD AUTO: 1.5 % (ref 0.3–6.2)
ERYTHROCYTE [DISTWIDTH] IN BLOOD BY AUTOMATED COUNT: 12.4 % (ref 12.3–15.4)
GFR SERPL CREATININE-BSD FRML MDRD: 89 ML/MIN/1.73
GLOBULIN UR ELPH-MCNC: 2.8 GM/DL
GLUCOSE SERPL-MCNC: 95 MG/DL (ref 65–99)
GLUCOSE UR STRIP-MCNC: NEGATIVE MG/DL
HCT VFR BLD AUTO: 40.9 % (ref 34–46.6)
HDLC SERPL-MCNC: 57 MG/DL (ref 40–60)
HGB BLD-MCNC: 13.6 G/DL (ref 12–15.9)
HGB UR QL STRIP.AUTO: NEGATIVE
HYALINE CASTS UR QL AUTO: ABNORMAL /LPF
IMM GRANULOCYTES # BLD AUTO: 0.03 10*3/MM3 (ref 0–0.05)
IMM GRANULOCYTES NFR BLD AUTO: 0.4 % (ref 0–0.5)
KETONES UR QL STRIP: ABNORMAL
LDLC SERPL CALC-MCNC: 99 MG/DL (ref 0–100)
LDLC/HDLC SERPL: 1.74 {RATIO}
LEUKOCYTE ESTERASE UR QL STRIP.AUTO: ABNORMAL
LYMPHOCYTES # BLD AUTO: 1.32 10*3/MM3 (ref 0.7–3.1)
LYMPHOCYTES NFR BLD AUTO: 15.5 % (ref 19.6–45.3)
MCH RBC QN AUTO: 30.9 PG (ref 26.6–33)
MCHC RBC AUTO-ENTMCNC: 33.3 G/DL (ref 31.5–35.7)
MCV RBC AUTO: 93 FL (ref 79–97)
MICROALBUMIN/CREAT UR: 41.9 MG/G
MONOCYTES # BLD AUTO: 0.71 10*3/MM3 (ref 0.1–0.9)
MONOCYTES NFR BLD AUTO: 8.4 % (ref 5–12)
NEUTROPHILS NFR BLD AUTO: 6.23 10*3/MM3 (ref 1.7–7)
NEUTROPHILS NFR BLD AUTO: 73.4 % (ref 42.7–76)
NITRITE UR QL STRIP: POSITIVE
NRBC BLD AUTO-RTO: 0 /100 WBC (ref 0–0.2)
PH UR STRIP.AUTO: 6.5 [PH] (ref 5–8)
PLATELET # BLD AUTO: 237 10*3/MM3 (ref 140–450)
PMV BLD AUTO: 10.8 FL (ref 6–12)
POTASSIUM SERPL-SCNC: 4.3 MMOL/L (ref 3.5–5.2)
PROT SERPL-MCNC: 6.7 G/DL (ref 6–8.5)
PROT UR QL STRIP: ABNORMAL
RBC # BLD AUTO: 4.4 10*6/MM3 (ref 3.77–5.28)
RBC # UR: ABNORMAL /HPF
REF LAB TEST METHOD: ABNORMAL
SODIUM SERPL-SCNC: 142 MMOL/L (ref 136–145)
SP GR UR STRIP: 1.02 (ref 1–1.03)
SQUAMOUS #/AREA URNS HPF: ABNORMAL /HPF
TRIGL SERPL-MCNC: 65 MG/DL (ref 0–150)
TSH SERPL DL<=0.05 MIU/L-ACNC: 2.66 UIU/ML (ref 0.27–4.2)
UROBILINOGEN UR QL STRIP: ABNORMAL
VIT B12 BLD-MCNC: 475 PG/ML (ref 211–946)
VLDLC SERPL-MCNC: 13 MG/DL (ref 5–40)
WBC # BLD AUTO: 8.49 10*3/MM3 (ref 3.4–10.8)
WBC UR QL AUTO: ABNORMAL /HPF

## 2020-07-20 PROCEDURE — 80061 LIPID PANEL: CPT

## 2020-07-20 PROCEDURE — 87086 URINE CULTURE/COLONY COUNT: CPT

## 2020-07-20 PROCEDURE — 87186 SC STD MICRODIL/AGAR DIL: CPT

## 2020-07-20 PROCEDURE — 93000 ELECTROCARDIOGRAM COMPLETE: CPT | Performed by: INTERNAL MEDICINE

## 2020-07-20 PROCEDURE — 85025 COMPLETE CBC W/AUTO DIFF WBC: CPT

## 2020-07-20 PROCEDURE — 87077 CULTURE AEROBIC IDENTIFY: CPT

## 2020-07-20 PROCEDURE — 69210 REMOVE IMPACTED EAR WAX UNI: CPT | Performed by: INTERNAL MEDICINE

## 2020-07-20 PROCEDURE — 99397 PER PM REEVAL EST PAT 65+ YR: CPT | Performed by: INTERNAL MEDICINE

## 2020-07-20 PROCEDURE — 81001 URINALYSIS AUTO W/SCOPE: CPT

## 2020-07-20 PROCEDURE — G0439 PPPS, SUBSEQ VISIT: HCPCS | Performed by: INTERNAL MEDICINE

## 2020-07-20 PROCEDURE — 82607 VITAMIN B-12: CPT

## 2020-07-20 PROCEDURE — 80053 COMPREHEN METABOLIC PANEL: CPT

## 2020-07-20 PROCEDURE — 82306 VITAMIN D 25 HYDROXY: CPT

## 2020-07-20 PROCEDURE — 84443 ASSAY THYROID STIM HORMONE: CPT

## 2020-07-20 PROCEDURE — 82043 UR ALBUMIN QUANTITATIVE: CPT

## 2020-07-20 PROCEDURE — 82570 ASSAY OF URINE CREATININE: CPT

## 2020-07-20 RX ORDER — AMLODIPINE BESYLATE 2.5 MG/1
2.5 TABLET ORAL NIGHTLY
Qty: 90 TABLET | Refills: 1 | Status: SHIPPED | OUTPATIENT
Start: 2020-07-20 | End: 2020-10-29 | Stop reason: SDUPTHER

## 2020-07-20 NOTE — PROGRESS NOTES
The ABCs of the Annual Wellness Visit  Initial Medicare Wellness Visit    Chief Complaint   Patient presents with   • Medicare Wellness-subsequent   • Hyperlipidemia     f/u, she's fasting   • Hypertension   • Abdominal Pain      states she has c/o stomach pain a few times       Subjective   History of Present Illness:  Jayda oLpes is a 84 y.o. female who presents for an Initial Medicare Wellness Visit and followup chronic conditions including hypertension,hyperlipidemia,.    HPI  One fall over last 6 months when carrying in groceries and tripped over laundry basket that was out of its normal place. Uneven surfaces bother her.  No injury.    Fells more off balance since sciatica improved, last few months and a little dizziness on and off. Hearing has decreased but no ear pain.     HPI  Ears get stopped up with wax.  Hearing has decreased gradually over the last couple years.    CHRONIC CONDITIONS:    Constipation has been worse over the last few months.  She has been eating less fats in the diet.  She is trying to get a lot of fiber in the diet.    BP's at home 106/62 this am. Rarely up to 140 systolic.  About a third of the time, of the systolic blood pressure is around 102-106.  She has been taking amlodipine only in the mornings but has been taking her carvedilol and ramipril as prescribed.    Gerd controlled with nexium.     Takes statin and eating less fats and sugars. Lost 13 lb over last 4 months. 30 lb over last     Taking sertraline it helps with anxiety.  She does not feel depressed.  She feels happy.  She is frustrated by COVID-19 pandemic and not being able to go out and see people and go shopping.  Sleeping fine.    For A. fib, she takes Xarelto and carvedilol.  She has not had any palpitations or rapid heartbeat.    No CHF symptoms.  No increase in shortness of breath with exercise.  No edema.  No chest pain.      HEALTH RISK ASSESSMENT    Recent Hospitalizations:  No hospitalization(s)  within the last year.    Current Medical Providers:  Patient Care Team:  Akilah Rueda MD as PCP - General (Internal Medicine)  Akilah Rueda MD as PCP - Internal Medicine (Internal Medicine)  Bernadette Almodovar MD as Consulting Physician (Dermatology)  Hiren Kaufman MD as Consulting Physician (Cardiac Electrophysiology)    Smoking Status:  Social History     Tobacco Use   Smoking Status Never Smoker   Smokeless Tobacco Never Used       Alcohol Consumption:  Social History     Substance and Sexual Activity   Alcohol Use Yes    Comment: rarely       Depression Screen:   PHQ-2/PHQ-9 Depression Screening 7/20/2020   Little interest or pleasure in doing things 1   Feeling down, depressed, or hopeless 1   Trouble falling or staying asleep, or sleeping too much 0   Feeling tired or having little energy 1   Poor appetite or overeating 0   Feeling bad about yourself - or that you are a failure or have let yourself or your family down 0   Trouble concentrating on things, such as reading the newspaper or watching television 0   Moving or speaking so slowly that other people could have noticed. Or the opposite - being so fidgety or restless that you have been moving around a lot more than usual 1   Thoughts that you would be better off dead, or of hurting yourself in some way 1   Total Score 5   If you checked off any problems, how difficult have these problems made it for you to do your work, take care of things at home, or get along with other people? Somewhat difficult       Fall Risk Screen:  STEADI Fall Risk Assessment was completed, and patient is at MODERATE risk for falls. Assessment completed on:7/20/2020    Health Habits and Functional and Cognitive Screening:  Functional & Cognitive Status 7/20/2020   Do you have difficulty preparing food and eating? No   Do you have difficulty bathing yourself, getting dressed or grooming yourself? No   Do you have difficulty using the toilet? No   Do you have  difficulty moving around from place to place? No   Do you have trouble with steps or getting out of a bed or a chair? No   Current Diet Well Balanced Diet   Dental Exam Up to date   Eye Exam Up to date   Exercise (times per week) 3 times per week   Current Exercise Activities Include Stationary Bicycling/Spin Class   Do you need help using the phone?  No   Are you deaf or do you have serious difficulty hearing?  No   Do you need help with transportation? No   Do you need help shopping? No   Do you need help preparing meals?  No   Do you need help with housework?  No   Do you need help with laundry? No   Do you need help taking your medications? No   Do you need help managing money? No   Do you ever drive or ride in a car without wearing a seat belt? No   Have you felt unusual stress, anger or loneliness in the last month? No   Who do you live with? Spouse   If you need help, do you have trouble finding someone available to you? No   Have you been bothered in the last four weeks by sexual problems? No   Do you have difficulty concentrating, remembering or making decisions? No       Does the patient have evidence of cognitive impairment? No    Asprin use counseling:Does not need ASA (and currently is not on it)    Age-appropriate Screening Schedule:  Refer to the list below for future screening recommendations based on patient's age, sex and/or medical conditions. Orders for these recommended tests are listed in the plan section. The patient has been provided with a written plan.    Age appropriate preventive counseling done including age appropriate ,self breast exam,  regular dental visits, mental health, injury prevention such as wearing seat belt and preventing falls, healthy  nutrition, healthy weight, regular physical exercise. Alcohol use is moderate.  Tobacco history-none. Drug use-none.  STD's-not at risk.          Health Maintenance   Topic Date Due   • ZOSTER VACCINE (2 of 3) 09/18/2015   • INFLUENZA  VACCINE  08/01/2020   • DXA SCAN  08/14/2020   • TDAP/TD VACCINES (2 - Td) 12/15/2020   • LIPID PANEL  07/20/2021        The following portions of the patient's history were reviewed and updated as appropriate: allergies, current medications, past family history, past medical history, past social history, past surgical history and problem list.    Outpatient Medications Prior to Visit   Medication Sig Dispense Refill   • Aflibercept (EYLEA IO) Inject  into the eye Every 3 (Three) Months.     • atorvastatin (LIPITOR) 20 MG tablet TAKE 1 TABLET EVERY EVENING 90 tablet 4   • carvedilol (COREG) 25 MG tablet TAKE 1 TABLET TWICE A  tablet 3   • Cholecalciferol (VITAMIN D3) 2000 units capsule Take 1 tablet by mouth Daily.     • esomeprazole (nexIUM) 20 MG capsule TAKE 1 CAPSULE DAILY 90 capsule 2   • Multiple Vitamins-Calcium (VIACTIV MULTI-VITAMIN) chewable tablet Chew 2 tablets Daily.     • Multiple Vitamins-Minerals (PRESERVISION AREDS 2 PO) Take 2 tablets by mouth Daily.     • ramipril (ALTACE) 5 MG capsule TAKE 1 CAPSULE EVERY MORNING AND 2 CAPSULES EVERY EVENING 270 capsule 4   • sertraline (ZOLOFT) 100 MG tablet TAKE 1 TABLET DAILY 90 tablet 3   • XARELTO 20 MG tablet TAKE 1 TABLET DAILY AS DIRECTED 90 tablet 4   • amLODIPine (NORVASC) 2.5 MG tablet Take 1 tablet by mouth 2 (Two) Times a Day. 180 tablet 1   • gabapentin (NEURONTIN) 100 MG capsule Take 1 capsule by mouth 2 (Two) Times a Day. 60 capsule 2   • HYDROcodone-acetaminophen (NORCO) 5-325 MG per tablet Take 1 tablet by mouth Every 6 (Six) Hours As Needed for Severe Pain . 20 tablet 0     No facility-administered medications prior to visit.        Patient Active Problem List   Diagnosis   • Chronic atrial fibrillation   • Palpitation   • Atypical chest pain   • GERD without esophagitis   • Macular degeneration (senile) of retina   • Bradycardia   • Cortical age-related cataract of both eyes   • Cough   • Non-rheumatic mitral regurgitation   • Seasonal  allergic rhinitis due to pollen   • Chronic systolic (congestive) heart failure (CMS/HCC)   • Dilated cardiomyopathy (CMS/HCC)   • Primary open angle glaucoma (POAG) of both eyes, mild stage   • Moderate episode of recurrent major depressive disorder (CMS/HCC)   • Generalized anxiety disorder   • B12 deficiency   • Osteopenia of multiple sites   • Osteoarthritis   • Varicose veins of both lower extremities without ulcer or inflammation   • Vitamin D deficiency   • Benign essential hypertension   • AV block   • Mixed hyperlipidemia   • Constipation, slow transit   • Bronchitis   • Wheeze   • Chronic right-sided low back pain with right-sided sciatica   • Shortness of breath   • Pain of right hip joint   • Trochanteric bursitis of right hip   • Right leg pain   • Anterior tibial tendonitis, right   • Poor balance   • Folliculitis   • Presbycusis of both ears   • Impacted cerumen of right ear       Advanced Care Planning:  ACP discussion was held with the patient during this visit. Patient does not have an advance directive, declines further assistance.    Review of Systems   Constitutional: Negative for chills, fatigue and fever.   HENT: Positive for hearing loss. Negative for sinus pressure and sore throat.    Eyes: Negative for visual disturbance.   Respiratory: Negative for cough, shortness of breath and wheezing.    Cardiovascular: Negative for chest pain, palpitations and leg swelling.   Gastrointestinal: Positive for constipation. Negative for abdominal pain, blood in stool and diarrhea.   Endocrine: Negative for cold intolerance and heat intolerance.   Genitourinary: Negative for dysuria and frequency.   Musculoskeletal: Positive for gait problem. Negative for arthralgias and back pain.   Skin: Positive for rash. Negative for wound.   Allergic/Immunologic: Negative for food allergies.   Neurological: Positive for light-headedness. Negative for headaches.   Hematological: Negative for adenopathy. Does not  "bruise/bleed easily.   Psychiatric/Behavioral: Negative for dysphoric mood and suicidal ideas. The patient is not nervous/anxious.        Compared to one year ago, the patient feels her physical health is the same.  Compared to one year ago, the patient feels her mental health is the same.    Reviewed chart for potential of high risk medication in the elderly: yes  Reviewed chart for potential of harmful drug interactions in the elderly:yes    Objective         Vitals:    07/20/20 0846   BP: 130/72   BP Location: Right arm   Patient Position: Sitting   Cuff Size: Adult   Pulse: 74   Temp: 98.2 °F (36.8 °C)   TempSrc: Temporal   Weight: 73.8 kg (162 lb 12.8 oz)   Height: 172.7 cm (68\")   PainSc: 0-No pain       Body mass index is 24.75 kg/m².  Discussed the patient's BMI with her. The BMI is in the acceptable range  .    Physical Exam   Constitutional: She is oriented to person, place, and time. She appears well-developed and well-nourished.   HENT:   Head: Normocephalic.   Right Ear: Tympanic membrane and external ear normal.   Left Ear: Tympanic membrane, external ear and ear canal normal.   Right canal with cerumen impaction.    Mild irritation of inferior ear canal after cerumen removal.   Eyes: Pupils are equal, round, and reactive to light. Conjunctivae and EOM are normal.   Neck: Normal range of motion. Neck supple. No thyromegaly present.   Cardiovascular: Normal rate, regular rhythm, normal heart sounds and intact distal pulses.   Pulmonary/Chest: Effort normal and breath sounds normal. She has no wheezes. Right breast exhibits no inverted nipple, no mass, no nipple discharge, no skin change and no tenderness. Left breast exhibits no inverted nipple, no mass, no nipple discharge, no skin change and no tenderness.   Abdominal: Soft. Bowel sounds are normal. There is no tenderness.   Musculoskeletal: Normal range of motion. She exhibits no edema or tenderness.   Lymphadenopathy:     She has no cervical " adenopathy.     She has no axillary adenopathy.   Neurological: She is alert and oriented to person, place, and time. She has normal strength. No cranial nerve deficit or sensory deficit. Coordination and gait normal.   Skin: Skin is warm and dry. Rash noted.   Resolving rash on the top of both feet.  Pale red bumps around the hair follicles.   Psychiatric: She has a normal mood and affect. Her speech is normal and behavior is normal. Judgment and thought content normal. Cognition and memory are normal.   Nursing note and vitals reviewed.      ECG 12 Lead  Date/Time: 7/20/2020 9:50 AM  Performed by: Akilah Rueda MD  Authorized by: Akilah Rueda MD   Comparison: compared with previous ECG   Similar to previous ECG  Comparison to previous ECG: Ventricular pacemaker.  Rhythm: sinus rhythm and paced  Rate: normal  BPM: 74  Conduction: conduction normal  ST Segments: ST segments normal  T Waves: T waves normal  QRS axis: normal    Clinical impression: abnormal EKG          Lab Results   Component Value Date    TRIG 65 07/20/2020    HDL 57 07/20/2020    LDL 99 07/20/2020    VLDL 13 07/20/2020        Assessment/Plan   Medicare Risks and Personalized Health Plan  CMS Preventative Services Quick Reference  Inactivity/Sedentary    The above risks/problems have been discussed with the patient.  Pertinent information has been shared with the patient in the After Visit Summary.  Follow up plans and orders are seen below in the Assessment/Plan Section.  Patient Instructions   Problem List Items Addressed This Visit        Cardiovascular and Mediastinum    Mixed hyperlipidemia (Chronic)    Overview     7/21/2020 Akilah Rueda MD    Continue atorvastatin every evening. Continue low fat diet. Increase regular exercise.         Relevant Medications    atorvastatin (LIPITOR) 20 MG tablet    Other Relevant Orders    CBC & Differential (Completed)    Comprehensive Metabolic Panel (Completed)    Lipid Panel (Completed)     Microalbumin / Creatinine Urine Ratio - Urine, Clean Catch (Completed)    Urinalysis With Culture If Indicated - (Completed)    TSH (Completed)    Chronic atrial fibrillation    Overview     7/21/2020 Akilah Rueda MD    Continue Xarelto and carvedilol.  Continue regular follow-up with the cardiologist.         Relevant Medications    carvedilol (COREG) 25 MG tablet    amLODIPine (NORVASC) 2.5 MG tablet    Other Relevant Orders    ECG 12 Lead (Completed)    Chronic systolic (congestive) heart failure (CMS/HCC)    Overview     7/21/2020 Akilah Rueda MD    Continue carvedilol, amlodipine, ramipril, Xarelto, and statin.    Let us know if there is a sudden unexplained weight gain of 3 pounds or more, or increased shortness of breath, or edema.         Relevant Medications    carvedilol (COREG) 25 MG tablet    amLODIPine (NORVASC) 2.5 MG tablet    Dilated cardiomyopathy (CMS/HCC)    Overview     7/21/2020 Akilah Rueda MD    Continue carvedilol, amlodipine, ramipril, Xarelto, and statin.    Let us know if there is a sudden unexplained weight gain of 3 pounds or more, or increased shortness of breath, or edema.           Relevant Medications    carvedilol (COREG) 25 MG tablet    amLODIPine (NORVASC) 2.5 MG tablet    Benign essential hypertension - Primary    Overview     7/21/2020 Akilah Rueda MD    Continue amlodipine, carvedilol, and ramipril.  Continue to avoid salt in the diet.         Relevant Medications    ramipril (ALTACE) 5 MG capsule    carvedilol (COREG) 25 MG tablet    amLODIPine (NORVASC) 2.5 MG tablet       Digestive    GERD without esophagitis    Overview     7/21/2020 Akilah Rueda MD    Continue esomeprazole daily.  Continue to avoid eating close to bedtime and avoid large meals.         Relevant Medications    esomeprazole (nexIUM) 20 MG capsule    B12 deficiency    Overview     7/21/2020 Akilah Rueda MD    Check B12 level today.         Relevant Orders    Vitamin B12  (Completed)    Vitamin D deficiency    Overview     7/21/2020 Akilah Rueda MD    Check vitamin D level today.         Relevant Orders    Vitamin D 25 Hydroxy (Completed)    Constipation, slow transit    Overview     7/21/2020 Akilah Rueda MD    May use MiraLAX daily in hot coffee or tea or water.  Drinking plenty of fluids and eating a lot of vegetables also helps.            Nervous and Auditory    Presbycusis of both ears (Chronic)    Overview     7/20/2020 Akilah Rueda MD    Audiology appointment was recommended, but she would rather wait until the COVID 19 pandemic improves.         Impacted cerumen of right ear (Chronic)    Overview     7/20/2020 Akilah Rueda MD    Ear wax removed by instrumentation with currette.  Patient tolerated procedure well.    She will put a couple drops of hydrogen peroxide in the right ear later today.            Musculoskeletal and Integument    Folliculitis    Overview     7/20/2020 Akilah Rueda MD    Inflamed hair follicles on the top of the feet.    Use over the counter triple antibiotic ointment twice a day as needed.         Relevant Medications    neomycin-bacitracin-polymyxin (NEOSPORIN) 5-400-5000 ointment    Osteopenia of multiple sites    Overview     7/21/2020 Akilah Rueda MD    Do some weightbearing exercises including walking and other exercises on the feet and using hand weights at home every day.  Continue taking calcium and vitamin D3.            Other    Poor balance (Chronic)    Overview     7/20/2020 Akilah Rueda MD    Take amlodipine in the evening instead of the morning to avoid the blood pressure getting too low during the day.    Doing more regular exercise can help with balance. Go to the gym 3 days a week and use the exercise bike and some of the upper body weight machines.         Moderate episode of recurrent major depressive disorder (CMS/HCC)    Overview     7/21/2020 Akilah Rueda MD    Continue sertraline daily.   Physical activity and exercise also help decrease symptoms of stress, anxiety, and depression.         Relevant Medications    sertraline (ZOLOFT) 100 MG tablet    Generalized anxiety disorder    Overview     7/21/2020 Akilah Rueda MD    Continue sertraline daily.  Physical activity and exercise also help decrease symptoms of stress, anxiety, and depression.           Relevant Medications    sertraline (ZOLOFT) 100 MG tablet      Other Visit Diagnoses     Menopausal and postmenopausal disorder        Relevant Orders    DEXA Bone Density Axial           Follow Up:  Return in about 4 weeks (around 8/17/2020) for Recheck.     An After Visit Summary and PPPS were given to the patient.

## 2020-07-21 ENCOUNTER — TELEPHONE (OUTPATIENT)
Dept: INTERNAL MEDICINE | Facility: CLINIC | Age: 85
End: 2020-07-21

## 2020-07-21 RX ORDER — IBUPROFEN 200 MG
TABLET ORAL 2 TIMES DAILY PRN
Qty: 1 TUBE | Refills: 0 | Status: SHIPPED | OUTPATIENT
Start: 2020-07-21 | End: 2021-01-05

## 2020-07-21 RX ORDER — AMPICILLIN 500 MG/1
500 CAPSULE ORAL 3 TIMES DAILY
Qty: 21 CAPSULE | Refills: 0 | Status: SHIPPED | OUTPATIENT
Start: 2020-07-21 | End: 2020-07-21

## 2020-07-21 RX ORDER — AMOXICILLIN 500 MG/1
500 TABLET, FILM COATED ORAL 2 TIMES DAILY
Qty: 14 TABLET | Refills: 0 | Status: SHIPPED | OUTPATIENT
Start: 2020-07-21 | End: 2020-08-13

## 2020-07-21 NOTE — TELEPHONE ENCOUNTER
If holding pressure does not get the ear bleeding to stop, have her come in to see me this afternoon.

## 2020-07-21 NOTE — TELEPHONE ENCOUNTER
PT CALLED STATING THAT SHE HAD WAX CLEANED OUT OF HER EAR YESTERDAY AND THAT IT IS STILL BLEEDING AND WOULD LIKE TO BE ADVISED IF THAT IS NORMAL OR NOT    PT ALSO WOULD LIKE TO KNOW WHAT OVER THE COUNTER CREAM THAT SHE IS SUPPOSED TO USE ON HER LEG     PLEASE ADVISE AT: [phone]

## 2020-07-21 NOTE — TELEPHONE ENCOUNTER
I had recommended triple antibiotic ointment for the folliculitis on the tops of her feet.  I just went on and sent it to her pharmacy so they will get it ready for her

## 2020-07-21 NOTE — TELEPHONE ENCOUNTER
Carleen from [facility] requested a call back. Patient was prescribed ampicillin (PRINCIPEN) 500 MG capsule and this is unavailable. Carleen would like to know if this could be changed to amoxicillin or cephalexin instead. Please call and advise.    [id number] - [place], [place] - [address] [facility] [address] - [phone]  - [phone] FX

## 2020-07-22 LAB — BACTERIA SPEC AEROBE CULT: ABNORMAL

## 2020-07-22 RX ORDER — NITROFURANTOIN 25; 75 MG/1; MG/1
100 CAPSULE ORAL 2 TIMES DAILY
Qty: 14 CAPSULE | Refills: 0 | Status: SHIPPED | OUTPATIENT
Start: 2020-07-22 | End: 2020-08-13

## 2020-07-22 NOTE — PATIENT INSTRUCTIONS
Patient Instructions   Problem List Items Addressed This Visit        Cardiovascular and Mediastinum    Mixed hyperlipidemia (Chronic)    Overview     7/21/2020 Akilah Rueda MD    Continue atorvastatin every evening. Continue low fat diet. Increase regular exercise.         Relevant Medications    atorvastatin (LIPITOR) 20 MG tablet    Other Relevant Orders    CBC & Differential (Completed)    Comprehensive Metabolic Panel (Completed)    Lipid Panel (Completed)    Microalbumin / Creatinine Urine Ratio - Urine, Clean Catch (Completed)    Urinalysis With Culture If Indicated - (Completed)    TSH (Completed)    Chronic atrial fibrillation    Overview     7/21/2020 Akilah Rueda MD    Continue Xarelto and carvedilol.  Continue regular follow-up with the cardiologist.         Relevant Medications    carvedilol (COREG) 25 MG tablet    amLODIPine (NORVASC) 2.5 MG tablet    Chronic systolic (congestive) heart failure (CMS/HCC)    Overview     7/21/2020 Akilah Rueda MD    Continue carvedilol, amlodipine, ramipril, Xarelto, and statin.    Let us know if there is a sudden unexplained weight gain of 3 pounds or more, or increased shortness of breath, or edema.         Relevant Medications    carvedilol (COREG) 25 MG tablet    amLODIPine (NORVASC) 2.5 MG tablet    Dilated cardiomyopathy (CMS/HCC)    Overview     7/21/2020 Akilah Rueda MD    Continue carvedilol, amlodipine, ramipril, Xarelto, and statin.    Let us know if there is a sudden unexplained weight gain of 3 pounds or more, or increased shortness of breath, or edema.           Relevant Medications    carvedilol (COREG) 25 MG tablet    amLODIPine (NORVASC) 2.5 MG tablet    Benign essential hypertension - Primary    Overview     7/21/2020 Akilah Rueda MD    Continue amlodipine, carvedilol, and ramipril.  Continue to avoid salt in the diet.         Relevant Medications    ramipril (ALTACE) 5 MG capsule    carvedilol (COREG) 25 MG tablet     amLODIPine (NORVASC) 2.5 MG tablet       Digestive    GERD without esophagitis    Overview     7/21/2020 Akilah Rueda MD    Continue esomeprazole daily.  Continue to avoid eating close to bedtime and avoid large meals.         Relevant Medications    esomeprazole (nexIUM) 20 MG capsule    B12 deficiency    Overview     7/21/2020 Akilah Rueda MD    Check B12 level today.         Relevant Orders    Vitamin B12 (Completed)    Vitamin D deficiency    Overview     7/21/2020 Akilah Rueda MD    Check vitamin D level today.         Relevant Orders    Vitamin D 25 Hydroxy (Completed)    Constipation, slow transit    Overview     7/21/2020 Akilah Rueda MD    May use MiraLAX daily in hot coffee or tea or water.  Drinking plenty of fluids and eating a lot of vegetables also helps.            Nervous and Auditory    Presbycusis of both ears (Chronic)    Overview     7/20/2020 Akilah Rueda MD    Audiology appointment was recommended, but she would rather wait until the COVID 19 pandemic improves.         Impacted cerumen of right ear (Chronic)    Overview     7/20/2020 Akilah Rueda MD    Ear wax removed by instrumentation.  Patient tolerated procedure well.    She will put a couple drops of hydrogen peroxide in the right ear later today.            Musculoskeletal and Integument    Folliculitis    Overview     7/20/2020 Akilah Rueda MD    Inflamed hair follicles on the top of the feet.    Use over the counter triple antibiotic ointment twice a day as needed.         Relevant Medications    neomycin-bacitracin-polymyxin (NEOSPORIN) 5-400-5000 ointment    Osteopenia of multiple sites    Overview     7/21/2020 Akilah Rueda MD    Do some weightbearing exercises including walking and other exercises on the feet and using hand weights at home every day.  Continue taking calcium and vitamin D3.            Other    Poor balance (Chronic)    Overview     7/20/2020 Akilah Rueda MD    Take  amlodipine in the evening instead of the morning to avoid the blood pressure getting too low during the day.    Doing more regular exercise can help with balance. Go to the gym 3 days a week and use the exercise bike and some of the upper body weight machines.         Moderate episode of recurrent major depressive disorder (CMS/HCC)    Overview     7/21/2020 Akilah Rueda MD    Continue sertraline daily.  Physical activity and exercise also help decrease symptoms of stress, anxiety, and depression.         Relevant Medications    sertraline (ZOLOFT) 100 MG tablet    Generalized anxiety disorder    Overview     7/21/2020 Akilah Rueda MD    Continue sertraline daily.  Physical activity and exercise also help decrease symptoms of stress, anxiety, and depression.           Relevant Medications    sertraline (ZOLOFT) 100 MG tablet      Other Visit Diagnoses     Menopausal and postmenopausal disorder        Relevant Orders    DEXA Bone Density Axial

## 2020-08-13 ENCOUNTER — OFFICE VISIT (OUTPATIENT)
Dept: INTERNAL MEDICINE | Facility: CLINIC | Age: 85
End: 2020-08-13

## 2020-08-13 VITALS
BODY MASS INDEX: 24.55 KG/M2 | WEIGHT: 162 LBS | HEART RATE: 72 BPM | SYSTOLIC BLOOD PRESSURE: 124 MMHG | TEMPERATURE: 97.1 F | DIASTOLIC BLOOD PRESSURE: 82 MMHG | HEIGHT: 68 IN

## 2020-08-13 DIAGNOSIS — I10 BENIGN ESSENTIAL HYPERTENSION: Primary | ICD-10-CM

## 2020-08-13 DIAGNOSIS — R26.89 POOR BALANCE: Chronic | ICD-10-CM

## 2020-08-13 DIAGNOSIS — H61.21 IMPACTED CERUMEN OF RIGHT EAR: Chronic | ICD-10-CM

## 2020-08-13 DIAGNOSIS — L73.9 FOLLICULITIS: ICD-10-CM

## 2020-08-13 DIAGNOSIS — Z23 NEED FOR VACCINATION: ICD-10-CM

## 2020-08-13 DIAGNOSIS — R19.00 ABDOMINAL WALL BULGE: Chronic | ICD-10-CM

## 2020-08-13 PROCEDURE — 99214 OFFICE O/P EST MOD 30 MIN: CPT | Performed by: INTERNAL MEDICINE

## 2020-08-13 PROCEDURE — 90732 PPSV23 VACC 2 YRS+ SUBQ/IM: CPT | Performed by: INTERNAL MEDICINE

## 2020-08-13 PROCEDURE — G0009 ADMIN PNEUMOCOCCAL VACCINE: HCPCS | Performed by: INTERNAL MEDICINE

## 2020-08-13 RX ORDER — CEPHALEXIN 500 MG/1
500 CAPSULE ORAL 2 TIMES DAILY
Qty: 14 CAPSULE | Refills: 0 | Status: SHIPPED | OUTPATIENT
Start: 2020-08-13 | End: 2020-10-14

## 2020-08-13 NOTE — PATIENT INSTRUCTIONS
Patient Instructions   Problem List Items Addressed This Visit        Cardiovascular and Mediastinum    Benign essential hypertension - Primary    Overview     8/13/2020 Akilah Rueda MD    Continue amlodipine, carvedilol, and ramipril.  Continue to avoid salt in the diet.         Relevant Medications    ramipril (ALTACE) 5 MG capsule    carvedilol (COREG) 25 MG tablet    amLODIPine (NORVASC) 2.5 MG tablet       Nervous and Auditory    Impacted cerumen of right ear (Chronic)    Overview     8/13/2020 Akilah Rueda MD    Ear wax removed by instrumentation with currette 7/20/2020.  The area bled quite a bit afterwards.    Today there is impacted cerumen with dried blood obscuring the TM.    She will put a couple drops of earwax softener in the right ear every evening for a week then come back for us to recheck.  Hopefully the wax and dried blood will soften enough that we can clean it out.            Musculoskeletal and Integument    Folliculitis    Overview     8/13/2020 Akilah Rueda MD    Inflamed hair follicles on the dorsal medial aspect of the left foot.    Take cephalexin antibiotic twice a day for 1 week.    Continue to use over the counter triple antibiotic ointment twice a day.    Follow-up in 1 week.         Relevant Medications    neomycin-bacitracin-polymyxin (NEOSPORIN) 5-400-5000 ointment       Other    Poor balance (Chronic)    Overview     8/13/2020 Akilah Rueda MD    Doing more regular exercise can help with balance. Go to the gym 3 days a week and use the exercise bike and some of the upper body weight machines.         Abdominal wall bulge (Chronic)    Overview     8/13/2020 Akilah Rueda MD    Intermittent bulge of right lower quadrant abdominal wall without pain.  No hernia noted on exam today.    Patient will continue to monitor for pain or discomfort.           Other Visit Diagnoses     Need for vaccination        Relevant Orders    Pneumococcal Polysaccharide Vaccine  23-Valent Greater Than or Equal To 1yo Subcutaneous / IM (Completed)

## 2020-08-13 NOTE — PROGRESS NOTES
[facility]     aJyda Lopes  1935   [id number]      Patient Care Team:  Akilah Rueda MD as PCP - General (Internal Medicine)  Akilah Rueda MD as PCP - Internal Medicine (Internal Medicine)  Bernadette Almodovar MD as Consulting Physician (Dermatology)  Hiren Kaufman MD as Consulting Physician (Cardiac Electrophysiology)    Chief Complaint   Patient presents with   • Hypertension     f/u   • Rash     on foot, still there        HPI  Rash on the left foot is a little better with using the Neosporin ointment twice a day but is not gone.  She has been shaving her legs and the top of the foot regularly.  She states she thought that would help.  It no longer itches and is less red.    HPI  Patient is a 84 y.o. female presents with  R ear bled after we cleaned it out with curette 7/20/20. Now feels stopped up again. Not painful.  A little tenderness just below ear.  Muffled hearing out of that ear.    HPI  Right lower abdomen feels a bulge like a hernia on and off.  She does not feel it today.  Never painful.      CHRONIC CONDITIONS   She has been working on weight loss by eating healthier. Lost 13 lb over last 5 months.     BP's at home 105 to 139/59 to 81. Usually 120's systolic.  Taking medications regularly.  No side effects.    Past Medical History:   Diagnosis Date   • Acute bronchitis due to infection 1/10/2019   • Anxiety    • Atrial fibrillation (CMS/HCC)    • Atypical chest pain    • Bradycardia    • GERD (gastroesophageal reflux disease)    • Hyperlipidemia    • Hypertension    • Macular degeneration    • Mitral valve prolapse    • Palpitation        Past Surgical History:   Procedure Laterality Date   • CYSTECTOMY      h/o Ovarian   • HYSTERECTOMY Bilateral    • OOPHORECTOMY Bilateral 1993   • THYROIDECTOMY      h/o thyroid surgery sub-total        Family History   Problem Relation Age of Onset   • Colon cancer Mother    • Other Mother         cardiac arrhythmia   • Heart  "failure Mother    • Cancer Mother    • Lung cancer Father    • Cancer Father    • No Known Problems Brother    • Hypertension Maternal Grandmother    • Breast cancer Neg Hx    • Ovarian cancer Neg Hx        Social History     Socioeconomic History   • Marital status:      Spouse name: Not on file   • Number of children: Not on file   • Years of education: Not on file   • Highest education level: Not on file   Tobacco Use   • Smoking status: Never Smoker   • Smokeless tobacco: Never Used   Substance and Sexual Activity   • Alcohol use: Yes     Comment: rarely   • Drug use: Defer   • Sexual activity: Defer       Allergies   Allergen Reactions   • Phenazopyridine Hcl Unknown (See Comments)     Pyridium       Review of Systems:     Review of Systems   Constitutional: Negative for chills, fatigue and fever.   HENT: Positive for ear discharge, hearing loss and sneezing. Negative for ear pain.    Respiratory: Negative for cough, shortness of breath and wheezing.    Cardiovascular: Negative for chest pain and palpitations.   Gastrointestinal: Negative for abdominal pain, blood in stool, constipation and diarrhea.        Intermittent bulge in the right lower quadrant   Skin: Positive for rash.       Vital Signs  Vitals:    08/13/20 0925   BP: 124/82   BP Location: Right arm   Patient Position: Sitting   Cuff Size: Adult   Pulse: 72   Temp: 97.1 °F (36.2 °C)   TempSrc: Infrared   Weight: 73.5 kg (162 lb)  Comment: pt reported   Height: 172.7 cm (68\")   PainSc: 0-No pain     Body mass index is 24.63 kg/m².      Current Outpatient Medications:   •  Aflibercept (EYLEA IO), Inject  into the eye Every 3 (Three) Months., Disp: , Rfl:   •  amLODIPine (NORVASC) 2.5 MG tablet, Take 1 tablet by mouth Every Night., Disp: 90 tablet, Rfl: 1  •  atorvastatin (LIPITOR) 20 MG tablet, TAKE 1 TABLET EVERY EVENING, Disp: 90 tablet, Rfl: 4  •  carvedilol (COREG) 25 MG tablet, TAKE 1 TABLET TWICE A DAY, Disp: 180 tablet, Rfl: 3  •  " Cholecalciferol (VITAMIN D3) 2000 units capsule, Take 1 tablet by mouth Daily., Disp: , Rfl:   •  esomeprazole (nexIUM) 20 MG capsule, TAKE 1 CAPSULE DAILY, Disp: 90 capsule, Rfl: 2  •  Multiple Vitamins-Calcium (VIACTIV MULTI-VITAMIN) chewable tablet, Chew 2 tablets Daily., Disp: , Rfl:   •  Multiple Vitamins-Minerals (PRESERVISION AREDS 2 PO), Take 2 tablets by mouth Daily., Disp: , Rfl:   •  neomycin-bacitracin-polymyxin (NEOSPORIN) 5-400-5000 ointment, Apply  topically to the appropriate area as directed 2 (Two) Times a Day As Needed for Rash., Disp: 1 tube, Rfl: 0  •  ramipril (ALTACE) 5 MG capsule, TAKE 1 CAPSULE EVERY MORNING AND 2 CAPSULES EVERY EVENING, Disp: 270 capsule, Rfl: 4  •  sertraline (ZOLOFT) 100 MG tablet, TAKE 1 TABLET DAILY, Disp: 90 tablet, Rfl: 3  •  XARELTO 20 MG tablet, TAKE 1 TABLET DAILY AS DIRECTED, Disp: 90 tablet, Rfl: 4  •  cephalexin (Keflex) 500 MG capsule, Take 1 capsule by mouth 2 (Two) Times a Day., Disp: 14 capsule, Rfl: 0    Physical Exam:    Physical Exam   Constitutional: She is oriented to person, place, and time. She appears well-developed and well-nourished.   HENT:   Head: Normocephalic.   Right Ear: cerumen impaction is present.  Left Ear: Tympanic membrane, external ear and ear canal normal.   Ears:    Eyes: Pupils are equal, round, and reactive to light. Conjunctivae and EOM are normal.   Neck: Normal range of motion. Neck supple. No thyromegaly present.   Cardiovascular: Normal rate, regular rhythm, normal heart sounds and intact distal pulses.   Pulmonary/Chest: Effort normal and breath sounds normal.   Abdominal: Soft. Normal appearance and bowel sounds are normal. There is no hepatosplenomegaly. There is no tenderness. No hernia.   Musculoskeletal: Normal range of motion. She exhibits no edema.   Lymphadenopathy:     She has no cervical adenopathy.   Neurological: She is alert and oriented to person, place, and time.   Skin:        Psychiatric: She has a normal  mood and affect. Thought content normal.   Nursing note and vitals reviewed.       ACE III MINI        Results Review:    None    CMP:  Lab Results   Component Value Date    BUN 12 07/20/2020    CREATININE 0.64 07/20/2020    EGFRIFNONA 89 07/20/2020    BCR 18.8 07/20/2020     07/20/2020    K 4.3 07/20/2020    CO2 30.9 (H) 07/20/2020    CALCIUM 9.2 07/20/2020    ALBUMIN 3.90 07/20/2020    BILITOT 0.8 07/20/2020    ALKPHOS 82 07/20/2020    AST 16 07/20/2020    ALT 12 07/20/2020     HbA1c:  Lab Results   Component Value Date    HGBA1C 6.0 09/07/2014    HGBA1C 6.1 (H) 05/14/2014     Microalbumin:  Lab Results   Component Value Date    MICROALBUR 8.6 07/20/2020     Lipid Panel  Lab Results   Component Value Date    CHOL 169 07/20/2020    TRIG 65 07/20/2020    HDL 57 07/20/2020    LDL 99 07/20/2020    AST 16 07/20/2020    ALT 12 07/20/2020       Medication Review: Medications reviewed and noted  Patient Instructions   Problem List Items Addressed This Visit        Cardiovascular and Mediastinum    Benign essential hypertension - Primary    Overview     8/13/2020 Akilah Rueda MD    Continue amlodipine, carvedilol, and ramipril.  Continue to avoid salt in the diet.         Relevant Medications    ramipril (ALTACE) 5 MG capsule    carvedilol (COREG) 25 MG tablet    amLODIPine (NORVASC) 2.5 MG tablet       Nervous and Auditory    Impacted cerumen of right ear (Chronic)    Overview     8/13/2020 Akilah Rueda MD    Ear wax removed by instrumentation with currette 7/20/2020.  The area bled quite a bit afterwards.    Today there is impacted cerumen with dried blood obscuring the TM.    She will put a couple drops of earwax softener in the right ear every evening for a week then come back for us to recheck.  Hopefully the wax and dried blood will soften enough that we can clean it out.            Musculoskeletal and Integument    Folliculitis    Overview     8/13/2020 Akilah Rueda MD    Inflamed hair follicles  on the dorsal medial aspect of the left foot.    Take cephalexin antibiotic twice a day for 1 week.    Continue to use over the counter triple antibiotic ointment twice a day.    Follow-up in 1 week.         Relevant Medications    neomycin-bacitracin-polymyxin (NEOSPORIN) 5-400-5000 ointment       Other    Poor balance (Chronic)    Overview     8/13/2020 Akilah Rueda MD    Doing more regular exercise can help with balance. Go to the gym 3 days a week and use the exercise bike and some of the upper body weight machines.         Abdominal wall bulge (Chronic)    Overview     8/13/2020 Akilah Rueda MD    Intermittent bulge of right lower quadrant abdominal wall without pain.  No hernia noted on exam today.    Patient will continue to monitor for pain or discomfort.           Other Visit Diagnoses     Need for vaccination        Relevant Orders    Pneumococcal Polysaccharide Vaccine 23-Valent Greater Than or Equal To 1yo Subcutaneous / IM (Completed)             Diagnosis Plan   1. Benign essential hypertension     2. Impacted cerumen of right ear     3. Folliculitis     4. Abdominal wall bulge     5. Poor balance     6. Need for vaccination  Pneumococcal Polysaccharide Vaccine 23-Valent Greater Than or Equal To 1yo Subcutaneous / IM       Plan of care reviewed with patient at the conclusion of today's visit. Education was provided regarding diagnosis, management, and any prescribed or recommended OTC medications.Patient verbalizes understanding of and agreement with management plan.         Akilah Rueda MD

## 2020-08-20 ENCOUNTER — OFFICE VISIT (OUTPATIENT)
Dept: INTERNAL MEDICINE | Facility: CLINIC | Age: 85
End: 2020-08-20

## 2020-08-20 VITALS
TEMPERATURE: 98 F | DIASTOLIC BLOOD PRESSURE: 80 MMHG | HEART RATE: 72 BPM | HEIGHT: 68 IN | BODY MASS INDEX: 24.55 KG/M2 | SYSTOLIC BLOOD PRESSURE: 128 MMHG | WEIGHT: 162 LBS

## 2020-08-20 DIAGNOSIS — I10 BENIGN ESSENTIAL HYPERTENSION: Primary | ICD-10-CM

## 2020-08-20 DIAGNOSIS — L73.9 FOLLICULITIS: ICD-10-CM

## 2020-08-20 DIAGNOSIS — H61.21 IMPACTED CERUMEN OF RIGHT EAR: ICD-10-CM

## 2020-08-20 PROCEDURE — 99214 OFFICE O/P EST MOD 30 MIN: CPT | Performed by: PHYSICIAN ASSISTANT

## 2020-08-20 NOTE — PROGRESS NOTES
Patient Care Team:  Akilah Rueda MD as PCP - General (Internal Medicine)  Akilah Rueda MD as PCP - Internal Medicine (Internal Medicine)  Bernadette Almodovar MD as Consulting Physician (Dermatology)  Hiren Kaufman MD as Consulting Physician (Cardiac Electrophysiology)    Chief Complaint::   Chief Complaint   Patient presents with   • Folliculitis     1 week follow up    • Earache   • Balance Issues        Subjective     HPI  84 year old presents with foliculitis follow up on bilateral feet with left greater than right.  She also had a right ear cerumen impaction and was instructed to use Debrox nightly.  Patient presents today with slight improvement in her feet.  She has been taking the cephalexin 1 pill once a day.  He has been using Debrox nightly as directed.          The following portions of the patient's history were reviewed and updated as appropriate: active problem list, medication list, allergies, family history, social history    Review of Systems:   Review of Systems   Constitutional: Negative for chills, fatigue and fever.   HENT: Positive for hearing loss. Negative for congestion, ear pain and sinus pressure.    Respiratory: Negative for cough, chest tightness, shortness of breath and wheezing.    Cardiovascular: Negative for chest pain and palpitations.   Gastrointestinal: Negative for abdominal pain, blood in stool and constipation.   Musculoskeletal: Negative for arthralgias and gait problem.   Skin: Positive for color change, rash and skin lesions.   Allergic/Immunologic: Negative for environmental allergies.   Neurological: Positive for dizziness. Negative for speech difficulty and headache.   Psychiatric/Behavioral: Negative for decreased concentration. The patient is not nervous/anxious.        Vital Signs  Vitals:    08/20/20 1258 08/20/20 1318   BP: 138/82 128/80   BP Location: Left arm    Patient Position: Sitting    Cuff Size: Adult    Pulse: 72    Temp: 98 °F (36.7 °C)   "  TempSrc: Temporal    Weight: 73.5 kg (162 lb)    Height: 172.7 cm (67.99\")    PainSc: 0-No pain      Body mass index is 24.64 kg/m².    Labs  Lab on 07/20/2020   Component Date Value Ref Range Status   • Glucose 07/20/2020 95  65 - 99 mg/dL Final   • BUN 07/20/2020 12  8 - 23 mg/dL Final   • Creatinine 07/20/2020 0.64  0.57 - 1.00 mg/dL Final   • Sodium 07/20/2020 142  136 - 145 mmol/L Final   • Potassium 07/20/2020 4.3  3.5 - 5.2 mmol/L Final   • Chloride 07/20/2020 105  98 - 107 mmol/L Final   • CO2 07/20/2020 30.9* 22.0 - 29.0 mmol/L Final   • Calcium 07/20/2020 9.2  8.6 - 10.5 mg/dL Final   • Total Protein 07/20/2020 6.7  6.0 - 8.5 g/dL Final   • Albumin 07/20/2020 3.90  3.50 - 5.20 g/dL Final   • ALT (SGPT) 07/20/2020 12  1 - 33 U/L Final   • AST (SGOT) 07/20/2020 16  1 - 32 U/L Final   • Alkaline Phosphatase 07/20/2020 82  39 - 117 U/L Final   • Total Bilirubin 07/20/2020 0.8  0.0 - 1.2 mg/dL Final   • eGFR Non African Amer 07/20/2020 89  >60 mL/min/1.73 Final   • Globulin 07/20/2020 2.8  gm/dL Final   • A/G Ratio 07/20/2020 1.4  g/dL Final   • BUN/Creatinine Ratio 07/20/2020 18.8  7.0 - 25.0 Final   • Anion Gap 07/20/2020 6.1  5.0 - 15.0 mmol/L Final   • Total Cholesterol 07/20/2020 169  0 - 200 mg/dL Final   • Triglycerides 07/20/2020 65  0 - 150 mg/dL Final   • HDL Cholesterol 07/20/2020 57  40 - 60 mg/dL Final   • LDL Cholesterol  07/20/2020 99  0 - 100 mg/dL Final   • VLDL Cholesterol 07/20/2020 13  5 - 40 mg/dL Final   • LDL/HDL Ratio 07/20/2020 1.74   Final   • Microalbumin/Creatinine Ratio 07/20/2020 41.9  mg/g Final   • Creatinine, Urine 07/20/2020 205.1  mg/dL Final   • Microalbumin, Urine 07/20/2020 8.6  mg/dL Final   • Color, UA 07/20/2020 Dark Yellow* Yellow, Straw Final   • Appearance, UA 07/20/2020 Cloudy* Clear Final   • pH, UA 07/20/2020 6.5  5.0 - 8.0 Final   • Specific Gravity, UA 07/20/2020 1.025  1.005 - 1.030 Final   • Glucose, UA 07/20/2020 Negative  Negative Final   • Ketones, UA " 07/20/2020 Trace* Negative Final   • Bilirubin, UA 07/20/2020 Negative  Negative Final   • Blood, UA 07/20/2020 Negative  Negative Final   • Protein, UA 07/20/2020 30 mg/dL (1+)* Negative Final   • Leuk Esterase, UA 07/20/2020 Moderate (2+)* Negative Final   • Nitrite, UA 07/20/2020 Positive* Negative Final   • Urobilinogen, UA 07/20/2020 1.0 E.U./dL  0.2 - 1.0 E.U./dL Final   • TSH 07/20/2020 2.660  0.270 - 4.200 uIU/mL Final   • 25 Hydroxy, Vitamin D 07/20/2020 45.4  30.0 - 100.0 ng/ml Final   • Vitamin B-12 07/20/2020 475  211 - 946 pg/mL Final   • WBC 07/20/2020 8.49  3.40 - 10.80 10*3/mm3 Final   • RBC 07/20/2020 4.40  3.77 - 5.28 10*6/mm3 Final   • Hemoglobin 07/20/2020 13.6  12.0 - 15.9 g/dL Final   • Hematocrit 07/20/2020 40.9  34.0 - 46.6 % Final   • MCV 07/20/2020 93.0  79.0 - 97.0 fL Final   • MCH 07/20/2020 30.9  26.6 - 33.0 pg Final   • MCHC 07/20/2020 33.3  31.5 - 35.7 g/dL Final   • RDW 07/20/2020 12.4  12.3 - 15.4 % Final   • RDW-SD 07/20/2020 42.6  37.0 - 54.0 fl Final   • MPV 07/20/2020 10.8  6.0 - 12.0 fL Final   • Platelets 07/20/2020 237  140 - 450 10*3/mm3 Final   • Neutrophil % 07/20/2020 73.4  42.7 - 76.0 % Final   • Lymphocyte % 07/20/2020 15.5* 19.6 - 45.3 % Final   • Monocyte % 07/20/2020 8.4  5.0 - 12.0 % Final   • Eosinophil % 07/20/2020 1.5  0.3 - 6.2 % Final   • Basophil % 07/20/2020 0.8  0.0 - 1.5 % Final   • Immature Grans % 07/20/2020 0.4  0.0 - 0.5 % Final   • Neutrophils, Absolute 07/20/2020 6.23  1.70 - 7.00 10*3/mm3 Final   • Lymphocytes, Absolute 07/20/2020 1.32  0.70 - 3.10 10*3/mm3 Final   • Monocytes, Absolute 07/20/2020 0.71  0.10 - 0.90 10*3/mm3 Final   • Eosinophils, Absolute 07/20/2020 0.13  0.00 - 0.40 10*3/mm3 Final   • Basophils, Absolute 07/20/2020 0.07  0.00 - 0.20 10*3/mm3 Final   • Immature Grans, Absolute 07/20/2020 0.03  0.00 - 0.05 10*3/mm3 Final   • nRBC 07/20/2020 0.0  0.0 - 0.2 /100 WBC Final   • RBC, UA 07/20/2020 3-5* None Seen, 0-2 /HPF Final   • WBC, UA  07/20/2020 Too Numerous to Count* None Seen, 0-2 /HPF Final   • Bacteria, UA 07/20/2020 4+* None Seen /HPF Final   • Squamous Epithelial Cells, UA 07/20/2020 0-2  None Seen, 0-2 /HPF Final   • Hyaline Casts, UA 07/20/2020 Too Numerous to Count  None Seen /LPF Final   • Methodology 07/20/2020 Automated Microscopy   Final   • Urine Culture 07/20/2020 >100,000 CFU/mL Escherichia coli*  Final       Imaging  No radiology results for the last 30 days.      Current Outpatient Medications:   •  Aflibercept (EYLEA IO), Inject  into the eye Every 3 (Three) Months., Disp: , Rfl:   •  amLODIPine (NORVASC) 2.5 MG tablet, Take 1 tablet by mouth Every Night., Disp: 90 tablet, Rfl: 1  •  atorvastatin (LIPITOR) 20 MG tablet, TAKE 1 TABLET EVERY EVENING, Disp: 90 tablet, Rfl: 4  •  carvedilol (COREG) 25 MG tablet, TAKE 1 TABLET TWICE A DAY, Disp: 180 tablet, Rfl: 3  •  cephalexin (Keflex) 500 MG capsule, Take 1 capsule by mouth 2 (Two) Times a Day., Disp: 14 capsule, Rfl: 0  •  Cholecalciferol (VITAMIN D3) 2000 units capsule, Take 1 tablet by mouth Daily., Disp: , Rfl:   •  esomeprazole (nexIUM) 20 MG capsule, TAKE 1 CAPSULE DAILY, Disp: 90 capsule, Rfl: 2  •  Multiple Vitamins-Calcium (VIACTIV MULTI-VITAMIN) chewable tablet, Chew 2 tablets Daily., Disp: , Rfl:   •  Multiple Vitamins-Minerals (PRESERVISION AREDS 2 PO), Take 2 tablets by mouth Daily., Disp: , Rfl:   •  neomycin-bacitracin-polymyxin (NEOSPORIN) 5-400-5000 ointment, Apply  topically to the appropriate area as directed 2 (Two) Times a Day As Needed for Rash., Disp: 1 tube, Rfl: 0  •  ramipril (ALTACE) 5 MG capsule, TAKE 1 CAPSULE EVERY MORNING AND 2 CAPSULES EVERY EVENING, Disp: 270 capsule, Rfl: 4  •  sertraline (ZOLOFT) 100 MG tablet, TAKE 1 TABLET DAILY, Disp: 90 tablet, Rfl: 3  •  XARELTO 20 MG tablet, TAKE 1 TABLET DAILY AS DIRECTED, Disp: 90 tablet, Rfl: 4    Physical Exam:    Physical Exam   Constitutional: She is oriented to person, place, and time.   HENT:    Head: Normocephalic and atraumatic.   Right Ear: External ear normal. cerumen impaction is present.  Left Ear: External ear normal.   Eyes: Pupils are equal, round, and reactive to light. EOM are normal.   Neck: Normal range of motion. Neck supple.   Cardiovascular: Normal rate, regular rhythm, normal heart sounds and intact distal pulses.   Pulmonary/Chest: Effort normal and breath sounds normal.   Neurological: She is alert and oriented to person, place, and time.   Skin: Skin is warm and dry. Rash noted. Rash is urticarial. There is erythema.   Scattered raised erythematous lesions on bilateral ankles with L>R   Psychiatric: She has a normal mood and affect. Her behavior is normal. Judgment and thought content normal.       Procedures        Assessment/Plan   Problem List Items Addressed This Visit        Cardiovascular and Mediastinum    Benign essential hypertension - Primary    Overview     Continue amlodipine, carvedilol, and ramipril.  Continue to avoid salt in the diet.         Relevant Medications    ramipril (ALTACE) 5 MG capsule    carvedilol (COREG) 25 MG tablet    amLODIPine (NORVASC) 2.5 MG tablet       Nervous and Auditory    Impacted cerumen of right ear (Chronic)    Overview     Continue debrox as directed.  She will need another week or two of drops to help soften.            Musculoskeletal and Integument    Folliculitis    Overview     Continue cephalexin daily.  There is improvement.  Encouraged patient to increase to twice daily. Discontinue triple antibiotic ointment.         Relevant Medications    neomycin-bacitracin-polymyxin (NEOSPORIN) 5-400-5000 ointment          Return in about 3 weeks (around 9/10/2020) for Recheck.    Plan of care reviewed with patient at the conclusion of today's visit. Education was provided regarding diagnosis, management, and any prescribed or recommended OTC medications.Patient verbalizes understanding of and agreement with management plan.       Dilma  Ousmane Baires PA-C    Please note that portions of this note were completed with a voice recognition program. Efforts were made to edit the dictations, but occasionally words are mistranscribed.

## 2020-09-09 RX ORDER — RAMIPRIL 5 MG/1
CAPSULE ORAL
Qty: 270 CAPSULE | Refills: 3 | Status: SHIPPED | OUTPATIENT
Start: 2020-09-09 | End: 2021-09-07

## 2020-10-14 ENCOUNTER — OFFICE VISIT (OUTPATIENT)
Dept: CARDIOLOGY | Facility: CLINIC | Age: 85
End: 2020-10-14

## 2020-10-14 VITALS
BODY MASS INDEX: 28.06 KG/M2 | HEART RATE: 70 BPM | DIASTOLIC BLOOD PRESSURE: 70 MMHG | HEIGHT: 70 IN | SYSTOLIC BLOOD PRESSURE: 126 MMHG | WEIGHT: 196 LBS | OXYGEN SATURATION: 97 %

## 2020-10-14 DIAGNOSIS — I48.21 PERMANENT ATRIAL FIBRILLATION (HCC): Primary | ICD-10-CM

## 2020-10-14 DIAGNOSIS — I10 BENIGN ESSENTIAL HYPERTENSION: ICD-10-CM

## 2020-10-14 DIAGNOSIS — I50.22 CHRONIC SYSTOLIC (CONGESTIVE) HEART FAILURE (HCC): ICD-10-CM

## 2020-10-14 PROCEDURE — 93280 PM DEVICE PROGR EVAL DUAL: CPT | Performed by: INTERNAL MEDICINE

## 2020-10-14 PROCEDURE — 99213 OFFICE O/P EST LOW 20 MIN: CPT | Performed by: INTERNAL MEDICINE

## 2020-10-14 NOTE — PROGRESS NOTES
Jayda Lopes  1935  [phone]    10/14/2020    [facility] CARDIOLOGY     Akilah Rueda MD  [address] [address]    Chief Complaint   Patient presents with   • Atrial Fibrillation       Problem List:   1. Atrial fibrillation:  a. Hospitalization with external cardioversion on 07/26/2004 with initiation of Rythmol therapy.   b. Palpitations with event recorder, December 2005, consistent with PACs.   c. Episode of atrial fibrillation approximately 60-90 minutes on Christmas 2009 with subsequent ER visit.  d. External cardioversion to normal sinus rhythm, 06/08/2012.   e. Hospitalization with initiation of Tikosyn, August 2012.   f. Pulmonary vein isolation procedure with extensive ablation of multiple sites and subsequent significant bradyarrhythmias after internal cardioversion to normal sinus rhythm with discontinuation of Tikosyn on 03/25/2014.  g. Unsuccessful external cardioversion, 05/15/2014, and subsequent successful internal cardioversion with early recurrence of atrial fibrillation, 05/15/2014.   h. Echocardiogram, 08/12/2014: EF less than 20% with severe global hypokinesis. Right ventricle mild to moderately dilated. Mild MR and mild TR.   i. 09/08/2014: Implantation of a [employer] Allure Quadra biventricular pacemaker, serial #[id number], and AV node ablation.   2. CHF class III:  a. Holter monitor in February 2002 with frequent PACs and PVCs.   b. Echocardiogram in March 2000 with normal LV size and function: LVEF of 60%. Mild TR, mild MR.   c. Echocardiogram on 07/26/2004: Normal LV size and function. No significant regurgitation from the mitral valve or tricuspid valve.   d. Echocardiogram, 02/06/2008: Normal LV size and function, mild MR.   e. Echocardiogram, 08/22/2012: Left ventricular ejection fraction 35% to 40%. Moderate MR, mild TR.  f. Echocardiogram, 02/13/2013: Left ventricular ejection fraction of 50% to 55%. Mild TR,  mild-to-moderate MR. LA 5.2.  g. Echocardiogram, 03/23/2015: EF 30-35%. Mild concentric LVH. Mild-to-moderate AI. Mild MR. Mild to moderate TR  h. Echocardiogram, 07/22/2015: Limited for EF only 50% to 55%.  3. Atypical chest pain:  a. Acceptable nuclear GXT with normal LV size and function and no ischemia in August 2000 and April 2004.  b. UMAIR, 03/25/2014: Ejection fraction of 50% to 55%. Mild mitral regurgitation.  4. Hypertension.   5. Hyperlipidemia.   6. Mitral valve prolapse.   7. Anxiety.   8. Gastroesophageal reflux disease.   9. Macular degeneration.   10. Surgical history:  a. Hysterectomy.  b. Partial thyroidectomy.  c. Ovarian cystectomy.   d. Cataract surgery    Allergies  Allergies   Allergen Reactions   • Phenazopyridine Hcl Unknown (See Comments)     Pyridium       Current Medications    Current Outpatient Medications:   •  Aflibercept (EYLEA IO), Inject  into the eye Every 3 (Three) Months., Disp: , Rfl:   •  amLODIPine (NORVASC) 2.5 MG tablet, Take 1 tablet by mouth Every Night. (Patient taking differently: Take 2.5 mg by mouth 2 (two) times a day.), Disp: 90 tablet, Rfl: 1  •  atorvastatin (LIPITOR) 20 MG tablet, TAKE 1 TABLET EVERY EVENING, Disp: 90 tablet, Rfl: 4  •  carvedilol (COREG) 25 MG tablet, TAKE 1 TABLET TWICE A DAY, Disp: 180 tablet, Rfl: 3  •  Cholecalciferol (VITAMIN D3) 2000 units capsule, Take 1 tablet by mouth Daily., Disp: , Rfl:   •  esomeprazole (nexIUM) 20 MG capsule, TAKE 1 CAPSULE DAILY, Disp: 90 capsule, Rfl: 3  •  Multiple Vitamins-Calcium (VIACTIV MULTI-VITAMIN) chewable tablet, Chew 2 tablets Daily., Disp: , Rfl:   •  Multiple Vitamins-Minerals (PRESERVISION AREDS 2 PO), Take 2 tablets by mouth Daily., Disp: , Rfl:   •  neomycin-bacitracin-polymyxin (NEOSPORIN) 5-400-5000 ointment, Apply  topically to the appropriate area as directed 2 (Two) Times a Day As Needed for Rash., Disp: 1 tube, Rfl: 0  •  ramipril (ALTACE) 5 MG capsule, TAKE 1 CAPSULE EVERY MORNING AND 2  "CAPSULES EVERY EVENING, Disp: 270 capsule, Rfl: 3  •  sertraline (ZOLOFT) 100 MG tablet, TAKE 1 TABLET DAILY, Disp: 90 tablet, Rfl: 3  •  XARELTO 20 MG tablet, TAKE 1 TABLET DAILY AS DIRECTED, Disp: 90 tablet, Rfl: 4    History of Present Illness:     Pt presents for follow up of permanent atrial fibrillation, HTN, chronic systolic CHF. Since we last saw the pt, she denies any SOB, CP, LH, and dizziness, syncope, falls. Denies any hospitalizations, ER visits, bleeding issues on Xarelto, or TIA/CVA symptoms. Overall feels well. BP is well controlled.     ROS:  General:  Denies fatigue, weight gain or loss  Cardiovascular:  Denies CP, PND, syncope, near syncope, edema or palpitations.  Pulmonary:  Denies ZAFAR, cough, or wheezing      Vitals:    10/14/20 1442   BP: 126/70   BP Location: Left arm   Patient Position: Sitting   Pulse: 70   SpO2: 97%   Weight: 88.9 kg (196 lb)   Height: 177.8 cm (70\")     Body mass index is 28.12 kg/m².  PE:  General: NAD. A & O x 3   Neck: no JVD, no carotid bruits, no TM  Heart RRR, NL S1, S2, S4 present, no rubs, murmurs  Lungs: CTA, no wheezes, rhonchi, or rales  Abd: soft, non-tender, NL BS  Ext: No musculoskeletal deformities, no edema, cyanosis, or clubbing + varicose veins.   Psych: normal mood and affect    Diagnostic Data:    BiV PM Manual Interrogation: normal function. BiV paced 98%. VVIR 70 bpm. 9 years on battery.     Procedures    1. Permanent atrial fibrillation (CMS/HCC)    2. Benign essential hypertension    3. Chronic systolic (congestive) heart failure (CMS/HCC)          Plan:  1. Permanent Atrial Fibrillation : AVN RFA. BIVPPM. Normal function.  2. HTN: Controled on BB and ACE, Norvasc 2.5 mg daily  3. Chronic SHF Class I, EF improved to 55%  4. Anticoagulation: Xarelto 20mg Daily.    F/up in 6 months    Scribed for Hiren Kaufman MD by Doris Romeo PA-C. 10/14/2020  15:11 EDT     I, Hiren Kaufman MD, personally performed the services described in this " documentation as scribed by the above named individual in my presence, and it is both accurate and complete.  10/14/2020  15:26 EDT

## 2020-10-27 ENCOUNTER — TELEPHONE (OUTPATIENT)
Dept: INTERNAL MEDICINE | Facility: CLINIC | Age: 85
End: 2020-10-27

## 2020-10-27 NOTE — TELEPHONE ENCOUNTER
called and spoke with patient. She is having pain when she sneezes. She is unsure If there is still wax. Appointment scheduled with Dilma for Thursday.

## 2020-10-27 NOTE — TELEPHONE ENCOUNTER
We removed wax on 7/20/2020.  She had bleeding after that.  I saw her back in August we started her on using the ear wax softener drops every night and she came back to have the irrigation done on 8/20/2020.  At that point Dilma said she needed another couple of weeks of just doing the softener drops before irrigation could be done.    See if she will come in and see Dilma tomorrow  since she has already seen it before.

## 2020-10-27 NOTE — TELEPHONE ENCOUNTER
PATIENT IS STILL HAVING A LOT OF TROUBLE WITH THE EAR DR VEGA TRIED TO EXTRACT EARWAX OUT OF.    PLEASE CALL PATIENT BACK -[phone]

## 2020-10-29 ENCOUNTER — OFFICE VISIT (OUTPATIENT)
Dept: INTERNAL MEDICINE | Facility: CLINIC | Age: 85
End: 2020-10-29

## 2020-10-29 VITALS
TEMPERATURE: 98.6 F | DIASTOLIC BLOOD PRESSURE: 70 MMHG | WEIGHT: 160 LBS | BODY MASS INDEX: 22.9 KG/M2 | HEART RATE: 72 BPM | SYSTOLIC BLOOD PRESSURE: 130 MMHG | HEIGHT: 70 IN

## 2020-10-29 DIAGNOSIS — F33.1 MODERATE EPISODE OF RECURRENT MAJOR DEPRESSIVE DISORDER (HCC): ICD-10-CM

## 2020-10-29 DIAGNOSIS — E78.2 MIXED HYPERLIPIDEMIA: ICD-10-CM

## 2020-10-29 DIAGNOSIS — I48.20 CHRONIC ATRIAL FIBRILLATION (HCC): ICD-10-CM

## 2020-10-29 DIAGNOSIS — I48.21 PERMANENT ATRIAL FIBRILLATION (HCC): ICD-10-CM

## 2020-10-29 DIAGNOSIS — I10 BENIGN ESSENTIAL HYPERTENSION: ICD-10-CM

## 2020-10-29 DIAGNOSIS — H92.01 RIGHT EAR PAIN: Primary | ICD-10-CM

## 2020-10-29 PROCEDURE — 99214 OFFICE O/P EST MOD 30 MIN: CPT | Performed by: PHYSICIAN ASSISTANT

## 2020-10-29 RX ORDER — AMLODIPINE BESYLATE 2.5 MG/1
2.5 TABLET ORAL 2 TIMES DAILY
Qty: 60 TABLET | Refills: 3 | Status: SHIPPED | OUTPATIENT
Start: 2020-10-29 | End: 2021-02-08 | Stop reason: SDUPTHER

## 2020-10-29 RX ORDER — SERTRALINE HYDROCHLORIDE 100 MG/1
100 TABLET, FILM COATED ORAL DAILY
Qty: 90 TABLET | Refills: 3 | Status: SHIPPED | OUTPATIENT
Start: 2020-10-29 | End: 2021-03-09

## 2020-10-29 RX ORDER — CARVEDILOL 25 MG/1
25 TABLET ORAL 2 TIMES DAILY
Qty: 180 TABLET | Refills: 3 | Status: SHIPPED | OUTPATIENT
Start: 2020-10-29 | End: 2021-07-06 | Stop reason: SDUPTHER

## 2020-10-29 RX ORDER — ATORVASTATIN CALCIUM 20 MG/1
20 TABLET, FILM COATED ORAL EVERY EVENING
Qty: 90 TABLET | Refills: 4 | Status: SHIPPED | OUTPATIENT
Start: 2020-10-29 | End: 2021-03-31

## 2020-12-04 ENCOUNTER — TELEPHONE (OUTPATIENT)
Dept: INTERNAL MEDICINE | Facility: CLINIC | Age: 85
End: 2020-12-04

## 2020-12-04 NOTE — TELEPHONE ENCOUNTER
10/29/2020 03:30 PM Fax (Outgoing)    [phone] Bucky Lao RegSched Rep     FAXE REFERRAL TO [place] [facility] PHONE [phone]       12/04/2020 03:06 PM Telephone (Outgoing)    [phone] Bucky Lao RegSched Rep    called [place] [facility] they are not in net work with pt insurance        12/04/2020 03:10 PM Telephone (Outgoing)    [phone] Bucky Lao RegSched Rep    called dr escobar they are not in network with pts insurance        12/04/2020 03:22 PM Fax (Outgoing)    [phone] Bucky Lao RegSched Rep    FAX REFERRAL TO [facility] PHONE [phone]

## 2020-12-04 NOTE — TELEPHONE ENCOUNTER
I saw the patient in the office on 10/29/2020 for ear pain and referred her to ENT.  I saw her in the office today and she says no one has every contacted her.  Her ear is still painful.  Can we please look into this referral?

## 2021-01-04 ENCOUNTER — TELEPHONE (OUTPATIENT)
Dept: INTERNAL MEDICINE | Facility: CLINIC | Age: 86
End: 2021-01-04

## 2021-01-04 NOTE — TELEPHONE ENCOUNTER
Patient fell before charles and it has scabbed over and it is red and sore.. patient would like a call back in what she can do.  Please advise      Jayda Lopes call back [phone]

## 2021-01-05 ENCOUNTER — OFFICE VISIT (OUTPATIENT)
Dept: INTERNAL MEDICINE | Facility: CLINIC | Age: 86
End: 2021-01-05

## 2021-01-05 VITALS
DIASTOLIC BLOOD PRESSURE: 90 MMHG | WEIGHT: 159.8 LBS | BODY MASS INDEX: 22.88 KG/M2 | SYSTOLIC BLOOD PRESSURE: 159 MMHG | TEMPERATURE: 97.1 F | HEART RATE: 71 BPM | HEIGHT: 70 IN

## 2021-01-05 DIAGNOSIS — S80.11XA CONTUSION OF RIGHT LOWER LEG, INITIAL ENCOUNTER: Primary | ICD-10-CM

## 2021-01-05 DIAGNOSIS — M79.89 RIGHT LEG SWELLING: ICD-10-CM
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
## 2021-01-05 DIAGNOSIS — I10 BENIGN ESSENTIAL HYPERTENSION: ICD-10-CM

## 2021-01-05 DIAGNOSIS — J41.0 SIMPLE CHRONIC BRONCHITIS (HCC): Chronic | ICD-10-CM

## 2021-01-05 PROCEDURE — 99214 OFFICE O/P EST MOD 30 MIN: CPT | Performed by: INTERNAL MEDICINE

## 2021-01-05 RX ORDER — GUAIFENESIN 600 MG/1
1200 TABLET, EXTENDED RELEASE ORAL 2 TIMES DAILY
Qty: 180 TABLET | Refills: 1 | Status: SHIPPED | OUTPATIENT
Start: 2021-01-05

## 2021-01-05 NOTE — PATIENT INSTRUCTIONS
Fall Prevention in the Home, Adult  Falls can cause injuries and can affect people from all age groups. There are many simple things that you can do to make your home safe and to help prevent falls. Ask for help when making these changes, if needed.  What actions can I take to prevent falls?  General instructions  · Use good lighting in all rooms. Replace any light bulbs that burn out.  · Turn on lights if it is dark. Use night-lights.  · Place frequently used items in easy-to-reach places. Lower the shelves around your home if necessary.  · Set up furniture so that there are clear paths around it. Avoid moving your furniture around.  · Remove throw rugs and other tripping hazards from the floor.  · Avoid walking on wet floors.  · Fix any uneven floor surfaces.  · Add color or contrast paint or tape to grab bars and handrails in your home. Place contrasting color strips on the first and last steps of stairways.  · When you use a stepladder, make sure that it is completely opened and that the sides are firmly locked. Have someone hold the ladder while you are using it. Do not climb a closed stepladder.  · Be aware of any and all pets.  What can I do in the bathroom?         · Keep the floor dry. Immediately clean up any water that spills onto the floor.  · Remove soap buildup in the tub or shower on a regular basis.  · Use non-skid mats or decals on the floor of the tub or shower.  · Attach bath mats securely with double-sided, non-slip rug tape.  · If you need to sit down while you are in the shower, use a plastic, non-slip stool.  · Install grab bars by the toilet and in the tub and shower. Do not use towel bars as grab bars.  What can I do in the bedroom?  · Make sure that a bedside light is easy to reach.  · Do not use oversized bedding that drapes onto the floor.  · Have a firm chair that has side arms to use for getting dressed.  What can I do in the kitchen?  · Clean up any spills right away.  · If you need to  reach for something above you, use a sturdy step stool that has a grab bar.  · Keep electrical cables out of the way.  · Do not use floor polish or wax that makes floors slippery. If you must use wax, make sure that it is non-skid floor wax.  What can I do in the stairways?  · Do not leave any items on the stairs.  · Make sure that you have a light switch at the top of the stairs and the bottom of the stairs. Have them installed if you do not have them.  · Make sure that there are handrails on both sides of the stairs. Fix handrails that are broken or loose. Make sure that handrails are as long as the stairways.  · Install non-slip stair treads on all stairs in your home.  · Avoid having throw rugs at the top or bottom of stairways, or secure the rugs with carpet tape to prevent them from moving.  · Choose a carpet design that does not hide the edge of steps on the stairway.  · Check any carpeting to make sure that it is firmly attached to the stairs. Fix any carpet that is loose or worn.  What can I do on the outside of my home?  · Use bright outdoor lighting.  · Regularly repair the edges of walkways and driveways and fix any cracks.  · Remove high doorway thresholds.  · Trim any shrubbery on the main path into your home.  · Regularly check that handrails are securely fastened and in good repair. Both sides of any steps should have handrails.  · Install guardrails along the edges of any raised decks or porches.  · Clear walkways of debris and clutter, including tools and rocks.  · Have leaves, snow, and ice cleared regularly.  · Use sand or salt on walkways during winter months.  · In the garage, clean up any spills right away, including grease or oil spills.  What other actions can I take?  · Wear closed-toe shoes that fit well and support your feet. Wear shoes that have rubber soles or low heels.  · Use mobility aids as needed, such as canes, walkers, scooters, and crutches.  · Review your medicines with your  health care provider. Some medicines can cause dizziness or changes in blood pressure, which increase your risk of falling.  Talk with your health care provider about other ways that you can decrease your risk of falls. This may include working with a physical therapist or  to improve your strength, balance, and endurance.  Where to find more information  · Centers for Disease Control and Prevention, [employer]AD[employer]: https://www.cdc.gov  · National [employer] Aging: https://yk4fiwi.rudolph.nih.gov  Contact a health care provider if:  · You are afraid of falling at home.  · You feel weak, drowsy, or dizzy at home.  · You fall at home.  Summary  · There are many simple things that you can do to make your home safe and to help prevent falls.  · Ways to make your home safe include removing tripping hazards and installing grab bars in the bathroom.  · Ask for help when making these changes in your home.  This information is not intended to replace advice given to you by your health care provider. Make sure you discuss any questions you have with your health care provider.  Document Revised: 11/30/2018 Document Reviewed: 08/02/2018  [employer] Patient Education © 2020 [employer]  Patient Instructions   Problem List Items Addressed This Visit        Cardiac and Vasculature    Benign essential hypertension    Overview     1/5/2021 Akilah Rueda MD    Continue amlodipine, carvedilol, and ramipril.  Continue to avoid salt in the diet.    Continue to monitor the blood pressures at home carefully.         Relevant Medications    ramipril (ALTACE) 5 MG capsule    amLODIPine (NORVASC) 2.5 MG tablet    carvedilol (COREG) 25 MG tablet       Musculoskeletal and Injuries    Contusion of right lower leg - Primary    Overview     1/5/2021 Akilah Rueda MD    Contusion R shin after fall.     Continue putting aquaphor on the injury 2-3 times a day. Continue to elevate the  leg when sitting at home.          Right leg swelling     Overview     1/5/2021 Akilah Rueda MD    Mild R lower leg swelling due to contusion and injury after fall.     Continue to elevate the leg several times a day.             Pulmonary and Pneumonias    Simple chronic bronchitis (CMS/HCC) (Chronic)    Overview     1/5/2021 Akilah Rueda MD    Chronic mild cough and sputum production.  Sputum is recently very thick although still colorless.    Recommend taking Mucinex tablets, or Mucinex liquid, twice a day.  Also drink plenty of water.         Relevant Medications    guaiFENesin (Mucinex) 600 MG 12 hr tablet       Other    Fall from slip, trip, or stumble, initial encounter    Overview     1/5/2021 Akilah Rueda MD    Tripped on a plastic storage box in the floor.     Fall education given.

## 2021-01-05 NOTE — PROGRESS NOTES
[facility]     Jayda Lopes  1935   [id number]      Patient Care Team:  Akilah Rueda MD as PCP - General (Internal Medicine)  Akilah Rueda MD as PCP - Internal Medicine (Internal Medicine)  Bernadette Almodovar MD as Consulting Physician (Dermatology)  Hiren Kaufman MD as Consulting Physician (Cardiac Electrophysiology)    Chief Complaint   Patient presents with   • Fall     fell before [place], now it is red, sore and scabbed over, LRE            HPI  Patient is a 85 y.o. female presents with R shin injury due to fall. Onset of symptoms was abrupt starting 2 weeks ago.  Chronicity acute. Severity severe.  Symptoms are associated with broken skin, redness, pain and tenderness, mild swelling of R lower leg. Pertinent negatives no pain with walking, no fever or chills or myalgias, no calf pain, no shortness of breath.   Symptoms are aggravated by clothing, very tender to touch. .   Symptoms improve with aquaphor. Helping some .  Context Tripped over a plastic storage box that was in the floor while she was carrying a large object in her arms.     She states that redness intensity and size of red area and pain and tenderness are much better than they were.    HPI  Chronic sputum has been thicker lately and harder to expectorate.  No color to it.No fever chills.  No dyspnea or edema or chest pain or wheeze.       CHRONIC CONDITIONS  BPs has been wellcontrolled at home takes meds regularly.     Past Medical History:   Diagnosis Date   • Acute bronchitis due to infection 1/10/2019   • Anxiety    • Atrial fibrillation (CMS/HCC)    • Atypical chest pain    • Bradycardia    • GERD (gastroesophageal reflux disease)    • Hyperlipidemia    • Hypertension    • Macular degeneration    • Mitral valve prolapse    • Palpitation        Past Surgical History:   Procedure Laterality Date   • CYSTECTOMY      h/o Ovarian   • HYSTERECTOMY Bilateral    • OOPHORECTOMY Bilateral 1993   • THYROIDECTOMY  "     h/o thyroid surgery sub-total        Family History   Problem Relation Age of Onset   • Colon cancer Mother    • Other Mother         cardiac arrhythmia   • Heart failure Mother    • Cancer Mother    • Lung cancer Father    • Cancer Father    • No Known Problems Brother    • Hypertension Maternal Grandmother    • Breast cancer Neg Hx    • Ovarian cancer Neg Hx        Social History     Socioeconomic History   • Marital status:      Spouse name: Not on file   • Number of children: Not on file   • Years of education: Not on file   • Highest education level: Not on file   Tobacco Use   • Smoking status: Never Smoker   • Smokeless tobacco: Never Used   Substance and Sexual Activity   • Alcohol use: Yes     Comment: rarely   • Drug use: Defer   • Sexual activity: Defer       Allergies   Allergen Reactions   • Phenazopyridine Hcl Unknown (See Comments)     Pyridium       Review of Systems:     Review of Systems   Constitutional: Negative for chills, fatigue and fever.   Respiratory: Negative for cough, shortness of breath and wheezing.         Thick white sputum production   Cardiovascular: Positive for leg swelling. Negative for chest pain and palpitations.   Gastrointestinal: Negative for abdominal pain, blood in stool, constipation and diarrhea.   Skin: Positive for color change and skin lesions.        Injury to right shin       Vital Signs  Vitals:    01/05/21 1338   BP: 159/90   BP Location: Left arm   Patient Position: Sitting   Cuff Size: Adult   Pulse: 71   Temp: 97.1 °F (36.2 °C)   TempSrc: Infrared   Weight: 72.5 kg (159 lb 12.8 oz)   Height: 177.8 cm (70\")   PainSc: 0-No pain     Body mass index is 22.93 kg/m².      Current Outpatient Medications:   •  Aflibercept (EYLEA IO), Inject  into the eye Every 3 (Three) Months., Disp: , Rfl:   •  amLODIPine (NORVASC) 2.5 MG tablet, Take 1 tablet by mouth 2 (two) times a day., Disp: 60 tablet, Rfl: 3  •  atorvastatin (LIPITOR) 20 MG tablet, Take 1 tablet by " mouth Every Evening., Disp: 90 tablet, Rfl: 4  •  carvedilol (COREG) 25 MG tablet, Take 1 tablet by mouth 2 (Two) Times a Day., Disp: 180 tablet, Rfl: 3  •  Cholecalciferol (VITAMIN D3) 2000 units capsule, Take 1 tablet by mouth Daily., Disp: , Rfl:   •  esomeprazole (nexIUM) 20 MG capsule, TAKE 1 CAPSULE DAILY, Disp: 90 capsule, Rfl: 3  •  Multiple Vitamins-Calcium (VIACTIV MULTI-VITAMIN) chewable tablet, Chew 2 tablets Daily., Disp: , Rfl:   •  Multiple Vitamins-Minerals (PRESERVISION AREDS 2 PO), Take 2 tablets by mouth Daily., Disp: , Rfl:   •  ramipril (ALTACE) 5 MG capsule, TAKE 1 CAPSULE EVERY MORNING AND 2 CAPSULES EVERY EVENING, Disp: 270 capsule, Rfl: 3  •  rivaroxaban (Xarelto) 20 MG tablet, Take 1 tablet by mouth Daily. as directed, Disp: 90 tablet, Rfl: 4  •  sertraline (ZOLOFT) 100 MG tablet, Take 1 tablet by mouth Daily., Disp: 90 tablet, Rfl: 3  •  guaiFENesin (Mucinex) 600 MG 12 hr tablet, Take 2 tablets by mouth 2 (Two) Times a Day., Disp: 180 tablet, Rfl: 1    Physical Exam:    Physical Exam  Vitals signs and nursing note reviewed.   Constitutional:       General: She is not in acute distress.     Appearance: She is well-developed.   HENT:      Head: Normocephalic.   Eyes:      Conjunctiva/sclera: Conjunctivae normal.      Pupils: Pupils are equal, round, and reactive to light.   Neck:      Musculoskeletal: Normal range of motion and neck supple.      Thyroid: No thyromegaly.   Cardiovascular:      Rate and Rhythm: Normal rate and regular rhythm.      Heart sounds: Normal heart sounds.   Pulmonary:      Effort: Pulmonary effort is normal.      Breath sounds: Normal breath sounds. No decreased breath sounds, wheezing or rhonchi.   Musculoskeletal: Normal range of motion.   Lymphadenopathy:      Cervical: No cervical adenopathy.   Skin:     General: Skin is warm and dry.      Findings: Lesion present.          Neurological:      Mental Status: She is alert and oriented to person, place, and time.    Psychiatric:         Thought Content: Thought content normal.          ACE III MINI        Results Review:    None    CMP:  Lab Results   Component Value Date    BUN 12 07/20/2020    CREATININE 0.64 07/20/2020    EGFRIFNONA 89 07/20/2020    BCR 18.8 07/20/2020     07/20/2020    K 4.3 07/20/2020    CO2 30.9 (H) 07/20/2020    CALCIUM 9.2 07/20/2020    ALBUMIN 3.90 07/20/2020    BILITOT 0.8 07/20/2020    ALKPHOS 82 07/20/2020    AST 16 07/20/2020    ALT 12 07/20/2020     HbA1c:  Lab Results   Component Value Date    HGBA1C 6.0 09/07/2014    HGBA1C 6.1 (H) 05/14/2014     Microalbumin:  Lab Results   Component Value Date    MICROALBUR 8.6 07/20/2020     Lipid Panel  Lab Results   Component Value Date    CHOL 169 07/20/2020    TRIG 65 07/20/2020    HDL 57 07/20/2020    LDL 99 07/20/2020    AST 16 07/20/2020    ALT 12 07/20/2020       Medication Review: Medications reviewed and noted  Patient Instructions   Problem List Items Addressed This Visit        Cardiac and Vasculature    Benign essential hypertension    Overview     1/5/2021 Akilah Rueda MD    Continue amlodipine, carvedilol, and ramipril.  Continue to avoid salt in the diet.    Continue to monitor the blood pressures at home carefully.         Relevant Medications    ramipril (ALTACE) 5 MG capsule    amLODIPine (NORVASC) 2.5 MG tablet    carvedilol (COREG) 25 MG tablet       Musculoskeletal and Injuries    Contusion of right lower leg - Primary    Overview     1/5/2021 Akilah Rueda MD    Contusion R shin after fall.     Continue putting aquaphor on the injury 2-3 times a day. Continue to elevate the  leg when sitting at home.          Right leg swelling    Overview     1/5/2021 Akilah Rueda MD    Mild R lower leg swelling due to contusion and injury after fall.     Continue to elevate the leg several times a day.             Pulmonary and Pneumonias    Simple chronic bronchitis (CMS/HCC) (Chronic)    Overview     1/5/2021 Akilah Rueda  MD    Chronic mild cough and sputum production.  Sputum is recently very thick although still colorless.    Recommend taking Mucinex tablets, or Mucinex liquid, twice a day.  Also drink plenty of water.         Relevant Medications    guaiFENesin (Mucinex) 600 MG 12 hr tablet       Other    Fall from slip, trip, or stumble, initial encounter    Overview     1/5/2021 Akilah Rueda MD    Tripped on a plastic storage box in the floor.     Fall education given.                  Diagnosis Plan   1. Contusion of right lower leg, initial encounter     2. Fall from slip, trip, or stumble, initial encounter     3. Right leg swelling     4. Simple chronic bronchitis (CMS/HCC)     5. Benign essential hypertension         Plan of care reviewed with patient at the conclusion of today's visit. Education was provided regarding diagnosis, management, and any prescribed or recommended OTC medications.Patient verbalizes understanding of and agreement with management plan.         Akilah Rueda MD

## 2021-01-18 ENCOUNTER — TELEPHONE (OUTPATIENT)
Dept: INTERNAL MEDICINE | Facility: CLINIC | Age: 86
End: 2021-01-18

## 2021-01-18 NOTE — TELEPHONE ENCOUNTER
PATIENT CALLING STATED SHE AND HER  WERE EXPOSED TO THEIR SON 01/16 WHO TESTED POSITIVE FOR COVID.      PATIENT STATED THEY ARE CURRENTLY NOT EXHIBITING SYMPTOMS.        PLEASE ADVISE:  [phone]

## 2021-01-18 NOTE — TELEPHONE ENCOUNTER
They should quarantine at home for at least 7 days.  Taking vitamin C 500 to 1000 mg daily, vitamin D, zinc 25 to 50 mg a day, may be helpful in preventing viral illnesses such as Covid.  If they develop symptoms, they should let us know.

## 2021-02-08 ENCOUNTER — OFFICE VISIT (OUTPATIENT)
Dept: INTERNAL MEDICINE | Facility: CLINIC | Age: 86
End: 2021-02-08

## 2021-02-08 ENCOUNTER — LAB (OUTPATIENT)
Dept: LAB | Facility: HOSPITAL | Age: 86
End: 2021-02-08

## 2021-02-08 VITALS
SYSTOLIC BLOOD PRESSURE: 116 MMHG | HEART RATE: 96 BPM | DIASTOLIC BLOOD PRESSURE: 76 MMHG | HEIGHT: 70 IN | TEMPERATURE: 97.3 F | WEIGHT: 160 LBS | BODY MASS INDEX: 22.9 KG/M2

## 2021-02-08 DIAGNOSIS — I50.22 CHRONIC SYSTOLIC (CONGESTIVE) HEART FAILURE (HCC): ICD-10-CM

## 2021-02-08 DIAGNOSIS — I10 BENIGN ESSENTIAL HYPERTENSION: Primary | ICD-10-CM

## 2021-02-08 DIAGNOSIS — F41.1 GENERALIZED ANXIETY DISORDER: ICD-10-CM

## 2021-02-08 DIAGNOSIS — F33.1 MODERATE EPISODE OF RECURRENT MAJOR DEPRESSIVE DISORDER (HCC): ICD-10-CM

## 2021-02-08 DIAGNOSIS — H40.1131 PRIMARY OPEN ANGLE GLAUCOMA (POAG) OF BOTH EYES, MILD STAGE: ICD-10-CM

## 2021-02-08 DIAGNOSIS — E78.2 MIXED HYPERLIPIDEMIA: Chronic | ICD-10-CM

## 2021-02-08 DIAGNOSIS — I48.21 PERMANENT ATRIAL FIBRILLATION (HCC): ICD-10-CM

## 2021-02-08 DIAGNOSIS — S80.11XD CONTUSION OF RIGHT LOWER LEG, SUBSEQUENT ENCOUNTER: ICD-10-CM

## 2021-02-08 DIAGNOSIS — I10 BENIGN ESSENTIAL HYPERTENSION: ICD-10-CM

## 2021-02-08 DIAGNOSIS — K21.9 GERD WITHOUT ESOPHAGITIS: ICD-10-CM

## 2021-02-08 LAB
ALBUMIN SERPL-MCNC: 3.9 G/DL (ref 3.5–5.2)
ALBUMIN/GLOB SERPL: 1.4 G/DL
ALP SERPL-CCNC: 104 U/L (ref 39–117)
ALT SERPL W P-5'-P-CCNC: 16 U/L (ref 1–33)
ANION GAP SERPL CALCULATED.3IONS-SCNC: 8.5 MMOL/L (ref 5–15)
AST SERPL-CCNC: 19 U/L (ref 1–32)
BASOPHILS # BLD AUTO: 0.06 10*3/MM3 (ref 0–0.2)
BASOPHILS NFR BLD AUTO: 0.9 % (ref 0–1.5)
BILIRUB SERPL-MCNC: 0.8 MG/DL (ref 0–1.2)
BUN SERPL-MCNC: 16 MG/DL (ref 8–23)
BUN/CREAT SERPL: 26.2 (ref 7–25)
CALCIUM SPEC-SCNC: 9.1 MG/DL (ref 8.6–10.5)
CHLORIDE SERPL-SCNC: 104 MMOL/L (ref 98–107)
CHOLEST SERPL-MCNC: 149 MG/DL (ref 0–200)
CO2 SERPL-SCNC: 29.5 MMOL/L (ref 22–29)
CREAT SERPL-MCNC: 0.61 MG/DL (ref 0.57–1)
DEPRECATED RDW RBC AUTO: 43.9 FL (ref 37–54)
EOSINOPHIL # BLD AUTO: 0.18 10*3/MM3 (ref 0–0.4)
EOSINOPHIL NFR BLD AUTO: 2.7 % (ref 0.3–6.2)
ERYTHROCYTE [DISTWIDTH] IN BLOOD BY AUTOMATED COUNT: 13.1 % (ref 12.3–15.4)
GFR SERPL CREATININE-BSD FRML MDRD: 93 ML/MIN/1.73
GLOBULIN UR ELPH-MCNC: 2.7 GM/DL
GLUCOSE SERPL-MCNC: 88 MG/DL (ref 65–99)
HCT VFR BLD AUTO: 39.3 % (ref 34–46.6)
HDLC SERPL-MCNC: 61 MG/DL (ref 40–60)
HGB BLD-MCNC: 12.6 G/DL (ref 12–15.9)
IMM GRANULOCYTES # BLD AUTO: 0.02 10*3/MM3 (ref 0–0.05)
IMM GRANULOCYTES NFR BLD AUTO: 0.3 % (ref 0–0.5)
LDLC SERPL CALC-MCNC: 76 MG/DL (ref 0–100)
LDLC/HDLC SERPL: 1.26 {RATIO}
LYMPHOCYTES # BLD AUTO: 1.48 10*3/MM3 (ref 0.7–3.1)
LYMPHOCYTES NFR BLD AUTO: 22.3 % (ref 19.6–45.3)
MCH RBC QN AUTO: 29.9 PG (ref 26.6–33)
MCHC RBC AUTO-ENTMCNC: 32.1 G/DL (ref 31.5–35.7)
MCV RBC AUTO: 93.3 FL (ref 79–97)
MONOCYTES # BLD AUTO: 0.62 10*3/MM3 (ref 0.1–0.9)
MONOCYTES NFR BLD AUTO: 9.3 % (ref 5–12)
NEUTROPHILS NFR BLD AUTO: 4.29 10*3/MM3 (ref 1.7–7)
NEUTROPHILS NFR BLD AUTO: 64.5 % (ref 42.7–76)
NRBC BLD AUTO-RTO: 0 /100 WBC (ref 0–0.2)
PLATELET # BLD AUTO: 234 10*3/MM3 (ref 140–450)
PMV BLD AUTO: 11.3 FL (ref 6–12)
POTASSIUM SERPL-SCNC: 4.2 MMOL/L (ref 3.5–5.2)
PROT SERPL-MCNC: 6.6 G/DL (ref 6–8.5)
RBC # BLD AUTO: 4.21 10*6/MM3 (ref 3.77–5.28)
SODIUM SERPL-SCNC: 142 MMOL/L (ref 136–145)
TRIGL SERPL-MCNC: 57 MG/DL (ref 0–150)
TSH SERPL DL<=0.05 MIU/L-ACNC: 3.34 UIU/ML (ref 0.27–4.2)
VLDLC SERPL-MCNC: 12 MG/DL (ref 5–40)
WBC # BLD AUTO: 6.65 10*3/MM3 (ref 3.4–10.8)

## 2021-02-08 PROCEDURE — 80061 LIPID PANEL: CPT

## 2021-02-08 PROCEDURE — 82043 UR ALBUMIN QUANTITATIVE: CPT

## 2021-02-08 PROCEDURE — 82570 ASSAY OF URINE CREATININE: CPT

## 2021-02-08 PROCEDURE — 80053 COMPREHEN METABOLIC PANEL: CPT

## 2021-02-08 PROCEDURE — 85025 COMPLETE CBC W/AUTO DIFF WBC: CPT

## 2021-02-08 PROCEDURE — 84443 ASSAY THYROID STIM HORMONE: CPT

## 2021-02-08 PROCEDURE — 99214 OFFICE O/P EST MOD 30 MIN: CPT | Performed by: INTERNAL MEDICINE

## 2021-02-08 RX ORDER — TIMOLOL MALEATE 5 MG/ML
SOLUTION/ DROPS OPHTHALMIC
COMMUNITY
Start: 2020-12-22 | End: 2022-11-09

## 2021-02-08 RX ORDER — AMLODIPINE BESYLATE 2.5 MG/1
2.5 TABLET ORAL DAILY
Qty: 90 TABLET | Refills: 3 | Status: SHIPPED | OUTPATIENT
Start: 2021-02-08 | End: 2021-06-22 | Stop reason: SDUPTHER

## 2021-02-08 NOTE — PROGRESS NOTES
[facility]     Jayda Lopes  1935   [id number]      Patient Care Team:  Akilah Rueda MD as PCP - General (Internal Medicine)  Akilah Rueda MD as PCP - Internal Medicine (Internal Medicine)  Bernadette Almodovar MD as Consulting Physician (Dermatology)  Hiren Kaufman MD as Consulting Physician (Cardiac Electrophysiology)    Chief Complaint   Patient presents with   • Hypertension     f/u            HPI  Patient is a 85 y.o. female presents with hypertension, hyperlipidemia, A. fib, hypothyroidism      CHRONIC CONDITIONS  BPs at home  97/56 sometimes, and usually 110/60s to [id number].Taking amlodipine and coreg and ramipril twice a day.  Avoids salt in the diet.  She does feel lightheaded when the blood pressure gets down around 100 systolic.    For hyperlipidemia, takes atorvastatin every evening.  Eats a low-fat low sugar diet.  She lost 40 pounds over the last year by eating less and eating healthier.  She has also tried to be more active.    For A. fib, she takes Xarelto and Coreg.  She denies any rapid heartbeat lately.  She does have occasional palpitations but nothing that last and no associated symptoms.  No symptoms of CHF lately.  No increase in shortness of breath with exertion or edema.  No chest pain.    GERD symptoms are controlled on East omeprazole daily.    Depression and anxiety are doing well on current dose of sertraline daily.  She does wish they were able to travel and get out and not be quarantined at home all the time, but she feels like she is dealing with it okay.    Past Medical History:   Diagnosis Date   • Acute bronchitis due to infection 1/10/2019   • Anxiety    • Atrial fibrillation (CMS/HCC)    • Atypical chest pain    • Bradycardia    • GERD (gastroesophageal reflux disease)    • Hyperlipidemia    • Hypertension    • Macular degeneration    • Mitral valve prolapse    • Palpitation        Past Surgical History:   Procedure Laterality Date   •  "CYSTECTOMY      h/o Ovarian   • HYSTERECTOMY Bilateral    • OOPHORECTOMY Bilateral 1993   • THYROIDECTOMY      h/o thyroid surgery sub-total        Family History   Problem Relation Age of Onset   • Colon cancer Mother    • Other Mother         cardiac arrhythmia   • Heart failure Mother    • Cancer Mother    • Lung cancer Father    • Cancer Father    • No Known Problems Brother    • Hypertension Maternal Grandmother    • Breast cancer Neg Hx    • Ovarian cancer Neg Hx        Social History     Socioeconomic History   • Marital status:      Spouse name: Not on file   • Number of children: Not on file   • Years of education: Not on file   • Highest education level: Not on file   Tobacco Use   • Smoking status: Never Smoker   • Smokeless tobacco: Never Used   Substance and Sexual Activity   • Alcohol use: Yes     Comment: rarely   • Drug use: Defer   • Sexual activity: Defer       Allergies   Allergen Reactions   • Phenazopyridine Hcl Unknown (See Comments)     Pyridium       Review of Systems:     Review of Systems   Constitutional: Negative for chills, fatigue and fever.   HENT: Negative for congestion, ear pain and sinus pressure.    Respiratory: Negative for cough, chest tightness, shortness of breath and wheezing.    Cardiovascular: Negative for chest pain and palpitations.   Gastrointestinal: Negative for abdominal pain, blood in stool, constipation and GERD.   Skin: Negative for color change.   Allergic/Immunologic: Negative for environmental allergies.   Neurological: Negative for dizziness and headache.   Psychiatric/Behavioral: Positive for stress. Negative for suicidal ideas and depressed mood. The patient is not nervous/anxious.        Vital Signs  Vitals:    02/08/21 0920 02/08/21 1034   BP: 150/92 116/76   BP Location: Left arm    Patient Position: Sitting    Cuff Size: Adult    Pulse: 96    Temp: 97.3 °F (36.3 °C)    TempSrc: Infrared    Weight: 72.6 kg (160 lb)    Height: 177.8 cm (70\")  "   PainSc: 0-No pain      Body mass index is 22.96 kg/m².      Current Outpatient Medications:   •  Aflibercept (EYLEA IO), Inject  into the eye Every 3 (Three) Months., Disp: , Rfl:   •  amLODIPine (NORVASC) 2.5 MG tablet, Take 1 tablet by mouth Daily., Disp: 90 tablet, Rfl: 3  •  atorvastatin (LIPITOR) 20 MG tablet, Take 1 tablet by mouth Every Evening., Disp: 90 tablet, Rfl: 4  •  carvedilol (COREG) 25 MG tablet, Take 1 tablet by mouth 2 (Two) Times a Day., Disp: 180 tablet, Rfl: 3  •  Cholecalciferol (VITAMIN D3) 2000 units capsule, Take 1 tablet by mouth Daily., Disp: , Rfl:   •  esomeprazole (nexIUM) 20 MG capsule, TAKE 1 CAPSULE DAILY, Disp: 90 capsule, Rfl: 3  •  guaiFENesin (Mucinex) 600 MG 12 hr tablet, Take 2 tablets by mouth 2 (Two) Times a Day., Disp: 180 tablet, Rfl: 1  •  Multiple Vitamins-Calcium (VIACTIV MULTI-VITAMIN) chewable tablet, Chew 2 tablets Daily., Disp: , Rfl:   •  Multiple Vitamins-Minerals (PRESERVISION AREDS 2 PO), Take 2 tablets by mouth Daily., Disp: , Rfl:   •  ramipril (ALTACE) 5 MG capsule, TAKE 1 CAPSULE EVERY MORNING AND 2 CAPSULES EVERY EVENING, Disp: 270 capsule, Rfl: 3  •  rivaroxaban (Xarelto) 20 MG tablet, Take 1 tablet by mouth Daily. as directed, Disp: 90 tablet, Rfl: 4  •  sertraline (ZOLOFT) 100 MG tablet, Take 1 tablet by mouth Daily., Disp: 90 tablet, Rfl: 3  •  timolol (TIMOPTIC) 0.5 % ophthalmic solution, , Disp: , Rfl:     Physical Exam:    Physical Exam  Vitals signs and nursing note reviewed.   Constitutional:       Appearance: She is well-developed.   HENT:      Head: Normocephalic.   Eyes:      Conjunctiva/sclera: Conjunctivae normal.      Pupils: Pupils are equal, round, and reactive to light.   Neck:      Musculoskeletal: Normal range of motion and neck supple.      Thyroid: No thyromegaly.   Cardiovascular:      Rate and Rhythm: Normal rate and regular rhythm.      Heart sounds: Normal heart sounds.   Pulmonary:      Effort: Pulmonary effort is normal.       Breath sounds: Normal breath sounds. No wheezing.   Musculoskeletal: Normal range of motion.      Right lower leg: She exhibits tenderness.        Legs:    Lymphadenopathy:      Cervical: No cervical adenopathy.   Skin:     General: Skin is warm and dry.   Neurological:      Mental Status: She is alert and oriented to person, place, and time.   Psychiatric:         Attention and Perception: Attention normal.         Mood and Affect: Mood and affect normal.         Speech: Speech normal.         Behavior: Behavior normal.         Thought Content: Thought content normal.          ACE III MINI        Results Review:    None    CMP:  Lab Results   Component Value Date    BUN 12 07/20/2020    CREATININE 0.64 07/20/2020    EGFRIFNONA 89 07/20/2020    BCR 18.8 07/20/2020     07/20/2020    K 4.3 07/20/2020    CO2 30.9 (H) 07/20/2020    CALCIUM 9.2 07/20/2020    ALBUMIN 3.90 07/20/2020    BILITOT 0.8 07/20/2020    ALKPHOS 82 07/20/2020    AST 16 07/20/2020    ALT 12 07/20/2020     HbA1c:  Lab Results   Component Value Date    HGBA1C 6.0 09/07/2014    HGBA1C 6.1 (H) 05/14/2014     Microalbumin:  Lab Results   Component Value Date    MICROALBUR 8.6 07/20/2020     Lipid Panel  Lab Results   Component Value Date    CHOL 169 07/20/2020    TRIG 65 07/20/2020    HDL 57 07/20/2020    LDL 99 07/20/2020    AST 16 07/20/2020    ALT 12 07/20/2020       Medication Review: Medications reviewed and noted  Patient Instructions   Problem List Items Addressed This Visit        Cardiac and Vasculature    Mixed hyperlipidemia (Chronic)    Overview     2/8/2021 Akilah Rueda MD    Continue atorvastatin every evening. Continue low fat diet. Increase regular exercise.         Relevant Medications    atorvastatin (LIPITOR) 20 MG tablet    Other Relevant Orders    Lipid Panel    TSH    Permanent atrial fibrillation (CMS/HCC)    Overview     2/8/2021 Akilah Rueda MD    Continue Xarelto and carvedilol.  Continue regular follow-up with  the cardiologist.         Relevant Medications    carvedilol (COREG) 25 MG tablet    rivaroxaban (Xarelto) 20 MG tablet    amLODIPine (NORVASC) 2.5 MG tablet    Chronic systolic (congestive) heart failure (CMS/HCC)    Overview     2/8/2021 Akilah Rueda MD    Continue carvedilol, amlodipine, ramipril, Xarelto, and statin.    Let us know if there is a sudden unexplained weight gain of 3 pounds or more, or increased shortness of breath, or edema.         Relevant Medications    carvedilol (COREG) 25 MG tablet    amLODIPine (NORVASC) 2.5 MG tablet    Benign essential hypertension - Primary    Overview     2/8/2021 Akilah Rueda MD    Decrease amlodipine to once a day in the mornings.  Continue carvedilol and ramipril twice a day.  Continue to avoid salt in the diet.    Continue to monitor the blood pressures at home carefully.         Relevant Medications    ramipril (ALTACE) 5 MG capsule    carvedilol (COREG) 25 MG tablet    amLODIPine (NORVASC) 2.5 MG tablet    Other Relevant Orders    Comprehensive Metabolic Panel    CBC & Differential    Microalbumin / Creatinine Urine Ratio - Urine, Clean Catch       Eye    Primary open angle glaucoma (POAG) of both eyes, mild stage    Overview     2/8/2021 Akilah Rueda MD    Patient reports being back on drops for glaucoma.  She will follow up with the ophthalmologist as planned.         Relevant Medications    Aflibercept (EYLEA IO)    timolol (TIMOPTIC) 0.5 % ophthalmic solution       Gastrointestinal Abdominal     GERD without esophagitis    Overview     2/8/2021 Akilah Rueda MD    Continue esomeprazole daily.  Continue to avoid eating close to bedtime and avoid large meals.         Relevant Medications    esomeprazole (nexIUM) 20 MG capsule       Mental Health    Moderate episode of recurrent major depressive disorder (CMS/HCC)    Overview     2/8/2021 Akilah Rueda MD    Continue sertraline daily.  Physical activity and exercise also help decrease  symptoms of stress, anxiety, and depression.         Relevant Medications    sertraline (ZOLOFT) 100 MG tablet    Generalized anxiety disorder    Overview     2/8/2021 Akilah Rueda MD    Continue sertraline daily.  Physical activity and exercise also help decrease symptoms of stress, anxiety, and depression.           Relevant Medications    sertraline (ZOLOFT) 100 MG tablet       Musculoskeletal and Injuries    Contusion of right lower leg    Overview     2/8/2021 Akilah Rueda MD    Contusion R shin after fall.  Improving.  There is still some tenderness to touch.                      Diagnosis Plan   1. Benign essential hypertension  amLODIPine (NORVASC) 2.5 MG tablet    Comprehensive Metabolic Panel    CBC & Differential    Microalbumin / Creatinine Urine Ratio - Urine, Clean Catch   2. Mixed hyperlipidemia  Lipid Panel    TSH   3. Permanent atrial fibrillation (CMS/HCC)     4. Moderate episode of recurrent major depressive disorder (CMS/HCC)     5. Generalized anxiety disorder     6. GERD without esophagitis     7. Chronic systolic (congestive) heart failure (CMS/HCC)     8. Primary open angle glaucoma (POAG) of both eyes, mild stage     9. Contusion of right lower leg, subsequent encounter             Plan of care reviewed with patient at the conclusion of today's visit. Education was provided regarding diagnosis, management, and any prescribed or recommended OTC medications.Patient verbalizes understanding of and agreement with management plan.         Akilah Rueda MD

## 2021-02-09 LAB
ALBUMIN UR-MCNC: 7 MG/DL
CREAT UR-MCNC: 104.1 MG/DL
MICROALBUMIN/CREAT UR: 67.2 MG/G

## 2021-02-09 NOTE — PATIENT INSTRUCTIONS
Patient Instructions   Problem List Items Addressed This Visit        Cardiac and Vasculature    Mixed hyperlipidemia (Chronic)    Overview     2/8/2021 Akilah Rueda MD    Continue atorvastatin every evening. Continue low fat diet. Increase regular exercise.         Relevant Medications    atorvastatin (LIPITOR) 20 MG tablet    Other Relevant Orders    Lipid Panel    TSH    Permanent atrial fibrillation (CMS/HCC)    Overview     2/8/2021 Akilah Rueda MD    Continue Xarelto and carvedilol.  Continue regular follow-up with the cardiologist.         Relevant Medications    carvedilol (COREG) 25 MG tablet    rivaroxaban (Xarelto) 20 MG tablet    amLODIPine (NORVASC) 2.5 MG tablet    Chronic systolic (congestive) heart failure (CMS/HCC)    Overview     2/8/2021 Akilah Rueda MD    Continue carvedilol, amlodipine, ramipril, Xarelto, and statin.    Let us know if there is a sudden unexplained weight gain of 3 pounds or more, or increased shortness of breath, or edema.         Relevant Medications    carvedilol (COREG) 25 MG tablet    amLODIPine (NORVASC) 2.5 MG tablet    Benign essential hypertension - Primary    Overview     2/8/2021 Akilah Rueda MD    Decrease amlodipine to once a day in the mornings.  Continue carvedilol and ramipril twice a day.  Continue to avoid salt in the diet.    Continue to monitor the blood pressures at home carefully.         Relevant Medications    ramipril (ALTACE) 5 MG capsule    carvedilol (COREG) 25 MG tablet    amLODIPine (NORVASC) 2.5 MG tablet    Other Relevant Orders    Comprehensive Metabolic Panel    CBC & Differential    Microalbumin / Creatinine Urine Ratio - Urine, Clean Catch       Eye    Primary open angle glaucoma (POAG) of both eyes, mild stage    Overview     2/8/2021 Akilah Rueda MD    Patient reports being back on drops for glaucoma.  She will follow up with the ophthalmologist as planned.         Relevant Medications    Aflibercept (EYLEA IO)     timolol (TIMOPTIC) 0.5 % ophthalmic solution       Gastrointestinal Abdominal     GERD without esophagitis    Overview     2/8/2021 Akilah Rueda MD    Continue esomeprazole daily.  Continue to avoid eating close to bedtime and avoid large meals.         Relevant Medications    esomeprazole (nexIUM) 20 MG capsule       [facility]    Moderate episode of recurrent major depressive disorder (CMS/HCC)    Overview     2/8/2021 Akilah Rueda MD    Continue sertraline daily.  Physical activity and exercise also help decrease symptoms of stress, anxiety, and depression.         Relevant Medications    sertraline (ZOLOFT) 100 MG tablet    Generalized anxiety disorder    Overview     2/8/2021 Akilah Rueda MD    Continue sertraline daily.  Physical activity and exercise also help decrease symptoms of stress, anxiety, and depression.           Relevant Medications    sertraline (ZOLOFT) 100 MG tablet       Musculoskeletal and Injuries    Contusion of right lower leg    Overview     2/8/2021 Akilah Rueda MD    Contusion R shin after fall.  Improving.  There is still some tenderness to touch.

## 2021-03-09 DIAGNOSIS — F33.1 MODERATE EPISODE OF RECURRENT MAJOR DEPRESSIVE DISORDER (HCC): ICD-10-CM

## 2021-03-09 RX ORDER — SERTRALINE HYDROCHLORIDE 100 MG/1
TABLET, FILM COATED ORAL
Qty: 90 TABLET | Refills: 3 | Status: SHIPPED | OUTPATIENT
Start: 2021-03-09 | End: 2021-12-27 | Stop reason: SDUPTHER

## 2021-03-31 DIAGNOSIS — E78.2 MIXED HYPERLIPIDEMIA: ICD-10-CM

## 2021-03-31 DIAGNOSIS — I48.20 CHRONIC ATRIAL FIBRILLATION (HCC): ICD-10-CM

## 2021-03-31 RX ORDER — ATORVASTATIN CALCIUM 20 MG/1
TABLET, FILM COATED ORAL
Qty: 90 TABLET | Refills: 3 | Status: SHIPPED | OUTPATIENT
Start: 2021-03-31 | End: 2022-03-08

## 2021-03-31 RX ORDER — RIVAROXABAN 20 MG/1
TABLET, FILM COATED ORAL
Qty: 90 TABLET | Refills: 3 | Status: SHIPPED | OUTPATIENT
Start: 2021-03-31 | End: 2022-03-28

## 2021-04-07 PROCEDURE — 93296 REM INTERROG EVL PM/IDS: CPT | Performed by: INTERNAL MEDICINE

## 2021-04-07 PROCEDURE — 93294 REM INTERROG EVL PM/LDLS PM: CPT | Performed by: INTERNAL MEDICINE

## 2021-04-20 ENCOUNTER — OFFICE VISIT (OUTPATIENT)
Dept: CARDIOLOGY | Facility: CLINIC | Age: 86
End: 2021-04-20

## 2021-04-20 VITALS
SYSTOLIC BLOOD PRESSURE: 138 MMHG | HEIGHT: 68 IN | HEART RATE: 73 BPM | BODY MASS INDEX: 23.95 KG/M2 | WEIGHT: 158 LBS | DIASTOLIC BLOOD PRESSURE: 64 MMHG | OXYGEN SATURATION: 96 %

## 2021-04-20 DIAGNOSIS — R00.2 PALPITATION: Primary | ICD-10-CM

## 2021-04-20 DIAGNOSIS — R00.1 BRADYCARDIA: ICD-10-CM

## 2021-04-20 PROCEDURE — 93280 PM DEVICE PROGR EVAL DUAL: CPT | Performed by: PHYSICIAN ASSISTANT

## 2021-04-20 PROCEDURE — 99213 OFFICE O/P EST LOW 20 MIN: CPT | Performed by: PHYSICIAN ASSISTANT

## 2021-04-20 NOTE — PROGRESS NOTES
[facility] at [facility]   OFFICE NOTE      Jayda Lopes  1935  PCP: Akilah Rueda MD    SUBJECTIVE:   Jayda Lopes is a 85 y.o. female seen for a follow up visit regarding the following:     CC:Afib    HPI:   85 year old presents for follow up regarding permanent Afib, HTN, chronic SHF and PPM for bradycardia. Since we last saw the pt, pt denies any sustained palpitation  episodes, SOB, CP, LH, and dizziness. Denies any hospitalizations, ER visits, bleeding, or TIA/CVA symptoms. Overall feels well she was concerned about her blood pressure being lower at times.  She feels maybe she is on too much medicine.  Today her blood pressure stable- she like to continue to monitor this closely.      Cardiac PMH: (Old records have been reviewed and summarized below)  1. Atrial fibrillation:  a. Hospitalization with external cardioversion on 07/26/2004 with initiation of Rythmol therapy.   b. Palpitations with event recorder, December 2005, consistent with PACs.   c. Episode of atrial fibrillation approximately 60-90 minutes on Christmas 2009 with subsequent ER visit.  d. External cardioversion to normal sinus rhythm, 06/08/2012.   e. Hospitalization with initiation of Tikosyn, August 2012.   f. Pulmonary vein isolation procedure with extensive ablation of multiple sites and subsequent significant bradyarrhythmias after internal cardioversion to normal sinus rhythm with discontinuation of Tikosyn on 03/25/2014.  g. Unsuccessful external cardioversion, 05/15/2014, and subsequent successful internal cardioversion with early recurrence of atrial fibrillation, 05/15/2014.   h. Echocardiogram, 08/12/2014: EF less than 20% with severe global hypokinesis. Right ventricle mild to moderately dilated. Mild MR and mild TR.   i. 09/08/2014: Implantation of a [employer] Allure Quadra biventricular pacemaker, serial #[id number], and AV node ablation.   2. CHF class III:  a. Holter monitor in February 2002  with frequent PACs and PVCs.   b. Echocardiogram in March 2000 with normal LV size and function: LVEF of 60%. Mild TR, mild MR.   c. Echocardiogram on 07/26/2004: Normal LV size and function. No significant regurgitation from the mitral valve or tricuspid valve.   d. Echocardiogram, 02/06/2008: Normal LV size and function, mild MR.   e. Echocardiogram, 08/22/2012: Left ventricular ejection fraction 35% to 40%. Moderate MR, mild TR.  f. Echocardiogram, 02/13/2013: Left ventricular ejection fraction of 50% to 55%. Mild TR, mild-to-moderate MR. LA 5.2.  g. Echocardiogram, 03/23/2015: EF 30-35%. Mild concentric LVH. Mild-to-moderate AI. Mild MR. Mild to moderate TR  h. Echocardiogram, 07/22/2015: Limited for EF only 50% to 55%.  3. Atypical chest pain:  a. Acceptable nuclear GXT with normal LV size and function and no ischemia in August 2000 and April 2004.  b. UMAIR, 03/25/2014: Ejection fraction of 50% to 55%. Mild mitral regurgitation.  4. Hypertension.   5. Hyperlipidemia.   6. Mitral valve prolapse.   7. Anxiety.   8. Gastroesophageal reflux disease.   9. Macular degeneration.   10. Surgical history:  a. Hysterectomy.  b. Partial thyroidectomy.  c. Ovarian cystectomy.   d. Cataract surgery    Past Medical History, Past Surgical History, Family history, Social History, and Medications were all reviewed with the patient today and updated as necessary.       Current Outpatient Medications:   •  Aflibercept (EYLEA IO), Inject  into the eye Every 3 (Three) Months., Disp: , Rfl:   •  amLODIPine (NORVASC) 2.5 MG tablet, Take 1 tablet by mouth Daily., Disp: 90 tablet, Rfl: 3  •  atorvastatin (LIPITOR) 20 MG tablet, TAKE 1 TABLET EVERY EVENING, Disp: 90 tablet, Rfl: 3  •  carvedilol (COREG) 25 MG tablet, Take 1 tablet by mouth 2 (Two) Times a Day., Disp: 180 tablet, Rfl: 3  •  Cholecalciferol (VITAMIN D3) 2000 units capsule, Take 1 tablet by mouth Daily., Disp: , Rfl:   •  esomeprazole (nexIUM) 20 MG capsule, TAKE 1 CAPSULE  DAILY, Disp: 90 capsule, Rfl: 3  •  guaiFENesin (Mucinex) 600 MG 12 hr tablet, Take 2 tablets by mouth 2 (Two) Times a Day., Disp: 180 tablet, Rfl: 1  •  Multiple Vitamins-Calcium (VIACTIV MULTI-VITAMIN) chewable tablet, Chew 2 tablets Daily., Disp: , Rfl:   •  Multiple Vitamins-Minerals (PRESERVISION AREDS 2 PO), Take 2 tablets by mouth Daily., Disp: , Rfl:   •  ramipril (ALTACE) 5 MG capsule, TAKE 1 CAPSULE EVERY MORNING AND 2 CAPSULES EVERY EVENING, Disp: 270 capsule, Rfl: 3  •  sertraline (ZOLOFT) 100 MG tablet, TAKE 1 TABLET DAILY, Disp: 90 tablet, Rfl: 3  •  timolol (TIMOPTIC) 0.5 % ophthalmic solution, , Disp: , Rfl:   •  Xarelto 20 MG tablet, TAKE 1 TABLET DAILY AS DIRECTED, Disp: 90 tablet, Rfl: 3      Allergies   Allergen Reactions   • Phenazopyridine Hcl Unknown (See Comments)     Pyridium     Patient Active Problem List   Diagnosis   • Permanent atrial fibrillation (CMS/HCC)   • Palpitation   • Atypical chest pain   • GERD without esophagitis   • Macular degeneration (senile) of retina   • Bradycardia   • Cortical age-related cataract of both eyes   • Cough   • Non-rheumatic mitral regurgitation   • Seasonal allergic rhinitis due to pollen   • Chronic systolic (congestive) heart failure (CMS/HCC)   • Dilated cardiomyopathy (CMS/HCC)   • Primary open angle glaucoma (POAG) of both eyes, mild stage   • Moderate episode of recurrent major depressive disorder (CMS/HCC)   • Generalized anxiety disorder   • B12 deficiency   • Osteopenia of multiple sites   • Osteoarthritis   • Varicose veins of both lower extremities without ulcer or inflammation   • Vitamin D deficiency   • Benign essential hypertension   • AV block   • Mixed hyperlipidemia   • Constipation, slow transit   • Simple chronic bronchitis (CMS/HCC)   • Chronic right-sided low back pain with right-sided sciatica   • Shortness of breath   • Pain of right hip joint   • Trochanteric bursitis of right hip   • Right leg pain   • Anterior tibial  tendonitis, right   • Poor balance   • Folliculitis   • Presbycusis of both ears   • Impacted cerumen of right ear   • Abdominal wall bulge   • Fall from slip, trip, or stumble, initial encounter   • Contusion of right lower leg   • Right leg swelling     Past Medical History:   Diagnosis Date   • Acute bronchitis due to infection 1/10/2019   • Anxiety    • Atrial fibrillation (CMS/HCC)    • Atypical chest pain    • Bradycardia    • GERD (gastroesophageal reflux disease)    • Hyperlipidemia    • Hypertension    • Macular degeneration    • Mitral valve prolapse    • Palpitation      Past Surgical History:   Procedure Laterality Date   • CYSTECTOMY      h/o Ovarian   • HYSTERECTOMY Bilateral    • OOPHORECTOMY Bilateral 1993   • THYROIDECTOMY      h/o thyroid surgery sub-total      Family History   Problem Relation Age of Onset   • Colon cancer Mother    • Other Mother         cardiac arrhythmia   • Heart failure Mother    • Cancer Mother    • Lung cancer Father    • Cancer Father    • No Known Problems Brother    • Hypertension Maternal Grandmother    • Breast cancer Neg Hx    • Ovarian cancer Neg Hx      Social History     Tobacco Use   • Smoking status: Never Smoker   • Smokeless tobacco: Never Used   Substance Use Topics   • Alcohol use: Yes     Comment: rarely       ROS:  Review of Symptoms:  General: no recent weight loss/gain, weakness or fatigue  Skin: no rashes, lumps, or other skin changes  HEENT: no dizziness, lightheadedness, or vision changes  Respiratory: no cough or hemoptysis  Cardiovascular: no palpitations, and tachycardia  Gastrointestinal: no black/tarry stools or diarrhea  Urinary: no change in frequency or urgency  Peripheral Vascular: no claudication or leg cramps  Musculoskeletal: no muscle or joint pain/stiffness  Psychiatric: no depression or excessive stress  Neurological: no sensory or motor loss, no syncope  Hematologic: no anemia, easy bruising or bleeding  Endocrine: no thyroid problems,  "nor heat or cold intolerance    PHYSICAL EXAM:    /64 (BP Location: Left arm, Patient Position: Sitting)   Pulse 73   Ht 172.7 cm (68\")   Wt 71.7 kg (158 lb)   LMP  (LMP Unknown)   SpO2 96%   BMI 24.02 kg/m²        Wt Readings from Last 5 Encounters:   04/20/21 71.7 kg (158 lb)   02/08/21 72.6 kg (160 lb)   01/05/21 72.5 kg (159 lb 12.8 oz)   10/29/20 72.6 kg (160 lb)   10/14/20 88.9 kg (196 lb)       BP Readings from Last 5 Encounters:   04/20/21 138/64   02/08/21 116/76   01/05/21 159/90   10/29/20 130/70   10/14/20 126/70       General appearance - Alert, well appearing, and in no distress   Mental status - Affect appropriate to mood.  Eyes - Sclerae anicteric,  ENMT - Hearing grossly normal bilaterally, Dental hygiene good.  Neck - Carotids upstroke normal bilaterally, no bruits, no JVD.  Resp - Clear to auscultation, no wheezes, rales or rhonchi, symmetric air entry.  Heart - Normal rate, regular rhythm, normal S1, S2, no murmurs, rubs, clicks or gallops.  GI - Soft, nontender, nondistended, no masses or organomegaly.  Neurological - Grossly intact - normal speech, no focal findings  Musculoskeletal - No joint tenderness, deformity or swelling, no muscular tenderness noted.  Extremities - Peripheral pulses normal, no pedal edema, no clubbing or cyanosis.  Skin - Normal coloration and turgor.  Psych -  oriented to person, place, and time.    Medical problems and test results were reviewed with the patient today.     No results found for this or any previous visit (from the past 672 hour(s)).        Device Interrogation:  Please see device interrogation form which has been signed and updated for full details. [employer] PPM, VVIR. 70. V paced 99%. 9 years on batter.     ASSESSMENT   1. Permanent Afib: AVN RFA. BIVPPM  2. HTN: Controlled on BB, ACE, Norvasc.   3. Chronic SHF Class I EF 55%  4. Anticoagulation: Xarelto 20mg Daily.     PLAN  · Continue current medical therapy monitor blood pressure " closely  · Return for follow-up as scheduled November 4/20/2021  14:11 EDT  Advance Care Planning   ACP discussion was held with the patient during this visit. Patient has an advance directive in EMR which is still valid.    Jam Guzman PA-C

## 2021-06-22 DIAGNOSIS — I10 BENIGN ESSENTIAL HYPERTENSION: ICD-10-CM

## 2021-06-22 RX ORDER — AMLODIPINE BESYLATE 2.5 MG/1
2.5 TABLET ORAL DAILY
Qty: 90 TABLET | Refills: 3 | Status: SHIPPED | OUTPATIENT
Start: 2021-06-22 | End: 2021-07-21 | Stop reason: SDUPTHER

## 2021-07-06 DIAGNOSIS — I10 BENIGN ESSENTIAL HYPERTENSION: ICD-10-CM

## 2021-07-07 PROCEDURE — 93294 REM INTERROG EVL PM/LDLS PM: CPT | Performed by: INTERNAL MEDICINE

## 2021-07-07 PROCEDURE — 93296 REM INTERROG EVL PM/IDS: CPT | Performed by: INTERNAL MEDICINE

## 2021-07-07 RX ORDER — CARVEDILOL 25 MG/1
25 TABLET ORAL 2 TIMES DAILY
Qty: 180 TABLET | Refills: 3 | Status: SHIPPED | OUTPATIENT
Start: 2021-07-07 | End: 2022-09-01

## 2021-07-21 ENCOUNTER — LAB (OUTPATIENT)
Dept: LAB | Facility: HOSPITAL | Age: 86
End: 2021-07-21

## 2021-07-21 ENCOUNTER — HOSPITAL ENCOUNTER (OUTPATIENT)
Dept: GENERAL RADIOLOGY | Facility: HOSPITAL | Age: 86
Discharge: HOME | End: 2021-07-21

## 2021-07-21 ENCOUNTER — OFFICE VISIT (OUTPATIENT)
Dept: INTERNAL MEDICINE | Facility: CLINIC | Age: 86
End: 2021-07-21

## 2021-07-21 VITALS
HEART RATE: 74 BPM | DIASTOLIC BLOOD PRESSURE: 68 MMHG | RESPIRATION RATE: 16 BRPM | WEIGHT: 159 LBS | OXYGEN SATURATION: 97 % | HEIGHT: 68 IN | BODY MASS INDEX: 24.1 KG/M2 | SYSTOLIC BLOOD PRESSURE: 122 MMHG | TEMPERATURE: 97.8 F

## 2021-07-21 DIAGNOSIS — H35.30 MACULAR DEGENERATION (SENILE) OF RETINA: ICD-10-CM

## 2021-07-21 DIAGNOSIS — E53.8 B12 DEFICIENCY: ICD-10-CM

## 2021-07-21 DIAGNOSIS — E78.2 MIXED HYPERLIPIDEMIA: Chronic | ICD-10-CM

## 2021-07-21 DIAGNOSIS — R26.89 POOR BALANCE: Chronic | ICD-10-CM

## 2021-07-21 DIAGNOSIS — R05.3 CHRONIC COUGH: ICD-10-CM

## 2021-07-21 DIAGNOSIS — E55.9 VITAMIN D DEFICIENCY: ICD-10-CM

## 2021-07-21 DIAGNOSIS — E78.2 MIXED HYPERLIPIDEMIA: ICD-10-CM

## 2021-07-21 DIAGNOSIS — F33.1 MODERATE EPISODE OF RECURRENT MAJOR DEPRESSIVE DISORDER (HCC): ICD-10-CM

## 2021-07-21 DIAGNOSIS — Z00.00 MEDICARE ANNUAL WELLNESS VISIT, SUBSEQUENT: Primary | ICD-10-CM

## 2021-07-21 DIAGNOSIS — Z91.81 AT MODERATE RISK FOR FALL: Chronic | ICD-10-CM

## 2021-07-21 DIAGNOSIS — K59.01 CONSTIPATION, SLOW TRANSIT: ICD-10-CM

## 2021-07-21 DIAGNOSIS — I10 BENIGN ESSENTIAL HYPERTENSION: ICD-10-CM

## 2021-07-21 DIAGNOSIS — Z00.00 ANNUAL PHYSICAL EXAM: ICD-10-CM

## 2021-07-21 DIAGNOSIS — R05.9 COUGH: ICD-10-CM

## 2021-07-21 DIAGNOSIS — I50.22 CHRONIC SYSTOLIC (CONGESTIVE) HEART FAILURE (HCC): ICD-10-CM

## 2021-07-21 DIAGNOSIS — N95.9 MENOPAUSAL AND POSTMENOPAUSAL DISORDER: ICD-10-CM

## 2021-07-21 DIAGNOSIS — I48.21 PERMANENT ATRIAL FIBRILLATION (HCC): ICD-10-CM

## 2021-07-21 DIAGNOSIS — H91.91 HEARING LOSS OF RIGHT EAR, UNSPECIFIED HEARING LOSS TYPE: ICD-10-CM

## 2021-07-21 DIAGNOSIS — M85.89 OSTEOPENIA OF MULTIPLE SITES: ICD-10-CM

## 2021-07-21 DIAGNOSIS — I42.0 DILATED CARDIOMYOPATHY (HCC): ICD-10-CM

## 2021-07-21 PROBLEM — H61.21 IMPACTED CERUMEN OF RIGHT EAR: Chronic | Status: RESOLVED | Noted: 2020-07-20 | Resolved: 2021-07-21

## 2021-07-21 LAB
25(OH)D3 SERPL-MCNC: 46.1 NG/ML (ref 30–100)
ALBUMIN SERPL-MCNC: 3.9 G/DL (ref 3.5–5.2)
ALBUMIN UR-MCNC: <1.2 MG/DL
ALBUMIN/GLOB SERPL: 1.4 G/DL
ALP SERPL-CCNC: 102 U/L (ref 39–117)
ALT SERPL W P-5'-P-CCNC: 17 U/L (ref 1–33)
ANION GAP SERPL CALCULATED.3IONS-SCNC: 7 MMOL/L (ref 5–15)
AST SERPL-CCNC: 22 U/L (ref 1–32)
BACTERIA UR QL AUTO: ABNORMAL /HPF
BASOPHILS # BLD AUTO: 0.07 10*3/MM3 (ref 0–0.2)
BASOPHILS NFR BLD AUTO: 1.2 % (ref 0–1.5)
BILIRUB SERPL-MCNC: 0.6 MG/DL (ref 0–1.2)
BILIRUB UR QL STRIP: NEGATIVE
BUN SERPL-MCNC: 11 MG/DL (ref 8–23)
BUN/CREAT SERPL: 15.5 (ref 7–25)
CALCIUM SPEC-SCNC: 8.8 MG/DL (ref 8.6–10.5)
CHLORIDE SERPL-SCNC: 106 MMOL/L (ref 98–107)
CHOLEST SERPL-MCNC: 159 MG/DL (ref 0–200)
CLARITY UR: ABNORMAL
CO2 SERPL-SCNC: 31 MMOL/L (ref 22–29)
COLOR UR: YELLOW
CREAT SERPL-MCNC: 0.71 MG/DL (ref 0.57–1)
CREAT UR-MCNC: 92.2 MG/DL
DEPRECATED RDW RBC AUTO: 43.7 FL (ref 37–54)
EOSINOPHIL # BLD AUTO: 0.22 10*3/MM3 (ref 0–0.4)
EOSINOPHIL NFR BLD AUTO: 3.8 % (ref 0.3–6.2)
ERYTHROCYTE [DISTWIDTH] IN BLOOD BY AUTOMATED COUNT: 13 % (ref 12.3–15.4)
GFR SERPL CREATININE-BSD FRML MDRD: 78 ML/MIN/1.73
GLOBULIN UR ELPH-MCNC: 2.8 GM/DL
GLUCOSE SERPL-MCNC: 91 MG/DL (ref 65–99)
GLUCOSE UR STRIP-MCNC: NEGATIVE MG/DL
HCT VFR BLD AUTO: 38.7 % (ref 34–46.6)
HDLC SERPL-MCNC: 61 MG/DL (ref 40–60)
HGB BLD-MCNC: 12.5 G/DL (ref 12–15.9)
HGB UR QL STRIP.AUTO: NEGATIVE
HYALINE CASTS UR QL AUTO: ABNORMAL /LPF
IMM GRANULOCYTES # BLD AUTO: 0.02 10*3/MM3 (ref 0–0.05)
IMM GRANULOCYTES NFR BLD AUTO: 0.3 % (ref 0–0.5)
KETONES UR QL STRIP: NEGATIVE
LDLC SERPL CALC-MCNC: 87 MG/DL (ref 0–100)
LDLC/HDLC SERPL: 1.43 {RATIO}
LEUKOCYTE ESTERASE UR QL STRIP.AUTO: ABNORMAL
LYMPHOCYTES # BLD AUTO: 1.34 10*3/MM3 (ref 0.7–3.1)
LYMPHOCYTES NFR BLD AUTO: 23.2 % (ref 19.6–45.3)
MCH RBC QN AUTO: 30 PG (ref 26.6–33)
MCHC RBC AUTO-ENTMCNC: 32.3 G/DL (ref 31.5–35.7)
MCV RBC AUTO: 92.8 FL (ref 79–97)
MICROALBUMIN/CREAT UR: NORMAL MG/G{CREAT}
MONOCYTES # BLD AUTO: 0.51 10*3/MM3 (ref 0.1–0.9)
MONOCYTES NFR BLD AUTO: 8.8 % (ref 5–12)
NEUTROPHILS NFR BLD AUTO: 3.61 10*3/MM3 (ref 1.7–7)
NEUTROPHILS NFR BLD AUTO: 62.7 % (ref 42.7–76)
NITRITE UR QL STRIP: POSITIVE
NRBC BLD AUTO-RTO: 0 /100 WBC (ref 0–0.2)
PH UR STRIP.AUTO: 7.5 [PH] (ref 5–8)
PLATELET # BLD AUTO: 225 10*3/MM3 (ref 140–450)
PMV BLD AUTO: 10.6 FL (ref 6–12)
POTASSIUM SERPL-SCNC: 4.3 MMOL/L (ref 3.5–5.2)
PROT SERPL-MCNC: 6.7 G/DL (ref 6–8.5)
PROT UR QL STRIP: NEGATIVE
RBC # BLD AUTO: 4.17 10*6/MM3 (ref 3.77–5.28)
RBC # UR: ABNORMAL /HPF
REF LAB TEST METHOD: ABNORMAL
SODIUM SERPL-SCNC: 144 MMOL/L (ref 136–145)
SP GR UR STRIP: 1.02 (ref 1–1.03)
SQUAMOUS #/AREA URNS HPF: ABNORMAL /HPF
T4 FREE SERPL-MCNC: 0.9 NG/DL (ref 0.93–1.7)
TRIGL SERPL-MCNC: 54 MG/DL (ref 0–150)
TSH SERPL DL<=0.05 MIU/L-ACNC: 3.21 UIU/ML (ref 0.27–4.2)
UROBILINOGEN UR QL STRIP: ABNORMAL
VIT B12 BLD-MCNC: 590 PG/ML (ref 211–946)
VLDLC SERPL-MCNC: 11 MG/DL (ref 5–40)
WBC # BLD AUTO: 5.77 10*3/MM3 (ref 3.4–10.8)
WBC UR QL AUTO: ABNORMAL /HPF

## 2021-07-21 PROCEDURE — 84443 ASSAY THYROID STIM HORMONE: CPT

## 2021-07-21 PROCEDURE — 85025 COMPLETE CBC W/AUTO DIFF WBC: CPT

## 2021-07-21 PROCEDURE — 82570 ASSAY OF URINE CREATININE: CPT

## 2021-07-21 PROCEDURE — 82306 VITAMIN D 25 HYDROXY: CPT

## 2021-07-21 PROCEDURE — 99397 PER PM REEVAL EST PAT 65+ YR: CPT | Performed by: INTERNAL MEDICINE

## 2021-07-21 PROCEDURE — 80053 COMPREHEN METABOLIC PANEL: CPT

## 2021-07-21 PROCEDURE — 84439 ASSAY OF FREE THYROXINE: CPT

## 2021-07-21 PROCEDURE — 82043 UR ALBUMIN QUANTITATIVE: CPT

## 2021-07-21 PROCEDURE — 80061 LIPID PANEL: CPT

## 2021-07-21 PROCEDURE — 81001 URINALYSIS AUTO W/SCOPE: CPT

## 2021-07-21 PROCEDURE — 93000 ELECTROCARDIOGRAM COMPLETE: CPT | Performed by: INTERNAL MEDICINE

## 2021-07-21 PROCEDURE — G0439 PPPS, SUBSEQ VISIT: HCPCS | Performed by: INTERNAL MEDICINE

## 2021-07-21 PROCEDURE — 71046 X-RAY EXAM CHEST 2 VIEWS: CPT

## 2021-07-21 PROCEDURE — 82607 VITAMIN B-12: CPT

## 2021-07-21 RX ORDER — AMLODIPINE BESYLATE 2.5 MG/1
2.5 TABLET ORAL 2 TIMES DAILY
Qty: 180 TABLET | Refills: 3 | Status: SHIPPED | OUTPATIENT
Start: 2021-07-21 | End: 2022-08-17

## 2021-07-21 RX ORDER — DOCUSATE SODIUM 100 MG/1
100 CAPSULE, LIQUID FILLED ORAL 2 TIMES DAILY
Qty: 60 CAPSULE | Refills: 5
Start: 2021-07-21 | End: 2022-07-22 | Stop reason: SDUPTHER

## 2021-07-21 NOTE — PROGRESS NOTES
The ABCs of the Annual Wellness Visit  Initial Medicare Wellness Visit    Chief Complaint   Patient presents with   • Medicare Wellness-subsequent   • Hypertension     f/u       Subjective   History of Present Illness:  Jayda Lopes is a 85 y.o. female who presents for an Initial Medicare Wellness Visit.    HPI  Constipation for few months, stools hard.Drinking lots of water. Tries to eat lots of fiber.     CHRONIC CONDITIONS:    Maintaining her weight loss. Exercising regularly.    BPs at home   Taking ramipril and amlodipine and coreg    Afib-taking xarelto and coreg.No palpitatiions or chest pain.    Hyperlipidemia-taking statin    Osteopenia-    HEALTH RISK ASSESSMENT    Recent Hospitalizations:  No hospitalization(s) within the last year.    Current Medical Providers:  Patient Care Team:  Akilah Rueda MD as PCP - General (Internal Medicine)  Akilah Rueda MD as PCP - Internal Medicine (Internal Medicine)  Bernadette Almodovar MD as Consulting Physician (Dermatology)  Hiren Kaufman MD as Consulting Physician (Cardiac Electrophysiology)    Smoking Status:  Social History     Tobacco Use   Smoking Status Never Smoker   Smokeless Tobacco Never Used       Alcohol Consumption:  Social History     Substance and Sexual Activity   Alcohol Use Yes    Comment: rarely       Depression Screen:   PHQ-2/PHQ-9 Depression Screening 7/21/2021   Little interest or pleasure in doing things 0   Feeling down, depressed, or hopeless 0   Trouble falling or staying asleep, or sleeping too much -   Feeling tired or having little energy -   Poor appetite or overeating -   Feeling bad about yourself - or that you are a failure or have let yourself or your family down -   Trouble concentrating on things, such as reading the newspaper or watching television -   Moving or speaking so slowly that other people could have noticed. Or the opposite - being so fidgety or restless that you have been moving around a lot more than  usual -   Thoughts that you would be better off dead, or of hurting yourself in some way -   Total Score 0   If you checked off any problems, how difficult have these problems made it for you to do your work, take care of things at home, or get along with other people? -       Fall Risk Screen:  KAYA Fall Risk Assessment was completed, and patient is at LOW risk for falls.Assessment completed on:7/21/2021    Health Habits and Functional and Cognitive Screening:  Functional & Cognitive Status 7/21/2021   Do you have difficulty preparing food and eating? No   Do you have difficulty bathing yourself, getting dressed or grooming yourself? No   Do you have difficulty using the toilet? No   Do you have difficulty moving around from place to place? No   Do you have trouble with steps or getting out of a bed or a chair? -   Current Diet Well Balanced Diet   Dental Exam Up to date   Eye Exam Up to date   Exercise (times per week) 7 times per week   Current Exercises Include Stationary Bicycling/Spin Class        Exercise Comment -   Current Exercise Activities Include -   Do you need help using the phone?  No   Are you deaf or do you have serious difficulty hearing?  Yes   Do you need help with transportation? No   Do you need help shopping? No   Do you need help preparing meals?  No   Do you need help with housework?  No   Do you need help with laundry? No   Do you need help taking your medications? No   Do you need help managing money? No   Do you ever drive or ride in a car without wearing a seat belt? No   Have you felt unusual stress, anger or loneliness in the last month? No   Who do you live with? Spouse   If you need help, do you have trouble finding someone available to you? No   Have you been bothered in the last four weeks by sexual problems? No   Do you have difficulty concentrating, remembering or making decisions? No       Does the patient have evidence of cognitive impairment? No    Asprin use  counseling:Does not need ASA (and currently is not on it)    Age-appropriate Screening Schedule:  Refer to the list below for future screening recommendations based on patient's age, sex and/or medical conditions. Orders for these recommended tests are listed in the plan section. The patient has been provided with a written plan.    Health Maintenance   Topic Date Due   • ZOSTER VACCINE (2 of 3) 09/18/2015   • DXA SCAN  08/14/2019   • TDAP/TD VACCINES (2 - Td or Tdap) 12/15/2020   • INFLUENZA VACCINE  10/01/2021   • LIPID PANEL  07/21/2022        The following portions of the patient's history were reviewed and updated as appropriate: allergies, current medications, past family history, past medical history, past social history, past surgical history and problem list.    Outpatient Medications Prior to Visit   Medication Sig Dispense Refill   • Aflibercept (EYLEA IO) Inject  into the eye Every 3 (Three) Months.     • atorvastatin (LIPITOR) 20 MG tablet TAKE 1 TABLET EVERY EVENING 90 tablet 3   • carvedilol (COREG) 25 MG tablet Take 1 tablet by mouth 2 (Two) Times a Day. 180 tablet 3   • Cholecalciferol (VITAMIN D3) 2000 units capsule Take 1 tablet by mouth Daily.     • esomeprazole (nexIUM) 20 MG capsule TAKE 1 CAPSULE DAILY 90 capsule 3   • guaiFENesin (Mucinex) 600 MG 12 hr tablet Take 2 tablets by mouth 2 (Two) Times a Day. 180 tablet 1   • Multiple Vitamins-Calcium (VIACTIV MULTI-VITAMIN) chewable tablet Chew 2 tablets Daily.     • Multiple Vitamins-Minerals (PRESERVISION AREDS 2 PO) Take 2 tablets by mouth Daily.     • ramipril (ALTACE) 5 MG capsule TAKE 1 CAPSULE EVERY MORNING AND 2 CAPSULES EVERY EVENING 270 capsule 3   • sertraline (ZOLOFT) 100 MG tablet TAKE 1 TABLET DAILY 90 tablet 3   • timolol (TIMOPTIC) 0.5 % ophthalmic solution      • Xarelto 20 MG tablet TAKE 1 TABLET DAILY AS DIRECTED 90 tablet 3   • amLODIPine (NORVASC) 2.5 MG tablet Take 1 tablet by mouth Daily. 90 tablet 3     No  facility-administered medications prior to visit.       Patient Active Problem List   Diagnosis   • Permanent atrial fibrillation (CMS/HCC)   • Palpitation   • Atypical chest pain   • GERD without esophagitis   • Macular degeneration (senile) of retina   • Bradycardia   • Cortical age-related cataract of both eyes   • Chronic cough   • Non-rheumatic mitral regurgitation   • Seasonal allergic rhinitis due to pollen   • Chronic systolic (congestive) heart failure (CMS/HCC)   • Dilated cardiomyopathy (CMS/HCC)   • Primary open angle glaucoma (POAG) of both eyes, mild stage   • Moderate episode of recurrent major depressive disorder (CMS/HCC)   • Generalized anxiety disorder   • B12 deficiency   • Osteopenia of multiple sites   • Osteoarthritis   • Varicose veins of both lower extremities without ulcer or inflammation   • Vitamin D deficiency   • Benign essential hypertension   • AV block   • Mixed hyperlipidemia   • Constipation, slow transit   • Simple chronic bronchitis (CMS/HCC)   • Chronic right-sided low back pain with right-sided sciatica   • Shortness of breath   • Pain of right hip joint   • Trochanteric bursitis of right hip   • Right leg pain   • Anterior tibial tendonitis, right   • Poor balance   • Folliculitis   • Presbycusis of both ears   • Abdominal wall bulge   • Fall from slip, trip, or stumble, initial encounter   • Contusion of right lower leg   • Right leg swelling   • At moderate risk for fall   • Hearing loss of right ear   • Medicare annual wellness visit, subsequent   • Annual physical exam       Advanced Care Planning:  ACP discussion was held with the patient during this visit. Patient has an advance directive (not in EMR), copy requested. Patient does not have an advance directive, information provided.    Review of Systems   Constitutional: Negative for chills, fatigue and fever.   HENT: Negative for congestion, ear pain, hearing loss and sinus pressure.    Eyes: Negative for visual  "disturbance.   Respiratory: Negative for cough, chest tightness, shortness of breath and wheezing.    Cardiovascular: Negative for chest pain, palpitations and leg swelling.   Gastrointestinal: Positive for constipation. Negative for abdominal pain and blood in stool.   Endocrine: Negative for cold intolerance and heat intolerance.   Genitourinary: Negative for dysuria, frequency and hematuria.   Musculoskeletal: Negative for arthralgias, back pain and gait problem.   Skin: Negative for color change and rash.   Allergic/Immunologic: Negative for environmental allergies.   Neurological: Negative for dizziness and headaches.   Hematological: Negative for adenopathy. Does not bruise/bleed easily.   Psychiatric/Behavioral: Negative for dysphoric mood, sleep disturbance and suicidal ideas. The patient is not nervous/anxious.        Compared to one year ago, the patient feels her physical health is the same.  Compared to one year ago, the patient feels her mental health is the same.    Reviewed chart for potential of high risk medication in the elderly: yes  Reviewed chart for potential of harmful drug interactions in the elderly:yes    Objective         Vitals:    07/21/21 0929   BP: 122/68   BP Location: Left arm   Patient Position: Sitting   Cuff Size: Adult   Pulse: 74   Resp: 16   Temp: 97.8 °F (36.6 °C)   TempSrc: Infrared   SpO2: 97%   Weight: 72.1 kg (159 lb)   Height: 172.7 cm (68\")   PainSc: 0-No pain       Body mass index is 24.18 kg/m².  Discussed the patient's BMI with her. The BMI is in the acceptable range.    Physical Exam  Vitals and nursing note reviewed.   Constitutional:       Appearance: She is well-developed.   Eyes:      Conjunctiva/sclera: Conjunctivae normal.      Pupils: Pupils are equal, round, and reactive to light.   Neck:      Thyroid: No thyromegaly.   Cardiovascular:      Rate and Rhythm: Normal rate and regular rhythm.      Heart sounds: Normal heart sounds. No murmur heard.        " Comments: Bilateral lower extremity varicose veins.    Pacemaker in left upper chest.  Pulmonary:      Effort: Pulmonary effort is normal.      Breath sounds: Normal breath sounds. No wheezing.   Chest:      Breasts:         Right: No inverted nipple, mass, nipple discharge, skin change or tenderness.         Left: No inverted nipple, mass, nipple discharge, skin change or tenderness.   Abdominal:      General: Bowel sounds are normal. There is no distension.      Palpations: Abdomen is soft. There is no mass.      Tenderness: There is no abdominal tenderness.   Musculoskeletal:         General: No tenderness. Normal range of motion.      Cervical back: Normal range of motion and neck supple.      Right lower leg: No edema.      Left lower leg: No edema.   Lymphadenopathy:      Cervical: No cervical adenopathy.      Upper Body:      Right upper body: No supraclavicular, axillary or pectoral adenopathy.      Left upper body: No supraclavicular, axillary or pectoral adenopathy.   Skin:     General: Skin is warm and dry.      Findings: No rash.   Neurological:      Mental Status: She is alert and oriented to person, place, and time.      Cranial Nerves: No cranial nerve deficit.      Sensory: No sensory deficit.      Coordination: Coordination normal.      Gait: Gait normal.   Psychiatric:         Attention and Perception: Attention normal.         Mood and Affect: Mood normal.         Speech: Speech normal.         Behavior: Behavior normal.         Thought Content: Thought content normal.         Judgment: Judgment normal.         Lab Results   Component Value Date    TRIG 54 07/21/2021    HDL 61 (H) 07/21/2021    LDL 87 07/21/2021    VLDL 11 07/21/2021      ECG 12 Lead    Date/Time: 7/21/2021 10:23 AM  Performed by: Akilah Rueda MD  Authorized by: Akilah Rueda MD   Comparison: compared with previous ECG   Similar to previous ECG  Comparison to previous ECG: Electronic ventricular pacemaker  Rhythm:  paced  Rate: normal  BPM: 69  Other findings: non-specific ST-T wave changes              Assessment/Plan   Medicare Risks and Personalized Health Plan  CMS Preventative Services Quick Reference  Cardiovascular risk    The above risks/problems have been discussed with the patient.  Pertinent information has been shared with the patient in the After Visit Summary.  Follow up plans and orders are seen below in the Assessment/Plan Section.  Patient Instructions   Problem List Items Addressed This Visit        Advance Directives and General Issues    At moderate risk for fall (Chronic)    Overview     7/21/2021 Akilah Rueda MD    Fall prevention education given.            Cardiac and Vasculature    Mixed hyperlipidemia (Chronic)    Overview     7/21/2021 Akilah Rueda MD    Continue atorvastatin every evening. Continue low fat diet. Increase regular exercise.         Relevant Medications    atorvastatin (LIPITOR) 20 MG tablet    Other Relevant Orders    CBC & Differential (Completed)    Comprehensive Metabolic Panel (Completed)    Lipid Panel (Completed)    TSH (Completed)    T4, Free (Completed)    Permanent atrial fibrillation (CMS/[facility])    Overview     7/21/2021 Akilah Rueda MD    Continue Xarelto and carvedilol.  Continue regular follow-up with the cardiologist.         Relevant Medications    Xarelto 20 MG tablet    carvedilol (COREG) 25 MG tablet    amLODIPine (NORVASC) 2.5 MG tablet    Other Relevant Orders    ECG 12 Lead    Chronic systolic (congestive) heart failure (CMS/HCC)    Overview     7/21/2021 Akilah Rueda MD    Continue carvedilol, amlodipine, ramipril, Xarelto, and statin.    Let us know if there is a sudden unexplained weight gain of 3 pounds or more, or increased shortness of breath, or edema.         Relevant Medications    carvedilol (COREG) 25 MG tablet    amLODIPine (NORVASC) 2.5 MG tablet    Dilated cardiomyopathy (CMS/HCC)    Overview     7/21/2021 Akilah Rueda  MD    Continue carvedilol, amlodipine, ramipril, Xarelto, and statin.    Let us know if there is a sudden unexplained weight gain of 3 pounds or more, or increased shortness of breath, or edema.           Relevant Medications    carvedilol (COREG) 25 MG tablet    amLODIPine (NORVASC) 2.5 MG tablet    Benign essential hypertension    Overview     7/21/2021 Akilah Rueda MD    Continue amlodipine twice a day.  Continue carvedilol and ramipril twice a day.  Continue to avoid salt in the diet.    Continue to monitor the blood pressures at home carefully.         Relevant Medications    ramipril (ALTACE) 5 MG capsule    carvedilol (COREG) 25 MG tablet    amLODIPine (NORVASC) 2.5 MG tablet    Other Relevant Orders    Microalbumin / Creatinine Urine Ratio - Urine, Clean Catch (Completed)    Urinalysis With Microscopic - Urine, Clean Catch (Completed)       ENT    Hearing loss of right ear    Relevant Orders    Ambulatory Referral to ENT (Otolaryngology)       Endocrine and Metabolic    B12 deficiency    Overview     7/21/2021 Akilah Rueda MD    Check B12 level today.         Relevant Orders    Vitamin B12 (Completed)    Vitamin D deficiency    Overview     7/21/2021 Akilah Rueda MD    Continue current dose of vitamin D3 daily.         Relevant Orders    Vitamin D 25 Hydroxy (Completed)       Eye    Macular degeneration (senile) of retina    Overview     7/21/2021 Akilah Rueda MD     Continue every 3 month injections with Dr. Newton.         Relevant Medications    Aflibercept (EYLEA IO)    timolol (TIMOPTIC) 0.5 % ophthalmic solution       Gastrointestinal Abdominal     Constipation, slow transit    Overview     7/21/2021 Akilah Rueda MD    Take 2 Colace tablets a day.  Continue drinking plenty of fluids and eating a lot of vegetables and fiber.      If not improving, May add MiraLAX daily in hot coffee or tea or water.              Health Encounters    Medicare annual wellness visit, subsequent -  Primary    Overview     7/21/2021 Akilah Rueda MD    Patient is up-to-date on all vaccinations except for tetanus which she was advised to get at her pharmacy..    DEXA ordered today.         Annual physical exam    Overview     7/21/2021 Akilah Rueda MD    Patient is up-to-date on all vaccinations except for tetanus which she was advised to get at her pharmacy..    DEXA ordered today.              Mental Health    Moderate episode of recurrent major depressive disorder (CMS/HCC)    Overview     7/21/2021 Akilah Rueda MD    Continue sertraline daily.  Physical activity and exercise also help decrease symptoms of stress, anxiety, and depression.         Relevant Medications    sertraline (ZOLOFT) 100 MG tablet       Musculoskeletal and Injuries    Osteopenia of multiple sites    Overview     7/21/2021 Akilah Rueda MD    DEXA 2017 revealed osteopenia with a T score in the spine of -1.9 and a T score in the hip of -2.1.      Do some weightbearing exercises including walking and other exercises on the feet and using hand weights at home every day.  Continue taking calcium and vitamin D3.    DEXA ordered.            Pulmonary and Pneumonias    Chronic cough (Chronic)    Overview     7/21/2021 Akilah Rueda MD    Chest x-ray today.                 Relevant Orders    XR Chest PA & Lateral (Completed)       Symptoms and Signs    Poor balance (Chronic)    Overview     7/21/2021 Akilah Rueda MD    Doing  regular exercise can help with balance. Go to the gym 3 days a week and use the exercise bike and some of the upper body weight machines.           Other Visit Diagnoses     Menopausal and postmenopausal disorder        Relevant Orders    DEXA Bone Density Axial           Follow Up:  No follow-ups on file.     An After Visit Summary and PPPS were given to the patient.

## 2021-07-22 NOTE — PATIENT INSTRUCTIONS
Patient Instructions   Problem List Items Addressed This Visit        Advance Directives and General Issues    At moderate risk for fall (Chronic)    Overview     7/21/2021 Akilah Rueda MD    Fall prevention education given.            Cardiac and Vasculature    Mixed hyperlipidemia (Chronic)    Overview     7/21/2021 Akilah Rueda MD    Continue atorvastatin every evening. Continue low fat diet. Increase regular exercise.         Relevant Medications    atorvastatin (LIPITOR) 20 MG tablet    Other Relevant Orders    CBC & Differential (Completed)    Comprehensive Metabolic Panel (Completed)    Lipid Panel (Completed)    TSH (Completed)    T4, Free (Completed)    Permanent atrial fibrillation (CMS/HCC)    Overview     7/21/2021 Akilah Rueda MD    Continue Xarelto and carvedilol.  Continue regular follow-up with the cardiologist.         Relevant Medications    Xarelto 20 MG tablet    carvedilol (COREG) 25 MG tablet    amLODIPine (NORVASC) 2.5 MG tablet    Other Relevant Orders    ECG 12 Lead    Chronic systolic (congestive) heart failure (CMS/HCC)    Overview     7/21/2021 Akilah Rueda MD    Continue carvedilol, amlodipine, ramipril, Xarelto, and statin.    Let us know if there is a sudden unexplained weight gain of 3 pounds or more, or increased shortness of breath, or edema.         Relevant Medications    carvedilol (COREG) 25 MG tablet    amLODIPine (NORVASC) 2.5 MG tablet    Dilated cardiomyopathy (CMS/HCC)    Overview     7/21/2021 Akilah Rueda MD    Continue carvedilol, amlodipine, ramipril, Xarelto, and statin.    Let us know if there is a sudden unexplained weight gain of 3 pounds or more, or increased shortness of breath, or edema.           Relevant Medications    carvedilol (COREG) 25 MG tablet    amLODIPine (NORVASC) 2.5 MG tablet    Benign essential hypertension    Overview     7/21/2021 Akilah Rueda MD    Continue amlodipine twice a day.  Continue carvedilol and ramipril  twice a day.  Continue to avoid salt in the diet.    Continue to monitor the blood pressures at home carefully.         Relevant Medications    ramipril (ALTACE) 5 MG capsule    carvedilol (COREG) 25 MG tablet    amLODIPine (NORVASC) 2.5 MG tablet    Other Relevant Orders    Microalbumin / Creatinine Urine Ratio - Urine, Clean Catch (Completed)    Urinalysis With Microscopic - Urine, Clean Catch (Completed)       ENT    Hearing loss of right ear    Relevant Orders    Ambulatory Referral to ENT (Otolaryngology)       Endocrine and Metabolic    B12 deficiency    Overview     7/21/2021 Akilah Rueda MD    Check B12 level today.         Relevant Orders    Vitamin B12 (Completed)    Vitamin D deficiency    Overview     7/21/2021 Akilah Rueda MD    Continue current dose of vitamin D3 daily.         Relevant Orders    Vitamin D 25 Hydroxy (Completed)       Eye    Macular degeneration (senile) of retina    Overview     7/21/2021 Akilah Rueda MD     Continue every 3 month injections with Dr. Newton.         Relevant Medications    Aflibercept (EYLEA IO)    timolol (TIMOPTIC) 0.5 % ophthalmic solution       Gastrointestinal Abdominal     Constipation, slow transit    Overview     7/21/2021 Akilah Rueda MD    Take 2 Colace tablets a day.  Continue drinking plenty of fluids and eating a lot of vegetables and fiber.      If not improving, May add MiraLAX daily in hot coffee or tea or water.              Health Encounters    Medicare annual wellness visit, subsequent - Primary    Overview     7/21/2021 Akilah Rueda MD    Patient is up-to-date on all vaccinations except for tetanus which she was advised to get at her pharmacy..    DEXA ordered today.         Annual physical exam    Overview     7/21/2021 Akilah Rueda MD    Patient is up-to-date on all vaccinations except for tetanus which she was advised to get at her pharmacy..    DEXA ordered today.              Mental Health    Moderate episode of  recurrent major depressive disorder (CMS/HCC)    Overview     7/21/2021 Akilah Rueda MD    Continue sertraline daily.  Physical activity and exercise also help decrease symptoms of stress, anxiety, and depression.         Relevant Medications    sertraline (ZOLOFT) 100 MG tablet       Musculoskeletal and Injuries    Osteopenia of multiple sites    Overview     7/21/2021 Akilah Rueda MD    DEXA 2017 revealed osteopenia with a T score in the spine of -1.9 and a T score in the hip of -2.1.      Do some weightbearing exercises including walking and other exercises on the feet and using hand weights at home every day.  Continue taking calcium and vitamin D3.    DEXA ordered.            Pulmonary and Pneumonias    Chronic cough (Chronic)    Overview     7/21/2021 Akilah Rueda MD    Chest x-ray today.                 Relevant Orders    XR Chest PA & Lateral (Completed)       Symptoms and Signs    Poor balance (Chronic)    Overview     7/21/2021 Akilah Rueda MD    Doing  regular exercise can help with balance. Go to the gym 3 days a week and use the exercise bike and some of the upper body weight machines.           Other Visit Diagnoses     Menopausal and postmenopausal disorder        Relevant Orders    DEXA Bone Density Axial          Fall Prevention in the Home, Adult  Falls can cause injuries and can affect people from all age groups. There are many simple things that you can do to make your home safe and to help prevent falls. Ask for help when making these changes, if needed.  What actions can I take to prevent falls?  General instructions  · Use good lighting in all rooms. Replace any light bulbs that burn out.  · Turn on lights if it is dark. Use night-lights.  · Place frequently used items in easy-to-reach places. Lower the shelves around your home if necessary.  · Set up furniture so that there are clear paths around it. Avoid moving your furniture around.  · Remove throw rugs and other  tripping hazards from the floor.  · Avoid walking on wet floors.  · Fix any uneven floor surfaces.  · Add color or contrast paint or tape to grab bars and handrails in your home. Place contrasting color strips on the first and last steps of stairways.  · When you use a stepladder, make sure that it is completely opened and that the sides are firmly locked. Have someone hold the ladder while you are using it. Do not climb a closed stepladder.  · Be aware of any and all pets.  What can I do in the bathroom?         · Keep the floor dry. Immediately clean up any water that spills onto the floor.  · Remove soap buildup in the tub or shower on a regular basis.  · Use non-skid mats or decals on the floor of the tub or shower.  · Attach bath mats securely with double-sided, non-slip rug tape.  · If you need to sit down while you are in the shower, use a plastic, non-slip stool.  · Install grab bars by the toilet and in the tub and shower. Do not use towel bars as grab bars.  What can I do in the bedroom?  · Make sure that a bedside light is easy to reach.  · Do not use oversized bedding that drapes onto the floor.  · Have a firm chair that has side arms to use for getting dressed.  What can I do in the kitchen?  · Clean up any spills right away.  · If you need to reach for something above you, use a sturdy step stool that has a grab bar.  · Keep electrical cables out of the way.  · Do not use floor polish or wax that makes floors slippery. If you must use wax, make sure that it is non-skid floor wax.  What can I do in the stairways?  · Do not leave any items on the stairs.  · Make sure that you have a light switch at the top of the stairs and the bottom of the stairs. Have them installed if you do not have them.  · Make sure that there are handrails on both sides of the stairs. Fix handrails that are broken or loose. Make sure that handrails are as long as the stairways.  · Install non-slip stair treads on all stairs in  your home.  · Avoid having throw rugs at the top or bottom of stairways, or secure the rugs with carpet tape to prevent them from moving.  · Choose a carpet design that does not hide the edge of steps on the stairway.  · Check any carpeting to make sure that it is firmly attached to the stairs. Fix any carpet that is loose or worn.  What can I do on the outside of my home?  · Use bright outdoor lighting.  · Regularly repair the edges of walkways and driveways and fix any cracks.  · Remove high doorway thresholds.  · Trim any shrubbery on the main path into your home.  · Regularly check that handrails are securely fastened and in good repair. Both sides of any steps should have handrails.  · Install guardrails along the edges of any raised decks or porches.  · Clear walkways of debris and clutter, including tools and rocks.  · Have leaves, snow, and ice cleared regularly.  · Use sand or salt on walkways during winter months.  · In the garage, clean up any spills right away, including grease or oil spills.  What other actions can I take?  · Wear closed-toe shoes that fit well and support your feet. Wear shoes that have rubber soles or low heels.  · Use mobility aids as needed, such as canes, walkers, scooters, and crutches.  · Review your medicines with your health care provider. Some medicines can cause dizziness or changes in blood pressure, which increase your risk of falling.  Talk with your health care provider about other ways that you can decrease your risk of falls. This may include working with a physical therapist or  to improve your strength, balance, and endurance.  Where to find more information  · Centers for Disease Control and Prevention, [place]: https://www.cdc.gov  · National [place] on Aging: https://zu1hzvb.rudolph.nih.gov  Contact a health care provider if:  · You are afraid of falling at home.  · You feel weak, drowsy, or dizzy at home.  · You fall at home.  Summary  · There are many simple  things that you can do to make your home safe and to help prevent falls.  · Ways to make your home safe include removing tripping hazards and installing grab bars in the bathroom.  · Ask for help when making these changes in your home.  This information is not intended to replace advice given to you by your health care provider. Make sure you discuss any questions you have with your health care provider.  Document Revised: 11/30/2018 Document Reviewed: 08/02/2018  Elsevier Patient Education © 2021 [employer]

## 2021-09-01 NOTE — TELEPHONE ENCOUNTER
Quality 110: Preventive Care And Screening: Influenza Immunization: Influenza immunization was not ordered or administered, reason not given She is taking promethazine-phenylephrine       Detail Level: Detailed Quality 226: Preventive Care And Screening: Tobacco Use: Screening And Cessation Intervention: Tobacco Screening not Performed for Medical Reasons Quality 431: Preventive Care And Screening: Unhealthy Alcohol Use - Screening: Patient not screened for unhealthy alcohol use using a systematic screening method or patient did not receive brief counseling if identified as an unhealthy alcohol user, reason not given

## 2021-09-07 RX ORDER — RAMIPRIL 5 MG/1
CAPSULE ORAL
Qty: 270 CAPSULE | Refills: 3 | Status: SHIPPED | OUTPATIENT
Start: 2021-09-07 | End: 2022-07-29

## 2021-10-06 PROCEDURE — 93294 REM INTERROG EVL PM/LDLS PM: CPT | Performed by: INTERNAL MEDICINE

## 2021-10-06 PROCEDURE — 93296 REM INTERROG EVL PM/IDS: CPT | Performed by: INTERNAL MEDICINE

## 2021-10-19 ENCOUNTER — HOSPITAL ENCOUNTER (OUTPATIENT)
Dept: BONE DENSITY | Facility: HOSPITAL | Age: 86
Discharge: HOME | End: 2021-10-19
Admitting: INTERNAL MEDICINE

## 2021-10-19 DIAGNOSIS — N95.9 MENOPAUSAL AND POSTMENOPAUSAL DISORDER: ICD-10-CM

## 2021-10-19 PROCEDURE — 77080 DXA BONE DENSITY AXIAL: CPT

## 2021-10-24 PROBLEM — M81.0 AGE-RELATED OSTEOPOROSIS WITHOUT CURRENT PATHOLOGICAL FRACTURE: Chronic | Status: ACTIVE | Noted: 2019-01-10

## 2021-10-24 RX ORDER — ALENDRONATE SODIUM 70 MG/1
70 TABLET ORAL
Qty: 15 TABLET | Refills: 1 | Status: SHIPPED | OUTPATIENT
Start: 2021-10-24 | End: 2022-07-22 | Stop reason: SDUPTHER

## 2021-11-03 ENCOUNTER — OFFICE VISIT (OUTPATIENT)
Dept: CARDIOLOGY | Facility: CLINIC | Age: 86
End: 2021-11-03

## 2021-11-03 VITALS
HEART RATE: 83 BPM | DIASTOLIC BLOOD PRESSURE: 82 MMHG | BODY MASS INDEX: 23.92 KG/M2 | HEIGHT: 68 IN | SYSTOLIC BLOOD PRESSURE: 154 MMHG | WEIGHT: 157.8 LBS

## 2021-11-03 DIAGNOSIS — I48.21 PERMANENT ATRIAL FIBRILLATION (HCC): Primary | ICD-10-CM

## 2021-11-03 DIAGNOSIS — I10 BENIGN ESSENTIAL HYPERTENSION: ICD-10-CM

## 2021-11-03 DIAGNOSIS — I44.30 AV BLOCK: ICD-10-CM

## 2021-11-03 DIAGNOSIS — I42.0 DILATED CARDIOMYOPATHY (HCC): ICD-10-CM

## 2021-11-03 PROCEDURE — 99213 OFFICE O/P EST LOW 20 MIN: CPT | Performed by: INTERNAL MEDICINE

## 2021-11-03 PROCEDURE — 93280 PM DEVICE PROGR EVAL DUAL: CPT | Performed by: INTERNAL MEDICINE

## 2021-11-03 NOTE — PROGRESS NOTES
Jayda Lopes  1935  [phone]    11/03/2021    [facility] CARDIOLOGY     Referring Provider: No ref. provider found     Akilah Rueda MD  [address] [id number]    Chief Complaint   Patient presents with   • Permanent atrial fibrillation (CMS/[facility])       Problem List:   1. Atrial fibrillation:  a. Hospitalization with external cardioversion on 07/26/2004 with initiation of Rythmol therapy.   b. Palpitations with event recorder, December 2005, consistent with PACs.   c. Episode of atrial fibrillation approximately 60-90 minutes on Christmas 2009 with subsequent ER visit.  d. External cardioversion to normal sinus rhythm, 06/08/2012.   e. Hospitalization with initiation of Tikosyn, August 2012.   f. Pulmonary vein isolation procedure with extensive ablation of multiple sites and subsequent significant bradyarrhythmias after internal cardioversion to normal sinus rhythm with discontinuation of Tikosyn on 03/25/2014.  g. Unsuccessful external cardioversion, 05/15/2014, and subsequent successful internal cardioversion with early recurrence of atrial fibrillation, 05/15/2014.   h. Echocardiogram, 08/12/2014: EF less than 20% with severe global hypokinesis. Right ventricle mild to moderately dilated. Mild MR and mild TR.   i. 09/08/2014: Implantation of a [employer]ure Quadra biventricular pacemaker, serial #[id number], and AV node ablation.   2. CHF class III:  a. Holter monitor in February 2002 with frequent PACs and PVCs.   b. Echocardiogram in March 2000 with normal LV size and function: LVEF of 60%. Mild TR, mild MR.   c. Echocardiogram on 07/26/2004: Normal LV size and function. No significant regurgitation from the mitral valve or tricuspid valve.   d. Echocardiogram, 02/06/2008: Normal LV size and function, mild MR.   e. Echocardiogram, 08/22/2012: Left ventricular ejection fraction 35% to 40%. Moderate MR, mild TR.  f. Echocardiogram, 02/13/2013: Left  ventricular ejection fraction of 50% to 55%. Mild TR, mild-to-moderate MR. LA 5.2.  g. Echocardiogram, 03/23/2015: EF 30-35%. Mild concentric LVH. Mild-to-moderate AI. Mild MR. Mild to moderate TR  h. Echocardiogram, 07/22/2015: Limited for EF only 50% to 55%.  3. Atypical chest pain:  a. Acceptable nuclear GXT with normal LV size and function and no ischemia in August 2000 and April 2004.  b. UMAIR, 03/25/2014: Ejection fraction of 50% to 55%. Mild mitral regurgitation.  4. Hypertension.   5. Hyperlipidemia.   6. Mitral valve prolapse.   7. Anxiety.   8. Gastroesophageal reflux disease.   9. Macular degeneration.   10. Surgical history:  a. Hysterectomy.  b. Partial thyroidectomy.  c. Ovarian cystectomy.   d. Cataract surgery    Allergies  Allergies   Allergen Reactions   • Phenazopyridine Hcl Unknown (See Comments)     Pyridium       Current Medications    Current Outpatient Medications:   •  Aflibercept (EYLEA IO), Inject  into the eye Every 3 (Three) Months., Disp: , Rfl:   •  alendronate (FOSAMAX) 70 MG tablet, Take 1 tablet by mouth Every 7 (Seven) Days., Disp: 15 tablet, Rfl: 1  •  amLODIPine (NORVASC) 2.5 MG tablet, Take 1 tablet by mouth 2 (two) times a day., Disp: 180 tablet, Rfl: 3  •  atorvastatin (LIPITOR) 20 MG tablet, TAKE 1 TABLET EVERY EVENING, Disp: 90 tablet, Rfl: 3  •  carvedilol (COREG) 25 MG tablet, Take 1 tablet by mouth 2 (Two) Times a Day., Disp: 180 tablet, Rfl: 3  •  Cholecalciferol (VITAMIN D3) 2000 units capsule, Take 1 tablet by mouth Daily., Disp: , Rfl:   •  docusate sodium (Colace) 100 MG capsule, Take 1 capsule by mouth 2 (Two) Times a Day., Disp: 60 capsule, Rfl: 5  •  esomeprazole (nexIUM) 20 MG capsule, TAKE 1 CAPSULE DAILY, Disp: 90 capsule, Rfl: 3  •  guaiFENesin (Mucinex) 600 MG 12 hr tablet, Take 2 tablets by mouth 2 (Two) Times a Day., Disp: 180 tablet, Rfl: 1  •  Multiple Vitamins-Calcium (VIACTIV MULTI-VITAMIN) chewable tablet, Chew 2 tablets Daily., Disp: , Rfl:   •   "Multiple Vitamins-Minerals (PRESERVISION AREDS 2 PO), Take 2 tablets by mouth Daily., Disp: , Rfl:   •  ramipril (ALTACE) 5 MG capsule, TAKE 1 CAPSULE EVERY MORNING AND 2 CAPSULES EVERY EVENING, Disp: 270 capsule, Rfl: 3  •  sertraline (ZOLOFT) 100 MG tablet, TAKE 1 TABLET DAILY, Disp: 90 tablet, Rfl: 3  •  timolol (TIMOPTIC) 0.5 % ophthalmic solution, , Disp: , Rfl:   •  Xarelto 20 MG tablet, TAKE 1 TABLET DAILY AS DIRECTED, Disp: 90 tablet, Rfl: 3    History of Present Illness     Pt presents for follow up of atrial fibrillation, chronic systolic CHF, HTN, and BiV PM check. Since we last saw the pt, pt denies any AF episodes, SOB, CP, LH, and dizziness, syncope. She states she has had 2 episodes of what sounds like diaphragmatic stimulation from her device, when she lays a certain way. In the last 2 years, she has lost 40 lbs.  Denies any hospitalizations, ER visits, bleeding issues on Xarelto, or TIA/CVA symptoms. Overall feels well. BP is well controlled.     ROS:  General:  Denies fatigue, + weight loss  Cardiovascular:  Denies CP, PND, syncope, near syncope, edema or palpitations.  Pulmonary:  Denies ZAFAR, cough, or wheezing      Vitals:    11/03/21 1450   BP: 154/82   BP Location: Left arm   Patient Position: Sitting   Pulse: 83   Weight: 71.6 kg (157 lb 12.8 oz)   Height: 172.7 cm (68\")     Body mass index is 23.99 kg/m².  PE:  General: NAD. A & O x 3   Neck: no JVD, no carotid bruits, no TM  Heart RRR, NL S1, S2, no rubs, murmurs  Lungs: CTA, no wheezes, rhonchi, or rales  Abd: soft, non-tender, NL BS  Ext: No musculoskeletal deformities, no edema, cyanosis, or clubbing  Psych: normal mood and affect  Chest: device site WNL, noted device protrudes mildly but no thinning of the skin or erosion.     Diagnostic Data:    BiV PM Manual Interrogation: 96% BiV paced. 8.6 years on battery. Normal function.     Procedures    1. Permanent atrial fibrillation (HCC)    2. AV block    3. Benign essential hypertension  "   4. Dilated cardiomyopathy (HCC)          Plan:  1. Permanent Afib: AVN RFA. BIVPPM with normal function.   2. HTN: Controlled on BB, ACE, Norvasc.   3. Chronic SHF Class I EF 55% in 2015.  4. Anticoagulation: Xarelto 20mg Daily.    F/up in 6 months    Scribed for Hiren Kaufman MD by Doris Romeo PA-C. 11/3/2021  15:18 EDT     I, Hiren Kaufman MD, personally performed the services described in this documentation as scribed by the above named individual in my presence, and it is both accurate and complete.  11/3/2021  15:24 EDT

## 2021-12-27 ENCOUNTER — OFFICE VISIT (OUTPATIENT)
Dept: INTERNAL MEDICINE | Facility: CLINIC | Age: 86
End: 2021-12-27

## 2021-12-27 VITALS
BODY MASS INDEX: 24.19 KG/M2 | SYSTOLIC BLOOD PRESSURE: 140 MMHG | DIASTOLIC BLOOD PRESSURE: 90 MMHG | RESPIRATION RATE: 16 BRPM | TEMPERATURE: 98.4 F | OXYGEN SATURATION: 95 % | HEART RATE: 83 BPM | HEIGHT: 68 IN | WEIGHT: 159.6 LBS

## 2021-12-27 DIAGNOSIS — R82.90 URINE MALODOR: ICD-10-CM

## 2021-12-27 DIAGNOSIS — R23.8 CHANGE OF SKIN COLOR: Chronic | ICD-10-CM

## 2021-12-27 DIAGNOSIS — M81.0 AGE-RELATED OSTEOPOROSIS WITHOUT CURRENT PATHOLOGICAL FRACTURE: Chronic | ICD-10-CM

## 2021-12-27 DIAGNOSIS — R19.00 ABDOMINAL WALL BULGE: Chronic | ICD-10-CM

## 2021-12-27 DIAGNOSIS — I83.93 VARICOSE VEINS OF BOTH LOWER EXTREMITIES WITHOUT ULCER OR INFLAMMATION: ICD-10-CM

## 2021-12-27 DIAGNOSIS — M67.479 GANGLION CYST OF FOOT: Chronic | ICD-10-CM

## 2021-12-27 DIAGNOSIS — I10 BENIGN ESSENTIAL HYPERTENSION: ICD-10-CM

## 2021-12-27 DIAGNOSIS — F33.1 MODERATE EPISODE OF RECURRENT MAJOR DEPRESSIVE DISORDER (HCC): Primary | ICD-10-CM

## 2021-12-27 DIAGNOSIS — E78.2 MIXED HYPERLIPIDEMIA: Chronic | ICD-10-CM

## 2021-12-27 DIAGNOSIS — I48.21 PERMANENT ATRIAL FIBRILLATION (HCC): ICD-10-CM

## 2021-12-27 DIAGNOSIS — I50.22 CHRONIC SYSTOLIC (CONGESTIVE) HEART FAILURE (HCC): ICD-10-CM

## 2021-12-27 PROBLEM — S80.11XA CONTUSION OF RIGHT LOWER LEG: Status: RESOLVED | Noted: 2021-01-05 | Resolved: 2021-12-27

## 2021-12-27 PROBLEM — M79.89 RIGHT LEG SWELLING: Status: RESOLVED | Noted: 2021-01-05 | Resolved: 2021-12-27

## 2021-12-27 LAB
BILIRUB BLD-MCNC: NEGATIVE MG/DL
CLARITY, POC: CLEAR
COLOR UR: ABNORMAL
EXPIRATION DATE: ABNORMAL
GLUCOSE UR STRIP-MCNC: NEGATIVE MG/DL
KETONES UR QL: NEGATIVE
LEUKOCYTE EST, POC: ABNORMAL
Lab: ABNORMAL
NITRITE UR-MCNC: NEGATIVE MG/ML
PH UR: 7.5 [PH] (ref 5–8)
PROT UR STRIP-MCNC: NEGATIVE MG/DL
RBC # UR STRIP: NEGATIVE /UL
SP GR UR: 1.02 (ref 1–1.03)
UROBILINOGEN UR QL: NORMAL

## 2021-12-27 PROCEDURE — 81003 URINALYSIS AUTO W/O SCOPE: CPT | Performed by: INTERNAL MEDICINE

## 2021-12-27 PROCEDURE — 99214 OFFICE O/P EST MOD 30 MIN: CPT | Performed by: INTERNAL MEDICINE

## 2021-12-27 RX ORDER — SERTRALINE HYDROCHLORIDE 100 MG/1
100 TABLET, FILM COATED ORAL DAILY
Qty: 90 TABLET | Refills: 3 | Status: SHIPPED | OUTPATIENT
Start: 2021-12-27 | End: 2023-01-26

## 2021-12-27 NOTE — PATIENT INSTRUCTIONS
Patient Instructions   Problem List Items Addressed This Visit        Cardiac and Vasculature    Mixed hyperlipidemia (Chronic)    Overview     12/27/2021 Akilah Rueda MD    Continue atorvastatin every evening. Continue low fat diet. Increase regular exercise.         Relevant Medications    atorvastatin (LIPITOR) 20 MG tablet    Permanent atrial fibrillation (HCC)    Overview     12/27/2021 Akilah Rueda MD    Continue Xarelto and carvedilol.  Continue regular follow-up with the cardiologist.         Relevant Medications    Xarelto 20 MG tablet    carvedilol (COREG) 25 MG tablet    amLODIPine (NORVASC) 2.5 MG tablet    Chronic systolic (congestive) heart failure (HCC)    Overview     12/27/2021 Akilah Rueda MD    Continue carvedilol, amlodipine, ramipril, Xarelto, and statin.    Let us know if there is a sudden unexplained weight gain of 3 pounds or more, or increased shortness of breath, or edema.         Relevant Medications    carvedilol (COREG) 25 MG tablet    amLODIPine (NORVASC) 2.5 MG tablet    Varicose veins of both lower extremities without ulcer or inflammation    Overview     12/27/2021 Akilah Rueda MD    Wear support hose daily.  Elevate feet when sitting at home.         Benign essential hypertension    Overview     12/27/2021 Akilah Rueda MD    Continue amlodipine twice a day.  Continue carvedilol and ramipril twice a day.  Continue to avoid salt in the diet.    Continue to monitor the blood pressures at home carefully.         Relevant Medications    carvedilol (COREG) 25 MG tablet    amLODIPine (NORVASC) 2.5 MG tablet    ramipril (ALTACE) 5 MG capsule       Gastrointestinal Abdominal     Abdominal wall bulge (Chronic)    Overview     12/27/2021 Akilah Rueda MD    Intermittent bulge of right lower quadrant abdominal wall without pain.  Mild thinning of the muscle layer in the area.  No hernia noted on exam today.    Patient will continue to monitor for pain or  discomfort.            Genitourinary and Reproductive     Urine malodor    Overview     Urinalysis was negative for infection.  Patient was advised to drink more fluids, especially water.         Relevant Orders    POC Urinalysis Dipstick, Automated (Completed)       Mental Health    Moderate episode of recurrent major depressive disorder (HCC) - Primary    Overview     12/27/2021 Akilah Rueda MD    Resume sertraline 100 mg tablet daily.      Physical activity and exercise also help decrease symptoms of stress, anxiety, and depression.         Relevant Medications    sertraline (ZOLOFT) 100 MG tablet       Musculoskeletal and Injuries    Age-related osteoporosis without current pathological fracture (Chronic)    Overview     12/27/2021 Akilah Rueda MD    DEXA 2017 revealed osteopenia with a T score in the spine of -1.9 and a T score in the hip of -2.1.      DEXA 10/19/2021 showed worsening of bone density.  T score at the hip is now in the osteoporosis range with a T score of -3.1.  Spine T score is -2.1.  (Osteoporosis is -2.5 or less.)    Start alendronate (Fosamax) tablet once a week.  Take it first thing in the morning with a glass of water, then wait 30 to 45 minutes before taking other medications, food, or other beverages.    Do some weightbearing exercises including walking and other exercises on the feet and using hand weights at home every day.  Continue taking calcium and vitamin D3.           Ganglion cyst of foot (Chronic)    Overview     12/27/2021 Akilah Rueda MD    Cyst over right great toe at the MTP joint.  Nontender.    We will continue to monitor.            Skin    Change of skin color (Chronic)    Overview     12/27/2021 Akilah Rueda MD    There is some discoloration of the skin at the point where she had a contusion and injury a year ago on the right shin.  Patient was reassured that it is not concerning.

## 2021-12-27 NOTE — PROGRESS NOTES
"[facility]     Jayda Lopes  1935   [id number]      Patient Care Team:  Akilah Rueda MD as PCP - General (Internal Medicine)  Akilah Rueda MD as PCP - Internal Medicine (Internal Medicine)  Bernadette Almodovar MD as Consulting Physician (Dermatology)  Hiren Kaufman MD as Consulting Physician (Cardiac Electrophysiology)    Chief Complaint   Patient presents with   • Hyperlipidemia   • Atrial Fibrillation   • Hypertension   • Foot Problem     rt (shin area) \"dark spot\" primarily big toe    • Abdominal Pain     possible hernia (RLQ)    • Urinary Tract Infection     malordor since xlast few months   • Medication Problem     discussion for fosamax            HPI  Patient is a 86 y.o. female presents with feels down and unmotivated for several weeks. She has been out of sertraline for 2 months-[facility] never sent it.  She thinks that is the reason.  She also feels that Covid and quarantining at home and the [place] has contributed to her dysthymia.    HPI  She would like me to look at the right lower quadrant hernia again today.  It is not painful.  Not enlarging.  She just wants to keep watch on it.    HPI  Right great toe has a hump on it now.  Her third right toe has for some time.  The great toe is not painful or red or swollen.    HPI  There is an overall dark area of skin about 1-1/2 inches on her right shin since she injured it a year ago when she was getting out [employer] decorations and tripped hitting her shin on the edge of the box.  It is not tender or painful.  No redness.  It just looks darker.    HPI  She has questions about alendronate today we had prescribed it earlier this year, but she had not started it yet.  Her questions were answered about how to take it.  She was reassured that it does not interact with any of her other medications.    CHRONIC CONDITIONS  Hyperlipidemia-taking atorvastatin and eating a low-fat diet.  She has maintained " her weight loss.    BPs at home 120s-130/70s-84 at home on amlodipine and carvedilol and ramipril.    Afib-taking zarelto and carvedilol. No chest pain or rapid hear beat.   No edema.    Past Medical History:   Diagnosis Date   • Acute bronchitis due to infection 1/10/2019   • Anxiety    • Atrial fibrillation (HCC)    • Atypical chest pain    • Bradycardia    • Contusion of right lower leg 1/5/2021 2/8/2021 Akilah Rueda MD  Contusion R shin after fall.  Improving.  There is still some tenderness to touch.     • GERD (gastroesophageal reflux disease)    • Hyperlipidemia    • Hypertension    • Impacted cerumen of right ear 7/20/2020    Continue debrox as directed.  She will need another week or two of drops to help soften.   • Macular degeneration    • Mitral valve prolapse    • Palpitation    • Right leg swelling 1/5/2021 1/5/2021 Akilah Rueda MD  Mild R lower leg swelling due to contusion and injury after fall.   Continue to elevate the leg several times a day.        Past Surgical History:   Procedure Laterality Date   • CYSTECTOMY      h/o Ovarian   • HYSTERECTOMY Bilateral    • OOPHORECTOMY Bilateral 1993   • THYROIDECTOMY      h/o thyroid surgery sub-total        Family History   Problem Relation Age of Onset   • Colon cancer Mother    • Other Mother         cardiac arrhythmia   • Heart failure Mother    • Cancer Mother    • Lung cancer Father    • Cancer Father    • No Known Problems Brother    • Hypertension Maternal Grandmother    • Breast cancer Neg Hx    • Ovarian cancer Neg Hx        Social History     Socioeconomic History   • Marital status:    Tobacco Use   • Smoking status: Never Smoker   • Smokeless tobacco: Never Used   Substance and Sexual Activity   • Alcohol use: Yes     Comment: rarely   • Drug use: Defer   • Sexual activity: Defer       Allergies   Allergen Reactions   • Phenazopyridine Hcl Unknown (See Comments)     Pyridium       Review of Systems:     Review of Systems  "  Constitutional: Negative for chills, fatigue and fever.   HENT: Negative for congestion, ear pain and sinus pressure.    Respiratory: Negative for cough, chest tightness, shortness of breath and wheezing.    Cardiovascular: Negative for chest pain, palpitations and leg swelling.   Gastrointestinal: Negative for abdominal pain, blood in stool and constipation.   Musculoskeletal: Positive for arthralgias. Negative for gait problem.   Skin: Positive for color change and skin lesions.   Allergic/Immunologic: Negative for environmental allergies.   Neurological: Negative for dizziness and headache.   Psychiatric/Behavioral: Positive for dysphoric mood and stress. Negative for suicidal ideas and depressed mood. The patient is nervous/anxious.        Vital Signs  Vitals:    12/27/21 1333   BP: 140/90   BP Location: Left arm   Patient Position: Sitting   Cuff Size: Adult   Pulse: 83   Resp: 16   Temp: 98.4 °F (36.9 °C)   TempSrc: Infrared   SpO2: 95%   Weight: 72.4 kg (159 lb 9.6 oz)   Height: 172.7 cm (68\")   PainSc: 0-No pain     Body mass index is 24.27 kg/m².  Patient's Body mass index is 24.27 kg/m². indicating that she is within normal range (BMI 18.5-24.9). No BMI management plan needed..        Current Outpatient Medications:   •  Aflibercept (EYLEA IO), Inject  into the eye Every 3 (Three) Months., Disp: , Rfl:   •  alendronate (FOSAMAX) 70 MG tablet, Take 1 tablet by mouth Every 7 (Seven) Days. (Patient taking differently: Take 70 mg by mouth Every 7 (Seven) Days. Pt has yet to start 12/27/21), Disp: 15 tablet, Rfl: 1  •  amLODIPine (NORVASC) 2.5 MG tablet, Take 1 tablet by mouth 2 (two) times a day., Disp: 180 tablet, Rfl: 3  •  atorvastatin (LIPITOR) 20 MG tablet, TAKE 1 TABLET EVERY EVENING, Disp: 90 tablet, Rfl: 3  •  carvedilol (COREG) 25 MG tablet, Take 1 tablet by mouth 2 (Two) Times a Day., Disp: 180 tablet, Rfl: 3  •  Cholecalciferol (VITAMIN D3) 2000 units capsule, Take 1 tablet by mouth Daily., Disp: " , Rfl:   •  docusate sodium (Colace) 100 MG capsule, Take 1 capsule by mouth 2 (Two) Times a Day. (Patient taking differently: Take 100 mg by mouth 2 (Two) Times a Day As Needed.), Disp: 60 capsule, Rfl: 5  •  esomeprazole (nexIUM) 20 MG capsule, TAKE 1 CAPSULE DAILY, Disp: 90 capsule, Rfl: 3  •  guaiFENesin (Mucinex) 600 MG 12 hr tablet, Take 2 tablets by mouth 2 (Two) Times a Day. (Patient taking differently: Take 1,200 mg by mouth Daily As Needed.), Disp: 180 tablet, Rfl: 1  •  Multiple Vitamins-Calcium (VIACTIV MULTI-VITAMIN) chewable tablet, Chew 2 tablets Daily., Disp: , Rfl:   •  Multiple Vitamins-Minerals (PRESERVISION AREDS 2 PO), Take 2 tablets by mouth Daily., Disp: , Rfl:   •  ramipril (ALTACE) 5 MG capsule, TAKE 1 CAPSULE EVERY MORNING AND 2 CAPSULES EVERY EVENING, Disp: 270 capsule, Rfl: 3  •  sertraline (ZOLOFT) 100 MG tablet, Take 1 tablet by mouth Daily., Disp: 90 tablet, Rfl: 3  •  Xarelto 20 MG tablet, TAKE 1 TABLET DAILY AS DIRECTED, Disp: 90 tablet, Rfl: 3  •  timolol (TIMOPTIC) 0.5 % ophthalmic solution, , Disp: , Rfl:     Physical Exam:    Physical Exam  Vitals and nursing note reviewed.   Constitutional:       Appearance: She is well-developed and normal weight.   HENT:      Head: Normocephalic.   Eyes:      Conjunctiva/sclera: Conjunctivae normal.      Pupils: Pupils are equal, round, and reactive to light.   Neck:      Thyroid: No thyromegaly.   Cardiovascular:      Rate and Rhythm: Normal rate and regular rhythm.      Heart sounds: Normal heart sounds.   Pulmonary:      Effort: Pulmonary effort is normal.      Breath sounds: Normal breath sounds. No wheezing.   Abdominal:      General: Bowel sounds are normal.      Palpations: Abdomen is soft. There is no hepatomegaly or splenomegaly.      Tenderness: There is no abdominal tenderness.       Musculoskeletal:         General: Normal range of motion.      Cervical back: Normal range of motion and neck supple.      Right foot: Deformity  present.        Feet:    Lymphadenopathy:      Cervical: No cervical adenopathy.   Skin:     General: Skin is warm and dry.          Neurological:      Mental Status: She is alert and oriented to person, place, and time.   Psychiatric:         Thought Content: Thought content normal.          ACE III MINI        Results Review:    None    CMP:  Lab Results   Component Value Date    BUN 11 07/21/2021    CREATININE 0.71 07/21/2021    EGFRIFNONA 78 07/21/2021    BCR 15.5 07/21/2021     07/21/2021    K 4.3 07/21/2021    CO2 31.0 (H) 07/21/2021    CALCIUM 8.8 07/21/2021    ALBUMIN 3.90 07/21/2021    BILITOT 0.6 07/21/2021    ALKPHOS 102 07/21/2021    AST 22 07/21/2021    ALT 17 07/21/2021     HbA1c:  Lab Results   Component Value Date    HGBA1C 6.0 09/07/2014    HGBA1C 6.1 (H) 05/14/2014     Microalbumin:  Lab Results   Component Value Date    MICROALBUR <1.2 07/21/2021     Lipid Panel  Lab Results   Component Value Date    CHOL 159 07/21/2021    TRIG 54 07/21/2021    HDL 61 (H) 07/21/2021    LDL 87 07/21/2021    AST 22 07/21/2021    ALT 17 07/21/2021       Medication Review: Medications reviewed and noted  Patient Instructions   Problem List Items Addressed This Visit        Cardiac and Vasculature    Mixed hyperlipidemia (Chronic)    Overview     12/27/2021 Akilah Rueda MD    Continue atorvastatin every evening. Continue low fat diet. Increase regular exercise.         Relevant Medications    atorvastatin (LIPITOR) 20 MG tablet    Permanent atrial fibrillation (HCC)    Overview     12/27/2021 Akilah Rueda MD    Continue Xarelto and carvedilol.  Continue regular follow-up with the cardiologist.         Relevant Medications    Xarelto 20 MG tablet    carvedilol (COREG) 25 MG tablet    amLODIPine (NORVASC) 2.5 MG tablet    Chronic systolic (congestive) heart failure (HCC)    Overview     12/27/2021 Akilah Rueda MD    Continue carvedilol, amlodipine, ramipril, Xarelto, and statin.    Let us know if  there is a sudden unexplained weight gain of 3 pounds or more, or increased shortness of breath, or edema.         Relevant Medications    carvedilol (COREG) 25 MG tablet    amLODIPine (NORVASC) 2.5 MG tablet    Varicose veins of both lower extremities without ulcer or inflammation    Overview     12/27/2021 Akilah Rueda MD    Wear support hose daily.  Elevate feet when sitting at home.         Benign essential hypertension    Overview     12/27/2021 Akilah Rueda MD    Continue amlodipine twice a day.  Continue carvedilol and ramipril twice a day.  Continue to avoid salt in the diet.    Continue to monitor the blood pressures at home carefully.         Relevant Medications    carvedilol (COREG) 25 MG tablet    amLODIPine (NORVASC) 2.5 MG tablet    ramipril (ALTACE) 5 MG capsule       Gastrointestinal Abdominal     Abdominal wall bulge (Chronic)    Overview     12/27/2021 Akilah Rueda MD    Intermittent bulge of right lower quadrant abdominal wall without pain.  Mild thinning of the muscle layer in the area.  No hernia noted on exam today.    Patient will continue to monitor for pain or discomfort.            Genitourinary and Reproductive     Urine malodor    Overview     Urinalysis was negative for infection.  Patient was advised to drink more fluids, especially water.         Relevant Orders    POC Urinalysis Dipstick, Automated (Completed)       Mental Health    Moderate episode of recurrent major depressive disorder (HCC) - Primary    Overview     12/27/2021 Akilah Rueda MD    Resume sertraline 100 mg tablet daily.      Physical activity and exercise also help decrease symptoms of stress, anxiety, and depression.         Relevant Medications    sertraline (ZOLOFT) 100 MG tablet       Musculoskeletal and Injuries    Age-related osteoporosis without current pathological fracture (Chronic)    Overview     12/27/2021 Akilah Rueda MD    DEXA 2017 revealed osteopenia with a T score in the  spine of -1.9 and a T score in the hip of -2.1.      DEXA 10/19/2021 showed worsening of bone density.  T score at the hip is now in the osteoporosis range with a T score of -3.1.  Spine T score is -2.1.  (Osteoporosis is -2.5 or less.)    Start alendronate (Fosamax) tablet once a week.  Take it first thing in the morning with a glass of water, then wait 30 to 45 minutes before taking other medications, food, or other beverages.    Do some weightbearing exercises including walking and other exercises on the feet and using hand weights at home every day.  Continue taking calcium and vitamin D3.           Ganglion cyst of foot (Chronic)    Overview     12/27/2021 Akilah Rueda MD    Cyst over right great toe at the MTP joint.  Nontender.    We will continue to monitor.            Skin    Change of skin color (Chronic)    Overview     12/27/2021 Akilah Rueda MD    There is some discoloration of the skin at the point where she had a contusion and injury a year ago on the right shin.  Patient was reassured that it is not concerning.                  Diagnosis Plan   1. Moderate episode of recurrent major depressive disorder (HCC)  sertraline (ZOLOFT) 100 MG tablet   2. Mixed hyperlipidemia     3. Benign essential hypertension     4. Age-related osteoporosis without current pathological fracture     5. Change of skin color     6. Ganglion cyst of foot     7. Permanent atrial fibrillation (HCC)     8. Abdominal wall bulge     9. Chronic systolic (congestive) heart failure (HCC)     10. Urine malodor  POC Urinalysis Dipstick, Automated   11. Varicose veins of both lower extremities without ulcer or inflammation             Plan of care reviewed with patient at the conclusion of today's visit. Education was provided regarding diagnosis, management, and any prescribed or recommended OTC medications.Patient verbalizes understanding of and agreement with management plan.         Akilah Rueda MD

## 2021-12-27 NOTE — PROGRESS NOTES
Patient was informed of no urinary infection and advised to increase fluids especially water - pt vu

## 2021-12-29 ENCOUNTER — TELEPHONE (OUTPATIENT)
Dept: INTERNAL MEDICINE | Facility: CLINIC | Age: 86
End: 2021-12-29

## 2021-12-29 RX ORDER — DEXTROMETHORPHAN HYDROBROMIDE AND PROMETHAZINE HYDROCHLORIDE 15; 6.25 MG/5ML; MG/5ML
5 SYRUP ORAL 4 TIMES DAILY PRN
Qty: 240 ML | Refills: 1 | Status: SHIPPED | OUTPATIENT
Start: 2021-12-29

## 2021-12-29 NOTE — TELEPHONE ENCOUNTER
Caller: Jayda Lopes    Relationship: Self    Best call back number: [phone]    What medication are you requesting: COUGH SYRUP     What are your current symptoms: COUGH     How long have you been experiencing symptoms: 2 Days     Have you had these symptoms before:    [x] Yes  [] No    Have you been treated for these symptoms before:   [x] Yes  [] No    If a prescription is needed, what is your preferred pharmacy and phone number: [id number] [place], [place] - [address] [place] [address] - [phone]  - [phone]      Additional notes: PATIENT WILL BE GOING ON VACATION TO [place]

## 2022-01-05 PROCEDURE — 93296 REM INTERROG EVL PM/IDS: CPT | Performed by: INTERNAL MEDICINE

## 2022-01-05 PROCEDURE — 93294 REM INTERROG EVL PM/LDLS PM: CPT | Performed by: INTERNAL MEDICINE

## 2022-02-23 DIAGNOSIS — F33.1 MODERATE EPISODE OF RECURRENT MAJOR DEPRESSIVE DISORDER: ICD-10-CM

## 2022-02-23 RX ORDER — SERTRALINE HYDROCHLORIDE 100 MG/1
100 TABLET, FILM COATED ORAL DAILY
Qty: 90 TABLET | Refills: 3 | Status: CANCELLED | OUTPATIENT
Start: 2022-02-23

## 2022-02-23 NOTE — TELEPHONE ENCOUNTER
Caller: Jayda Lopes    Relationship: Self    Best call back number: [phone]    Requested Prescriptions:   Requested Prescriptions     Pending Prescriptions Disp Refills   • sertraline (ZOLOFT) 100 MG tablet 90 tablet 3     Sig: Take 1 tablet by mouth Daily.        Pharmacy where request should be sent:  [facility] [phone]    Additional details provided by patient: PATIENT OUT OF MEDICATION. 90 DAY SUPPLY    Does the patient have less than a 3 day supply:  [x] Yes  [] No    Ema Godoy Rep   02/23/22 11:26 EST

## 2022-02-23 NOTE — TELEPHONE ENCOUNTER
Notified patient sertraline was sent 12/27/21 with 90 day supply and a years worth of refills. She will call them for a refill.

## 2022-03-08 DIAGNOSIS — E78.2 MIXED HYPERLIPIDEMIA: ICD-10-CM

## 2022-03-08 RX ORDER — ATORVASTATIN CALCIUM 20 MG/1
TABLET, FILM COATED ORAL
Qty: 90 TABLET | Refills: 3 | Status: SHIPPED | OUTPATIENT
Start: 2022-03-08

## 2022-03-28 DIAGNOSIS — I48.20 CHRONIC ATRIAL FIBRILLATION: ICD-10-CM

## 2022-03-28 RX ORDER — RIVAROXABAN 20 MG/1
TABLET, FILM COATED ORAL
Qty: 90 TABLET | Refills: 3 | Status: SHIPPED | OUTPATIENT
Start: 2022-03-28 | End: 2023-03-21

## 2022-04-06 PROCEDURE — 93296 REM INTERROG EVL PM/IDS: CPT | Performed by: INTERNAL MEDICINE

## 2022-04-06 PROCEDURE — 93294 REM INTERROG EVL PM/LDLS PM: CPT | Performed by: INTERNAL MEDICINE

## 2022-07-22 ENCOUNTER — LAB (OUTPATIENT)
Dept: LAB | Facility: HOSPITAL | Age: 87
End: 2022-07-22

## 2022-07-22 ENCOUNTER — OFFICE VISIT (OUTPATIENT)
Dept: INTERNAL MEDICINE | Facility: CLINIC | Age: 87
End: 2022-07-22

## 2022-07-22 VITALS
TEMPERATURE: 97.8 F | HEIGHT: 67 IN | BODY MASS INDEX: 25.58 KG/M2 | DIASTOLIC BLOOD PRESSURE: 76 MMHG | OXYGEN SATURATION: 96 % | SYSTOLIC BLOOD PRESSURE: 130 MMHG | WEIGHT: 163 LBS | HEART RATE: 80 BPM

## 2022-07-22 DIAGNOSIS — I10 BENIGN ESSENTIAL HYPERTENSION: ICD-10-CM

## 2022-07-22 DIAGNOSIS — Z00.00 MEDICARE ANNUAL WELLNESS VISIT, SUBSEQUENT: Primary | ICD-10-CM

## 2022-07-22 DIAGNOSIS — E53.8 B12 DEFICIENCY: ICD-10-CM

## 2022-07-22 DIAGNOSIS — E55.9 VITAMIN D DEFICIENCY: ICD-10-CM

## 2022-07-22 DIAGNOSIS — K59.01 CONSTIPATION, SLOW TRANSIT: ICD-10-CM

## 2022-07-22 DIAGNOSIS — E78.2 MIXED HYPERLIPIDEMIA: Chronic | ICD-10-CM

## 2022-07-22 DIAGNOSIS — R10.2 PELVIC PRESSURE IN FEMALE: Chronic | ICD-10-CM

## 2022-07-22 DIAGNOSIS — R05.3 CHRONIC COUGH: Chronic | ICD-10-CM

## 2022-07-22 DIAGNOSIS — I48.21 PERMANENT ATRIAL FIBRILLATION: ICD-10-CM

## 2022-07-22 DIAGNOSIS — F33.1 MODERATE EPISODE OF RECURRENT MAJOR DEPRESSIVE DISORDER: ICD-10-CM

## 2022-07-22 DIAGNOSIS — Z00.00 ANNUAL PHYSICAL EXAM: ICD-10-CM

## 2022-07-22 DIAGNOSIS — J30.1 SEASONAL ALLERGIC RHINITIS DUE TO POLLEN: ICD-10-CM

## 2022-07-22 DIAGNOSIS — Z91.81 AT MODERATE RISK FOR FALL: Chronic | ICD-10-CM

## 2022-07-22 DIAGNOSIS — M81.0 AGE-RELATED OSTEOPOROSIS WITHOUT CURRENT PATHOLOGICAL FRACTURE: Chronic | ICD-10-CM

## 2022-07-22 PROBLEM — M21.611 BUNION, RIGHT FOOT: Chronic | Status: ACTIVE | Noted: 2022-07-22

## 2022-07-22 PROBLEM — K40.90 NON-RECURRENT UNILATERAL INGUINAL HERNIA WITHOUT OBSTRUCTION OR GANGRENE: Chronic | Status: ACTIVE | Noted: 2022-07-22

## 2022-07-22 LAB
BASOPHILS # BLD AUTO: 0.07 10*3/MM3 (ref 0–0.2)
BASOPHILS NFR BLD AUTO: 1.1 % (ref 0–1.5)
BILIRUB UR QL STRIP: NEGATIVE
CLARITY UR: ABNORMAL
COLOR UR: YELLOW
DEPRECATED RDW RBC AUTO: 44.2 FL (ref 37–54)
EOSINOPHIL # BLD AUTO: 0.2 10*3/MM3 (ref 0–0.4)
EOSINOPHIL NFR BLD AUTO: 3.2 % (ref 0.3–6.2)
ERYTHROCYTE [DISTWIDTH] IN BLOOD BY AUTOMATED COUNT: 13 % (ref 12.3–15.4)
GLUCOSE UR STRIP-MCNC: NEGATIVE MG/DL
HCT VFR BLD AUTO: 38.4 % (ref 34–46.6)
HGB BLD-MCNC: 12.8 G/DL (ref 12–15.9)
HGB UR QL STRIP.AUTO: NEGATIVE
IMM GRANULOCYTES # BLD AUTO: 0.01 10*3/MM3 (ref 0–0.05)
IMM GRANULOCYTES NFR BLD AUTO: 0.2 % (ref 0–0.5)
KETONES UR QL STRIP: NEGATIVE
LEUKOCYTE ESTERASE UR QL STRIP.AUTO: ABNORMAL
LYMPHOCYTES # BLD AUTO: 1.75 10*3/MM3 (ref 0.7–3.1)
LYMPHOCYTES NFR BLD AUTO: 27.7 % (ref 19.6–45.3)
MCH RBC QN AUTO: 31.4 PG (ref 26.6–33)
MCHC RBC AUTO-ENTMCNC: 33.3 G/DL (ref 31.5–35.7)
MCV RBC AUTO: 94.1 FL (ref 79–97)
MONOCYTES # BLD AUTO: 0.53 10*3/MM3 (ref 0.1–0.9)
MONOCYTES NFR BLD AUTO: 8.4 % (ref 5–12)
NEUTROPHILS NFR BLD AUTO: 3.76 10*3/MM3 (ref 1.7–7)
NEUTROPHILS NFR BLD AUTO: 59.4 % (ref 42.7–76)
NITRITE UR QL STRIP: POSITIVE
NRBC BLD AUTO-RTO: 0 /100 WBC (ref 0–0.2)
PH UR STRIP.AUTO: 7.5 [PH] (ref 5–8)
PLATELET # BLD AUTO: 214 10*3/MM3 (ref 140–450)
PMV BLD AUTO: 11 FL (ref 6–12)
PROT UR QL STRIP: ABNORMAL
RBC # BLD AUTO: 4.08 10*6/MM3 (ref 3.77–5.28)
SP GR UR STRIP: 1.02 (ref 1–1.03)
UROBILINOGEN UR QL STRIP: ABNORMAL
WBC NRBC COR # BLD: 6.32 10*3/MM3 (ref 3.4–10.8)

## 2022-07-22 PROCEDURE — 82043 UR ALBUMIN QUANTITATIVE: CPT

## 2022-07-22 PROCEDURE — G0439 PPPS, SUBSEQ VISIT: HCPCS | Performed by: INTERNAL MEDICINE

## 2022-07-22 PROCEDURE — 80061 LIPID PANEL: CPT

## 2022-07-22 PROCEDURE — 82570 ASSAY OF URINE CREATININE: CPT

## 2022-07-22 PROCEDURE — 81001 URINALYSIS AUTO W/SCOPE: CPT

## 2022-07-22 PROCEDURE — 1159F MED LIST DOCD IN RCRD: CPT | Performed by: INTERNAL MEDICINE

## 2022-07-22 PROCEDURE — 1170F FXNL STATUS ASSESSED: CPT | Performed by: INTERNAL MEDICINE

## 2022-07-22 PROCEDURE — 80053 COMPREHEN METABOLIC PANEL: CPT

## 2022-07-22 PROCEDURE — 82306 VITAMIN D 25 HYDROXY: CPT

## 2022-07-22 PROCEDURE — 82607 VITAMIN B-12: CPT

## 2022-07-22 PROCEDURE — 84443 ASSAY THYROID STIM HORMONE: CPT

## 2022-07-22 PROCEDURE — 99397 PER PM REEVAL EST PAT 65+ YR: CPT | Performed by: INTERNAL MEDICINE

## 2022-07-22 PROCEDURE — 85025 COMPLETE CBC W/AUTO DIFF WBC: CPT

## 2022-07-22 RX ORDER — ALENDRONATE SODIUM 70 MG/1
70 TABLET ORAL
Qty: 15 TABLET | Refills: 1 | Status: SHIPPED | OUTPATIENT
Start: 2022-07-22

## 2022-07-22 RX ORDER — TRIAMCINOLONE ACETONIDE 55 UG/1
2 SPRAY, METERED NASAL DAILY
Qty: 16.5 G | Refills: 11 | Status: SHIPPED | OUTPATIENT
Start: 2022-07-22 | End: 2022-11-09

## 2022-07-22 RX ORDER — DOCUSATE SODIUM 100 MG/1
100 CAPSULE, LIQUID FILLED ORAL 2 TIMES DAILY
Qty: 60 CAPSULE | Refills: 5
Start: 2022-07-22

## 2022-07-22 NOTE — PROGRESS NOTES
The ABCs of the Annual Wellness Visit  Initial Medicare Wellness Visit    Chief Complaint   Patient presents with   • Medicare Wellness-subsequent   • Hypertension       Subjective   History of Present Illness:  Jayda Lopes is a 86 y.o. female who presents for an Initial Medicare Wellness Visit.    Fall  The patient reports she had a fall in 01/2022 while in [place]. She states she was stepping up on the sidewalk and missed the step. She notes she hurt her knee; however, it is doing fine now. She admits she has had numbness in the area of the right shin ever since. She adds she does not have trouble going up steps. She states she tries to walk for exercise some. She reports she does not go to the gym with her  who goes regularly.    Hypertension  The patient states she checks her blood pressure at home. She reports it is usually around 120 over 70. She notes sometimes it is a little lower. She admits she is taking amlodipine twice a day, carvedilol twice a day, and ramipril twice a day. She denies any side effects with any of her medications.     Atrial fibrillation  The patient has a pacemaker. She denies any rapid heartbeat. She states she has a pacemaker. She reports when she gets up and does something, her heartbeat beats faster. She notes the moment she sits back down, it goes right back into the 70 beats a minute. She admits when she is up moving around, it does not go more than 100 beats a minute. She adds she drinks a couple of cups of coffee in the morning; however, it is half and half. She states if she drinks any other during the day, it is always decaf. She reports she tries to eat low sugar and low fat. She notes since she lost 40 pounds, she has tried to watch it.    Vaginal discomfort  She states when she goes to the bathroom, if she strains, it causes her whole bottom to feel uncomfortable. She reports she does not feel anything poking out from the vaginal area. She adds she does  have a  "hernia in the right lingual area, which she has had a couple years.  She notes when she wipes, she does not feel like a ball coming out. She admits it is mainly after straining. She denies feeling it when she is just walking. She adds it itself does not hurt. She reports she has not been taking the docusate sodium. She notes she trying to intake a lot of fiber and drink a lot of water. She admits for a while, she was not doing it on a regular basis and it was hard to go to the bathroom. She states she thought she would start doing that. She reports she does not like it. She notes she does not lift heavy things.     Bunion  She does have a bunion on her right foot. She states it does not hurt.    Allergies  She states she coughs year round. She will sneeze like 8 to 10 times. She reports her nose runs \"like a faucet.\" She feels drainage down the back of her throat.     Immunizations  She is due for a tetanus shot. She has not had a shingles vaccine.   CHRONIC CONDITIONS:    HEALTH RISK ASSESSMENT    Recent Hospitalizations:  No hospitalization(s) within the last year.    Current Medical Providers:  Patient Care Team:  Akilah Rueda MD as PCP - General (Internal Medicine)  Akilah Rueda MD as PCP - Internal Medicine (Internal Medicine)  Bernadette Almodovar MD as Consulting Physician (Dermatology)  Hiren Kaufman MD as Consulting Physician (Cardiac Electrophysiology)    Smoking Status:  Social History     Tobacco Use   Smoking Status Never Smoker   Smokeless Tobacco Never Used       Alcohol Consumption:  Social History     Substance and Sexual Activity   Alcohol Use Yes    Comment: rarely       Depression Screen:   PHQ-2/PHQ-9 Depression Screening 7/22/2022   Retired PHQ-9 Total Score -   Retired Total Score -   Little Interest or Pleasure in Doing Things 0-->not at all   Feeling Down, Depressed or Hopeless 0-->not at all   PHQ-9: Brief Depression Severity Measure Score 0       Fall Risk Screen:  KAYA" Fall Risk Assessment was completed, and patient is at MODERATE risk for falls. Assessment completed on:7/22/2022    Health Habits and Functional and Cognitive Screening:  Functional & Cognitive Status 7/22/2022   Do you have difficulty preparing food and eating? No   Do you have difficulty bathing yourself, getting dressed or grooming yourself? No   Do you have difficulty using the toilet? No   Do you have difficulty moving around from place to place? No   Do you have trouble with steps or getting out of a bed or a chair? No   Current Diet Well Balanced Diet   Dental Exam Up to date   Eye Exam Up to date   Exercise (times per week) 0 times per week   Current Exercises Include No Regular Exercise        Exercise Comment -   Current Exercise Activities Include -   Do you need help using the phone?  No   Are you deaf or do you have serious difficulty hearing?  No   Do you need help with transportation? No   Do you need help shopping? No   Do you need help preparing meals?  No   Do you need help with housework?  No   Do you need help with laundry? No   Do you need help taking your medications? No   Do you need help managing money? No   Do you ever drive or ride in a car without wearing a seat belt? No   Have you felt unusual stress, anger or loneliness in the last month? No   Who do you live with? Spouse   If you need help, do you have trouble finding someone available to you? No   Have you been bothered in the last four weeks by sexual problems? No   Do you have difficulty concentrating, remembering or making decisions? No       Does the patient have evidence of cognitive impairment? No    Asprin use counseling:Does not need ASA (and currently is not on it)    Age-appropriate Screening Schedule:  Refer to the list below for future screening recommendations based on patient's age, sex and/or medical conditions. Orders for these recommended tests are listed in the plan section. The patient has been provided with a  written plan.    Health Maintenance   Topic Date Due   • ZOSTER VACCINE (2 of 3) 09/18/2015   • TDAP/TD VACCINES (2 - Td or Tdap) 12/15/2020   • INFLUENZA VACCINE  10/01/2022   • LIPID PANEL  07/22/2023   • DXA SCAN  10/19/2023        The following portions of the patient's history were reviewed and updated as appropriate: allergies, current medications, past family history, past medical history, past social history, past surgical history and problem list.    Outpatient Medications Prior to Visit   Medication Sig Dispense Refill   • Aflibercept (EYLEA IO) Inject  into the eye Every 3 (Three) Months.     • amLODIPine (NORVASC) 2.5 MG tablet Take 1 tablet by mouth 2 (two) times a day. 180 tablet 3   • atorvastatin (LIPITOR) 20 MG tablet TAKE 1 TABLET EVERY EVENING 90 tablet 3   • carvedilol (COREG) 25 MG tablet Take 1 tablet by mouth 2 (Two) Times a Day. 180 tablet 3   • Cholecalciferol (VITAMIN D3) 2000 units capsule Take 1 tablet by mouth Daily.     • esomeprazole (nexIUM) 20 MG capsule TAKE 1 CAPSULE DAILY (Patient taking differently: 1 cap every other day) 90 capsule 3   • guaiFENesin (Mucinex) 600 MG 12 hr tablet Take 2 tablets by mouth 2 (Two) Times a Day. (Patient taking differently: Take 1,200 mg by mouth Daily As Needed.) 180 tablet 1   • Multiple Vitamins-Calcium (VIACTIV MULTI-VITAMIN) chewable tablet Chew 2 tablets Daily.     • Multiple Vitamins-Minerals (PRESERVISION AREDS 2 PO) Take 2 tablets by mouth Daily.     • promethazine-dextromethorphan (PROMETHAZINE-DM) 6.25-15 MG/5ML syrup Take 5 mL by mouth 4 (Four) Times a Day As Needed for Cough. 240 mL 1   • ramipril (ALTACE) 5 MG capsule TAKE 1 CAPSULE EVERY MORNING AND 2 CAPSULES EVERY EVENING 270 capsule 3   • sertraline (ZOLOFT) 100 MG tablet Take 1 tablet by mouth Daily. 90 tablet 3   • timolol (TIMOPTIC) 0.5 % ophthalmic solution      • Xarelto 20 MG tablet TAKE 1 TABLET DAILY AS DIRECTED 90 tablet 3   • docusate sodium (Colace) 100 MG capsule Take 1  capsule by mouth 2 (Two) Times a Day. (Patient taking differently: Take 100 mg by mouth 2 (Two) Times a Day As Needed.) 60 capsule 5   • alendronate (FOSAMAX) 70 MG tablet Take 1 tablet by mouth Every 7 (Seven) Days. (Patient taking differently: Take 70 mg by mouth Every 7 (Seven) Days. Pt has yet to start 12/27/21) 15 tablet 1     No facility-administered medications prior to visit.       Patient Active Problem List   Diagnosis   • Permanent atrial fibrillation (HCC)   • Palpitation   • Atypical chest pain   • GERD without esophagitis   • Macular degeneration (senile) of retina   • Bradycardia   • Cortical age-related cataract of both eyes   • Chronic cough   • Non-rheumatic mitral regurgitation   • Seasonal allergic rhinitis due to pollen   • Chronic systolic (congestive) heart failure (HCC)   • Dilated cardiomyopathy (HCC)   • Primary open angle glaucoma (POAG) of both eyes, mild stage   • Moderate episode of recurrent major depressive disorder (HCC)   • Generalized anxiety disorder   • B12 deficiency   • Age-related osteoporosis without current pathological fracture   • Osteoarthritis   • Varicose veins of both lower extremities without ulcer or inflammation   • Vitamin D deficiency   • Benign essential hypertension   • AV block   • Mixed hyperlipidemia   • Constipation, slow transit   • Simple chronic bronchitis (HCC)   • Chronic right-sided low back pain with right-sided sciatica   • Shortness of breath   • Pain of right hip joint   • Trochanteric bursitis of right hip   • Right leg pain   • Anterior tibial tendonitis, right   • Poor balance   • Folliculitis   • Presbycusis of both ears   • Fall from slip, trip, or stumble, initial encounter   • At moderate risk for fall   • Hearing loss of right ear   • Medicare annual wellness visit, subsequent   • Annual physical exam   • Change of skin color   • Ganglion cyst of foot   • Bunion, right foot   • Non-recurrent unilateral inguinal hernia without obstruction or  "gangrene   • Pelvic pressure in female       Advanced Care Planning:  ACP discussion was held with the patient during this visit. Patient has an advance directive (not in EMR), copy requested.    Review of Systems    Compared to one year ago, the patient feels her physical health is the same.  Compared to one year ago, the patient feels her mental health is the same.    Reviewed chart for potential of high risk medication in the elderly: yes  Reviewed chart for potential of harmful drug interactions in the elderly:yes    Objective         Vitals:    07/22/22 0955   BP: 130/76   BP Location: Left arm   Patient Position: Sitting   Cuff Size: Adult   Pulse: 80   Temp: 97.8 °F (36.6 °C)   TempSrc: Infrared   SpO2: 96%   Weight: 73.9 kg (163 lb)  Comment: pt reported   Height: 170.2 cm (67\")   PainSc: 0-No pain     BMI is >= 25 and <30. (Overweight) The following options were offered after discussion;: weight loss educational material (shared in after visit summary), exercise counseling/recommendations and nutrition counseling/recommendations    Body mass index is 25.53 kg/m².  Discussed the patient's BMI with her. The BMI is in the acceptable range.    Physical Exam    Lab Results   Component Value Date    TRIG 65 07/22/2022    HDL 65 (H) 07/22/2022    LDL 82 07/22/2022    VLDL 13 07/22/2022        Assessment & Plan   Medicare Risks and Personalized Health Plan  CMS Preventative Services Quick Reference  Cardiovascular risk  Fall Risk  Lung Cancer Risk    The above risks/problems have been discussed with the patient.  Pertinent information has been shared with the patient in the After Visit Summary.  Follow up plans and orders are seen below in the Assessment/Plan Section.  Patient Instructions   Problem List Items Addressed This Visit        Advance Directives and General Issues    At moderate risk for fall (Chronic)       Allergies and Adverse Reactions    Seasonal allergic rhinitis due to pollen    Current Assessment " & Plan     Use Nasacort 2 sprays in each nostril daily.              Cardiac and Vasculature    Mixed hyperlipidemia (Chronic)    Overview     Taking atorvastatin every evening.            Relevant Medications    atorvastatin (LIPITOR) 20 MG tablet    Other Relevant Orders    Lipid Panel (Completed)    TSH (Completed)    Permanent atrial fibrillation (HCC)    Overview     Taking Xarelto and carvedilol.  Sees Dr. Kaufman.            Relevant Medications    carvedilol (COREG) 25 MG tablet    amLODIPine (NORVASC) 2.5 MG tablet    Xarelto 20 MG tablet    Benign essential hypertension    Overview     Taking amlodipine and carvedilol and ramipril twice a day.             Relevant Medications    carvedilol (COREG) 25 MG tablet    amLODIPine (NORVASC) 2.5 MG tablet    ramipril (ALTACE) 5 MG capsule    Other Relevant Orders    Comprehensive Metabolic Panel (Completed)    CBC & Differential (Completed)    Urinalysis With Microscopic - Urine, Clean Catch (Completed)    Microalbumin / Creatinine Urine Ratio - Urine, Clean Catch (Completed)       Endocrine and Metabolic    B12 deficiency    Relevant Orders    Vitamin B12 (Completed)    Vitamin D deficiency    Relevant Orders    Vitamin D 25 Hydroxy (Completed)       Gastrointestinal Abdominal     Pelvic pressure in female (Chronic)    Constipation, slow transit    Overview     Has not tried 2 Colace tablets a day.                Health Encounters    Annual physical exam    Overview     7/21/2021 Akilah Rueda MD    Patient is up-to-date on all vaccinations except for tetanus which she was advised to get at her pharmacy..    DEXA ordered today.             Medicare annual wellness visit, subsequent - Primary       Mental Health    Moderate episode of recurrent major depressive disorder (HCC)    Overview     Taking sertraline 100 mg tablet daily.                 Relevant Medications    sertraline (ZOLOFT) 100 MG tablet       Musculoskeletal and Injuries    Age-related  osteoporosis without current pathological fracture (Chronic)    Overview     DEXA 2017 revealed osteopenia with a T score in the spine of -1.9 and a T score in the hip of -2.1.      DEXA 10/19/2021 showed worsening of bone density.  T score at the hip is now in the osteoporosis range with a T score of -3.1.  Spine T score is -2.1.  (Osteoporosis is -2.5 or less.)    7/2022 Start alendronate (Fosamax) tablet once a week.                Pulmonary and Pneumonias    Chronic cough (Chronic)    Overview                      Fall risk.  Fall prevention education given.    Hypertension.  Continue amlodipine twice daily, carvedilol twice daily, and ramipril twice daily. Continue to monitor blood pressure at home. Continue to avoid salt in her diet.    Atrial fibrillation.  Continue Xarelto and carvedilol. Continue to follow up with Dr. Valente.     Hyperlipidemia.  Continue atorvastatin every evening. Continue low-fat diet, start regular exercise, and walking.    Osteoporosis.  Start taking alendronate once weekly, first thing in the morning with a full glass of water, then wait about 30 minutes before taking her other pills, and wait 30 minutes for any other beverages or food. Increase weight-bearing exercises. Walk more often. Use small hand weights while sitting at home.     Right inguinal hernia.  Continue to monitor.  She will let us know if she starts having pain or feels that the hernia is enlarging.    Pelvic pressure   If she continues to feel a lot of pressure in the pelvic area, she will let me know, and we could have her see one of the gynecologist and check it out.    Chronic cough.  Start Nasacort 2 sprays in each nostril daily. May also take Mucinex as needed for thick postnasal drainage.    Bunion.  Continue to wear shoes that are wide in the toebox.    Immunizations.  She is due for a tetanus vaccine. She has been 10 years since her last one. She has not had the new shingles vaccine. She will get the  tetanus first, and then wait a month before getting the shingles vaccine.    Vitamin D deficiency  Recheck levels today.    Constipation.  Take two Colace (docusate sodium) 100 mg tablets per day, drink lots of water.  She will follow up in 6 months.    Depression.  Continue sertraline daily. Physical exercise also to help decrease symptoms of stress, anxiety, and mood.     Medicare annual wellness.  She was advised to get Tdap (tetanus, diphtheria, whooping cough) vaccination at her pharmacy, then wait a month, then get Shingrix immunization. She will get the flu shot in early fall. She is up to date on DEXA.        Follow Up:  Return in about 6 months (around 1/22/2023) for recheck fasting.     An After Visit Summary and PPPS were given to the patient.       Transcribed from ambient dictation for Akilah Rueda MD by JALEEL GREY.  07/22/22   15:18 EDT    Patient verbalized consent to the visit recording.  I have personally performed the services described in this document as transcribed by the above individual, and it is both accurate and complete.  Akilah Rueda MD  7/24/2022  15:46 EDT

## 2022-07-22 NOTE — PATIENT INSTRUCTIONS
Patient Instructions   Problem List Items Addressed This Visit          Advance Directives and General Issues    At moderate risk for fall (Chronic)       Allergies and Adverse Reactions    Seasonal allergic rhinitis due to pollen    Current Assessment & Plan     Use Nasacort 2 sprays in each nostril daily.              Cardiac and Vasculature    Mixed hyperlipidemia (Chronic)    Overview     Taking atorvastatin every evening.            Relevant Medications    atorvastatin (LIPITOR) 20 MG tablet    Other Relevant Orders    Lipid Panel (Completed)    TSH (Completed)    Permanent atrial fibrillation (HCC)    Overview     Taking Xarelto and carvedilol.  Sees Dr. Kaufman.            Relevant Medications    carvedilol (COREG) 25 MG tablet    amLODIPine (NORVASC) 2.5 MG tablet    Xarelto 20 MG tablet    Benign essential hypertension    Overview     Taking amlodipine and carvedilol and ramipril twice a day.             Relevant Medications    carvedilol (COREG) 25 MG tablet    amLODIPine (NORVASC) 2.5 MG tablet    ramipril (ALTACE) 5 MG capsule    Other Relevant Orders    Comprehensive Metabolic Panel (Completed)    CBC & Differential (Completed)    Urinalysis With Microscopic - Urine, Clean Catch (Completed)    Microalbumin / Creatinine Urine Ratio - Urine, Clean Catch (Completed)       Endocrine and Metabolic    B12 deficiency    Relevant Orders    Vitamin B12 (Completed)    Vitamin D deficiency    Relevant Orders    Vitamin D 25 Hydroxy (Completed)       Gastrointestinal Abdominal     Pelvic pressure in female (Chronic)    Constipation, slow transit    Overview     Has not tried 2 Colace tablets a day.                Health Encounters    Annual physical exam    Overview     7/21/2021 Akilah Rueda MD    Patient is up-to-date on all vaccinations except for tetanus which she was advised to get at her pharmacy..    DEXA ordered today.             Medicare annual wellness visit, subsequent - Primary       Mental  Health    Moderate episode of recurrent major depressive disorder (HCC)    Overview     Taking sertraline 100 mg tablet daily.                 Relevant Medications    sertraline (ZOLOFT) 100 MG tablet       Musculoskeletal and Injuries    Age-related osteoporosis without current pathological fracture (Chronic)    Overview     DEXA 2017 revealed osteopenia with a T score in the spine of -1.9 and a T score in the hip of -2.1.      DEXA 10/19/2021 showed worsening of bone density.  T score at the hip is now in the osteoporosis range with a T score of -3.1.  Spine T score is -2.1.  (Osteoporosis is -2.5 or less.)    7/2022 Start alendronate (Fosamax) tablet once a week.                Pulmonary and Pneumonias    Chronic cough (Chronic)    Overview                        Fall risk.  Fall prevention education given.    Hypertension.  Continue amlodipine twice daily, carvedilol twice daily, and ramipril twice daily. Continue to monitor blood pressure at home. Continue to avoid salt in her diet.    Atrial fibrillation.  Continue Xarelto and carvedilol. Continue to follow up with Dr. Valente.     Hyperlipidemia.  Continue atorvastatin every evening. Continue low-fat diet, start regular exercise, and walking.    Osteoporosis.  Start taking alendronate once weekly, first thing in the morning with a full glass of water, then wait about 30 minutes before taking her other pills, and wait 30 minutes for any other beverages or food. Increase weight-bearing exercises. Walk more often. Use small hand weights while sitting at home.     Right inguinal hernia.  Continue to monitor.  She will let us know if she starts having pain or feels that the hernia is enlarging.    Pelvic pressure   If she continues to feel a lot of pressure in the pelvic area, she will let me know, and we could have her see one of the gynecologist and check it out.    Chronic cough.  Start Nasacort 2 sprays in each nostril daily. May also take Mucinex as needed  for thick postnasal drainage.    Bunion.  Continue to wear shoes that are wide in the toebox.    Immunizations.  She is due for a tetanus vaccine. She has been 10 years since her last one. She has not had the new shingles vaccine. She will get the tetanus first, and then wait a month before getting the shingles vaccine.    Vitamin D deficiency  Recheck levels today.    Constipation.  Take two Colace (docusate sodium) 100 mg tablets per day, drink lots of water.  She will follow up in 6 months.    Depression.  Continue sertraline daily. Physical exercise also to help decrease symptoms of stress, anxiety, and mood.     Medicare annual wellness.  She was advised to get Tdap (tetanus, diphtheria, whooping cough) vaccination at her pharmacy, then wait a month, then get Shingrix immunization. She will get the flu shot in early fall. She is up to date on DEXA.    Fall Prevention in the Home, Adult  Falls can cause injuries and can affect people from all age groups. There are many simple things that you can do to make your home safe and to help prevent falls. Ask for help when making these changes, if needed.  What actions can I take to prevent falls?  General instructions  Use good lighting in all rooms. Replace any light bulbs that burn out.  Turn on lights if it is dark. Use night-lights.  Place frequently used items in easy-to-reach places. Lower the shelves around your home if necessary.  Set up furniture so that there are clear paths around it. Avoid moving your furniture around.  Remove throw rugs and other tripping hazards from the floor.  Avoid walking on wet floors.  Fix any uneven floor surfaces.  Add color or contrast paint or tape to grab bars and handrails in your home. Place contrasting color strips on the first and last steps of stairways.  When you use a stepladder, make sure that it is completely opened and that the sides are firmly locked. Have someone hold the ladder while you are using it. Do not climb  a closed stepladder.  Be aware of any and all pets.  What can I do in the bathroom?         Keep the floor dry. Immediately clean up any water that spills onto the floor.  Remove soap buildup in the tub or shower on a regular basis.  Use non-skid mats or decals on the floor of the tub or shower.  Attach bath mats securely with double-sided, non-slip rug tape.  If you need to sit down while you are in the shower, use a plastic, non-slip stool.  Install grab bars by the toilet and in the tub and shower. Do not use towel bars as grab bars.  What can I do in the bedroom?  Make sure that a bedside light is easy to reach.  Do not use oversized bedding that drapes onto the floor.  Have a firm chair that has side arms to use for getting dressed.  What can I do in the kitchen?  Clean up any spills right away.  If you need to reach for something above you, use a sturdy step stool that has a grab bar.  Keep electrical cables out of the way.  Do not use floor polish or wax that makes floors slippery. If you must use wax, make sure that it is non-skid floor wax.  What can I do in the stairways?  Do not leave any items on the stairs.  Make sure that you have a light switch at the top of the stairs and the bottom of the stairs. Have them installed if you do not have them.  Make sure that there are handrails on both sides of the stairs. Fix handrails that are broken or loose. Make sure that handrails are as long as the stairways.  Install non-slip stair treads on all stairs in your home.  Avoid having throw rugs at the top or bottom of stairways, or secure the rugs with carpet tape to prevent them from moving.  Choose a carpet design that does not hide the edge of steps on the stairway.  Check any carpeting to make sure that it is firmly attached to the stairs. Fix any carpet that is loose or worn.  What can I do on the outside of my home?  Use bright outdoor lighting.  Regularly repair the edges of walkways and driveways and fix  any cracks.  Remove high doorway thresholds.  Trim any shrubbery on the main path into your home.  Regularly check that handrails are securely fastened and in good repair. Both sides of any steps should have handrails.  Install guardrails along the edges of any raised decks or porches.  Clear walkways of debris and clutter, including tools and rocks.  Have leaves, snow, and ice cleared regularly.  Use sand or salt on walkways during winter months.  In the garage, clean up any spills right away, including grease or oil spills.  What other actions can I take?  Wear closed-toe shoes that fit well and support your feet. Wear shoes that have rubber soles or low heels.  Use mobility aids as needed, such as canes, walkers, scooters, and crutches.  Review your medicines with your health care provider. Some medicines can cause dizziness or changes in blood pressure, which increase your risk of falling.  Talk with your health care provider about other ways that you can decrease your risk of falls. This may include working with a physical therapist or  to improve your strength, balance, and endurance.  Where to find more information  Centers for Disease Control and Prevention, [place]: https://www.cdc.gov  [employer] [place] [employer] Aging: https://mp3bhrz.rudolph.nih.gov  Contact a health care provider if:  You are afraid of falling at home.  You feel weak, drowsy, or dizzy at home.  You fall at home.  Summary  There are many simple things that you can do to make your home safe and to help prevent falls.  Ways to make your home safe include removing tripping hazards and installing grab bars in the bathroom.  Ask for help when making these changes in your home.  This information is not intended to replace advice given to you by your health care provider. Make sure you discuss any questions you have with your health care provider.  Document Revised: 11/30/2018 Document Reviewed: 08/02/2018  Elsevier Patient Education © 2021  [employer]

## 2022-07-23 LAB
25(OH)D3 SERPL-MCNC: 47.6 NG/ML (ref 30–100)
ALBUMIN SERPL-MCNC: 4.1 G/DL (ref 3.5–5.2)
ALBUMIN UR-MCNC: 1.4 MG/DL
ALBUMIN/GLOB SERPL: 1.6 G/DL
ALP SERPL-CCNC: 87 U/L (ref 39–117)
ALT SERPL W P-5'-P-CCNC: 18 U/L (ref 1–33)
ANION GAP SERPL CALCULATED.3IONS-SCNC: 10 MMOL/L (ref 5–15)
AST SERPL-CCNC: 24 U/L (ref 1–32)
BACTERIA UR QL AUTO: ABNORMAL /HPF
BILIRUB SERPL-MCNC: 0.9 MG/DL (ref 0–1.2)
BUN SERPL-MCNC: 13 MG/DL (ref 8–23)
BUN/CREAT SERPL: 21.3 (ref 7–25)
CALCIUM SPEC-SCNC: 9.1 MG/DL (ref 8.6–10.5)
CHLORIDE SERPL-SCNC: 104 MMOL/L (ref 98–107)
CHOLEST SERPL-MCNC: 160 MG/DL (ref 0–200)
CO2 SERPL-SCNC: 28 MMOL/L (ref 22–29)
CREAT SERPL-MCNC: 0.61 MG/DL (ref 0.57–1)
CREAT UR-MCNC: 109.1 MG/DL
EGFRCR SERPLBLD CKD-EPI 2021: 87.7 ML/MIN/1.73
GLOBULIN UR ELPH-MCNC: 2.6 GM/DL
GLUCOSE SERPL-MCNC: 94 MG/DL (ref 65–99)
HDLC SERPL-MCNC: 65 MG/DL (ref 40–60)
HYALINE CASTS UR QL AUTO: ABNORMAL /LPF
LDLC SERPL CALC-MCNC: 82 MG/DL (ref 0–100)
LDLC/HDLC SERPL: 1.26 {RATIO}
MICROALBUMIN/CREAT UR: 12.8 MG/G
POTASSIUM SERPL-SCNC: 4.6 MMOL/L (ref 3.5–5.2)
PROT SERPL-MCNC: 6.7 G/DL (ref 6–8.5)
RBC # UR STRIP: ABNORMAL /HPF
REF LAB TEST METHOD: ABNORMAL
SODIUM SERPL-SCNC: 142 MMOL/L (ref 136–145)
SQUAMOUS #/AREA URNS HPF: ABNORMAL /HPF
TRIGL SERPL-MCNC: 65 MG/DL (ref 0–150)
TSH SERPL DL<=0.05 MIU/L-ACNC: 3.15 UIU/ML (ref 0.27–4.2)
VIT B12 BLD-MCNC: 544 PG/ML (ref 211–946)
VLDLC SERPL-MCNC: 13 MG/DL (ref 5–40)
WBC # UR STRIP: ABNORMAL /HPF

## 2022-07-24 ENCOUNTER — TELEPHONE (OUTPATIENT)
Dept: INTERNAL MEDICINE | Facility: CLINIC | Age: 87
End: 2022-07-24

## 2022-07-24 PROBLEM — R10.2 PELVIC PRESSURE IN FEMALE: Chronic | Status: ACTIVE | Noted: 2022-07-24

## 2022-07-24 RX ORDER — NITROFURANTOIN 25; 75 MG/1; MG/1
100 CAPSULE ORAL 2 TIMES DAILY
Qty: 10 CAPSULE | Refills: 0 | Status: SHIPPED | OUTPATIENT
Start: 2022-07-24 | End: 2022-07-29

## 2022-07-24 NOTE — TELEPHONE ENCOUNTER
Please call patient Monday morning to make sure she got my message about the UTI.  I sent Macrobid to the pharmacy.  I left a message on her home phone.  I also tried to call the cell number but the mailbox was not set up

## 2022-07-29 RX ORDER — RAMIPRIL 5 MG/1
CAPSULE ORAL
Qty: 270 CAPSULE | Refills: 3 | Status: SHIPPED | OUTPATIENT
Start: 2022-07-29

## 2022-08-17 DIAGNOSIS — I10 BENIGN ESSENTIAL HYPERTENSION: ICD-10-CM

## 2022-08-17 RX ORDER — AMLODIPINE BESYLATE 2.5 MG/1
TABLET ORAL
Qty: 180 TABLET | Refills: 0 | Status: SHIPPED | OUTPATIENT
Start: 2022-08-17 | End: 2022-11-14

## 2022-09-01 DIAGNOSIS — I10 BENIGN ESSENTIAL HYPERTENSION: ICD-10-CM

## 2022-09-01 RX ORDER — CARVEDILOL 25 MG/1
TABLET ORAL
Qty: 180 TABLET | Refills: 3 | Status: SHIPPED | OUTPATIENT
Start: 2022-09-01

## 2022-10-05 PROCEDURE — 93296 REM INTERROG EVL PM/IDS: CPT | Performed by: INTERNAL MEDICINE

## 2022-10-05 PROCEDURE — 93294 REM INTERROG EVL PM/LDLS PM: CPT | Performed by: INTERNAL MEDICINE

## 2022-11-09 ENCOUNTER — OFFICE VISIT (OUTPATIENT)
Dept: CARDIOLOGY | Facility: CLINIC | Age: 87
End: 2022-11-09

## 2022-11-09 VITALS
BODY MASS INDEX: 24.71 KG/M2 | SYSTOLIC BLOOD PRESSURE: 132 MMHG | WEIGHT: 163 LBS | OXYGEN SATURATION: 97 % | HEART RATE: 70 BPM | HEIGHT: 68 IN | DIASTOLIC BLOOD PRESSURE: 60 MMHG

## 2022-11-09 DIAGNOSIS — I50.22 CHRONIC SYSTOLIC (CONGESTIVE) HEART FAILURE: ICD-10-CM

## 2022-11-09 DIAGNOSIS — I48.21 PERMANENT ATRIAL FIBRILLATION: Primary | ICD-10-CM

## 2022-11-09 DIAGNOSIS — I10 BENIGN ESSENTIAL HYPERTENSION: ICD-10-CM

## 2022-11-09 DIAGNOSIS — I44.2 CHB (COMPLETE HEART BLOCK): ICD-10-CM

## 2022-11-09 PROCEDURE — 99213 OFFICE O/P EST LOW 20 MIN: CPT | Performed by: INTERNAL MEDICINE

## 2022-11-09 PROCEDURE — 93281 PM DEVICE PROGR EVAL MULTI: CPT | Performed by: INTERNAL MEDICINE

## 2022-11-09 RX ORDER — LATANOPROST 50 UG/ML
1 SOLUTION/ DROPS OPHTHALMIC NIGHTLY
COMMUNITY
Start: 2022-10-03

## 2022-11-09 NOTE — PROGRESS NOTES
Jayda Lopes  1935  [phone]      11/09/2022      [facility]     Akilah Rueda MD  [address] [id number]    Chief Complaint   Patient presents with   • Perm AF       Problem List:    1. Atrial fibrillation:  a. Hospitalization with external cardioversion on 07/26/2004 with initiation of Rythmol therapy.   b. Palpitations with event recorder, December 2005, consistent with PACs.   c. Episode of atrial fibrillation approximately 60-90 minutes on Christmas 2009 with subsequent ER visit.  d. External cardioversion to normal sinus rhythm, 06/08/2012.   e. Hospitalization with initiation of Tikosyn, August 2012.   f. Pulmonary vein isolation procedure with extensive ablation of multiple sites and subsequent significant bradyarrhythmias after internal cardioversion to normal sinus rhythm with discontinuation of Tikosyn on 03/25/2014.  g. Unsuccessful external cardioversion, 05/15/2014, and subsequent successful internal cardioversion with early recurrence of atrial fibrillation, 05/15/2014.   h. Echocardiogram, 08/12/2014: EF less than 20% with severe global hypokinesis. Right ventricle mild to moderately dilated. Mild MR and mild TR.   i. 09/08/2014: Implantation of a [employer]ure Quadra biventricular pacemaker, serial #[id number], and AV node ablation.   2. CHF class III:  a. Holter monitor in February 2002 with frequent PACs and PVCs.   b. Echocardiogram in March 2000 with normal LV size and function: LVEF of 60%. Mild TR, mild MR.   c. Echocardiogram on 07/26/2004: Normal LV size and function. No significant regurgitation from the mitral valve or tricuspid valve.   d. Echocardiogram, 02/06/2008: Normal LV size and function, mild MR.   e. Echocardiogram, 08/22/2012: Left ventricular ejection fraction 35% to 40%. Moderate MR, mild TR.  f. Echocardiogram, 02/13/2013: Left ventricular ejection fraction of 50% to 55%. Mild TR,  mild-to-moderate MR. LA 5.2.  g. Echocardiogram, 03/23/2015: EF 30-35%. Mild concentric LVH. Mild-to-moderate AI. Mild MR. Mild to moderate TR  h. Echocardiogram, 07/22/2015: Limited for EF only 50% to 55%.  3. Atypical chest pain:  a. Acceptable nuclear GXT with normal LV size and function and no ischemia in August 2000 and April 2004.  b. UMAIR, 03/25/2014: Ejection fraction of 50% to 55%. Mild mitral regurgitation.  4. Hypertension.   5. Hyperlipidemia.   6. Mitral valve prolapse.   7. Anxiety.   8. Gastroesophageal reflux disease.   9. Macular degeneration.   10. Surgical history:  a. Hysterectomy.  b. Partial thyroidectomy.  c. Ovarian cystectomy.   d. Cataract surgery       Allergies  Allergies   Allergen Reactions   • Phenazopyridine Hcl Unknown (See Comments)     Pyridium       Current Medications    Current Outpatient Medications:   •  Aflibercept (EYLEA IO), Inject  into the eye Every 3 (Three) Months., Disp: , Rfl:   •  alendronate (FOSAMAX) 70 MG tablet, Take 1 tablet by mouth Every 7 (Seven) Days., Disp: 15 tablet, Rfl: 1  •  amLODIPine (NORVASC) 2.5 MG tablet, TAKE 1 TABLET BY MOUTH TWO TIMES A DAY, Disp: 180 tablet, Rfl: 0  •  atorvastatin (LIPITOR) 20 MG tablet, TAKE 1 TABLET EVERY EVENING, Disp: 90 tablet, Rfl: 3  •  carvedilol (COREG) 25 MG tablet, TAKE 1 TABLET BY MOUTH TWO TIMES A DAY, Disp: 180 tablet, Rfl: 3  •  Cholecalciferol (VITAMIN D3) 2000 units capsule, Take 1 tablet by mouth Daily., Disp: , Rfl:   •  docusate sodium (Colace) 100 MG capsule, Take 1 capsule by mouth 2 (Two) Times a Day., Disp: 60 capsule, Rfl: 5  •  esomeprazole (nexIUM) 20 MG capsule, TAKE 1 CAPSULE DAILY (Patient taking differently: As Needed.), Disp: 90 capsule, Rfl: 3  •  guaiFENesin (Mucinex) 600 MG 12 hr tablet, Take 2 tablets by mouth 2 (Two) Times a Day. (Patient taking differently: Take 2 tablets by mouth Daily As Needed.), Disp: 180 tablet, Rfl: 1  •  latanoprost (XALATAN) 0.005 % ophthalmic solution, 1 drop Every  "Night., Disp: , Rfl:   •  Multiple Vitamins-Minerals (PRESERVISION AREDS 2 PO), Take 2 tablets by mouth Daily., Disp: , Rfl:   •  promethazine-dextromethorphan (PROMETHAZINE-DM) 6.25-15 MG/5ML syrup, Take 5 mL by mouth 4 (Four) Times a Day As Needed for Cough., Disp: 240 mL, Rfl: 1  •  ramipril (ALTACE) 5 MG capsule, TAKE 1 CAPSULE EVERY MORNING AND 2 CAPSULES EVERY EVENING, Disp: 270 capsule, Rfl: 3  •  sertraline (ZOLOFT) 100 MG tablet, Take 1 tablet by mouth Daily., Disp: 90 tablet, Rfl: 3  •  Xarelto 20 MG tablet, TAKE 1 TABLET DAILY AS DIRECTED, Disp: 90 tablet, Rfl: 3    History of Present Illness     Pt presents for follow up of AF/DCM/CHF/HTN. Since the pt has seen us in clinic last, pt denies any syncope, SOB, CP, LH, and dizziness. Denies any hospitalizations, ER visits, bleeding, or TIA/CVA symptoms. Overall feels well. BP stable at home.    ROS:  General:  + fatigue, No weight gain or loss  Cardiovascular:  Denies CP, PND, syncope, near syncope, edema or palpitations.  Pulmonary:  Denies ZAFAR, cough, or wheezing    Vitals:    11/09/22 1051   BP: 132/60   BP Location: Left arm   Patient Position: Sitting   Pulse: 70   SpO2: 97%   Weight: 73.9 kg (163 lb)   Height: 172.7 cm (68\")       PE:  General: NAD  Neck: no JVD, no carotid bruits, no TM  Heart: RRR, NL S1, S2, no rubs, No murmurs  Lungs: CTA, no wheezes, rhonchi, or rales  Abd: soft, non-tender, NL BS  Ext: No musculoskeletal deformities, no edema, cyanosis, or clubbing  Psych: normal mood and affect    Diagnostic Data:  Procedures      1. Permanent atrial fibrillation (HCC)    2. CHB (complete heart block) (HCC)    3. Chronic systolic (congestive) heart failure (HCC)    4. Benign essential hypertension           BiV PM Manual Interrogation: 98% BiV paced. 8.6 years on battery. Normal function. Permanent AF. 2 yrs on battery      Plan:    1. Permanent Afib: AVN RFA. BIVPPM with normal function.   2. HTN: Controlled on BB, ACE, Norvasc.   3. Chronic " SHF Class I EF 55% in 2015.  4. Anticoagulation: Xarelto 20mg Daily    F/up in 6/12 months

## 2022-11-14 DIAGNOSIS — I10 BENIGN ESSENTIAL HYPERTENSION: ICD-10-CM

## 2022-11-14 RX ORDER — AMLODIPINE BESYLATE 2.5 MG/1
TABLET ORAL
Qty: 180 TABLET | Refills: 1 | Status: SHIPPED | OUTPATIENT
Start: 2022-11-14 | End: 2023-02-28

## 2022-12-27 NOTE — PATIENT INSTRUCTIONS
Patient Instructions   Problem List Items Addressed This Visit        Cardiovascular and Mediastinum    Mixed hyperlipidemia (Chronic)    Overview     2/4/2020 Akilah Rueda MD    Continue atorvastatin every evening. Continue low fat diet. Increase regular exercise.         Relevant Medications    atorvastatin (LIPITOR) 20 MG tablet    Chronic atrial fibrillation    Overview     2/4/2020 Akilah Rueda MD    Continue Xarelto and carvedilol.  Continue regular follow-up with the cardiologist.         Relevant Medications    carvedilol (COREG) 25 MG tablet    amLODIPine (NORVASC) 2.5 MG tablet    Benign essential hypertension    Overview     2/4/2020 Akilah Rueda MD    Continue amlodipine, carvedilol, and ramipril.  Continue to avoid salt in the diet.         Relevant Medications    carvedilol (COREG) 25 MG tablet    ramipril (ALTACE) 5 MG capsule    amLODIPine (NORVASC) 2.5 MG tablet       Nervous and Auditory    Chronic right-sided low back pain with right-sided sciatica - Primary    Overview     2/4/2020 Akilah Rueda MD    Start PT today.  Do some daily back stretches. Use moist heat to relax tight muscles. Take tylenol most of the time, then take ibuprofen with food sparingly for more severe pain;          Relevant Medications    triamcinolone acetonide (KENALOG-40) injection 80 mg (Completed)       Musculoskeletal and Integument    Trochanteric bursitis of right hip    Overview     2/4/2020 Akilah Rueda MD    Start PT today.  Do some daily back stretches. Use moist heat to relax tight muscles. Take tylenol most of the time, then take ibuprofen with food sparingly for more severe pain;            Relevant Medications    triamcinolone acetonide (KENALOG-40) injection 80 mg (Completed)              Azithromycin Counseling:  I discussed with the patient the risks of azithromycin including but not limited to GI upset, allergic reaction, drug rash, diarrhea, and yeast infections.

## 2023-01-04 PROCEDURE — 93296 REM INTERROG EVL PM/IDS: CPT | Performed by: INTERNAL MEDICINE

## 2023-01-04 PROCEDURE — 93294 REM INTERROG EVL PM/LDLS PM: CPT | Performed by: INTERNAL MEDICINE

## 2023-01-06 NOTE — TELEPHONE ENCOUNTER
Appt made for tomorrow  
If her pain is that much worse she really needs to be seen, pain medication in this setting is not appropriate until we have a better idea of what is causing her pain   
PATIENTS  CALLED STATING HE WOULD LIKE A PAIN MEDICATION CALLED IN IN REGARDS TO HER [facility] PATIENT IS IN PAIN CLOSE TO TEARS    PLEASE ADVISE      PAYAL CONFIRMED  [phone]    PATIENT WOULD LIKE A CALL BACK FOR FURTHER QUESTIONS   
The patient is a 81y Male complaining of shortness of breath.

## 2023-01-26 DIAGNOSIS — F33.1 MODERATE EPISODE OF RECURRENT MAJOR DEPRESSIVE DISORDER: ICD-10-CM

## 2023-01-26 RX ORDER — SERTRALINE HYDROCHLORIDE 100 MG/1
TABLET, FILM COATED ORAL
Qty: 90 TABLET | Refills: 1 | Status: SHIPPED | OUTPATIENT
Start: 2023-01-26

## 2023-02-13 ENCOUNTER — LAB (OUTPATIENT)
Dept: LAB | Facility: HOSPITAL | Age: 88
End: 2023-02-13
Payer: MEDICARE

## 2023-02-13 ENCOUNTER — OFFICE VISIT (OUTPATIENT)
Dept: INTERNAL MEDICINE | Facility: CLINIC | Age: 88
End: 2023-02-13
Payer: MEDICARE

## 2023-02-13 VITALS
WEIGHT: 162.4 LBS | BODY MASS INDEX: 24.61 KG/M2 | HEART RATE: 80 BPM | DIASTOLIC BLOOD PRESSURE: 78 MMHG | TEMPERATURE: 97.8 F | SYSTOLIC BLOOD PRESSURE: 136 MMHG | OXYGEN SATURATION: 100 % | HEIGHT: 68 IN

## 2023-02-13 DIAGNOSIS — K40.90 NON-RECURRENT UNILATERAL INGUINAL HERNIA WITHOUT OBSTRUCTION OR GANGRENE: Chronic | ICD-10-CM

## 2023-02-13 DIAGNOSIS — E78.2 MIXED HYPERLIPIDEMIA: Chronic | ICD-10-CM

## 2023-02-13 DIAGNOSIS — I10 BENIGN ESSENTIAL HYPERTENSION: Primary | ICD-10-CM

## 2023-02-13 DIAGNOSIS — I10 BENIGN ESSENTIAL HYPERTENSION: ICD-10-CM

## 2023-02-13 DIAGNOSIS — K21.9 GERD WITHOUT ESOPHAGITIS: ICD-10-CM

## 2023-02-13 DIAGNOSIS — E53.8 B12 DEFICIENCY: ICD-10-CM

## 2023-02-13 DIAGNOSIS — I48.21 PERMANENT ATRIAL FIBRILLATION: ICD-10-CM

## 2023-02-13 DIAGNOSIS — E55.9 VITAMIN D DEFICIENCY: ICD-10-CM

## 2023-02-13 DIAGNOSIS — R06.02 SHORTNESS OF BREATH: ICD-10-CM

## 2023-02-13 DIAGNOSIS — I83.93 VARICOSE VEINS OF BOTH LOWER EXTREMITIES WITHOUT ULCER OR INFLAMMATION: ICD-10-CM

## 2023-02-13 DIAGNOSIS — K59.01 CONSTIPATION, SLOW TRANSIT: ICD-10-CM

## 2023-02-13 DIAGNOSIS — R10.11 RUQ ABDOMINAL PAIN: Chronic | ICD-10-CM

## 2023-02-13 LAB
BILIRUB UR QL STRIP: NEGATIVE
CLARITY UR: CLEAR
COLOR UR: ABNORMAL
GLUCOSE UR STRIP-MCNC: NEGATIVE MG/DL
HGB UR QL STRIP.AUTO: NEGATIVE
KETONES UR QL STRIP: NEGATIVE
LEUKOCYTE ESTERASE UR QL STRIP.AUTO: ABNORMAL
NITRITE UR QL STRIP: NEGATIVE
PH UR STRIP.AUTO: 7.5 [PH] (ref 5–8)
PROT UR QL STRIP: ABNORMAL
SP GR UR STRIP: 1.02 (ref 1–1.03)
UROBILINOGEN UR QL STRIP: ABNORMAL

## 2023-02-13 PROCEDURE — 82306 VITAMIN D 25 HYDROXY: CPT

## 2023-02-13 PROCEDURE — 80061 LIPID PANEL: CPT

## 2023-02-13 PROCEDURE — 82570 ASSAY OF URINE CREATININE: CPT

## 2023-02-13 PROCEDURE — 99214 OFFICE O/P EST MOD 30 MIN: CPT | Performed by: INTERNAL MEDICINE

## 2023-02-13 PROCEDURE — 80053 COMPREHEN METABOLIC PANEL: CPT

## 2023-02-13 PROCEDURE — 82043 UR ALBUMIN QUANTITATIVE: CPT

## 2023-02-13 PROCEDURE — 82607 VITAMIN B-12: CPT

## 2023-02-13 PROCEDURE — 81001 URINALYSIS AUTO W/SCOPE: CPT

## 2023-02-13 RX ORDER — FLUTICASONE PROPIONATE 50 MCG
1 SPRAY, SUSPENSION (ML) NASAL 2 TIMES DAILY
Qty: 16 G | Refills: 11 | Status: SHIPPED | OUTPATIENT
Start: 2023-02-13

## 2023-02-13 RX ORDER — IPRATROPIUM BROMIDE 42 UG/1
2 SPRAY, METERED NASAL 2 TIMES DAILY
Qty: 1 EACH | Refills: 5 | Status: SHIPPED | OUTPATIENT
Start: 2023-02-13

## 2023-02-13 NOTE — ASSESSMENT & PLAN NOTE
Since her blood pressures are well controlled at home, she will continue her current doses of amlodipine, carvedilol, and ramipril. She will continue to avoid salt in the diet..

## 2023-02-13 NOTE — ASSESSMENT & PLAN NOTE
She will continue esomeprazole as needed. The patient will continue to avoid eating close to bedtime and avoid large meals.

## 2023-02-13 NOTE — ASSESSMENT & PLAN NOTE
We discussed that it is most likely related to constipation and stool moving around the right flexure. She will work on improving stool movement by taking 2 of the stool softeners per day and drinking a lot of fluids.

## 2023-02-13 NOTE — ASSESSMENT & PLAN NOTE
The patient will continue to avoid lifting heavy things. She will let us know if it ever starts to bother her.

## 2023-02-13 NOTE — ASSESSMENT & PLAN NOTE
She will continue carvedilol and Xarelto. The patient will continue to avoid excessive caffeine and chocolate.

## 2023-02-13 NOTE — ASSESSMENT & PLAN NOTE
Her symptoms are stable. She will continue trying to do her regular exercise, which does help lung function and stamina as well.

## 2023-02-13 NOTE — ASSESSMENT & PLAN NOTE
She is advised to take 2 of the Colace stool softeners per day. May take 1 tablet twice per day or may take 2 of them once per day.

## 2023-02-13 NOTE — ASSESSMENT & PLAN NOTE
The patient will continue atorvastatin every evening. She will continue to eat a low-fat, low-sugar diet.

## 2023-02-13 NOTE — ASSESSMENT & PLAN NOTE
The patient will continue wearing support hose or support socks daily. She will continue to avoid salt in the diet and elevate her feet when sitting at home.

## 2023-02-14 LAB
25(OH)D3 SERPL-MCNC: 36.7 NG/ML (ref 30–100)
ALBUMIN SERPL-MCNC: 4 G/DL (ref 3.5–5.2)
ALBUMIN UR-MCNC: 2.5 MG/DL
ALBUMIN/GLOB SERPL: 1.5 G/DL
ALP SERPL-CCNC: 87 U/L (ref 39–117)
ALT SERPL W P-5'-P-CCNC: 17 U/L (ref 1–33)
ANION GAP SERPL CALCULATED.3IONS-SCNC: 10.4 MMOL/L (ref 5–15)
AST SERPL-CCNC: 24 U/L (ref 1–32)
BACTERIA UR QL AUTO: ABNORMAL /HPF
BILIRUB SERPL-MCNC: 0.9 MG/DL (ref 0–1.2)
BUN SERPL-MCNC: 10 MG/DL (ref 8–23)
BUN/CREAT SERPL: 16.4 (ref 7–25)
CALCIUM SPEC-SCNC: 8.8 MG/DL (ref 8.6–10.5)
CHLORIDE SERPL-SCNC: 105 MMOL/L (ref 98–107)
CHOLEST SERPL-MCNC: 162 MG/DL (ref 0–200)
CO2 SERPL-SCNC: 28.6 MMOL/L (ref 22–29)
CREAT SERPL-MCNC: 0.61 MG/DL (ref 0.57–1)
CREAT UR-MCNC: 101 MG/DL
EGFRCR SERPLBLD CKD-EPI 2021: 87.2 ML/MIN/1.73
GLOBULIN UR ELPH-MCNC: 2.7 GM/DL
GLUCOSE SERPL-MCNC: 90 MG/DL (ref 65–99)
HDLC SERPL-MCNC: 74 MG/DL (ref 40–60)
HYALINE CASTS UR QL AUTO: ABNORMAL /LPF
LDLC SERPL CALC-MCNC: 75 MG/DL (ref 0–100)
LDLC/HDLC SERPL: 1 {RATIO}
MICROALBUMIN/CREAT UR: 24.8 MG/G
POTASSIUM SERPL-SCNC: 4.2 MMOL/L (ref 3.5–5.2)
PROT SERPL-MCNC: 6.7 G/DL (ref 6–8.5)
RBC # UR STRIP: ABNORMAL /HPF
REF LAB TEST METHOD: ABNORMAL
SODIUM SERPL-SCNC: 144 MMOL/L (ref 136–145)
SQUAMOUS #/AREA URNS HPF: ABNORMAL /HPF
TRIGL SERPL-MCNC: 69 MG/DL (ref 0–150)
VIT B12 BLD-MCNC: 629 PG/ML (ref 211–946)
VLDLC SERPL-MCNC: 13 MG/DL (ref 5–40)
WBC # UR STRIP: ABNORMAL /HPF

## 2023-02-14 RX ORDER — ACETAMINOPHEN 160 MG
2000 TABLET,DISINTEGRATING ORAL 2 TIMES DAILY
Qty: 180 CAPSULE | Refills: 3 | Status: SHIPPED | OUTPATIENT
Start: 2023-02-14

## 2023-02-16 NOTE — PATIENT INSTRUCTIONS
Patient Instructions   Problem List Items Addressed This Visit          Cardiac and Vasculature    Mixed hyperlipidemia (Chronic)    Overview     Taking atorvastatin every evening.          Current Assessment & Plan     The patient will continue atorvastatin every evening. She will continue to eat a low-fat, low-sugar diet.         Relevant Medications    atorvastatin (LIPITOR) 20 MG tablet    Other Relevant Orders    Lipid Panel    Permanent atrial fibrillation (HCC)    Overview     Taking Xarelto and carvedilol.  Sees Dr. Kaufman.          Current Assessment & Plan     She will continue carvedilol and Xarelto. The patient will continue to avoid excessive caffeine and chocolate.         Relevant Medications    Xarelto 20 MG tablet    carvedilol (COREG) 25 MG tablet    amLODIPine (NORVASC) 2.5 MG tablet    Varicose veins of both lower extremities without ulcer or inflammation    Overview     Wear support hose/socks daily.           Current Assessment & Plan     The patient will continue wearing support hose or support socks daily. She will continue to avoid salt in the diet and elevate her feet when sitting at home.         Benign essential hypertension - Primary    Overview     Taking amlodipine and carvedilol and ramipril twice a day.           Current Assessment & Plan     Since her blood pressures are well controlled at home, she will continue her current doses of amlodipine, carvedilol, and ramipril. She will continue to avoid salt in the diet..         Relevant Medications    ramipril (ALTACE) 5 MG capsule    carvedilol (COREG) 25 MG tablet    amLODIPine (NORVASC) 2.5 MG tablet    Other Relevant Orders    Comprehensive Metabolic Panel    Microalbumin / Creatinine Urine Ratio - Urine, Clean Catch    Urinalysis With Microscopic - Urine, Clean Catch       Endocrine and Metabolic    B12 deficiency    Relevant Orders    Vitamin B12    Vitamin D deficiency    Relevant Orders    Vitamin D,25-Hydroxy        Gastrointestinal Abdominal     Non-recurrent unilateral inguinal hernia without obstruction or gangrene (Chronic)    Overview     R inguinal hernia is asymptomatic.         Current Assessment & Plan     The patient will continue to avoid lifting heavy things. She will let us know if it ever starts to bother her.         RUQ abdominal pain (Chronic)    Current Assessment & Plan     We discussed that it is most likely related to constipation and stool moving around the right flexure. She will work on improving stool movement by taking 2 of the stool softeners per day and drinking a lot of fluids.         GERD without esophagitis    Overview     Taking esomeprazole as needed.         Current Assessment & Plan     She will continue esomeprazole as needed. The patient will continue to avoid eating close to bedtime and avoid large meals.         Relevant Medications    esomeprazole (nexIUM) 20 MG capsule    Constipation, slow transit    Overview     Not taking Colace tablets         Current Assessment & Plan     She is advised to take 2 of the Colace stool softeners per day. May take 1 tablet twice per day or may take 2 of them once per day.            Pulmonary and Pneumonias    Shortness of breath    Current Assessment & Plan     Her symptoms are stable. She will continue trying to do her regular exercise, which does help lung function and stamina as well.

## 2023-02-28 DIAGNOSIS — I10 BENIGN ESSENTIAL HYPERTENSION: ICD-10-CM

## 2023-02-28 RX ORDER — AMLODIPINE BESYLATE 2.5 MG/1
TABLET ORAL
Qty: 180 TABLET | Refills: 0 | Status: SHIPPED | OUTPATIENT
Start: 2023-02-28

## 2023-03-21 DIAGNOSIS — I48.20 CHRONIC ATRIAL FIBRILLATION: ICD-10-CM

## 2023-03-21 RX ORDER — RIVAROXABAN 20 MG/1
TABLET, FILM COATED ORAL
Qty: 90 TABLET | Refills: 2 | Status: SHIPPED | OUTPATIENT
Start: 2023-03-21

## 2023-04-05 PROCEDURE — 93294 REM INTERROG EVL PM/LDLS PM: CPT | Performed by: INTERNAL MEDICINE

## 2023-04-05 PROCEDURE — 93296 REM INTERROG EVL PM/IDS: CPT | Performed by: INTERNAL MEDICINE

## 2023-05-25 ENCOUNTER — OFFICE VISIT (OUTPATIENT)
Dept: CARDIOLOGY | Facility: CLINIC | Age: 88
End: 2023-05-25
Payer: MEDICARE

## 2023-05-25 VITALS
HEART RATE: 73 BPM | WEIGHT: 169 LBS | BODY MASS INDEX: 26.53 KG/M2 | DIASTOLIC BLOOD PRESSURE: 84 MMHG | HEIGHT: 67 IN | SYSTOLIC BLOOD PRESSURE: 160 MMHG | OXYGEN SATURATION: 97 %

## 2023-05-25 DIAGNOSIS — I44.30 AV BLOCK: Primary | ICD-10-CM

## 2023-05-25 DIAGNOSIS — I42.0 DILATED CARDIOMYOPATHY: ICD-10-CM

## 2023-05-25 DIAGNOSIS — I50.22 CHRONIC SYSTOLIC (CONGESTIVE) HEART FAILURE: ICD-10-CM

## 2023-05-25 PROCEDURE — 99213 OFFICE O/P EST LOW 20 MIN: CPT | Performed by: PHYSICIAN ASSISTANT

## 2023-05-25 PROCEDURE — 93000 ELECTROCARDIOGRAM COMPLETE: CPT | Performed by: PHYSICIAN ASSISTANT

## 2023-05-25 NOTE — PROGRESS NOTES
Jayda Lopes  1935  [phone]      [facility]     Akilah Rueda MD  [address] [place] [id number]    Chief Complaint   Patient presents with   • Permanent atrial fibrillation     6 month follow up        Problem List:    1. Atrial fibrillation:  a. Hospitalization with external cardioversion on 07/26/2004 with initiation of Rythmol therapy.   b. Palpitations with event recorder, December 2005, consistent with PACs.   c. Episode of atrial fibrillation approximately 60-90 minutes on Christmas 2009 with subsequent ER visit.  d. External cardioversion to normal sinus rhythm, 06/08/2012.   e. Hospitalization with initiation of Tikosyn, August 2012.   f. Pulmonary vein isolation procedure with extensive ablation of multiple sites and subsequent significant bradyarrhythmias after internal cardioversion to normal sinus rhythm with discontinuation of Tikosyn on 03/25/2014.  g. Unsuccessful external cardioversion, 05/15/2014, and subsequent successful internal cardioversion with early recurrence of atrial fibrillation, 05/15/2014.   h. Echocardiogram, 08/12/2014: EF less than 20% with severe global hypokinesis. Right ventricle mild to moderately dilated. Mild MR and mild TR.   i. 09/08/2014: Implantation of a [employer]ure Quadra biventricular pacemaker, serial #[id number], and AV node ablation.   2. CHF class III:  a. Holter monitor in February 2002 with frequent PACs and PVCs.   b. Echocardiogram in March 2000 with normal LV size and function: LVEF of 60%. Mild TR, mild MR.   c. Echocardiogram on 07/26/2004: Normal LV size and function. No significant regurgitation from the mitral valve or tricuspid valve.   d. Echocardiogram, 02/06/2008: Normal LV size and function, mild MR.   e. Echocardiogram, 08/22/2012: Left ventricular ejection fraction 35% to 40%. Moderate MR, mild TR.  f. Echocardiogram, 02/13/2013: Left ventricular ejection fraction of 50%  to 55%. Mild TR, mild-to-moderate MR. LA 5.2.  g. Echocardiogram, 03/23/2015: EF 30-35%. Mild concentric LVH. Mild-to-moderate AI. Mild MR. Mild to moderate TR  h. Echocardiogram, 07/22/2015: Limited for EF only 50% to 55%.  3. Atypical chest pain:  a. Acceptable nuclear GXT with normal LV size and function and no ischemia in August 2000 and April 2004.  b. UMAIR, 03/25/2014: Ejection fraction of 50% to 55%. Mild mitral regurgitation.  4. Hypertension.   5. Hyperlipidemia.   6. Mitral valve prolapse.   7. Anxiety.   8. Gastroesophageal reflux disease.   9. Macular degeneration.   10. Surgical history:  a. Hysterectomy.  b. Partial thyroidectomy.  c. Ovarian cystectomy.   d. Cataract surgery       Allergies  Allergies   Allergen Reactions   • Phenazopyridine Hcl Unknown (See Comments)     Pyridium       Current Medications    Current Outpatient Medications:   •  Aflibercept (EYLEA IO), Inject  into the eye Every 3 (Three) Months., Disp: , Rfl:   •  amLODIPine (NORVASC) 2.5 MG tablet, TAKE 1 TABLET BY MOUTH TWO TIMES A DAY, Disp: 180 tablet, Rfl: 0  •  atorvastatin (LIPITOR) 20 MG tablet, TAKE 1 TABLET EVERY EVENING, Disp: 90 tablet, Rfl: 3  •  carvedilol (COREG) 25 MG tablet, TAKE 1 TABLET BY MOUTH TWO TIMES A DAY, Disp: 180 tablet, Rfl: 3  •  Cholecalciferol (Vitamin D3) 50 MCG (2000 UT) capsule, Take 1 capsule by mouth 2 (Two) Times a Day., Disp: 180 capsule, Rfl: 3  •  latanoprost (XALATAN) 0.005 % ophthalmic solution, 1 drop Every Night., Disp: , Rfl:   •  Multiple Vitamins-Minerals (PRESERVISION AREDS 2 PO), Take 2 tablets by mouth Daily., Disp: , Rfl:   •  ramipril (ALTACE) 5 MG capsule, TAKE 1 CAPSULE EVERY MORNING AND 2 CAPSULES EVERY EVENING, Disp: 270 capsule, Rfl: 3  •  sertraline (ZOLOFT) 100 MG tablet, TAKE 1 TABLET BY MOUTH EVERY DAY, Disp: 90 tablet, Rfl: 1  •  Xarelto 20 MG tablet, TAKE 1 TABLET DAILY AS DIRECTED, Disp: 90 tablet, Rfl: 2  •  alendronate (FOSAMAX) 70 MG tablet, Take 1 tablet by mouth  "Every 7 (Seven) Days. (Patient not taking: Reported on 5/25/2023), Disp: 15 tablet, Rfl: 1  •  docusate sodium (Colace) 100 MG capsule, Take 1 capsule by mouth 2 (Two) Times a Day. (Patient not taking: Reported on 5/25/2023), Disp: 60 capsule, Rfl: 5  •  esomeprazole (nexIUM) 20 MG capsule, TAKE 1 CAPSULE DAILY (Patient not taking: Reported on 5/25/2023), Disp: 90 capsule, Rfl: 3  •  fluticasone (FLONASE) 50 MCG/ACT nasal spray, 1 spray into the nostril(s) as directed by provider 2 (Two) Times a Day. (Patient not taking: Reported on 5/25/2023), Disp: 16 g, Rfl: 11  •  guaiFENesin (Mucinex) 600 MG 12 hr tablet, Take 2 tablets by mouth 2 (Two) Times a Day. (Patient not taking: Reported on 5/25/2023), Disp: 180 tablet, Rfl: 1  •  ipratropium (ATROVENT) 0.06 % nasal spray, 2 sprays into the nostril(s) as directed by provider 2 (Two) Times a Day. (Patient not taking: Reported on 5/25/2023), Disp: 1 each, Rfl: 5  •  promethazine-dextromethorphan (PROMETHAZINE-DM) 6.25-15 MG/5ML syrup, Take 5 mL by mouth 4 (Four) Times a Day As Needed for Cough. (Patient not taking: Reported on 5/25/2023), Disp: 240 mL, Rfl: 1    History of Present Illness     Pt presents for follow up of AF/DCM/CHF/HTN. Since the pt has seen us in clinic last, pt denies any syncope, SOB, CP, LH, and dizziness. Denies any hospitalizations, ER visits, bleeding, or TIA/CVA symptoms. Overall feels well. BP stable at home.    ROS:  General:  + fatigue, No weight gain or loss  Cardiovascular:  Denies CP, PND, syncope, near syncope, edema or palpitations.  Pulmonary:  Denies ZAFAR, cough, or wheezing    Vitals:    05/25/23 1316   BP: 160/84   Pulse: 73   SpO2: 97%   Weight: 76.7 kg (169 lb)   Height: 170.2 cm (67\")       PE:  General: NAD  Neck: no JVD, no carotid bruits, no TM  Heart: RRR, NL S1, S2, no rubs, No murmurs  Lungs: CTA, no wheezes, rhonchi, or rales  Abd: soft, non-tender, NL BS  Ext: No musculoskeletal deformities, no edema, cyanosis, or " clubbing  Psych: normal mood and affect    Diagnostic Data:    ECG 12 Lead    Date/Time: 5/25/2023 1:47 PM  Performed by: Milton Guzman PA  Authorized by: Milton Guzman PA   Comparison: compared with previous ECG from 7/21/2022  Similar to previous ECG  Rhythm: paced  Rate: normal  Conduction: conduction normal  ST Segments: ST segments normal  T Waves: T waves normal  QRS axis: normal  Other: no other findings    Clinical impression: non-specific ECG              1. AV block    2. Dilated cardiomyopathy    3. Chronic systolic (congestive) heart failure (HCC)           BiV PM Manual Interrogation: 99% BiV paced. 8.6 years on battery. Normal function. Permanent AF. 1.7 yrs on battery      Plan:    1. Permanent Afib: AVN RFA. BIVPPM with normal function. Xarelto 20mg daily. Tolerated well.   2. HTN: Normally controlled, elevated today. At home 130mmHg.  on BB, ACE, Norvasc.   3. Chronic SHF Class I EF 55% in 2015.  4. Anticoagulation: Xarelto 20mg Daily    F/up in 6/12 months  Electronically signed by CELENA Hernandez, 05/25/23, 1:48 PM EDT.

## 2023-05-28 DIAGNOSIS — I10 BENIGN ESSENTIAL HYPERTENSION: ICD-10-CM

## 2023-05-30 RX ORDER — AMLODIPINE BESYLATE 2.5 MG/1
TABLET ORAL
Qty: 180 TABLET | Refills: 0 | Status: SHIPPED | OUTPATIENT
Start: 2023-05-30

## 2023-05-30 RX ORDER — CARVEDILOL 25 MG/1
TABLET ORAL
Qty: 180 TABLET | Refills: 0 | Status: SHIPPED | OUTPATIENT
Start: 2023-05-30

## 2023-07-24 RX ORDER — RAMIPRIL 5 MG/1
CAPSULE ORAL
Qty: 270 CAPSULE | Refills: 3 | Status: SHIPPED | OUTPATIENT
Start: 2023-07-24

## 2023-07-26 ENCOUNTER — LAB (OUTPATIENT)
Dept: LAB | Facility: HOSPITAL | Age: 88
End: 2023-07-26
Payer: MEDICARE

## 2023-07-26 ENCOUNTER — OFFICE VISIT (OUTPATIENT)
Dept: INTERNAL MEDICINE | Facility: CLINIC | Age: 88
End: 2023-07-26
Payer: MEDICARE

## 2023-07-26 ENCOUNTER — HOSPITAL ENCOUNTER (OUTPATIENT)
Dept: GENERAL RADIOLOGY | Facility: HOSPITAL | Age: 88
Discharge: HOME | End: 2023-07-26
Payer: MEDICARE

## 2023-07-26 VITALS
DIASTOLIC BLOOD PRESSURE: 68 MMHG | TEMPERATURE: 97.8 F | HEART RATE: 74 BPM | BODY MASS INDEX: 25.61 KG/M2 | SYSTOLIC BLOOD PRESSURE: 138 MMHG | OXYGEN SATURATION: 97 % | HEIGHT: 68 IN | WEIGHT: 169 LBS

## 2023-07-26 DIAGNOSIS — I10 BENIGN ESSENTIAL HYPERTENSION: ICD-10-CM

## 2023-07-26 DIAGNOSIS — R06.02 SHORTNESS OF BREATH: ICD-10-CM

## 2023-07-26 DIAGNOSIS — E78.2 MIXED HYPERLIPIDEMIA: Chronic | ICD-10-CM

## 2023-07-26 DIAGNOSIS — E55.9 VITAMIN D DEFICIENCY: ICD-10-CM

## 2023-07-26 DIAGNOSIS — E53.8 B12 DEFICIENCY: ICD-10-CM

## 2023-07-26 DIAGNOSIS — K21.9 GERD WITHOUT ESOPHAGITIS: ICD-10-CM

## 2023-07-26 DIAGNOSIS — I48.20 CHRONIC ATRIAL FIBRILLATION: ICD-10-CM

## 2023-07-26 DIAGNOSIS — R05.3 CHRONIC COUGH: Chronic | ICD-10-CM

## 2023-07-26 DIAGNOSIS — I83.93 VARICOSE VEINS OF BOTH LOWER EXTREMITIES WITHOUT ULCER OR INFLAMMATION: ICD-10-CM

## 2023-07-26 DIAGNOSIS — J41.0 SIMPLE CHRONIC BRONCHITIS: Chronic | ICD-10-CM

## 2023-07-26 DIAGNOSIS — Z00.00 MEDICARE ANNUAL WELLNESS VISIT, SUBSEQUENT: Primary | ICD-10-CM

## 2023-07-26 DIAGNOSIS — I48.21 PERMANENT ATRIAL FIBRILLATION: ICD-10-CM

## 2023-07-26 DIAGNOSIS — K40.90 NON-RECURRENT UNILATERAL INGUINAL HERNIA WITHOUT OBSTRUCTION OR GANGRENE: Chronic | ICD-10-CM

## 2023-07-26 DIAGNOSIS — Z00.00 ANNUAL PHYSICAL EXAM: Chronic | ICD-10-CM

## 2023-07-26 DIAGNOSIS — K59.01 CONSTIPATION, SLOW TRANSIT: ICD-10-CM

## 2023-07-26 DIAGNOSIS — I50.22 CHRONIC SYSTOLIC (CONGESTIVE) HEART FAILURE: ICD-10-CM

## 2023-07-26 DIAGNOSIS — Z91.81 AT MODERATE RISK FOR FALL: Chronic | ICD-10-CM

## 2023-07-26 DIAGNOSIS — M81.0 AGE-RELATED OSTEOPOROSIS WITHOUT CURRENT PATHOLOGICAL FRACTURE: Chronic | ICD-10-CM

## 2023-07-26 DIAGNOSIS — F33.1 MODERATE EPISODE OF RECURRENT MAJOR DEPRESSIVE DISORDER: ICD-10-CM

## 2023-07-26 DIAGNOSIS — M21.611 BUNION, RIGHT FOOT: Chronic | ICD-10-CM

## 2023-07-26 DIAGNOSIS — M76.811 ANTERIOR TIBIAL TENDONITIS, RIGHT: Chronic | ICD-10-CM

## 2023-07-26 PROBLEM — M25.561 ACUTE PAIN OF RIGHT KNEE: Chronic | Status: ACTIVE | Noted: 2023-07-26

## 2023-07-26 LAB
ALBUMIN SERPL-MCNC: 4.4 G/DL (ref 3.5–5.2)
ALBUMIN UR-MCNC: <1.2 MG/DL
ALBUMIN/GLOB SERPL: 1.8 G/DL
ALP SERPL-CCNC: 90 U/L (ref 39–117)
ALT SERPL W P-5'-P-CCNC: 17 U/L (ref 1–33)
ANION GAP SERPL CALCULATED.3IONS-SCNC: 10 MMOL/L (ref 5–15)
AST SERPL-CCNC: 22 U/L (ref 1–32)
BASOPHILS # BLD AUTO: 0.07 10*3/MM3 (ref 0–0.2)
BASOPHILS NFR BLD AUTO: 1.2 % (ref 0–1.5)
BILIRUB SERPL-MCNC: 0.9 MG/DL (ref 0–1.2)
BILIRUB UR QL STRIP: NEGATIVE
BUN SERPL-MCNC: 15 MG/DL (ref 8–23)
BUN/CREAT SERPL: 24.6 (ref 7–25)
CALCIUM SPEC-SCNC: 9.2 MG/DL (ref 8.6–10.5)
CHLORIDE SERPL-SCNC: 103 MMOL/L (ref 98–107)
CHOLEST SERPL-MCNC: 156 MG/DL (ref 0–200)
CLARITY UR: ABNORMAL
CO2 SERPL-SCNC: 28 MMOL/L (ref 22–29)
COLOR UR: YELLOW
CREAT SERPL-MCNC: 0.61 MG/DL (ref 0.57–1)
CREAT UR-MCNC: 92 MG/DL
DEPRECATED RDW RBC AUTO: 43.5 FL (ref 37–54)
EGFRCR SERPLBLD CKD-EPI 2021: 87.2 ML/MIN/1.73
EOSINOPHIL # BLD AUTO: 0.15 10*3/MM3 (ref 0–0.4)
EOSINOPHIL NFR BLD AUTO: 2.6 % (ref 0.3–6.2)
ERYTHROCYTE [DISTWIDTH] IN BLOOD BY AUTOMATED COUNT: 12.8 % (ref 12.3–15.4)
GLOBULIN UR ELPH-MCNC: 2.4 GM/DL
GLUCOSE SERPL-MCNC: 83 MG/DL (ref 65–99)
GLUCOSE UR STRIP-MCNC: NEGATIVE MG/DL
HCT VFR BLD AUTO: 40 % (ref 34–46.6)
HDLC SERPL-MCNC: 69 MG/DL (ref 40–60)
HGB BLD-MCNC: 13.1 G/DL (ref 12–15.9)
HGB UR QL STRIP.AUTO: NEGATIVE
IMM GRANULOCYTES # BLD AUTO: 0.02 10*3/MM3 (ref 0–0.05)
IMM GRANULOCYTES NFR BLD AUTO: 0.4 % (ref 0–0.5)
KETONES UR QL STRIP: NEGATIVE
LDLC SERPL CALC-MCNC: 75 MG/DL (ref 0–100)
LDLC/HDLC SERPL: 1.09 {RATIO}
LEUKOCYTE ESTERASE UR QL STRIP.AUTO: NEGATIVE
LYMPHOCYTES # BLD AUTO: 1.43 10*3/MM3 (ref 0.7–3.1)
LYMPHOCYTES NFR BLD AUTO: 25 % (ref 19.6–45.3)
MCH RBC QN AUTO: 30.4 PG (ref 26.6–33)
MCHC RBC AUTO-ENTMCNC: 32.8 G/DL (ref 31.5–35.7)
MCV RBC AUTO: 92.8 FL (ref 79–97)
MICROALBUMIN/CREAT UR: NORMAL MG/G{CREAT}
MONOCYTES # BLD AUTO: 0.51 10*3/MM3 (ref 0.1–0.9)
MONOCYTES NFR BLD AUTO: 8.9 % (ref 5–12)
NEUTROPHILS NFR BLD AUTO: 3.53 10*3/MM3 (ref 1.7–7)
NEUTROPHILS NFR BLD AUTO: 61.9 % (ref 42.7–76)
NITRITE UR QL STRIP: NEGATIVE
NRBC BLD AUTO-RTO: 0 /100 WBC (ref 0–0.2)
NT-PROBNP SERPL-MCNC: 647 PG/ML (ref 0–1800)
PH UR STRIP.AUTO: 6.5 [PH] (ref 5–8)
PLATELET # BLD AUTO: 220 10*3/MM3 (ref 140–450)
PMV BLD AUTO: 10.3 FL (ref 6–12)
POTASSIUM SERPL-SCNC: 4.3 MMOL/L (ref 3.5–5.2)
PROT SERPL-MCNC: 6.8 G/DL (ref 6–8.5)
PROT UR QL STRIP: NEGATIVE
RBC # BLD AUTO: 4.31 10*6/MM3 (ref 3.77–5.28)
SODIUM SERPL-SCNC: 141 MMOL/L (ref 136–145)
SP GR UR STRIP: 1.02 (ref 1–1.03)
TRIGL SERPL-MCNC: 60 MG/DL (ref 0–150)
TSH SERPL DL<=0.05 MIU/L-ACNC: 2.88 UIU/ML (ref 0.27–4.2)
UROBILINOGEN UR QL STRIP: ABNORMAL
VLDLC SERPL-MCNC: 12 MG/DL (ref 5–40)
WBC NRBC COR # BLD: 5.71 10*3/MM3 (ref 3.4–10.8)

## 2023-07-26 PROCEDURE — 81001 URINALYSIS AUTO W/SCOPE: CPT

## 2023-07-26 PROCEDURE — 83880 ASSAY OF NATRIURETIC PEPTIDE: CPT

## 2023-07-26 PROCEDURE — 82043 UR ALBUMIN QUANTITATIVE: CPT

## 2023-07-26 PROCEDURE — 80053 COMPREHEN METABOLIC PANEL: CPT

## 2023-07-26 PROCEDURE — 82607 VITAMIN B-12: CPT

## 2023-07-26 PROCEDURE — 82306 VITAMIN D 25 HYDROXY: CPT

## 2023-07-26 PROCEDURE — 71046 X-RAY EXAM CHEST 2 VIEWS: CPT

## 2023-07-26 PROCEDURE — 82570 ASSAY OF URINE CREATININE: CPT

## 2023-07-26 PROCEDURE — 85025 COMPLETE CBC W/AUTO DIFF WBC: CPT

## 2023-07-26 PROCEDURE — 84443 ASSAY THYROID STIM HORMONE: CPT

## 2023-07-26 PROCEDURE — 80061 LIPID PANEL: CPT

## 2023-07-26 RX ORDER — CARVEDILOL 25 MG/1
25 TABLET ORAL 2 TIMES DAILY
Qty: 180 TABLET | Refills: 1 | Status: SHIPPED | OUTPATIENT
Start: 2023-07-26

## 2023-07-26 RX ORDER — ALENDRONATE SODIUM 70 MG/1
70 TABLET ORAL
Qty: 15 TABLET | Refills: 1 | Status: SHIPPED | OUTPATIENT
Start: 2023-07-26

## 2023-07-26 RX ORDER — ATORVASTATIN CALCIUM 20 MG/1
20 TABLET, FILM COATED ORAL EVERY EVENING
Qty: 90 TABLET | Refills: 3 | Status: SHIPPED | OUTPATIENT
Start: 2023-07-26

## 2023-07-26 RX ORDER — AMLODIPINE BESYLATE 2.5 MG/1
2.5 TABLET ORAL 2 TIMES DAILY
Qty: 180 TABLET | Refills: 1 | Status: SHIPPED | OUTPATIENT
Start: 2023-07-26

## 2023-07-26 RX ORDER — ACETAMINOPHEN 160 MG
2000 TABLET,DISINTEGRATING ORAL DAILY
Start: 2023-07-26

## 2023-07-26 RX ORDER — SERTRALINE HYDROCHLORIDE 100 MG/1
100 TABLET, FILM COATED ORAL DAILY
Qty: 90 TABLET | Refills: 1 | Status: SHIPPED | OUTPATIENT
Start: 2023-07-26

## 2023-07-26 RX ORDER — POLYETHYLENE GLYCOL 3350 17 G/17G
17 POWDER, FOR SOLUTION ORAL DAILY
Qty: 850 G | Refills: 11 | Status: SHIPPED | OUTPATIENT
Start: 2023-07-26

## 2023-07-26 NOTE — PATIENT INSTRUCTIONS
Patient Instructions  Problem List Items Addressed This Visit          Advance Directives and General Issues    At moderate risk for fall (Chronic)    Current Assessment & Plan     We discussed starting the alendronate for the bone density. She will continue taking vitamin D3 to help prevent falls. Stay well hydrated.            Cardiac and Vasculature    Mixed hyperlipidemia (Chronic)    Overview     Taking atorvastatin every evening.          Current Assessment & Plan     She will continue atorvastatin every evening. Continue low-fat, healthy diet. Continue regular exercise. Continue using the exercise bike every day.         Relevant Medications    atorvastatin (LIPITOR) 20 MG tablet    Other Relevant Orders    Lipid Panel    TSH    Permanent atrial fibrillation    Overview     Taking Xarelto and carvedilol.  Sees Dr. Kaufman.          Current Assessment & Plan     Continue Xarelto and carvedilol. Follow up with Dr. Kaufman in 11/2023.         Relevant Medications    rivaroxaban (Xarelto) 20 MG tablet    amLODIPine (NORVASC) 2.5 MG tablet    carvedilol (COREG) 25 MG tablet    Chronic systolic (congestive) heart failure    Overview     Taking carvedilol, ramipril, Xarelto, and statin.           Current Assessment & Plan     She will continue taking carvedilol, ramipril, Xarelto, and atorvastatin. She will let us know if there is an unexplained weight gain of 3 pounds or more, increased shortness of breath, or edema.         Relevant Medications    amLODIPine (NORVASC) 2.5 MG tablet    carvedilol (COREG) 25 MG tablet    Other Relevant Orders    BNP    Varicose veins of both lower extremities without ulcer or inflammation    Overview     Wear support hose/socks daily.           Current Assessment & Plan     She is encouraged to wear support hose daily. Elevate feet when sitting at home. Avoid salt in the diet. Dink plenty of water.          Benign essential hypertension    Overview     Taking amlodipine and  carvedilol and ramipril twice a day.           Current Assessment & Plan     She will continue amlodipine, carvedilol, and ramipril.           Relevant Medications    ramipril (ALTACE) 5 MG capsule    amLODIPine (NORVASC) 2.5 MG tablet    carvedilol (COREG) 25 MG tablet    Other Relevant Orders    CBC & Differential    Comprehensive Metabolic Panel    Microalbumin / Creatinine Urine Ratio - Urine, Clean Catch    Urinalysis With Microscopic - Urine, Clean Catch       Endocrine and Metabolic    B12 deficiency    Current Assessment & Plan     Recheck level today.         Relevant Orders    Vitamin B12    Vitamin D deficiency    Current Assessment & Plan     Recheck level today.          Relevant Orders    Vitamin D,25-Hydroxy       Gastrointestinal Abdominal     Non-recurrent unilateral inguinal hernia without obstruction or gangrene (Chronic)    Overview     R inguinal hernia is asymptomatic.         Current Assessment & Plan     We will continue to monitor. She will avoid lifting heavy objects.          GERD without esophagitis    Overview     Not taking esomeprazole.         Current Assessment & Plan     She is encouraged to resume taking esomeprazole everyday. Avoid eating close to bedtime and avoid large meals.          Relevant Medications    esomeprazole (nexIUM) 20 MG capsule    Constipation, slow transit    Current Assessment & Plan     She will start using half a capful of MiraLAX generic daily. I sent a prescription to the pharmacy. She will also continue drinking plenty of fluids everyday.            Health Encounters    Medicare annual wellness visit, subsequent - Primary    Current Assessment & Plan     She is up to date on vaccinations except for Tdap which she was encouraged to get at her pharmacy. She is up to date on her DEXA bone scan.          Annual physical exam    Current Assessment & Plan     She is up to date on vaccinations except for Tdap which she was encouraged to get at her pharmacy. She  is up to date on her DEXA bone scan.             Mental Health    Moderate episode of recurrent major depressive disorder    Overview     Taking sertraline 100 mg tablet daily.               Current Assessment & Plan     She will continue current vogt of sertraline daily. Physical activity also helps decrease symptoms of stress, anxiety, and mood.          Relevant Medications    sertraline (ZOLOFT) 100 MG tablet       Musculoskeletal and Injuries    Age-related osteoporosis without current pathological fracture (Chronic)    Overview     DEXA 2017 revealed osteopenia with a T score in the spine of -1.9 and a T score in the hip of -2.1.      DEXA 10/19/2021 showed worsening of bone density.  T score at the hip is now in the osteoporosis range with a T score of -3.1.  Spine T score is -2.1.  (Osteoporosis is -2.5 or less.)    Not taking alendronate (Fosamax) tablet.           Current Assessment & Plan     She has not started the alendronate. I do recommend starting the alendronate tablet once a week first thing in the morning with a glass of water. Then wait 30 minutes before taking other medications, other beverages, or food. She will let us know if she has any stomach upset with it. We will plan to recheck DEXA next summer to giver her a year of alendronate to make sure it is helping. Weightbearing exercises also help with bone density and prevention of falls. Use some small hand weights at home while watching TV once a day. Use 3 pounds or less. Also, walking around more often and walking at the grocery store helps.            Anterior tibial tendonitis, right (Chronic)    Current Assessment & Plan     She may also use a cold pack on the knee as needed to decrease pain, inflammation, and swelling. Continue using the exercise bike every day. May take Tylenol as needed. Stay active. She is advised to perform some hip and back stretches daily as well to help with the right knee pain.         Bunion, right foot  (Chronic)    Current Assessment & Plan     Continue choosing shoes that are good, have good support, and have a wide toe box.             Pulmonary and Pneumonias    Chronic cough (Chronic)    Overview                  Current Assessment & Plan     We will recheck chest x-ray today. I have recommended resuming esomeprazole (Nexium) daily. I think that is part of the trigger for her cough.            Relevant Orders    XR Chest PA & Lateral    Simple chronic bronchitis (Chronic)    Current Assessment & Plan     She is encouraged to resume esomeprazole (Nexium) daily. If she is noticing any postnasal drainage, she should resume the Flonase an ipratropium nasal sprays.          Shortness of breath    Current Assessment & Plan     Continue using the exercise bike daily. We will check labs today.          Relevant Orders    XR Chest PA & Lateral     Other Visit Diagnoses       Chronic atrial fibrillation        Relevant Medications    rivaroxaban (Xarelto) 20 MG tablet    amLODIPine (NORVASC) 2.5 MG tablet    carvedilol (COREG) 25 MG tablet           BMI for Adults  What is BMI?  Body mass index (BMI) is a number that is calculated from a person's weight and height. BMI can help estimate how much of a person's weight is composed of fat. BMI does not measure body fat directly. Rather, it is an alternative to procedures that directly measure body fat, which can be difficult and expensive.  BMI can help identify people who may be at higher risk for certain medical problems.  What are BMI measurements used for?  BMI is used as a screening tool to identify possible weight problems. It helps determine whether a person is obese, overweight, a healthy weight, or underweight.  BMI is useful for:  Identifying a weight problem that may be related to a medical condition or may increase the risk for medical problems.  Promoting changes, such as changes in diet and exercise, to help reach a healthy weight. BMI screening can be  "repeated to see if these changes are working.  How is BMI calculated?  BMI involves measuring your weight in relation to your height. Both height and weight are measured, and the BMI is calculated from those numbers. This can be done either in English ([place] or metric measurements. Note that charts and online BMI calculators are available to help you find your BMI quickly and easily without having to do these calculations yourself.  To calculate your BMI in English ([place] measurements:    Measure your weight in pounds (lb).  Multiply the number of pounds by 703.  For example, for a person who weighs 180 lb, multiply that number by 703, which equals [phone].  Measure your height in inches. Then multiply that number by itself to get a measurement called \"inches squared.\"  For example, for a person who is 70 inches tall, the \"inches squared\" measurement is 70 inches x 70 inches, which equals 4,900 inches squared.  Divide the total from step 2 (number of lb x 703) by the total from step 3 (inches squared): 126,540 ÷ 4,900 = 25.8. This is your BMI.  To calculate your BMI in metric measurements:  Measure your weight in kilograms (kg).  Measure your height in meters (m). Then multiply that number by itself to get a measurement called \"meters squared.\"  For example, for a person who is 1.75 m tall, the \"meters squared\" measurement is 1.75 m x 1.75 m, which is equal to 3.1 meters squared.  Divide the number of kilograms (your weight) by the meters squared number. In this example: 70 ÷ 3.1 = 22.6. This is your BMI.  What do the results mean?  BMI charts are used to identify whether you are underweight, normal weight, overweight, or obese. The following guidelines will be used:  Underweight: BMI less than 18.5.  Normal weight: BMI between 18.5 and 24.9.  Overweight: BMI between 25 and 29.9.  Obese: BMI of 30 or above.  Keep these notes in mind:  Weight includes both fat and muscle, so someone with a muscular build, such as an " athlete, may have a BMI that is higher than 24.9. In cases like these, BMI is not an accurate measure of body fat.  To determine if excess body fat is the cause of a BMI of 25 or higher, further assessments may need to be done by a health care provider.  BMI is usually interpreted in the same way for men and women.  Where to find more information  For more information about BMI, including tools to quickly calculate your BMI, go to these websites:  Centers for Disease Control and Prevention: www.cdc.gov  American Heart Association: www.heart.org  National Heart, Lung, and Blood [place]: www.nhlbi.nih.gov  Summary  Body mass index (BMI) is a number that is calculated from a person's weight and height.  BMI may help estimate how much of a person's weight is composed of fat. BMI can help identify those who may be at higher risk for certain medical problems.  BMI can be measured using English measurements or metric measurements.  BMI charts are used to identify whether you are underweight, normal weight, overweight, or obese.  This information is not intended to replace advice given to you by your health care provider. Make sure you discuss any questions you have with your health care provider.  Document Revised: 09/09/2020 Document Reviewed: 07/17/2020  [email or web address] Patient Education © 2023 [employer]  Exercising to Lose Weight  Getting regular exercise is important for everyone. It is especially important if you are overweight. Being overweight increases your risk of heart disease, stroke, diabetes, high blood pressure, and several types of cancer. Exercising, and reducing the calories you consume, can help you lose weight and improve fitness and health.  Exercise can be moderate or vigorous intensity. To lose weight, most people need to do a certain amount of moderate or vigorous-intensity exercise each week.  How can exercise affect me?  You lose weight when you exercise enough to burn more calories than you eat.  Exercise also reduces body fat and builds muscle. The more muscle you have, the more calories you burn. Exercise also:  Improves mood.  Reduces stress and tension.  Improves your overall fitness, flexibility, and endurance.  Increases bone strength.  Moderate-intensity exercise  A person riding a bicycle wearing a safety helmet.      Moderate-intensity exercise is any activity that gets you moving enough to burn at least three times more energy (calories) than if you were sitting.  Examples of moderate exercise include:  Walking a mile in 15 minutes.  Doing light yard work.  Biking at an easy pace.  Most people should get at least 150 minutes of moderate-intensity exercise a week to maintain their body weight.  Vigorous-intensity exercise  Vigorous-intensity exercise is any activity that gets you moving enough to burn at least six times more calories than if you were sitting. When you exercise at this intensity, you should be working hard enough that you are not able to carry on a conversation.  Examples of vigorous exercise include:  Running.  Playing a team sport, such as football, basketball, and soccer.  Jumping rope.  Most people should get at least 75 minutes a week of vigorous exercise to maintain their body weight.  What actions can I take to lose weight?  The amount of exercise you need to lose weight depends on:  Your age.  The type of exercise.  Any health conditions you have.  Your overall physical ability.  Talk to your health care provider about how much exercise you need and what types of activities are safe for you.  Nutrition  A plate with healthy, colorful foods.      Make changes to your diet as told by your health care provider or diet and nutrition specialist (dietitian). This may include:  Eating fewer calories.  Eating more protein.  Eating less unhealthy fats.  Eating a diet that includes fresh fruits and vegetables, whole grains, low-fat dairy products, and lean protein.  Avoiding foods with  added fat, salt, and sugar.  Drink plenty of water while you exercise to prevent dehydration or heat stroke.  Activity  Choose an activity that you enjoy and set realistic goals. Your health care provider can help you make an exercise plan that works for you.  Exercise at a moderate or vigorous intensity most days of the week.  The intensity of exercise may vary from person to person. You can tell how intense a workout is for you by paying attention to your breathing and heartbeat. Most people will notice their breathing and heartbeat get faster with more intense exercise.  Do resistance training twice each week, such as:  Push-ups.  Sit-ups.  Lifting weights.  Using resistance bands.  Getting short amounts of exercise can be just as helpful as long, structured periods of exercise. If you have trouble finding time to exercise, try doing these things as part of your daily routine:  Get up, stretch, and walk around every 30 minutes throughout the day.  Go for a walk during your lunch break.  Park your car farther away from your destination.  If you take public transportation, get off one stop early and walk the rest of the way.  Make phone calls while standing up and walking around.  Take the stairs instead of elevators or escalators.  Wear comfortable clothes and shoes with good support.  Do not exercise so much that you hurt yourself, feel dizzy, or get very short of breath.  Where to find more information  [place] Department of Health and Human Services: www.hhs.gov  Centers for Disease Control and Prevention: www.cdc.gov  Contact a health care provider:  Before starting a new exercise program.  If you have questions or concerns about your weight.  If you have a medical problem that keeps you from exercising.  Get help right away if:  You have any of the following while exercising:  Injury.  Dizziness.  Difficulty breathing or shortness of breath that does not go away when you stop exercising.  Chest pain.  Rapid  heartbeat.  These symptoms may represent a serious problem that is an emergency. Do not wait to see if the symptoms will go away. Get medical help right away. Call your local emergency services (911 in the [place] Do not drive yourself to the hospital.  Summary  Getting regular exercise is especially important if you are overweight.  Being overweight increases your risk of heart disease, stroke, diabetes, high blood pressure, and several types of cancer.  Losing weight happens when you burn more calories than you eat.  Reducing the amount of calories you eat, and getting regular moderate or vigorous exercise each week, helps you lose weight.  This information is not intended to replace advice given to you by your health care provider. Make sure you discuss any questions you have with your health care provider. Heart-Healthy Eating Plan  Many factors influence your heart (coronary) health, including eating and exercise habits. Coronary risk increases with abnormal blood fat (lipid) levels. Heart-healthy meal planning includes limiting unhealthy fats, increasing healthy fats, and making other diet and lifestyle changes.  What is my plan?  Your health care provider may recommend that you:  Limit your fat intake to _________% or less of your total calories each day.  Limit your saturated fat intake to _________% or less of your total calories each day.  Limit the amount of cholesterol in your diet to less than _________ mg per day.  What are tips for following this plan?  Cooking  Cook foods using methods other than frying. Baking, boiling, grilling, and broiling are all good options. Other ways to reduce fat include:  Removing the skin from poultry.  Removing all visible fats from meats.  Steaming vegetables in water or broth.  Meal planning  A plate with examples of foods in a healthy diet.      At meals, imagine dividing your plate into fourths:  Fill one-half of your plate with vegetables and green salads.  Fill  one-fourth of your plate with whole grains.  Fill one-fourth of your plate with lean protein foods.  Eat 4-5 servings of vegetables per day. One serving equals 1 cup raw or cooked vegetable, or 2 cups raw leafy greens.  Eat 4-5 servings of fruit per day. One serving equals 1 medium whole fruit, ¼ cup dried fruit, ½ cup fresh, frozen, or canned fruit, or ½ cup 100% fruit juice.  Eat more foods that contain soluble fiber. Examples include apples, broccoli, carrots, beans, peas, and barley. Aim to get 25-30 g of fiber per day.  Increase your consumption of legumes, nuts, and seeds to 4-5 servings per week. One serving of dried beans or legumes equals ½ cup cooked, 1 serving of nuts is ¼ cup, and 1 serving of seeds equals 1 tablespoon.  Fats  Choose healthy fats more often. Choose monounsaturated and polyunsaturated fats, such as olive and canola oils, flaxseeds, walnuts, almonds, and seeds.  Eat more omega-3 fats. Choose salmon, mackerel, sardines, tuna, flaxseed oil, and ground flaxseeds. Aim to eat fish at least 2 times each week.  Check food labels carefully to identify foods with trans fats or high amounts of saturated fat.  Limit saturated fats. These are found in animal products, such as meats, butter, and cream. Plant sources of saturated fats include palm oil, palm kernel oil, and coconut oil.  Avoid foods with partially hydrogenated oils in them. These contain trans fats. Examples are stick margarine, some tub margarines, cookies, crackers, and other baked goods.  Avoid fried foods.  General information  Eat more home-cooked food and less restaurant, buffet, and fast food.  Limit or avoid alcohol.  Limit foods that are high in starch and sugar.  Lose weight if you are overweight. Losing just 5-10% of your body weight can help your overall health and prevent diseases such as diabetes and heart disease.  Monitor your salt (sodium) intake, especially if you have high blood pressure. Talk with your health care  provider about your sodium intake.  Try to incorporate more vegetarian meals weekly.  What foods can I eat?  Fruits  All fresh, canned (in natural juice), or frozen fruits.  Vegetables  Fresh or frozen vegetables (raw, steamed, roasted, or grilled). Green salads.  Grains  Most grains. Choose whole wheat and whole grains most of the time. Rice and pasta, including brown rice and pastas made with whole wheat.  Meats and other proteins  Lean, well-trimmed beef, veal, pork, and lamb. Chicken and turkey without skin. All fish and shellfish. Wild duck, rabbit, pheasant, and venison. Egg whites or low-cholesterol egg substitutes. Dried beans, peas, lentils, and tofu. Seeds and most nuts.  Dairy  Low-fat or nonfat cheeses, including ricotta and mozzarella. Skim or 1% milk (liquid, powdered, or evaporated). Buttermilk made with low-fat milk. Nonfat or low-fat yogurt.  Fats and oils  Non-hydrogenated (trans-free) margarines. Vegetable oils, including soybean, sesame, sunflower, olive, peanut, safflower, corn, canola, and cottonseed. Salad dressings or mayonnaise made with a vegetable oil.  Beverages  Water (mineral or sparkling). Coffee and tea. Diet carbonated beverages.  Sweets and desserts  Sherbet, gelatin, and fruit ice. Small amounts of dark chocolate.  Limit all sweets and desserts.  Seasonings and condiments  All seasonings and condiments.  The items listed above may not be a complete list of foods and beverages you can eat. Contact a dietitian for more options.  What foods are not recommended?  Fruits  Canned fruit in heavy syrup. Fruit in cream or butter sauce. Fried fruit. Limit coconut.  Vegetables  Vegetables cooked in cheese, cream, or butter sauce. Fried vegetables.  Grains  Breads made with saturated or trans fats, oils, or whole milk. Croissants. Sweet rolls. Donuts. High-fat crackers, such as cheese crackers.  Meats and other proteins  Fatty meats, such as hot dogs, ribs, sausage, whitaker, rib-eye roast or  steak. High-fat deli meats, such as salami and bologna. Caviar. Domestic duck and goose. Organ meats, such as liver.  Dairy  Cream, sour cream, cream cheese, and creamed cottage cheese. Whole-milk cheeses. Whole or 2% milk (liquid, evaporated, or condensed). Whole buttermilk. Cream sauce or high-fat cheese sauce. Whole-milk yogurt.  Fats and oils  Meat fat, or shortening. Cocoa butter, hydrogenated oils, palm oil, coconut oil, palm kernel oil. Solid fats and shortenings, including whitaker fat, salt pork, lard, and butter. Nondairy cream substitutes. Salad dressings with cheese or sour cream.  Beverages  Regular sodas and any drinks with added sugar.  Sweets and desserts  Frosting. Pudding. Cookies. Cakes. Pies. Milk chocolate or white chocolate. Buttered syrups. Full-fat ice cream or ice cream drinks.  The items listed above may not be a complete list of foods and beverages to avoid. Contact a dietitian for more information.  Summary  Heart-healthy meal planning includes limiting unhealthy fats, increasing healthy fats, and making other diet and lifestyle changes.  Lose weight if you are overweight. Losing just 5-10% of your body weight can help your overall health and prevent diseases such as diabetes and heart disease.  Focus on eating a balance of foods, including fruits and vegetables, low-fat or nonfat dairy, lean protein, nuts and legumes, whole grains, and heart-healthy oils and fats.  This information is not intended to replace advice given to you by your health care provider. Make sure you discuss any questions you have with your health care provider.  Document Revised: 04/28/2022 Document Reviewed: 04/28/2022  [employer] Patient Education © 2022 [employer]

## 2023-07-26 NOTE — ASSESSMENT & PLAN NOTE
She will continue current vogt of sertraline daily. Physical activity also helps decrease symptoms of stress, anxiety, and mood.

## 2023-07-26 NOTE — ASSESSMENT & PLAN NOTE
She will continue atorvastatin every evening. Continue low-fat, healthy diet. Continue regular exercise. Continue using the exercise bike every day.

## 2023-07-26 NOTE — ASSESSMENT & PLAN NOTE
She will start using half a capful of MiraLAX generic daily. I sent a prescription to the pharmacy. She will also continue drinking plenty of fluids everyday.

## 2023-07-26 NOTE — PROGRESS NOTES
The ABCs of the Annual Wellness Visit  Initial Medicare Wellness Visit    Chief Complaint   Patient presents with    Medicare Wellness-subsequent    Knee Pain     Only during walking, for couple months       Subjective   History of Present Illness:  Jayda Lopes is a 87 y.o. female who presents for an Initial Medicare Wellness Visit.      The patient presents today for an annual physical.         Fall risk  The patient denies any falls.      Right bunion  The patient states that when she stands up, she has a bunion on her right second toe. Her toe does not straighten out. She denies pain in the second toe. She reports numbness in her right foot after an injury a few years ago. She denies any numbness in the other foot. She states that the bunion does not bother her.    Hypertension  The patient's blood pressure is 138/68 mmHg. When she checks it at home, it is always around normal. She adds that sometimes it could be slightly elevated at 118 systolic. She is taking amlodipine, carvedilol twice a day, and ramipril 1 in the morning and 2 at night. She denies much swelling in her ankles. She notes that they are about a half inch bigger right now than usual. She denies any palpitations, fast heartbeat, or chest pain. She sees Dr. Kaufman regularly and has an upcoming appointment with him on 11/22/2023.    Hernia  The patient expresses concern regarding her right inguinal area. She denies pain or tenderness.    Constipation  The patient tries to drink a lot of water. She monitors her fiber intake. She has MiraLAX, but she does not take it.    Osteoporosis  She has not tried the alendronate.    Hyperlipidemia  The patient is taking atorvastatin every evening for her cholesterol. Affirms a low fat diet most of the time. She does not use any hand weights at home.     Health maintenance  The patient states that she did get the second dose of the new Shingrix vaccine. She is due for the Tdap vaccine.      CHRONIC  CONDITIONS:    HEALTH RISK ASSESSMENT    Recent Hospitalizations:  She was not admitted to the hospital during the last year.       Current Medical Providers:  Patient Care Team:  Akilah Rueda MD as PCP - General (Internal Medicine)  Akilah Rueda MD as PCP - Internal Medicine (Internal Medicine)  Bernadette Almodovar MD as Consulting Physician (Dermatology)  Hiren Kaufman MD as Consulting Physician (Cardiac Electrophysiology)    Smoking Status:  Social History     Tobacco Use   Smoking Status Never   Smokeless Tobacco Never       Alcohol Consumption:  Social History     Substance and Sexual Activity   Alcohol Use Yes    Alcohol/week: 3.0 standard drinks    Types: 3 Glasses of wine per week    Comment: a few glasses weekly       Depression Screen:       2023    10:27 AM   PHQ-2/PHQ-9 Depression Screening   Little Interest or Pleasure in Doing Things 0-->not at all   Feeling Down, Depressed or Hopeless 0-->not at all   PHQ-9: Brief Depression Severity Measure Score 0       Fall Risk Screen:  KAYA Fall Risk Assessment was completed, and patient is at MODERATE risk for falls. Assessment completed on:2023    Health Habits and Functional and Cognitive Screenin/22/2022    10:02 AM   Functional & Cognitive Status   Do you have difficulty preparing food and eating? No   Do you have difficulty bathing yourself, getting dressed or grooming yourself? No   Do you have difficulty using the toilet? No   Do you have difficulty moving around from place to place? No   Do you have trouble with steps or getting out of a bed or a chair? No   Current Diet Well Balanced Diet   Dental Exam Up to date   Eye Exam Up to date   Exercise (times per week) 0 times per week   Current Exercises Include No Regular Exercise   Do you need help using the phone?  No   Are you deaf or do you have serious difficulty hearing?  No   Do you need help to go to places out of walking distance? No   Do you need help  shopping? No   Do you need help preparing meals?  No   Do you need help with housework?  No   Do you need help with laundry? No   Do you need help taking your medications? No   Do you need help managing money? No   Do you ever drive or ride in a car without wearing a seat belt? No   Have you felt unusual stress, anger or loneliness in the last month? No   Who do you live with? Spouse   If you need help, do you have trouble finding someone available to you? No   Have you been bothered in the last four weeks by sexual problems? No   Do you have difficulty concentrating, remembering or making decisions? No         Age-appropriate Screening Schedule:  Refer to the list below for future screening recommendations based on patient's age, sex and/or medical conditions. Orders for these recommended tests are listed in the plan section. The patient has been provided with a written plan.    Health Maintenance   Topic Date Due    TDAP/TD VACCINES (2 - Td or Tdap) 12/15/2020    ZOSTER VACCINE (3 of 3) 12/16/2022    COVID-19 Vaccine (6 - Pfizer series) 01/17/2023    ANNUAL WELLNESS VISIT  07/22/2023    INFLUENZA VACCINE  10/01/2023    LIPID PANEL  02/13/2024    DXA SCAN  10/19/2024    Pneumococcal Vaccine 65+  Completed        The following portions of the patient's history were reviewed and updated as appropriate: allergies, current medications, past family history, past medical history, past social history, past surgical history, and problem list.    Does the patient have evidence of cognitive impairment? No    Aspirin is not on active medication list.  Aspirin use is not indicated based on review of current medical condition/s. Risk of harm outweighs potential benefits.  .    Outpatient Medications Prior to Visit   Medication Sig Dispense Refill    Aflibercept (EYLEA IO) Inject  into the eye Every 3 (Three) Months.      latanoprost (XALATAN) 0.005 % ophthalmic solution Administer 1 drop to both eyes Every Night.      Multiple  Vitamins-Minerals (PRESERVISION AREDS 2 PO) Take 2 tablets by mouth Daily.      ramipril (ALTACE) 5 MG capsule TAKE 1 CAPSULE EVERY MORNING AND 2 CAPSULES EVERY EVENING 270 capsule 3    amLODIPine (NORVASC) 2.5 MG tablet TAKE 1 TABLET BY MOUTH TWO TIMES A  tablet 0    atorvastatin (LIPITOR) 20 MG tablet TAKE 1 TABLET EVERY EVENING 90 tablet 3    carvedilol (COREG) 25 MG tablet TAKE 1 TABLET BY MOUTH TWO TIMES A  tablet 0    Cholecalciferol (Vitamin D3) 50 MCG (2000 UT) capsule Take 1 capsule by mouth 2 (Two) Times a Day. (Patient taking differently: Take 1 capsule by mouth Daily.) 180 capsule 3    sertraline (ZOLOFT) 100 MG tablet TAKE 1 TABLET BY MOUTH EVERY DAY 90 tablet 1    Xarelto 20 MG tablet TAKE 1 TABLET DAILY AS DIRECTED 90 tablet 2    alendronate (FOSAMAX) 70 MG tablet Take 1 tablet by mouth Every 7 (Seven) Days. (Patient not taking: Reported on 5/25/2023) 15 tablet 1    docusate sodium (Colace) 100 MG capsule Take 1 capsule by mouth 2 (Two) Times a Day. (Patient not taking: Reported on 5/25/2023) 60 capsule 5    esomeprazole (nexIUM) 20 MG capsule TAKE 1 CAPSULE DAILY (Patient not taking: Reported on 5/25/2023) 90 capsule 3    fluticasone (FLONASE) 50 MCG/ACT nasal spray 1 spray into the nostril(s) as directed by provider 2 (Two) Times a Day. (Patient not taking: Reported on 5/25/2023) 16 g 11    guaiFENesin (Mucinex) 600 MG 12 hr tablet Take 2 tablets by mouth 2 (Two) Times a Day. (Patient not taking: Reported on 5/25/2023) 180 tablet 1    ipratropium (ATROVENT) 0.06 % nasal spray 2 sprays into the nostril(s) as directed by provider 2 (Two) Times a Day. (Patient not taking: Reported on 5/25/2023) 1 each 5    promethazine-dextromethorphan (PROMETHAZINE-DM) 6.25-15 MG/5ML syrup Take 5 mL by mouth 4 (Four) Times a Day As Needed for Cough. (Patient not taking: Reported on 5/25/2023) 240 mL 1     No facility-administered medications prior to visit.       Patient Active Problem List   Diagnosis     Permanent atrial fibrillation    Palpitation    Atypical chest pain    GERD without esophagitis    Macular degeneration (senile) of retina    Bradycardia    Cortical age-related cataract of both eyes    Chronic cough    Non-rheumatic mitral regurgitation    Seasonal allergic rhinitis due to pollen    Chronic systolic (congestive) heart failure    Dilated cardiomyopathy    Primary open angle glaucoma (POAG) of both eyes, mild stage    Moderate episode of recurrent major depressive disorder    Generalized anxiety disorder    B12 deficiency    Age-related osteoporosis without current pathological fracture    Osteoarthritis    Varicose veins of both lower extremities without ulcer or inflammation    Vitamin D deficiency    Benign essential hypertension    AV block    Mixed hyperlipidemia    Constipation, slow transit    Simple chronic bronchitis    Chronic right-sided low back pain with right-sided sciatica    Shortness of breath    Pain of right hip joint    Trochanteric bursitis of right hip    Right leg pain    Anterior tibial tendonitis, right    Poor balance    Folliculitis    Presbycusis of both ears    Fall from slip, trip, or stumble, initial encounter    At moderate risk for fall    Hearing loss of right ear    Medicare annual wellness visit, subsequent    Annual physical exam    Change of skin color    Ganglion cyst of foot    Bunion, right foot    Non-recurrent unilateral inguinal hernia without obstruction or gangrene    Pelvic pressure in female    RUQ abdominal pain       Advanced Care Planning:  Advance Directive is not on file.  ACP discussion was held with the patient during this visit. Patient does not have an advance directive, information provided.    Review of Systems    Compared to one year ago, the patient feels her physical health is the same.  Compared to one year ago, the patient feels her mental health is the same.    Reviewed chart for potential of high risk medication in the elderly:  "yes  Reviewed chart for potential of harmful drug interactions in the elderly:yes    Objective         Vitals:    07/26/23 1021   BP: 138/68   BP Location: Left arm   Patient Position: Sitting   Cuff Size: Adult   Pulse: 74   Temp: 97.8 °F (36.6 °C)   TempSrc: Infrared   SpO2: 97%   Weight: 76.7 kg (169 lb)  Comment: pt refused   Height: 171.6 cm (67.56\")     BMI is >= 25 and <30. (Overweight) The following options were offered after discussion;: exercise counseling/recommendations and nutrition counseling/recommendations    Body mass index is 26.03 kg/m².  Discussed the patient's BMI with her. The BMI is above average; BMI management plan is completed.    Physical Exam  Vitals and nursing note reviewed.   Constitutional:       Appearance: She is well-developed.   HENT:      Head: Normocephalic.   Eyes:      Conjunctiva/sclera: Conjunctivae normal.      Pupils: Pupils are equal, round, and reactive to light.   Neck:      Thyroid: No thyromegaly.   Cardiovascular:      Rate and Rhythm: Normal rate and regular rhythm.      Heart sounds: Normal heart sounds.   Pulmonary:      Effort: Pulmonary effort is normal.      Breath sounds: Normal breath sounds. No wheezing.   Chest:   Breasts:     Right: No inverted nipple, mass, nipple discharge, skin change or tenderness.      Left: No inverted nipple, mass, nipple discharge, skin change or tenderness.   Abdominal:      General: Bowel sounds are normal.      Palpations: Abdomen is soft.      Tenderness: There is no abdominal tenderness.      Hernia: Hernia is present in the right inguinal area.      Comments:  There is a right inguinal hernia without obstruction. Nontender and only felt when she is standing, not when lying.   Musculoskeletal:         General: No tenderness. Normal range of motion.      Cervical back: Normal range of motion and neck supple.      Right knee: No swelling or erythema. Tenderness present over the medial joint line.      Right lower leg: No edema. "      Left lower leg: No edema.      Right foot: Bunion present.      Comments: Right knee with changes of osteoarthritis, a little tenderness in the inferior joint line medially. No swelling or redness. Right bunion and right second hammertoe and bilateral varicose veins in lower legs.   Lymphadenopathy:      Cervical: No cervical adenopathy.   Skin:     General: Skin is warm and dry.      Findings: No rash.   Neurological:      Mental Status: She is alert and oriented to person, place, and time.      Cranial Nerves: No cranial nerve deficit.      Sensory: No sensory deficit.      Coordination: Coordination normal.      Gait: Gait normal.   Psychiatric:         Speech: Speech normal.         Behavior: Behavior normal.         Thought Content: Thought content normal.         Judgment: Judgment normal.                Assessment & Plan   Medicare Risks and Personalized Health Plan  CMS Preventative Services Quick Reference  Cardiovascular risk  Osteoporosis Risk    The above risks/problems have been discussed with the patient.  Pertinent information has been shared with the patient in the After Visit Summary.  Follow up plans and orders are seen below in the Assessment/Plan Section.  Patient Instructions   Problem List Items Addressed This Visit          Advance Directives and General Issues    At moderate risk for fall (Chronic)    Current Assessment & Plan     We discussed starting the alendronate for the bone density. She will continue taking vitamin D3 to help prevent falls. Stay well hydrated.            Cardiac and Vasculature    Mixed hyperlipidemia (Chronic)    Overview     Taking atorvastatin every evening.          Current Assessment & Plan     She will continue atorvastatin every evening. Continue low-fat, healthy diet. Continue regular exercise. Continue using the exercise bike every day.         Relevant Medications    atorvastatin (LIPITOR) 20 MG tablet    Other Relevant Orders    Lipid Panel    TSH     Permanent atrial fibrillation    Overview     Taking Xarelto and carvedilol.  Sees Dr. Kaufman.          Current Assessment & Plan     Continue Xarelto and carvedilol. Follow up with Dr. Kaufman in 11/2023.         Relevant Medications    rivaroxaban (Xarelto) 20 MG tablet    amLODIPine (NORVASC) 2.5 MG tablet    carvedilol (COREG) 25 MG tablet    Chronic systolic (congestive) heart failure    Overview     Taking carvedilol, ramipril, Xarelto, and statin.           Current Assessment & Plan     She will continue taking carvedilol, ramipril, Xarelto, and atorvastatin. She will let us know if there is an unexplained weight gain of 3 pounds or more, increased shortness of breath, or edema.         Relevant Medications    amLODIPine (NORVASC) 2.5 MG tablet    carvedilol (COREG) 25 MG tablet    Other Relevant Orders    BNP    Varicose veins of both lower extremities without ulcer or inflammation    Overview     Wear support hose/socks daily.           Current Assessment & Plan     She is encouraged to wear support hose daily. Elevate feet when sitting at home. Avoid salt in the diet. Dink plenty of water.          Benign essential hypertension    Overview     Taking amlodipine and carvedilol and ramipril twice a day.           Current Assessment & Plan     She will continue amlodipine, carvedilol, and ramipril.           Relevant Medications    ramipril (ALTACE) 5 MG capsule    amLODIPine (NORVASC) 2.5 MG tablet    carvedilol (COREG) 25 MG tablet    Other Relevant Orders    CBC & Differential    Comprehensive Metabolic Panel    Microalbumin / Creatinine Urine Ratio - Urine, Clean Catch    Urinalysis With Microscopic - Urine, Clean Catch       Endocrine and Metabolic    B12 deficiency    Current Assessment & Plan     Recheck level today.         Relevant Orders    Vitamin B12    Vitamin D deficiency    Current Assessment & Plan     Recheck level today.          Relevant Orders    Vitamin D,25-Hydroxy        Gastrointestinal Abdominal     Non-recurrent unilateral inguinal hernia without obstruction or gangrene (Chronic)    Overview     R inguinal hernia is asymptomatic.         Current Assessment & Plan     We will continue to monitor. She will avoid lifting heavy objects.          GERD without esophagitis    Overview     Not taking esomeprazole.         Current Assessment & Plan     She is encouraged to resume taking esomeprazole everyday. Avoid eating close to bedtime and avoid large meals.          Relevant Medications    esomeprazole (nexIUM) 20 MG capsule    Constipation, slow transit    Current Assessment & Plan     She will start using half a capful of MiraLAX generic daily. I sent a prescription to the pharmacy. She will also continue drinking plenty of fluids everyday.            Health Encounters    Medicare annual wellness visit, subsequent - Primary    Current Assessment & Plan     She is up to date on vaccinations except for Tdap which she was encouraged to get at her pharmacy. She is up to date on her DEXA bone scan.          Annual physical exam    Current Assessment & Plan     She is up to date on vaccinations except for Tdap which she was encouraged to get at her pharmacy. She is up to date on her DEXA bone scan.             Mental Health    Moderate episode of recurrent major depressive disorder    Overview     Taking sertraline 100 mg tablet daily.               Current Assessment & Plan     She will continue current vogt of sertraline daily. Physical activity also helps decrease symptoms of stress, anxiety, and mood.          Relevant Medications    sertraline (ZOLOFT) 100 MG tablet       Musculoskeletal and Injuries    Age-related osteoporosis without current pathological fracture (Chronic)    Overview     DEXA 2017 revealed osteopenia with a T score in the spine of -1.9 and a T score in the hip of -2.1.      DEXA 10/19/2021 showed worsening of bone density.  T score at the hip is now in the  osteoporosis range with a T score of -3.1.  Spine T score is -2.1.  (Osteoporosis is -2.5 or less.)    Not taking alendronate (Fosamax) tablet.           Current Assessment & Plan     She has not started the alendronate. I do recommend starting the alendronate tablet once a week first thing in the morning with a glass of water. Then wait 30 minutes before taking other medications, other beverages, or food. She will let us know if she has any stomach upset with it. We will plan to recheck DEXA next summer to giver her a year of alendronate to make sure it is helping. Weightbearing exercises also help with bone density and prevention of falls. Use some small hand weights at home while watching TV once a day. Use 3 pounds or less. Also, walking around more often and walking at the grocery store helps.            Anterior tibial tendonitis, right (Chronic)    Current Assessment & Plan     She may also use a cold pack on the knee as needed to decrease pain, inflammation, and swelling. Continue using the exercise bike every day. May take Tylenol as needed. Stay active. She is advised to perform some hip and back stretches daily as well to help with the right knee pain.         Bunion, right foot (Chronic)    Current Assessment & Plan     Continue choosing shoes that are good, have good support, and have a wide toe box.             Pulmonary and Pneumonias    Chronic cough (Chronic)    Overview                  Current Assessment & Plan     We will recheck chest x-ray today. I have recommended resuming esomeprazole (Nexium) daily. I think that is part of the trigger for her cough.            Relevant Orders    XR Chest PA & Lateral    Simple chronic bronchitis (Chronic)    Current Assessment & Plan     She is encouraged to resume esomeprazole (Nexium) daily. If she is noticing any postnasal drainage, she should resume the Flonase an ipratropium nasal sprays.          Shortness of breath    Current Assessment & Plan      Continue using the exercise bike daily. We will check labs today.          Relevant Orders    XR Chest PA & Lateral     Other Visit Diagnoses       Chronic atrial fibrillation        Relevant Medications    rivaroxaban (Xarelto) 20 MG tablet    amLODIPine (NORVASC) 2.5 MG tablet    carvedilol (COREG) 25 MG tablet             Follow Up:  Next Medicare Wellness visit to be scheduled in 1 year.    Return in about 6 months (around 1/26/2024) for recheck fasting.    An After Visit Summary and PPPS were given to the patient.       Additional E&M Note during same encounter follows:  Patient has multiple medical problems which are significant and separately identifiable that require additional work above and beyond the Medicare Wellness Visit.      Chief Complaint  Medicare Wellness-subsequent and Knee Pain (Only during walking, for couple months)    Subjective        Jayda Lopes is also being seen today for chronic cough and shortness of breath, right knee pain    Cough  The patient states that she coughs year around. Occasionally she has a lot of mucus comes up. She notes that her mucus is gray and tan in appearance.  She denies sinus congestion. She wakes during the night coughing. She denies heartburn or acid coming up. She denies any trouble swallowing. She reports shortness of breath. She does not notice the cough as much at night as she does during the day. She is not taking any cough syrup. It has been a couple of years since her last chest x-ray. She denies rhinorrhea and postnasal drip. She denies wheezing. She is not using ipratropium or Flonase. She is not taking Nexium.     A few months ago she had gotten down to look under the sofa and as she started to get down her hand slipped on a piece of paper on the table. She injured her right knee when this happened. She notes that her knee is tender and slightly swollen. It does not keep her from walking. She typically does not walk long distances. She walks the  "aisle at the grocery store. She uses an exercise bike and it does not bother her too much. She does not feel like she needs to take Tylenol or anything for her knee pain. She reports that her right knee pain is worse at night when she is in bed.    Objective   Vital Signs:  /68 (BP Location: Left arm, Patient Position: Sitting, Cuff Size: Adult)   Pulse 74   Temp 97.8 °F (36.6 °C) (Infrared)   Ht 171.6 cm (67.56\")   Wt 76.7 kg (169 lb) Comment: pt refused  SpO2 97%   BMI 26.03 kg/m²     The following data was reviewed by: Akilah Rueda MD on 07/26/2023:  CMP          2/13/2023    11:02   CMP   Glucose 90    BUN 10    Creatinine 0.61    EGFR 87.2    Sodium 144    Potassium 4.2    Chloride 105    Calcium 8.8    Total Protein 6.7    Albumin 4.0    Globulin 2.7    Total Bilirubin 0.9    Alkaline Phosphatase 87    AST (SGOT) 24    ALT (SGPT) 17    Albumin/Globulin Ratio 1.5    BUN/Creatinine Ratio 16.4    Anion Gap 10.4          Lipid Panel          2/13/2023    11:02   Lipid Panel   Total Cholesterol 162    Triglycerides 69    HDL Cholesterol 74    VLDL Cholesterol 13    LDL Cholesterol  75    LDL/HDL Ratio 1.00         Assessment and Plan   Diagnoses and all orders for this visit:    1. Medicare annual wellness visit, subsequent (Primary)  Assessment & Plan:  She is up to date on vaccinations except for Tdap which she was encouraged to get at her pharmacy. She is up to date on her DEXA bone scan.       2. Annual physical exam  Assessment & Plan:  She is up to date on vaccinations except for Tdap which she was encouraged to get at her pharmacy. She is up to date on her DEXA bone scan.       3. Chronic cough  Assessment & Plan:  We will recheck chest x-ray today. I have recommended resuming esomeprazole (Nexium) daily. I think that is part of the trigger for her cough.       Orders:  -     XR Chest PA & Lateral; Future    4. Shortness of breath  Assessment & Plan:  Continue using the exercise bike daily. " We will check labs today.     Orders:  -     XR Chest PA & Lateral; Future    5. Benign essential hypertension  Assessment & Plan:  She will continue amlodipine, carvedilol, and ramipril.      Orders:  -     amLODIPine (NORVASC) 2.5 MG tablet; Take 1 tablet by mouth 2 (Two) Times a Day.  Dispense: 180 tablet; Refill: 1  -     carvedilol (COREG) 25 MG tablet; Take 1 tablet by mouth 2 (Two) Times a Day.  Dispense: 180 tablet; Refill: 1  -     CBC & Differential; Future  -     Comprehensive Metabolic Panel; Future  -     Microalbumin / Creatinine Urine Ratio - Urine, Clean Catch; Future  -     Urinalysis With Microscopic - Urine, Clean Catch; Future    6. Chronic systolic (congestive) heart failure  Assessment & Plan:  She will continue taking carvedilol, ramipril, Xarelto, and atorvastatin. She will let us know if there is an unexplained weight gain of 3 pounds or more, increased shortness of breath, or edema.    Orders:  -     BNP; Future    7. Mixed hyperlipidemia  Assessment & Plan:  She will continue atorvastatin every evening. Continue low-fat, healthy diet. Continue regular exercise. Continue using the exercise bike every day.    Orders:  -     atorvastatin (LIPITOR) 20 MG tablet; Take 1 tablet by mouth Every Evening.  Dispense: 90 tablet; Refill: 3  -     Lipid Panel; Future  -     TSH; Future    8. Moderate episode of recurrent major depressive disorder  Assessment & Plan:  She will continue current vogt of sertraline daily. Physical activity also helps decrease symptoms of stress, anxiety, and mood.     Orders:  -     sertraline (ZOLOFT) 100 MG tablet; Take 1 tablet by mouth Daily.  Dispense: 90 tablet; Refill: 1    9. At moderate risk for fall  Assessment & Plan:  We discussed starting the alendronate for the bone density. She will continue taking vitamin D3 to help prevent falls. Stay well hydrated.      10. Age-related osteoporosis without current pathological fracture  Assessment & Plan:  She has not  started the alendronate. I do recommend starting the alendronate tablet once a week first thing in the morning with a glass of water. Then wait 30 minutes before taking other medications, other beverages, or food. She will let us know if she has any stomach upset with it. We will plan to recheck DEXA next summer to giver her a year of alendronate to make sure it is helping. Weightbearing exercises also help with bone density and prevention of falls. Use some small hand weights at home while watching TV once a day. Use 3 pounds or less. Also, walking around more often and walking at the grocery store helps.         11. Bunion, right foot  Assessment & Plan:  Continue choosing shoes that are good, have good support, and have a wide toe box.       12. Chronic atrial fibrillation  -     rivaroxaban (Xarelto) 20 MG tablet; Take 1 tablet by mouth Daily. as directed  Dispense: 90 tablet; Refill: 2    13. Permanent atrial fibrillation  Assessment & Plan:  Continue Xarelto and carvedilol. Follow up with Dr. Kaufman in 11/2023.      14. B12 deficiency  Assessment & Plan:  Recheck level today.    Orders:  -     Vitamin B12; Future    15. Vitamin D deficiency  Assessment & Plan:  Recheck level today.     Orders:  -     Vitamin D,25-Hydroxy; Future    16. Varicose veins of both lower extremities without ulcer or inflammation  Assessment & Plan:  She is encouraged to wear support hose daily. Elevate feet when sitting at home. Avoid salt in the diet. Dink plenty of water.       17. Constipation, slow transit  Assessment & Plan:  She will start using half a capful of MiraLAX generic daily. I sent a prescription to the pharmacy. She will also continue drinking plenty of fluids everyday.      18. GERD without esophagitis  Assessment & Plan:  She is encouraged to resume taking esomeprazole everyday. Avoid eating close to bedtime and avoid large meals.       19. Non-recurrent unilateral inguinal hernia without obstruction or  gangrene  Assessment & Plan:  We will continue to monitor. She will avoid lifting heavy objects.       20. Anterior tibial tendonitis, right  Assessment & Plan:  She may also use a cold pack on the knee as needed to decrease pain, inflammation, and swelling. Continue using the exercise bike every day. May take Tylenol as needed. Stay active. She is advised to perform some hip and back stretches daily as well to help with the right knee pain.      21. Simple chronic bronchitis  Assessment & Plan:  She is encouraged to resume esomeprazole (Nexium) daily. If she is noticing any postnasal drainage, she should resume the Flonase an ipratropium nasal sprays.       Other orders  -     esomeprazole (nexIUM) 20 MG capsule; Take 1 capsule by mouth Daily.  Dispense: 90 capsule; Refill: 3  -     Cholecalciferol (Vitamin D3) 50 MCG (2000 UT) capsule; Take 1 capsule by mouth Daily.  -     polyethylene glycol (GlycoLax) 17 GM/SCOOP powder; Take 17 g by mouth Daily.  Dispense: 850 g; Refill: 11  -     alendronate (FOSAMAX) 70 MG tablet; Take 1 tablet by mouth Every 7 (Seven) Days.  Dispense: 15 tablet; Refill: 1             Follow Up   Return in about 6 months (around 1/26/2024) for recheck fasting.  Patient was given instructions and counseling regarding her condition or for health maintenance advice. Please see specific information pulled into the AVS if appropriate.   Transcribed from ambient dictation for Akilah Rueda MD by Loyda Valente.  07/26/23   15:04 EDT    Patient or patient representative verbalized consent to the visit recording.  I have personally performed the services described in this document as transcribed by the above individual, and it is both accurate and complete.

## 2023-07-26 NOTE — ASSESSMENT & PLAN NOTE
She is up to date on vaccinations except for Tdap which she was encouraged to get at her pharmacy. She is up to date on her DEXA bone scan.

## 2023-07-26 NOTE — ASSESSMENT & PLAN NOTE
She is encouraged to wear support hose daily. Elevate feet when sitting at home. Avoid salt in the diet. Dink plenty of water.

## 2023-07-26 NOTE — ASSESSMENT & PLAN NOTE
We will recheck chest x-ray today. I have recommended resuming esomeprazole (Nexium) daily. I think that is part of the trigger for her cough.

## 2023-07-26 NOTE — ASSESSMENT & PLAN NOTE
She is encouraged to resume esomeprazole (Nexium) daily. If she is noticing any postnasal drainage, she should resume the Flonase an ipratropium nasal sprays.

## 2023-07-26 NOTE — ASSESSMENT & PLAN NOTE
She has not started the alendronate. I do recommend starting the alendronate tablet once a week first thing in the morning with a glass of water. Then wait 30 minutes before taking other medications, other beverages, or food. She will let us know if she has any stomach upset with it. We will plan to recheck DEXA next summer to giver her a year of alendronate to make sure it is helping. Weightbearing exercises also help with bone density and prevention of falls. Use some small hand weights at home while watching TV once a day. Use 3 pounds or less. Also, walking around more often and walking at the grocery store helps.

## 2023-07-26 NOTE — ASSESSMENT & PLAN NOTE
She may also use a cold pack on the knee as needed to decrease pain, inflammation, and swelling. Continue using the exercise bike every day. May take Tylenol as needed. Stay active. She is advised to perform some hip and back stretches daily as well to help with the right knee pain.

## 2023-07-26 NOTE — ASSESSMENT & PLAN NOTE
She is encouraged to resume taking esomeprazole everyday. Avoid eating close to bedtime and avoid large meals.

## 2023-07-26 NOTE — ASSESSMENT & PLAN NOTE
We discussed starting the alendronate for the bone density. She will continue taking vitamin D3 to help prevent falls. Stay well hydrated.

## 2023-07-27 LAB
25(OH)D3 SERPL-MCNC: 50.3 NG/ML (ref 30–100)
BACTERIA UR QL AUTO: NORMAL /HPF
HYALINE CASTS UR QL AUTO: NORMAL /LPF
RBC # UR STRIP: NORMAL /HPF
REF LAB TEST METHOD: NORMAL
SQUAMOUS #/AREA URNS HPF: NORMAL /HPF
VIT B12 BLD-MCNC: 629 PG/ML (ref 211–946)
WBC # UR STRIP: NORMAL /HPF

## 2023-07-27 RX ORDER — BUDESONIDE AND FORMOTEROL FUMARATE DIHYDRATE 160; 4.5 UG/1; UG/1
2 AEROSOL RESPIRATORY (INHALATION)
Qty: 10.2 G | Refills: 12 | Status: SHIPPED | OUTPATIENT
Start: 2023-07-27

## 2023-07-28 ENCOUNTER — TELEPHONE (OUTPATIENT)
Dept: INTERNAL MEDICINE | Facility: CLINIC | Age: 88
End: 2023-07-28
Payer: MEDICARE

## 2023-08-07 ENCOUNTER — OFFICE VISIT (OUTPATIENT)
Dept: INTERNAL MEDICINE | Facility: CLINIC | Age: 88
End: 2023-08-07
Payer: MEDICARE

## 2023-08-07 VITALS
BODY MASS INDEX: 26.03 KG/M2 | DIASTOLIC BLOOD PRESSURE: 76 MMHG | TEMPERATURE: 98.4 F | HEART RATE: 76 BPM | HEIGHT: 68 IN | SYSTOLIC BLOOD PRESSURE: 134 MMHG

## 2023-08-07 DIAGNOSIS — M25.572 ACUTE LEFT ANKLE PAIN: ICD-10-CM

## 2023-08-07 DIAGNOSIS — L30.9 ECZEMA, UNSPECIFIED TYPE: Primary | ICD-10-CM

## 2023-08-07 RX ORDER — TRIAMCINOLONE ACETONIDE 5 MG/G
1 OINTMENT TOPICAL 2 TIMES DAILY
Qty: 15 G | Refills: 1 | Status: SHIPPED | OUTPATIENT
Start: 2023-08-07

## 2023-08-07 NOTE — PROGRESS NOTES
Acute Office Visit      Name: Jayda Lopes    : 1935     MRN: [id number]   Care Team: Patient Care Team:  Akilah Rueda MD as PCP - General (Internal Medicine)  Akilah Rueda MD as PCP - Internal Medicine (Internal Medicine)  Bernadette Almodovar MD as Consulting Physician (Dermatology)  Hiren Kaufman MD as Consulting Physician (Cardiac Electrophysiology)    Chief Complaint  Ankle Pain (Intermittent left ankle pain with a red itchy spot that has been there for over a year. )    Subjective     History of Present Illness:  Jayda Lopes is a 87 y.o. female who presents today for rash to lateral aspect of left ankle along with intermittent pain.    Jayda states that for about a year, she has had this area of raised, red skin to her left outer ankle.  She has been applying triamcinolone 0.1% cream.  She states that it helps to rid her skin of the itch however, it has not improved the redness.  She states that last night, she was awakened to sharp pains shooting up her ankle.  She reports these sharp pains as being pretty significant and she was unable to sleep.  It kept her awake for several hours.  She did not take anything for pain during this time.  She denies any injury or trauma.    Review of systems was completed, and pertinent findings are noted in the HPI.  Review of Systems   Musculoskeletal:  Positive for arthralgias.   Skin:  Positive for rash.   All other systems reviewed and are negative.    Past Medical History:   Diagnosis Date    Acute bronchitis due to infection 1/10/2019    Anxiety     Atrial fibrillation     Atypical chest pain     Bradycardia     Contusion of right lower leg 2021 Akilah Rueda MD  Contusion R shin after fall.  Improving.  There is still some tenderness to touch.      GERD (gastroesophageal reflux disease)     Hyperlipidemia     Hypertension     Impacted cerumen of right ear 2020    Continue debrox as directed.  She will need  another week or two of drops to help soften.    Macular degeneration     Mitral valve prolapse     Palpitation     Right leg swelling 1/5/2021 1/5/2021 Akilah Rueda MD  Mild R lower leg swelling due to contusion and injury after fall.   Continue to elevate the leg several times a day.        Past Surgical History:   Procedure Laterality Date    CYSTECTOMY      h/o Ovarian    HYSTERECTOMY Bilateral     OOPHORECTOMY Bilateral 1993    THYROIDECTOMY      h/o thyroid surgery sub-total        Social History     Socioeconomic History    Marital status:    Tobacco Use    Smoking status: Never    Smokeless tobacco: Never   Vaping Use    Vaping Use: Never used   Substance and Sexual Activity    Alcohol use: Yes     Alcohol/week: 3.0 standard drinks     Types: 3 Glasses of wine per week     Comment: a few glasses weekly    Drug use: Never    Sexual activity: Defer         Current Outpatient Medications:     Aflibercept (EYLEA IO), Inject  into the eye Every 3 (Three) Months., Disp: , Rfl:     alendronate (FOSAMAX) 70 MG tablet, Take 1 tablet by mouth Every 7 (Seven) Days., Disp: 15 tablet, Rfl: 1    amLODIPine (NORVASC) 2.5 MG tablet, Take 1 tablet by mouth 2 (Two) Times a Day., Disp: 180 tablet, Rfl: 1    atorvastatin (LIPITOR) 20 MG tablet, Take 1 tablet by mouth Every Evening., Disp: 90 tablet, Rfl: 3    budesonide-formoterol (SYMBICORT) 160-4.5 MCG/ACT inhaler, Inhale 2 puffs 2 (Two) Times a Day., Disp: 10.2 g, Rfl: 12    carvedilol (COREG) 25 MG tablet, Take 1 tablet by mouth 2 (Two) Times a Day., Disp: 180 tablet, Rfl: 1    Cholecalciferol (Vitamin D3) 50 MCG (2000 UT) capsule, Take 1 capsule by mouth Daily., Disp: , Rfl:     esomeprazole (nexIUM) 20 MG capsule, Take 1 capsule by mouth Daily., Disp: 90 capsule, Rfl: 3    latanoprost (XALATAN) 0.005 % ophthalmic solution, Administer 1 drop to both eyes Every Night., Disp: , Rfl:     Multiple Vitamins-Minerals (PRESERVISION AREDS 2 PO), Take 2 tablets by  "mouth Daily., Disp: , Rfl:     polyethylene glycol (GlycoLax) 17 GM/SCOOP powder, Take 17 g by mouth Daily., Disp: 850 g, Rfl: 11    ramipril (ALTACE) 5 MG capsule, TAKE 1 CAPSULE EVERY MORNING AND 2 CAPSULES EVERY EVENING, Disp: 270 capsule, Rfl: 3    rivaroxaban (Xarelto) 20 MG tablet, Take 1 tablet by mouth Daily. as directed, Disp: 90 tablet, Rfl: 2    sertraline (ZOLOFT) 100 MG tablet, Take 1 tablet by mouth Daily., Disp: 90 tablet, Rfl: 1    triamcinolone (KENALOG) 0.5 % ointment, Apply 1 application  topically to the appropriate area as directed 2 (Two) Times a Day., Disp: 15 g, Rfl: 1    Procedures    PHQ-9 Total Score:      Objective     Vital Signs  /76 (BP Location: Left arm, Patient Position: Sitting, Cuff Size: Adult)   Pulse 76   Temp 98.4 øF (36.9 øC) (Temporal)   Ht 171.6 cm (67.56\")   BMI 26.03 kg/mý   Estimated body mass index is 26.03 kg/mý as calculated from the following:    Height as of this encounter: 171.6 cm (67.56\").    Weight as of 7/26/23: 76.7 kg (169 lb).            Physical Exam  Vitals and nursing note reviewed.   HENT:      Head: Normocephalic.   Cardiovascular:      Rate and Rhythm: Normal rate.   Pulmonary:      Effort: Pulmonary effort is normal.      Breath sounds: Normal breath sounds.   Musculoskeletal:        Feet:    Skin:     General: Skin is warm and dry.   Neurological:      General: No focal deficit present.      Mental Status: She is alert and oriented to person, place, and time.   Psychiatric:         Mood and Affect: Mood normal.         Behavior: Behavior normal.         Thought Content: Thought content normal.         Judgment: Judgment normal.        @[facility]@    Assessment and Plan     Assessment/Plan:  Diagnoses and all orders for this visit:    1. Eczema, unspecified type (Primary)  Comments:  Triamcinolone 0.5% cream twice daily.  Orders:  -     triamcinolone (KENALOG) 0.5 % ointment; Apply 1 application  topically to the appropriate area as directed 2 " (Two) Times a Day.  Dispense: 15 g; Refill: 1    2. Acute left ankle pain  -Denies injury or trauma.  -Agreeable to try cream and monitor for further symptoms, pain has subsided since this morning.       There are no Patient Instructions on file for this visit.  Plan of care reviewed with patient at the conclusion of today's visit. Education was provided regarding diagnosis, management and any prescribed or recommended OTC medications.  Patient verbalizes understanding of and agreement with management plan.    Follow Up  Return for Next Scheduled Follow up.    Loyda Post, APRN

## 2023-08-28 DIAGNOSIS — I48.20 CHRONIC ATRIAL FIBRILLATION: ICD-10-CM

## 2023-08-28 NOTE — TELEPHONE ENCOUNTER
Caller: Jayda Lopes    Relationship: Self    Best call back number: [phone]     Requested Prescriptions:   Requested Prescriptions     Pending Prescriptions Disp Refills    rivaroxaban (Xarelto) 20 MG tablet 90 tablet 2     Sig: Take 1 tablet by mouth Daily. as directed        Pharmacy where request should be sent: [facility] - [address] [phone] [facility] [phone] [facility]     Last office visit with prescribing clinician: 7/26/2023   Last telemedicine visit with prescribing clinician: Visit date not found   Next office visit with prescribing clinician: 1/26/2024     Additional details provided by patient:     Does the patient have less than a 3 day supply:  [] Yes  [x] No    Would you like a call back once the refill request has been completed: [] Yes [x] No    If the office needs to give you a call back, can they leave a voicemail: [] Yes [x] No    Cadance Dunaway, RegSched Rep   08/28/23 16:55 EDT

## 2023-11-13 ENCOUNTER — OFFICE VISIT (OUTPATIENT)
Dept: INTERNAL MEDICINE | Facility: CLINIC | Age: 88
End: 2023-11-13
Payer: MEDICARE

## 2023-11-13 VITALS
HEIGHT: 67 IN | TEMPERATURE: 97.8 F | DIASTOLIC BLOOD PRESSURE: 80 MMHG | SYSTOLIC BLOOD PRESSURE: 132 MMHG | WEIGHT: 169 LBS | BODY MASS INDEX: 26.53 KG/M2 | HEART RATE: 64 BPM

## 2023-11-13 DIAGNOSIS — J30.1 SEASONAL ALLERGIC RHINITIS DUE TO POLLEN: ICD-10-CM

## 2023-11-13 DIAGNOSIS — R05.1 ACUTE COUGH: Primary | ICD-10-CM

## 2023-11-13 DIAGNOSIS — I10 BENIGN ESSENTIAL HYPERTENSION: ICD-10-CM

## 2023-11-13 LAB
EXPIRATION DATE: NORMAL
INTERNAL CONTROL: NORMAL
Lab: NORMAL
RSV AG SPEC QL: NEGATIVE

## 2023-11-13 PROCEDURE — 99213 OFFICE O/P EST LOW 20 MIN: CPT | Performed by: PHYSICIAN ASSISTANT

## 2023-11-13 RX ORDER — METHYLPREDNISOLONE 4 MG/1
TABLET ORAL
Qty: 21 EACH | Refills: 0 | Status: SHIPPED | OUTPATIENT
Start: 2023-11-13

## 2023-11-13 RX ORDER — GUAIFENESIN AND CODEINE PHOSPHATE 100; 10 MG/5ML; MG/5ML
5 SOLUTION ORAL NIGHTLY PRN
Qty: 120 ML | Refills: 0 | Status: SHIPPED | OUTPATIENT
Start: 2023-11-13

## 2023-11-13 NOTE — PROGRESS NOTES
[facility] Internal Medicine    Jayda Lopes  1935   [id number]      Patient Care Team:  Akilah Rueda MD as PCP - General (Internal Medicine)  Akilah Rueda MD as PCP - Internal Medicine (Internal Medicine)  Bernadette Almodovar MD as Consulting Physician (Dermatology)  Hiren Kaufman MD as Consulting Physician (Cardiac Electrophysiology)    Chief Complaint::   Chief Complaint   Patient presents with    Acute sinusitis follow up     [facility] on 23        HPI  Jayda is a 89 yo female  1935 presents today with a cough and runny nose. Symptoms started Friday afternoon- runny nose, cough (somewhat productive, mucus is colorless). Urgent Care on Saturday- gave her Amoxicillin (has taken 4 pills) and Mucinex (didn't take). COVID negative.  Admits to fatigue. Denies headache, chest pain, palpitations, wheezing.       Patient Active Problem List   Diagnosis    Permanent atrial fibrillation    Palpitation    Atypical chest pain    GERD without esophagitis    Macular degeneration (senile) of retina    Bradycardia    Cortical age-related cataract of both eyes    Chronic cough    Non-rheumatic mitral regurgitation    Seasonal allergic rhinitis due to pollen    Chronic systolic (congestive) heart failure    Dilated cardiomyopathy    Primary open angle glaucoma (POAG) of both eyes, mild stage    Moderate episode of recurrent major depressive disorder    Generalized anxiety disorder    B12 deficiency    Age-related osteoporosis without current pathological fracture    Osteoarthritis    Varicose veins of both lower extremities without ulcer or inflammation    Vitamin D deficiency    Benign essential hypertension    AV block    Mixed hyperlipidemia    Constipation, slow transit    Simple chronic bronchitis    Chronic right-sided low back pain with right-sided sciatica    Shortness of breath    Pain of right hip joint    Trochanteric bursitis of right hip    Right leg pain    Anterior tibial tendonitis,  right    Poor balance    Folliculitis    Presbycusis of both ears    Fall from slip, trip, or stumble, initial encounter    At moderate risk for fall    Hearing loss of right ear    Medicare annual wellness visit, subsequent    Annual physical exam    Change of skin color    Ganglion cyst of foot    Bunion, right foot    Non-recurrent unilateral inguinal hernia without obstruction or gangrene    Pelvic pressure in female    RUQ abdominal pain    Acute cough        Past Medical History:   Diagnosis Date    Acute bronchitis due to infection 1/10/2019    Anxiety     Atrial fibrillation     Atypical chest pain     Bradycardia     Contusion of right lower leg 1/5/2021 2/8/2021 Akilah Rueda MD  Contusion R shin after fall.  Improving.  There is still some tenderness to touch.      GERD (gastroesophageal reflux disease)     Hyperlipidemia     Hypertension     Impacted cerumen of right ear 7/20/2020    Continue debrox as directed.  She will need another week or two of drops to help soften.    Macular degeneration     Mitral valve prolapse     Palpitation     Right leg swelling 1/5/2021 1/5/2021 Akilah Rueda MD  Mild R lower leg swelling due to contusion and injury after fall.   Continue to elevate the leg several times a day.        Past Surgical History:   Procedure Laterality Date    CYSTECTOMY      h/o Ovarian    HYSTERECTOMY Bilateral     OOPHORECTOMY Bilateral 1993    THYROIDECTOMY      h/o thyroid surgery sub-total        Family History   Problem Relation Age of Onset    Colon cancer Mother     Other Mother         cardiac arrhythmia    Heart failure Mother     Cancer Mother     Lung cancer Father     Cancer Father     No Known Problems Brother     Hypertension Maternal Grandmother     Breast cancer Neg Hx     Ovarian cancer Neg Hx        Social History     Socioeconomic History    Marital status:    Tobacco Use    Smoking status: Never    Smokeless tobacco: Never   Vaping Use    Vaping Use:  "Never used   Substance and Sexual Activity    Alcohol use: Yes     Alcohol/week: 3.0 standard drinks of alcohol     Types: 3 Glasses of wine per week     Comment: a few glasses weekly    Drug use: Never    Sexual activity: Defer       Allergies   Allergen Reactions    Phenazopyridine Hcl Unknown (See Comments)     Pyridium       Review of Systems   Constitutional:  Negative for fatigue and fever.   HENT:  Positive for congestion.    Respiratory:  Positive for cough and wheezing. Negative for shortness of breath.    Allergic/Immunologic: Negative for environmental allergies.        Vital Signs  Vitals:    11/13/23 1244   BP: 132/80   BP Location: Left arm   Patient Position: Sitting   Cuff Size: Adult   Pulse: 64   Temp: 97.8 °F (36.6 °C)   TempSrc: Temporal   Weight: 76.7 kg (169 lb)   Height: 170.2 cm (67.01\")     Body mass index is 26.46 kg/m².        Advance Care Planning   ACP discussion was held with the patient during this visit. Patient does not have an advance directive, information provided.       Current Outpatient Medications:     Aflibercept (EYLEA IO), Inject  into the eye Every 3 (Three) Months., Disp: , Rfl:     amLODIPine (NORVASC) 2.5 MG tablet, Take 1 tablet by mouth 2 (Two) Times a Day., Disp: 180 tablet, Rfl: 1    amoxicillin (AMOXIL) 875 MG tablet, Take 1 tablet by mouth 2 (Two) Times a Day for 10 days., Disp: 20 tablet, Rfl: 0    atorvastatin (LIPITOR) 20 MG tablet, Take 1 tablet by mouth Every Evening., Disp: 90 tablet, Rfl: 3    carvedilol (COREG) 25 MG tablet, Take 1 tablet by mouth 2 (Two) Times a Day., Disp: 180 tablet, Rfl: 1    Cholecalciferol (Vitamin D3) 50 MCG (2000 UT) capsule, Take 1 capsule by mouth Daily., Disp: , Rfl:     latanoprost (XALATAN) 0.005 % ophthalmic solution, Administer 1 drop to both eyes Every Night., Disp: , Rfl:     Multiple Vitamins-Minerals (PRESERVISION AREDS 2 PO), Take 2 tablets by mouth Daily., Disp: , Rfl:     polyethylene glycol (GlycoLax) 17 GM/SCOOP " powder, Take 17 g by mouth Daily. (Patient taking differently: Take 17 g by mouth Daily. Not daily), Disp: 850 g, Rfl: 11    ramipril (ALTACE) 5 MG capsule, TAKE 1 CAPSULE EVERY MORNING AND 2 CAPSULES EVERY EVENING, Disp: 270 capsule, Rfl: 3    rivaroxaban (Xarelto) 20 MG tablet, Take 1 tablet by mouth Daily. as directed, Disp: 90 tablet, Rfl: 2    sertraline (ZOLOFT) 100 MG tablet, Take 1 tablet by mouth Daily., Disp: 90 tablet, Rfl: 1    guaiFENesin-codeine (GUAIFENESIN AC) 100-10 MG/5ML liquid, Take 5 mL by mouth At Night As Needed for Cough., Disp: 120 mL, Rfl: 0    methylPREDNISolone (MEDROL) 4 MG dose pack, Take as directed on package instructions., Disp: 21 each, Rfl: 0    Physical Exam  Vitals and nursing note reviewed.   Constitutional:       General: She is not in acute distress.     Appearance: She is well-developed. She is not diaphoretic.   HENT:      Head: Normocephalic and atraumatic.      Nose: Nose normal.   Neck:      Vascular: No JVD.   Cardiovascular:      Rate and Rhythm: Normal rate and regular rhythm.      Heart sounds: Normal heart sounds. No murmur heard.  Pulmonary:      Effort: Pulmonary effort is normal. No respiratory distress.      Breath sounds: No stridor. Wheezing present.   Musculoskeletal:      Cervical back: Neck supple.   Lymphadenopathy:      Cervical: No cervical adenopathy.          ACE III MINI            Results Review:    Recent Results (from the past 672 hour(s))   Covid-19 + Flu A&B AG, Veritor ([id number])    Collection Time: 11/11/23 11:31 AM    Specimen: Swab   Result Value Ref Range    SARS Antigen Not Detected Not Detected, Presumptive Negative    Influenza A Antigen YURIY Not Detected Not Detected    Influenza B Antigen YURIY Not Detected Not Detected    Internal Control Passed Passed    Lot Number [id number]     Expiration Date 10,525,352      Procedures    Medication Review: Medications reviewed and noted    Social History     Socioeconomic History    Marital status:     Tobacco Use    Smoking status: Never    Smokeless tobacco: Never   Vaping Use    Vaping Use: Never used   Substance and Sexual Activity    Alcohol use: Yes     Alcohol/week: 3.0 standard drinks of alcohol     Types: 3 Glasses of wine per week     Comment: a few glasses weekly    Drug use: Never    Sexual activity: Defer        Assessment/Plan:    Diagnoses and all orders for this visit:    1. Acute cough (Primary)  -     guaiFENesin-codeine (GUAIFENESIN AC) 100-10 MG/5ML liquid; Take 5 mL by mouth At Night As Needed for Cough.  Dispense: 120 mL; Refill: 0  -     methylPREDNISolone (MEDROL) 4 MG dose pack; Take as directed on package instructions.  Dispense: 21 each; Refill: 0    2. Benign essential hypertension  Overview:  Taking amlodipine and carvedilol and ramipril twice a day.           Plan of care reviewed with patient at the conclusion of today's visit. Education was provided regarding diagnosis, management, and any prescribed or recommended OTC medications.Patient verbalizes understanding of and agreement with management plan.       ALBIN garrett Jayda on this date of service :preparing for the visit, reviewing tests, obtaining and/or reviewing a separately obtained history, performing a medically appropriate examination and/or evaluation , counseling and educating the patient/family/caregiver, ordering medications, tests, or procedures, referring and communicating with other health care professionals , and documenting information in the medical record    Dilma Baires PA-C      Note: Part of this note may be an electronic transcription/translation of spoken language to printed text using the Dragon Dictation system.

## 2023-11-14 ENCOUNTER — TELEPHONE (OUTPATIENT)
Dept: INTERNAL MEDICINE | Facility: CLINIC | Age: 88
End: 2023-11-14
Payer: MEDICARE

## 2023-11-14 DIAGNOSIS — R05.1 ACUTE COUGH: Primary | ICD-10-CM

## 2023-11-14 RX ORDER — HYDROCODONE POLISTIREX AND CHLORPHENIRAMINE POLISTIREX 10; 8 MG/5ML; MG/5ML
5 SUSPENSION, EXTENDED RELEASE ORAL EVERY 12 HOURS PRN
Qty: 100 ML | Refills: 0 | Status: SHIPPED | OUTPATIENT
Start: 2023-11-14

## 2023-11-14 NOTE — TELEPHONE ENCOUNTER
Caller: Jayda Lopes    Relationship: Self    Best call back number: [phone]    What medication are you requesting: NEW COUGH MEDS    What are your current symptoms: COUGH    How long have you been experiencing symptoms: YESTERDAY    Have you had these symptoms before:    [x] Yes  [] No    Have you been treated for these symptoms before:   [x] Yes  [] No    If a prescription is needed, what is your preferred pharmacy and phone number: [id number] - [place], [place] - [address] [facility] [address] - [phone]  - [phone] FX     Additional notes:    guaiFENesin-codeine (GUAIFENESIN AC) 100-10 MG/5ML liquid     PHARMACY STATES THEY PULLED THIS AND DO NOT HAVE THIS ANY LONGER. PLEASE CALL PATIENT AND LET HER KNOW IF AND WHEN SOMETHING ELSE IS CALLED IN

## 2023-11-14 NOTE — TELEPHONE ENCOUNTER
Spoke with pt and she states the Tussionex is too expensive and she is just going to take OTC cough syrup that the pharmacist recommended.

## 2023-11-22 ENCOUNTER — OFFICE VISIT (OUTPATIENT)
Dept: CARDIOLOGY | Facility: CLINIC | Age: 88
End: 2023-11-22
Payer: MEDICARE

## 2023-11-22 VITALS
DIASTOLIC BLOOD PRESSURE: 72 MMHG | HEIGHT: 67 IN | SYSTOLIC BLOOD PRESSURE: 118 MMHG | WEIGHT: 168 LBS | HEART RATE: 96 BPM | BODY MASS INDEX: 26.37 KG/M2 | OXYGEN SATURATION: 95 %

## 2023-11-22 DIAGNOSIS — I48.21 PERMANENT ATRIAL FIBRILLATION: Primary | ICD-10-CM

## 2023-11-22 DIAGNOSIS — I50.22 CHRONIC SYSTOLIC (CONGESTIVE) HEART FAILURE: ICD-10-CM

## 2023-11-22 DIAGNOSIS — I44.2 CHB (COMPLETE HEART BLOCK): ICD-10-CM

## 2023-11-22 DIAGNOSIS — I10 PRIMARY HYPERTENSION: ICD-10-CM

## 2023-11-22 NOTE — PROGRESS NOTES
Jayda Lopes  1935  [phone]    11/22/2023    [facility] CARDIOLOGY     Referring Provider: No ref. provider found     Akilah Rueda MD  [address] [id number]    Chief Complaint   Patient presents with    AVB    DICM    Congestive Heart Failure     Problem List:     Atrial fibrillation:  Hospitalization with external cardioversion on 07/26/2004 with initiation of Rythmol therapy.   Palpitations with event recorder, December 2005, consistent with PACs.   Episode of atrial fibrillation approximately 60-90 minutes on Christmas 2009 with subsequent ER visit.  External cardioversion to normal sinus rhythm, 06/08/2012.   Hospitalization with initiation of Tikosyn, August 2012.   Pulmonary vein isolation procedure with extensive ablation of multiple sites and subsequent significant bradyarrhythmias after internal cardioversion to normal sinus rhythm with discontinuation of Tikosyn on 03/25/2014.  Unsuccessful external cardioversion, 05/15/2014, and subsequent successful internal cardioversion with early recurrence of atrial fibrillation, 05/15/2014.   Echocardiogram, 08/12/2014: EF less than 20% with severe global hypokinesis. Right ventricle mild to moderately dilated. Mild MR and mild TR.   09/08/2014: Implantation of a [employer] Allure Quadra biventricular pacemaker, serial #[id number], and AV node ablation.   CHF class III:  Holter monitor in February 2002 with frequent PACs and PVCs.   Echocardiogram in March 2000 with normal LV size and function: LVEF of 60%. Mild TR, mild MR.   Echocardiogram on 07/26/2004: Normal LV size and function. No significant regurgitation from the mitral valve or tricuspid valve.   Echocardiogram, 02/06/2008: Normal LV size and function, mild MR.   Echocardiogram, 08/22/2012: Left ventricular ejection fraction 35% to 40%. Moderate MR, mild TR.  Echocardiogram, 02/13/2013: Left ventricular ejection fraction of 50% to 55%. Mild TR,  mild-to-moderate MR. LA 5.2.  Echocardiogram, 03/23/2015: EF 30-35%. Mild concentric LVH. Mild-to-moderate AI. Mild MR. Mild to moderate TR  Echocardiogram, 07/22/2015: Limited for EF only 50% to 55%.  Atypical chest pain:  Acceptable nuclear GXT with normal LV size and function and no ischemia in August 2000 and April 2004.  UMAIR, 03/25/2014: Ejection fraction of 50% to 55%. Mild mitral regurgitation.  Hypertension.   Hyperlipidemia.   Mitral valve prolapse.   Anxiety.   Gastroesophageal reflux disease.   Macular degeneration.   Surgical history:  Hysterectomy.  Partial thyroidectomy.  Ovarian cystectomy.   Cataract surgery    Allergies  Allergies   Allergen Reactions    Phenazopyridine Hcl Unknown (See Comments)     Pyridium       Current Medications    Current Outpatient Medications:     Aflibercept (EYLEA IO), Inject  into the eye Every 3 (Three) Months., Disp: , Rfl:     amLODIPine (NORVASC) 2.5 MG tablet, Take 1 tablet by mouth 2 (Two) Times a Day., Disp: 180 tablet, Rfl: 1    atorvastatin (LIPITOR) 20 MG tablet, Take 1 tablet by mouth Every Evening., Disp: 90 tablet, Rfl: 3    carvedilol (COREG) 25 MG tablet, Take 1 tablet by mouth 2 (Two) Times a Day., Disp: 180 tablet, Rfl: 1    Cholecalciferol (Vitamin D3) 50 MCG (2000 UT) capsule, Take 1 capsule by mouth Daily., Disp: , Rfl:     guaiFENesin-codeine (GUAIFENESIN AC) 100-10 MG/5ML liquid, Take 5 mL by mouth At Night As Needed for Cough., Disp: 120 mL, Rfl: 0    Hydrocod Cheikh-Chlorphe Cheikh ER (TUSSIONEX PENNKINETIC) 10-8 MG/5ML ER suspension, Take 5 mL by mouth Every 12 (Twelve) Hours As Needed for Cough., Disp: 100 mL, Rfl: 0    latanoprost (XALATAN) 0.005 % ophthalmic solution, Administer 1 drop to both eyes Every Night., Disp: , Rfl:     Multiple Vitamins-Minerals (PRESERVISION AREDS 2 PO), Take 2 tablets by mouth Daily., Disp: , Rfl:     polyethylene glycol (GlycoLax) 17 GM/SCOOP powder, Take 17 g by mouth Daily. (Patient taking differently: Take 17 g by  "mouth Daily. Not daily), Disp: 850 g, Rfl: 11    ramipril (ALTACE) 5 MG capsule, TAKE 1 CAPSULE EVERY MORNING AND 2 CAPSULES EVERY EVENING, Disp: 270 capsule, Rfl: 3    rivaroxaban (Xarelto) 20 MG tablet, Take 1 tablet by mouth Daily. as directed, Disp: 90 tablet, Rfl: 2    sertraline (ZOLOFT) 100 MG tablet, Take 1 tablet by mouth Daily., Disp: 90 tablet, Rfl: 1    methylPREDNISolone (MEDROL) 4 MG dose pack, Take as directed on package instructions., Disp: 21 each, Rfl: 0    History of Present Illness     Pt presents for follow up of AF/DCM/CHF/HTN. Since we last saw the pt, pt denies any palps + ZAFAR unchanged. No near syncope or syncope.  Had bronchitis 2 weeks ago. Denies any hospitalizations, bleeding, or TIA/CVA symptoms. BP stable at home.           Vitals:    11/22/23 0959   BP: 118/72   BP Location: Left arm   Patient Position: Sitting   Pulse: 96   SpO2: 95%   Weight: 76.2 kg (168 lb)   Height: 170.2 cm (67.01\")     Body mass index is 26.31 kg/m².  PE:  General: NAD  Neck: no JVD, no carotid bruits, no TM  Heart RRR, NL S1, S2, no rubs, murmurs  Lungs: CTA, no wheezes, rhonchi, or rales  Abd: soft, non-tender, NL BS  Ext: No musculoskeletal deformities, no edema, cyanosis, or clubbing  Psych: normal mood and affect    Diagnostic Data:      BiV PM Manual Interrogation: 99% BiV paced. RV/LV NL thresholds 1.3 years on battery. Normal function. Permanent AF.     Procedures           1. Permanent atrial fibrillation    2. CHB (complete heart block)    3. Chronic systolic (congestive) heart failure    4. Primary hypertension          Plan:  1. Permanent Afib/CHB: AVN RFA. BIVPPM with normal function. Xarelto 20mg daily. Tolerated well.   2. HTN: stable  3. Chronic SHF Class I EF 55% in 2015.  4. Anticoagulation: Xarelto 20mg Daily    F/up in 6 months        "

## 2023-12-18 DIAGNOSIS — I48.20 CHRONIC ATRIAL FIBRILLATION: ICD-10-CM

## 2023-12-18 RX ORDER — RIVAROXABAN 20 MG/1
20 TABLET, FILM COATED ORAL DAILY
Qty: 90 TABLET | Refills: 3 | Status: SHIPPED | OUTPATIENT
Start: 2023-12-18

## 2024-02-02 ENCOUNTER — OFFICE VISIT (OUTPATIENT)
Dept: INTERNAL MEDICINE | Facility: CLINIC | Age: 89
End: 2024-02-02
Payer: MEDICARE

## 2024-02-02 VITALS
HEART RATE: 70 BPM | HEIGHT: 67 IN | BODY MASS INDEX: 26.21 KG/M2 | DIASTOLIC BLOOD PRESSURE: 72 MMHG | TEMPERATURE: 98 F | SYSTOLIC BLOOD PRESSURE: 162 MMHG | WEIGHT: 167 LBS | OXYGEN SATURATION: 96 %

## 2024-02-02 DIAGNOSIS — R05.3 CHRONIC COUGH: Chronic | ICD-10-CM

## 2024-02-02 DIAGNOSIS — E78.2 MIXED HYPERLIPIDEMIA: Chronic | ICD-10-CM

## 2024-02-02 DIAGNOSIS — J30.1 SEASONAL ALLERGIC RHINITIS DUE TO POLLEN: ICD-10-CM

## 2024-02-02 DIAGNOSIS — E53.8 B12 DEFICIENCY: ICD-10-CM

## 2024-02-02 DIAGNOSIS — I50.22 CHRONIC SYSTOLIC (CONGESTIVE) HEART FAILURE: ICD-10-CM

## 2024-02-02 DIAGNOSIS — E55.9 VITAMIN D DEFICIENCY: ICD-10-CM

## 2024-02-02 DIAGNOSIS — I48.21 PERMANENT ATRIAL FIBRILLATION: ICD-10-CM

## 2024-02-02 DIAGNOSIS — I10 BENIGN ESSENTIAL HYPERTENSION: Primary | ICD-10-CM

## 2024-02-02 DIAGNOSIS — N95.9 MENOPAUSAL AND POSTMENOPAUSAL DISORDER: ICD-10-CM

## 2024-02-02 PROBLEM — G89.29 CHRONIC BILATERAL LOW BACK PAIN WITHOUT SCIATICA: Chronic | Status: ACTIVE | Noted: 2020-01-22

## 2024-02-02 PROBLEM — M54.50 CHRONIC BILATERAL LOW BACK PAIN WITHOUT SCIATICA: Chronic | Status: ACTIVE | Noted: 2020-01-22

## 2024-02-02 RX ORDER — FLUTICASONE PROPIONATE 50 MCG
1 SPRAY, SUSPENSION (ML) NASAL 2 TIMES DAILY
Qty: 16 G | Refills: 5 | Status: SHIPPED | OUTPATIENT
Start: 2024-02-02

## 2024-02-02 RX ORDER — AMLODIPINE BESYLATE 2.5 MG/1
2.5 TABLET ORAL 2 TIMES DAILY
Qty: 180 TABLET | Refills: 1 | Status: SHIPPED | OUTPATIENT
Start: 2024-02-02

## 2024-02-02 RX ORDER — CARVEDILOL 25 MG/1
25 TABLET ORAL 2 TIMES DAILY
Qty: 180 TABLET | Refills: 1 | Status: SHIPPED | OUTPATIENT
Start: 2024-02-02

## 2024-02-02 NOTE — ASSESSMENT & PLAN NOTE
Continue carvedilol, ramipril, nightly statin, and Xarelto daily. Let us know if there is an increase in shortness of breath or edema.

## 2024-02-02 NOTE — ASSESSMENT & PLAN NOTE
Since her blood pressures are well controlled at home, she will continue current doses of amlodipine, carvedilol, and ramipril. Continue to avoid salt in the diet.

## 2024-02-02 NOTE — PROGRESS NOTES
[facility]     Jayda Lopes  1935   [id number]      Patient Care Team:  Akilah Rueda MD as PCP - General (Internal Medicine)  Akilah Rueda MD as PCP - Internal Medicine (Internal Medicine)  Bernadette Almodovar MD as Consulting Physician (Dermatology)  Hiren Kaufman MD as Consulting Physician (Cardiac Electrophysiology)    Chief Complaint   Patient presents with    Hypertension    Shoulder Pain     Bilateral, pt states from helping  up stairs            HPI  Patient is a 88 y.o. female presents for a 6-month follow-up. She is accompanied by an adult male.    Acid reflux  The patient denies any issues with acid reflux. She has not been taking omeprazole.    Hypertension  The patient's blood pressure today is 162/72 mmHg. She monitors her blood pressure at home, and it is usually in the 120s/70s mmHg. Occasionally, it is in the 130s/70s mmHg. She is currently taking amlodipine 2.5 mg twice per day, carvedilol twice per day, and ramipril 5 mg in the morning and 10 mg in the evening. She tries to avoid salt in her diet. She is active. She does not go to the gym. She has an exercise bicycle in her kitchen that she uses. She generally eats a low-fat diet. She denies any chest pain or shortness of breath.    Low back pain  The patient has noticed intermittent low back pain when she gets up and walks. The pain improves once she is up and moving. She denies pain when she is in bed at night.    Osteoporosis  The patient's last DEXA scan was in 10/2021. The femoral neck was the worst area. She has never tried Fosamax or alendronate. She is taking calcium and vitamin D 2000 IU per day.    Constipation  The patient denies any recent constipation. She has MiraLAX that she can take as needed.    Chronic cough  The patient coughs and sneezes frequently. She has noticed significant postnasal drainage, which she attributes to her runny nose. It is tolerable unless something causes it to  be worse. She reports that she was coughing during the night recently. Instead of taking cough syrup, she sucks on a couple of cough drops before she goes to bed, which seems to help more. She sleeps with her head elevated. She sleeps on her side. She denies nasal congestion. She is not currently using any nasal sprays.    Atrial fibrillation  The patient denies feeling symptoms of atrial fibrillation.    Shoulder pain  The patient denies any recent shoulder pain.     Immunizations  The patient is up to date on all of her immunizations except for her Tdap vaccine. She has received both of the shingles vaccines.      CHRONIC CONDITIONS      Past Medical History:   Diagnosis Date    Acute bronchitis due to infection 1/10/2019    Anxiety     Atrial fibrillation     Atypical chest pain     Bradycardia     Contusion of right lower leg 1/5/2021 2/8/2021 Akilah Rueda MD  Contusion R shin after fall.  Improving.  There is still some tenderness to touch.      GERD (gastroesophageal reflux disease)     Hyperlipidemia     Hypertension     Impacted cerumen of right ear 7/20/2020    Continue debrox as directed.  She will need another week or two of drops to help soften.    Macular degeneration     Mitral valve prolapse     Palpitation     Right leg swelling 1/5/2021 1/5/2021 Akilah Rueda MD  Mild R lower leg swelling due to contusion and injury after fall.   Continue to elevate the leg several times a day.        Past Surgical History:   Procedure Laterality Date    CYSTECTOMY      h/o Ovarian    HYSTERECTOMY Bilateral     OOPHORECTOMY Bilateral 1993    THYROIDECTOMY      h/o thyroid surgery sub-total        Family History   Problem Relation Age of Onset    Colon cancer Mother     Other Mother         cardiac arrhythmia    Heart failure Mother     Cancer Mother     Lung cancer Father     Cancer Father     No Known Problems Brother     Hypertension Maternal Grandmother     Breast cancer Neg Hx     Ovarian cancer  "Neg Hx        Social History     Socioeconomic History    Marital status:    Tobacco Use    Smoking status: Never    Smokeless tobacco: Never   Vaping Use    Vaping Use: Never used    Passive vaping exposure: Yes   Substance and Sexual Activity    Alcohol use: Yes     Alcohol/week: 3.0 standard drinks of alcohol     Types: 3 Glasses of wine per week     Comment: a few glasses weekly    Drug use: Never    Sexual activity: Defer       Allergies   Allergen Reactions    Phenazopyridine Hcl Unknown (See Comments)     Pyridium       Review of Systems:   The appropriate review of systems is included in the HPI.    Vital Signs  Vitals:    02/02/24 1545   BP: 162/72   BP Location: Left arm   Patient Position: Sitting   Cuff Size: Adult   Pulse: 70   Temp: 98 °F (36.7 °C)   TempSrc: Infrared   SpO2: 96%   Weight: 75.8 kg (167 lb)   Height: 170.2 cm (67.01\")   PainSc: 0-No pain     Body mass index is 26.15 kg/m².  BMI is >= 25 and <30. (Overweight) The following options were offered after discussion;: weight loss educational material (shared in after visit summary), exercise counseling/recommendations, nutrition counseling/recommendations, and Information on healthy weight added to patient's after visit summary.        Current Outpatient Medications:     Aflibercept (EYLEA IO), Inject  into the eye Every 3 (Three) Months., Disp: , Rfl:     amLODIPine (NORVASC) 2.5 MG tablet, Take 1 tablet by mouth 2 (Two) Times a Day., Disp: 180 tablet, Rfl: 1    atorvastatin (LIPITOR) 20 MG tablet, Take 1 tablet by mouth Every Evening., Disp: 90 tablet, Rfl: 3    carvedilol (COREG) 25 MG tablet, Take 1 tablet by mouth 2 (Two) Times a Day., Disp: 180 tablet, Rfl: 1    Cholecalciferol (Vitamin D3) 50 MCG (2000 UT) capsule, Take 1 capsule by mouth Daily., Disp: , Rfl:     latanoprost (XALATAN) 0.005 % ophthalmic solution, Administer 1 drop to both eyes Every Night., Disp: , Rfl:     Multiple Vitamins-Minerals (PRESERVISION AREDS 2 PO), " Take 2 tablets by mouth Daily., Disp: , Rfl:     polyethylene glycol (GlycoLax) 17 GM/SCOOP powder, Take 17 g by mouth Daily. (Patient taking differently: Take 17 g by mouth Daily. Not daily), Disp: 850 g, Rfl: 11    ramipril (ALTACE) 5 MG capsule, TAKE 1 CAPSULE EVERY MORNING AND 2 CAPSULES EVERY EVENING, Disp: 270 capsule, Rfl: 3    sertraline (ZOLOFT) 100 MG tablet, Take 1 tablet by mouth Daily., Disp: 90 tablet, Rfl: 1    Xarelto 20 MG tablet, TAKE 1 TABLET DAILY AS DIRECTED, Disp: 90 tablet, Rfl: 3    fluticasone (FLONASE) 50 MCG/ACT nasal spray, 1 spray into the nostril(s) as directed by provider 2 (Two) Times a Day., Disp: 16 g, Rfl: 5    Physical Exam:    Physical Exam  Vitals and nursing note reviewed.   Constitutional:       Appearance: Normal appearance. She is well-developed.   HENT:      Head: Normocephalic.   Eyes:      Conjunctiva/sclera: Conjunctivae normal.      Pupils: Pupils are equal, round, and reactive to light.   Neck:      Thyroid: No thyromegaly.   Cardiovascular:      Rate and Rhythm: Normal rate and regular rhythm.      Heart sounds: Normal heart sounds.      Comments: No edema. Bilateral varicose veins in the lower legs.  Pulmonary:      Effort: Pulmonary effort is normal.      Breath sounds: Normal breath sounds. No wheezing.   Musculoskeletal:         General: Normal range of motion.      Cervical back: Normal range of motion and neck supple.   Lymphadenopathy:      Cervical: No cervical adenopathy.   Skin:     General: Skin is warm and dry.   Neurological:      Mental Status: She is alert and oriented to person, place, and time.   Psychiatric:         Thought Content: Thought content normal.      ACE III MINI        Results Review:    I reviewed the patient's new clinical results.    CMP:  Lab Results   Component Value Date    BUN 15 07/26/2023    CREATININE 0.61 07/26/2023    EGFRIFNONA 78 07/21/2021    BCR 24.6 07/26/2023     07/26/2023    K 4.3 07/26/2023    CO2 28.0  07/26/2023    CALCIUM 9.2 07/26/2023    ALBUMIN 4.4 07/26/2023    BILITOT 0.9 07/26/2023    ALKPHOS 90 07/26/2023    AST 22 07/26/2023    ALT 17 07/26/2023     HbA1c:  Lab Results   Component Value Date    HGBA1C 6.0 09/07/2014    HGBA1C 6.1 (H) 05/14/2014     Microalbumin:  Lab Results   Component Value Date    MICROALBUR <1.2 07/26/2023     Lipid Panel  Lab Results   Component Value Date    CHOL 156 07/26/2023    TRIG 60 07/26/2023    HDL 69 (H) 07/26/2023    LDL 75 07/26/2023    AST 22 07/26/2023    ALT 17 07/26/2023       Medication Review: Medications reviewed and noted  Patient Instructions   Problem List Items Addressed This Visit          Allergies and Adverse Reactions    Seasonal allergic rhinitis due to pollen    Current Assessment & Plan     Start using fluticasone nasal spray twice per day.            Cardiac and Vasculature    Mixed hyperlipidemia (Chronic)    Overview     Taking atorvastatin every evening.          Current Assessment & Plan     Continue atorvastatin every evening. Continue low-fat diet. Increase regular exercise and physical activity. Use the exercise bike some every day.         Relevant Medications    atorvastatin (LIPITOR) 20 MG tablet    Permanent atrial fibrillation    Overview     Taking Xarelto and carvedilol.  Sees Dr. Kaufman.          Current Assessment & Plan     Continue Xarelto and carvedilol and regular follow-up with Dr. Hiren Kaufman.         Relevant Medications    Xarelto 20 MG tablet    amLODIPine (NORVASC) 2.5 MG tablet    carvedilol (COREG) 25 MG tablet    Chronic systolic (congestive) heart failure    Overview     Taking carvedilol, ramipril, Xarelto, and statin.           Current Assessment & Plan     Continue carvedilol, ramipril, nightly statin, and Xarelto daily. Let us know if there is an increase in shortness of breath or edema.         Relevant Medications    amLODIPine (NORVASC) 2.5 MG tablet    carvedilol (COREG) 25 MG tablet    Benign essential  hypertension - Primary    Overview     Taking amlodipine and carvedilol and ramipril twice a day.           Current Assessment & Plan     Since her blood pressures are well controlled at home, she will continue current doses of amlodipine, carvedilol, and ramipril. Continue to avoid salt in the diet.         Relevant Medications    ramipril (ALTACE) 5 MG capsule    amLODIPine (NORVASC) 2.5 MG tablet    carvedilol (COREG) 25 MG tablet       Endocrine and Metabolic    B12 deficiency    Current Assessment & Plan     Recheck level.         Vitamin D deficiency    Current Assessment & Plan     Recheck level.            Pulmonary and Pneumonias    Chronic cough (Chronic)    Current Assessment & Plan     Elevate head when in bed at night. The best is a wedge pillow. Use fluticasone nasal spray twice per day. Continue using throat lozenges as needed.          Other Visit Diagnoses       Menopausal and postmenopausal disorder        Relevant Orders    DEXA Bone Density Axial               Diagnosis Plan   1. Benign essential hypertension  amLODIPine (NORVASC) 2.5 MG tablet    carvedilol (COREG) 25 MG tablet      2. Chronic systolic (congestive) heart failure        3. Mixed hyperlipidemia        4. Chronic cough        5. Seasonal allergic rhinitis due to pollen        6. Permanent atrial fibrillation        7. Vitamin D deficiency        8. B12 deficiency        9. Menopausal and postmenopausal disorder  DEXA Bone Density Axial              Follow Up: She will come back to see me in 6 months for annual wellness visit.    Plan of care reviewed with patient at the conclusion of today's visit. Education was provided regarding diagnosis, management, and any prescribed or recommended OTC medications.Patient verbalizes understanding of and agreement with management plan.       Transcribed from ambient dictation for Akilah Rueda MD by Loyda Garcia.  02/02/24   17:59 EST    Patient or patient representative verbalized  consent to the visit recording.  I have personally performed the services described in this document as transcribed by the above individual, and it is both accurate and complete.

## 2024-02-02 NOTE — ASSESSMENT & PLAN NOTE
Elevate head when in bed at night. The best is a wedge pillow. Use fluticasone nasal spray twice per day. Continue using throat lozenges as needed.

## 2024-02-02 NOTE — ASSESSMENT & PLAN NOTE
Continue atorvastatin every evening. Continue low-fat diet. Increase regular exercise and physical activity. Use the exercise bike some every day.

## 2024-02-02 NOTE — PATIENT INSTRUCTIONS
Patient Instructions   Problem List Items Addressed This Visit          Allergies and Adverse Reactions    Seasonal allergic rhinitis due to pollen    Current Assessment & Plan     Start using fluticasone nasal spray twice per day.            Cardiac and Vasculature    Mixed hyperlipidemia (Chronic)    Overview     Taking atorvastatin every evening.          Current Assessment & Plan     Continue atorvastatin every evening. Continue low-fat diet. Increase regular exercise and physical activity. Use the exercise bike some every day.         Relevant Medications    atorvastatin (LIPITOR) 20 MG tablet    Permanent atrial fibrillation    Overview     Taking Xarelto and carvedilol.  Sees Dr. Kaufman.          Current Assessment & Plan     Continue Xarelto and carvedilol and regular follow-up with Dr. Hiren Kaufman.         Relevant Medications    Xarelto 20 MG tablet    amLODIPine (NORVASC) 2.5 MG tablet    carvedilol (COREG) 25 MG tablet    Chronic systolic (congestive) heart failure    Overview     Taking carvedilol, ramipril, Xarelto, and statin.           Current Assessment & Plan     Continue carvedilol, ramipril, nightly statin, and Xarelto daily. Let us know if there is an increase in shortness of breath or edema.         Relevant Medications    amLODIPine (NORVASC) 2.5 MG tablet    carvedilol (COREG) 25 MG tablet    Benign essential hypertension - Primary    Overview     Taking amlodipine and carvedilol and ramipril twice a day.           Current Assessment & Plan     Since her blood pressures are well controlled at home, she will continue current doses of amlodipine, carvedilol, and ramipril. Continue to avoid salt in the diet.         Relevant Medications    ramipril (ALTACE) 5 MG capsule    amLODIPine (NORVASC) 2.5 MG tablet    carvedilol (COREG) 25 MG tablet       Endocrine and Metabolic    B12 deficiency    Current Assessment & Plan     Recheck level.         Vitamin D deficiency    Current Assessment &  Plan     Recheck level.            Pulmonary and Pneumonias    Chronic cough (Chronic)    Current Assessment & Plan     Elevate head when in bed at night. The best is a wedge pillow. Use fluticasone nasal spray twice per day. Continue using throat lozenges as needed.          Other Visit Diagnoses       Menopausal and postmenopausal disorder        Relevant Orders    DEXA Bone Density Axial            Exercising to Stay Healthy  To become healthy and stay healthy, it is recommended that you do moderate-intensity and vigorous-intensity exercise. You can tell that you are exercising at a moderate intensity if your heart starts beating faster and you start breathing faster but can still hold a conversation. You can tell that you are exercising at a vigorous intensity if you are breathing much harder and faster and cannot hold a conversation while exercising.  How can exercise benefit me?  Exercising regularly is important. It has many health benefits, such as:  Improving overall fitness, flexibility, and endurance.  Increasing bone density.  Helping with weight control.  Decreasing body fat.  Increasing muscle strength and endurance.  Reducing stress and tension, anxiety, depression, or anger.  Improving overall health.  What guidelines should I follow while exercising?  Before you start a new exercise program, talk with your health care provider.  Do not exercise so much that you hurt yourself, feel dizzy, or get very short of breath.  Wear comfortable clothes and wear shoes with good support.  Drink plenty of water while you exercise to prevent dehydration or heat stroke.  Work out until your breathing and your heartbeat get faster (moderate intensity).  How often should I exercise?  Choose an activity that you enjoy, and set realistic goals. Your health care provider can help you make an activity plan that is individually designed and works best for you.  Exercise regularly as told by your health care provider. This  may include:  Doing strength training two times a week, such as:  Lifting weights.  Using resistance bands.  Push-ups.  Sit-ups.  Yoga.  Doing a certain intensity of exercise for a given amount of time. Choose from these options:  A total of 150 minutes of moderate-intensity exercise every week.  A total of 75 minutes of vigorous-intensity exercise every week.  A mix of moderate-intensity and vigorous-intensity exercise every week.  Children, pregnant women, people who have not exercised regularly, people who are overweight, and older adults may need to talk with a health care provider about what activities are safe to perform. If you have a medical condition, be sure to talk with your health care provider before you start a new exercise program.  What are some exercise ideas?  Moderate-intensity exercise ideas include:  Walking 1 mile (1.6 km) in about 15 minutes.  Biking.  Hiking.  Golfing.  Dancing.  Water aerobics.  Vigorous-intensity exercise ideas include:  Walking 4.5 miles (7.2 km) or more in about 1 hour.  Jogging or running 5 miles (8 km) in about 1 hour.  Biking 10 miles (16.1 km) or more in about 1 hour.  Lap swimming.  Roller-skating or in-line skating.  Cross-country skiing.  Vigorous competitive sports, such as football, basketball, and soccer.  Jumping rope.  Aerobic dancing.  What are some everyday activities that can help me get exercise?  Yard work, such as:  Pushing a .  Raking and bagging leaves.  Washing your car.  Pushing a stroller.  Shoveling snow.  Gardening.  Washing windows or floors.  How can I be more active in my day-to-day activities?  Use stairs instead of an elevator.  Take a walk during your lunch break.  If you drive, park your car farther away from your work or school.  If you take public transportation, get off one stop early and walk the rest of the way.  Stand up or walk around during all of your indoor phone calls.  Get up, stretch, and walk around every 30 minutes  throughout the day.  Enjoy exercise with a friend. Support to continue exercising will help you keep a regular routine of activity.  Where to find more information  You can find more information about exercising to stay healthy from:  [place] Department of Health and Human Services: www.hhs.gov  Centers for Disease Control and Prevention (CDC): www.cdc.gov  Summary  Exercising regularly is important. It will improve your overall fitness, flexibility, and endurance.  Regular exercise will also improve your overall health. It can help you control your weight, reduce stress, and improve your bone density.  Do not exercise so much that you hurt yourself, feel dizzy, or get very short of breath.  Before you start a new exercise program, talk with your health care provider.  This information is not intended to replace advice given to you by your health care provider. Make sure you discuss any questions you have with your health care provider.  Document Revised: 04/15/2022 Document Reviewed: 04/15/2022  [employer] Patient Education © 2023 [employer] Heart-Healthy Eating Plan  Many factors influence your heart (coronary) health, including eating and exercise habits. Coronary risk increases with abnormal blood fat (lipid) levels. Heart-healthy meal planning includes limiting unhealthy fats, increasing healthy fats, and making other diet and lifestyle changes.  What is my plan?  Your health care provider may recommend that you:  Limit your fat intake to _________% or less of your total calories each day.  Limit your saturated fat intake to _________% or less of your total calories each day.  Limit the amount of cholesterol in your diet to less than _________ mg per day.  What are tips for following this plan?  Cooking  Cook foods using methods other than frying. Baking, boiling, grilling, and broiling are all good options. Other ways to reduce fat include:  Removing the skin from poultry.  Removing all visible fats from  meats.  Steaming vegetables in water or broth.  Meal planning  A plate with examples of foods in a healthy diet.      At meals, imagine dividing your plate into fourths:  Fill one-half of your plate with vegetables and green salads.  Fill one-fourth of your plate with whole grains.  Fill one-fourth of your plate with lean protein foods.  Eat 4-5 servings of vegetables per day. One serving equals 1 cup raw or cooked vegetable, or 2 cups raw leafy greens.  Eat 4-5 servings of fruit per day. One serving equals 1 medium whole fruit, ¼ cup dried fruit, ½ cup fresh, frozen, or canned fruit, or ½ cup 100% fruit juice.  Eat more foods that contain soluble fiber. Examples include apples, broccoli, carrots, beans, peas, and barley. Aim to get 25-30 g of fiber per day.  Increase your consumption of legumes, nuts, and seeds to 4-5 servings per week. One serving of dried beans or legumes equals ½ cup cooked, 1 serving of nuts is ¼ cup, and 1 serving of seeds equals 1 tablespoon.  Fats  Choose healthy fats more often. Choose monounsaturated and polyunsaturated fats, such as olive and canola oils, flaxseeds, walnuts, almonds, and seeds.  Eat more omega-3 fats. Choose salmon, mackerel, sardines, tuna, flaxseed oil, and ground flaxseeds. Aim to eat fish at least 2 times each week.  Check food labels carefully to identify foods with trans fats or high amounts of saturated fat.  Limit saturated fats. These are found in animal products, such as meats, butter, and cream. Plant sources of saturated fats include palm oil, palm kernel oil, and coconut oil.  Avoid foods with partially hydrogenated oils in them. These contain trans fats. Examples are stick margarine, some tub margarines, cookies, crackers, and other baked goods.  Avoid fried foods.  General information  Eat more home-cooked food and less restaurant, buffet, and fast food.  Limit or avoid alcohol.  Limit foods that are high in starch and sugar.  Lose weight if you are  overweight. Losing just 5-10% of your body weight can help your overall health and prevent diseases such as diabetes and heart disease.  Monitor your salt (sodium) intake, especially if you have high blood pressure. Talk with your health care provider about your sodium intake.  Try to incorporate more vegetarian meals weekly.  What foods can I eat?  Fruits  All fresh, canned (in natural juice), or frozen fruits.  Vegetables  Fresh or frozen vegetables (raw, steamed, roasted, or grilled). Green salads.  Grains  Most grains. Choose whole wheat and whole grains most of the time. Rice and pasta, including brown rice and pastas made with whole wheat.  Meats and other proteins  Lean, well-trimmed beef, veal, pork, and lamb. Chicken and turkey without skin. All fish and shellfish. Wild duck, rabbit, pheasant, and venison. Egg whites or low-cholesterol egg substitutes. Dried beans, peas, lentils, and tofu. Seeds and most nuts.  Dairy  Low-fat or nonfat cheeses, including ricotta and mozzarella. Skim or 1% milk (liquid, powdered, or evaporated). Buttermilk made with low-fat milk. Nonfat or low-fat yogurt.  Fats and oils  Non-hydrogenated (trans-free) margarines. Vegetable oils, including soybean, sesame, sunflower, olive, peanut, safflower, corn, canola, and cottonseed. Salad dressings or mayonnaise made with a vegetable oil.  Beverages  Water (mineral or sparkling). Coffee and tea. Diet carbonated beverages.  Sweets and desserts  Sherbet, gelatin, and fruit ice. Small amounts of dark chocolate.  Limit all sweets and desserts.  Seasonings and condiments  All seasonings and condiments.  The items listed above may not be a complete list of foods and beverages you can eat. Contact a dietitian for more options.  What foods are not recommended?  Fruits  Canned fruit in heavy syrup. Fruit in cream or butter sauce. Fried fruit. Limit coconut.  Vegetables  Vegetables cooked in cheese, cream, or butter sauce. Fried  vegetables.  Grains  Breads made with saturated or trans fats, oils, or whole milk. Croissants. Sweet rolls. Donuts. High-fat crackers, such as cheese crackers.  Meats and other proteins  Fatty meats, such as hot dogs, ribs, sausage, whitaker, rib-eye roast or steak. High-fat deli meats, such as salami and bologna. Caviar. Domestic duck and goose. Organ meats, such as liver.  Dairy  Cream, sour cream, cream cheese, and creamed cottage cheese. Whole-milk cheeses. Whole or 2% milk (liquid, evaporated, or condensed). Whole buttermilk. Cream sauce or high-fat cheese sauce. Whole-milk yogurt.  Fats and oils  Meat fat, or shortening. Cocoa butter, hydrogenated oils, palm oil, coconut oil, palm kernel oil. Solid fats and shortenings, including whitaker fat, salt pork, lard, and butter. Nondairy cream substitutes. Salad dressings with cheese or sour cream.  Beverages  Regular sodas and any drinks with added sugar.  Sweets and desserts  Frosting. Pudding. Cookies. Cakes. Pies. Milk chocolate or white chocolate. Buttered syrups. Full-fat ice cream or ice cream drinks.  The items listed above may not be a complete list of foods and beverages to avoid. Contact a dietitian for more information.  Summary  Heart-healthy meal planning includes limiting unhealthy fats, increasing healthy fats, and making other diet and lifestyle changes.  Lose weight if you are overweight. Losing just 5-10% of your body weight can help your overall health and prevent diseases such as diabetes and heart disease.  Focus on eating a balance of foods, including fruits and vegetables, low-fat or nonfat dairy, lean protein, nuts and legumes, whole grains, and heart-healthy oils and fats.  This information is not intended to replace advice given to you by your health care provider. Make sure you discuss any questions you have with your health care provider.  Document Revised: 04/28/2022 Document Reviewed: 04/28/2022  [employer] Patient Education © 2022 [employer]    BMI for Adults  Body mass index (BMI) is a number found using a person's weight and height. BMI can help tell how much of a person's weight is made up of fat. BMI does not measure body fat directly. It is used instead of tests that directly measure body fat, which can be difficult and expensive.  What are BMI measurements used for?  BMI is useful to:  Find out if your weight puts you at higher risk for medical problems.  Help recommend changes, such as in diet and exercise. This can help you reach a healthy weight. BMI screening can be done again to see if these changes are working.  How is BMI calculated?  Your height and weight are measured. The BMI is found from those numbers. This can be done with [place] or metric measurements. Note that charts and online BMI calculators are available to help you find your BMI quickly and easily without doing these calculations.  To calculate your BMI in [place] measurements:  Measure your weight in pounds (lb).  Multiply the number of pounds by 703.  So, for an adult who weighs 150 lb, multiply that number by 703: 150 x 703, which equals 105,450.  Measure your height in inches. Then multiply that number by itself to get a measurement called \"inches squared.\"  So, for an adult who is 70 inches tall, the \"inches squared\" measurement is 70 inches x 70 inches, which equals 4,900 inches squared.  Divide the total from step 2 (number of lb x 703) by the total from step 3 (inches squared): 105,450 ÷ 4,900 = 21.5. This is your BMI.  To calculate your BMI in metric measurements:    Measure your weight in kilograms (kg).  For this example, the weight is 70 kg.  Measure your height in meters (m). Then multiply that number by itself to get a measurement called \"meters squared.\"  So, for an adult who is 1.75 m tall, the \"meters squared\" measurement is 1.75 m x 1.75 m, which equals 3.1 meters squared.  Divide the number of kilograms (your weight) by the meters squared number. In this " example: 70 ÷ 3.1 = 22.6. This is your BMI.  What do the results mean?  BMI charts are used to see if you are underweight, normal weight, overweight, or obese. The following guidelines will be used:  Underweight: BMI less than 18.5.  Normal weight: BMI between 18.5 and 24.9.  Overweight: BMI between 25 and 29.9.  Obese: BMI of 30 or above.  BMI is a tool and cannot diagnose a condition. Talk with your health care provider about what your BMI means for you. Keep these notes in mind:  Weight includes fat and muscle. Someone with a muscular build, such as an athlete, may have a BMI that is higher than 24.9. In cases like these, BMI is not a correct measure of body fat.  If you have a BMI of 25 or higher, your provider may need to do more testing to find out if excess body fat is the cause.  BMI is measured the same way for males and females. Females usually have more body fat than males of the same height and weight.  Where to find more information  For more information about BMI, including tools to quickly find your BMI, go to:  Centers for Disease Control and Prevention: cdc.gov  American Heart Association: heart.org  National Heart, Lung, and Blood [place]: nhlbi.nih.gov  This information is not intended to replace advice given to you by your health care provider. Make sure you discuss any questions you have with your health care provider.  Document Revised: 09/07/2023 Document Reviewed: 08/31/2023  [employer] Patient Education © 2023 [employer]

## 2024-03-08 DIAGNOSIS — F33.1 MODERATE EPISODE OF RECURRENT MAJOR DEPRESSIVE DISORDER: ICD-10-CM

## 2024-03-08 RX ORDER — SERTRALINE HYDROCHLORIDE 100 MG/1
100 TABLET, FILM COATED ORAL DAILY
Qty: 90 TABLET | Refills: 1 | Status: SHIPPED | OUTPATIENT
Start: 2024-03-08

## 2024-03-22 RX ORDER — RAMIPRIL 5 MG/1
CAPSULE ORAL
Qty: 270 CAPSULE | Refills: 3 | Status: SHIPPED | OUTPATIENT
Start: 2024-03-22

## 2024-03-27 ENCOUNTER — OFFICE VISIT (OUTPATIENT)
Dept: INTERNAL MEDICINE | Facility: CLINIC | Age: 89
End: 2024-03-27
Payer: MEDICARE

## 2024-03-27 VITALS
OXYGEN SATURATION: 98 % | HEART RATE: 72 BPM | DIASTOLIC BLOOD PRESSURE: 82 MMHG | HEIGHT: 67 IN | BODY MASS INDEX: 26.15 KG/M2 | TEMPERATURE: 98.4 F | SYSTOLIC BLOOD PRESSURE: 144 MMHG

## 2024-03-27 DIAGNOSIS — J34.89 SINUS DRAINAGE: ICD-10-CM

## 2024-03-27 DIAGNOSIS — R05.9 COUGH, UNSPECIFIED TYPE: Primary | ICD-10-CM

## 2024-03-27 LAB
EXPIRATION DATE: NORMAL
INTERNAL CONTROL: NORMAL
Lab: NORMAL
S PYO AG THROAT QL: NEGATIVE

## 2024-03-27 PROCEDURE — 87880 STREP A ASSAY W/OPTIC: CPT | Performed by: NURSE PRACTITIONER

## 2024-03-27 PROCEDURE — 1159F MED LIST DOCD IN RCRD: CPT | Performed by: NURSE PRACTITIONER

## 2024-03-27 PROCEDURE — 99213 OFFICE O/P EST LOW 20 MIN: CPT | Performed by: NURSE PRACTITIONER

## 2024-03-27 PROCEDURE — 1160F RVW MEDS BY RX/DR IN RCRD: CPT | Performed by: NURSE PRACTITIONER

## 2024-03-27 RX ORDER — LORATADINE 10 MG/1
10 TABLET ORAL DAILY
Qty: 28 TABLET | Refills: 0 | Status: SHIPPED | OUTPATIENT
Start: 2024-03-27

## 2024-03-27 RX ORDER — LORATADINE 10 MG/1
10 TABLET ORAL DAILY
Qty: 30 TABLET | Refills: 0 | Status: SHIPPED | OUTPATIENT
Start: 2024-03-27 | End: 2024-03-27

## 2024-03-27 NOTE — PROGRESS NOTES
Office Note     Name: Jayda Lopes    : 1935     MRN: [id number]     Chief Complaint  Cough    Subjective     History of Present Illness:  Jayda Lopes is a 88 y.o. female who presents today for cough and chest congestion. Symptoms developed one week ago. Her cough was keeping her awake at night. Since starting OTC tussin, she has improvement in symptoms and is sleeping at night. She started taking Flonase daily which also improved sinus drainage. She reports multiple symptoms at the onset of her illness with improvement in several symptoms over time. Lingering symptoms include a congested cough, fatigue, irritated throat, and  decreased appetite.     Review of Systems:   Review of Systems   Constitutional:  Positive for appetite change (food has not tasted good.), chills and fatigue. Negative for activity change, diaphoresis and fever.   HENT:  Positive for ear pain, postnasal drip, rhinorrhea, sinus pressure and sore throat. Negative for congestion.    Eyes:  Negative for discharge.   Respiratory:  Positive for cough. Negative for shortness of breath and wheezing.    Gastrointestinal:  Negative for diarrhea, nausea and vomiting.   Allergic/Immunologic: Positive for environmental allergies. Negative for food allergies and immunocompromised state.   Neurological:  Positive for light-headedness. Negative for dizziness and headache.       Past Medical History:   Past Medical History:   Diagnosis Date    Acute bronchitis due to infection 1/10/2019    Anxiety     Atrial fibrillation     Atypical chest pain     Bradycardia     Contusion of right lower leg 2021 Akilah Rueda MD  Contusion R shin after fall.  Improving.  There is still some tenderness to touch.      GERD (gastroesophageal reflux disease)     Hyperlipidemia     Hypertension     Impacted cerumen of right ear 2020    Continue debrox as directed.  She will need another week or two of drops to help soften.    Macular  degeneration     Mitral valve prolapse     Palpitation     Right leg swelling 1/5/2021 1/5/2021 Akilah Rueda MD  Mild R lower leg swelling due to contusion and injury after fall.   Continue to elevate the leg several times a day.        Past Surgical History:   Past Surgical History:   Procedure Laterality Date    CYSTECTOMY      h/o Ovarian    HYSTERECTOMY Bilateral     OOPHORECTOMY Bilateral 1993    THYROIDECTOMY      h/o thyroid surgery sub-total        Immunizations:   Immunization History   Administered Date(s) Administered    COVID-19 (PFIZER) BIVALENT 12+YRS 09/17/2022    COVID-19 (PFIZER) Purple Cap Monovalent 01/27/2021, 02/19/2021, 09/25/2021    COVID-19 F23 (PFIZER) 12YRS+ (COMIRNATY) 09/28/2023    Covid-19 (Pfizer) Gray Cap Monovalent 03/31/2022    FLUAD TRI 65YR+ 11/08/2019    Fluad Quad 65+ 09/14/2020, 10/18/2021, 10/21/2022    Fluzone High Dose =>65 Years ([facility] ONLY) 09/23/2014, 10/28/2015, 10/21/2016, 10/24/2017, 10/29/2020    Fluzone High-Dose 65+yrs 09/28/2023    Fluzone Quad >6mos (Multi-dose) 10/30/2018    Hepatitis A 07/09/2018    INFLUENZA SPLIT TRI 10/05/2012, 10/10/2013    Influenza TIV (IM) 09/14/2009, 10/05/2010, 09/14/2011    Pneumococcal Conjugate 13-Valent (PCV13) 11/19/2015    Pneumococcal Polysaccharide (PPSV23) 08/14/2009, 11/06/2014, 08/13/2020    Shingrix 10/21/2022    Tdap 12/15/2010    Zostavax 07/24/2015        Medications:     Current Outpatient Medications:     Aflibercept (EYLEA IO), Inject  into the eye Every 3 (Three) Months., Disp: , Rfl:     amLODIPine (NORVASC) 2.5 MG tablet, Take 1 tablet by mouth 2 (Two) Times a Day., Disp: 180 tablet, Rfl: 1    atorvastatin (LIPITOR) 20 MG tablet, Take 1 tablet by mouth Every Evening., Disp: 90 tablet, Rfl: 3    carvedilol (COREG) 25 MG tablet, Take 1 tablet by mouth 2 (Two) Times a Day., Disp: 180 tablet, Rfl: 1    Cholecalciferol (Vitamin D3) 50 MCG (2000 UT) capsule, Take 1 capsule by mouth Daily., Disp: , Rfl:      fluticasone (FLONASE) 50 MCG/ACT nasal spray, 1 spray into the nostril(s) as directed by provider 2 (Two) Times a Day., Disp: 16 g, Rfl: 5    latanoprost (XALATAN) 0.005 % ophthalmic solution, Administer 1 drop to both eyes Every Night., Disp: , Rfl:     Multiple Vitamins-Minerals (PRESERVISION AREDS 2 PO), Take 2 tablets by mouth Daily., Disp: , Rfl:     polyethylene glycol (GlycoLax) 17 GM/SCOOP powder, Take 17 g by mouth Daily. (Patient taking differently: Take 17 g by mouth Daily. Not daily), Disp: 850 g, Rfl: 11    ramipril (ALTACE) 5 MG capsule, Take 1 cap every morning and 2 caps every evening, Disp: 270 capsule, Rfl: 3    sertraline (ZOLOFT) 100 MG tablet, TAKE 1 TABLET BY MOUTH EVERY DAY, Disp: 90 tablet, Rfl: 1    Xarelto 20 MG tablet, TAKE 1 TABLET DAILY AS DIRECTED, Disp: 90 tablet, Rfl: 3    loratadine (Claritin) 10 MG tablet, Take 1 tablet by mouth Daily., Disp: 28 tablet, Rfl: 0    Allergies:   Allergies   Allergen Reactions    Phenazopyridine Hcl Unknown (See Comments)     Pyridium       Family History:   Family History   Problem Relation Age of Onset    Colon cancer Mother     Other Mother         cardiac arrhythmia    Heart failure Mother     Cancer Mother     Lung cancer Father     Cancer Father     No Known Problems Brother     Hypertension Maternal Grandmother     Breast cancer Neg Hx     Ovarian cancer Neg Hx        Social History:   Social History     Socioeconomic History    Marital status:    Tobacco Use    Smoking status: Never    Smokeless tobacco: Never   Vaping Use    Vaping status: Never Used    Passive vaping exposure: Yes   Substance and Sexual Activity    Alcohol use: Yes     Alcohol/week: 3.0 standard drinks of alcohol     Types: 3 Glasses of wine per week     Comment: a few glasses weekly    Drug use: Never    Sexual activity: Defer         Objective     Vital Signs  /82 (BP Location: Left arm, Patient Position: Sitting, Cuff Size: Adult)   Pulse 72   Temp 98.4 °F  "(36.9 °C) (Infrared)   Ht 170.2 cm (67.01\")   SpO2 98%   BMI 26.15 kg/m²   Estimated body mass index is 26.15 kg/m² as calculated from the following:    Height as of this encounter: 170.2 cm (67.01\").    Weight as of 2/2/24: 75.8 kg (167 lb).            Physical Exam  Vitals and nursing note reviewed.   Constitutional:       General: She is not in acute distress.     Appearance: Normal appearance. She is not ill-appearing or toxic-appearing.   HENT:      Head: Normocephalic and atraumatic.      Right Ear: Tympanic membrane, ear canal and external ear normal.      Left Ear: Tympanic membrane, ear canal and external ear normal.      Mouth/Throat:      Pharynx: Posterior oropharyngeal erythema present. No oropharyngeal exudate.   Eyes:      General: No scleral icterus.        Right eye: No discharge.         Left eye: No discharge.      Conjunctiva/sclera: Conjunctivae normal.   Cardiovascular:      Rate and Rhythm: Normal rate. Rhythm irregular.      Heart sounds: Normal heart sounds.   Pulmonary:      Effort: Pulmonary effort is normal.      Breath sounds: Normal breath sounds.      Comments: Occasional congested cough during the clinic visit.   Musculoskeletal:         General: Normal range of motion.      Cervical back: Normal range of motion and neck supple. No rigidity or tenderness.   Lymphadenopathy:      Cervical: No cervical adenopathy.   Skin:     General: Skin is warm.   Neurological:      General: No focal deficit present.      Mental Status: She is alert.   Psychiatric:         Mood and Affect: Mood normal.         Behavior: Behavior normal.         Thought Content: Thought content normal.         Judgment: Judgment normal.          Assessment and Plan     Procedures      Lab Results (last 72 hours)       Procedure Component Value Units Date/Time    POC Rapid Strep A [[id number]] Collected: 03/27/24 1336    Specimen: Swab Updated: 03/27/24 1336     Rapid Strep A Screen Negative     Internal Control " Passed     Lot Number [id number]     Expiration Date 10/1/2026               Diagnoses and all orders for this visit:    1. Cough, unspecified type (Primary)  Comments:  The patient wants to continue OTC Tussin to control cough.  Orders:  -     POC Rapid Strep A    2. Sinus drainage  -     Discontinue: loratadine (Claritin) 10 MG tablet; Take 1 tablet by mouth Daily.  Dispense: 30 tablet; Refill: 0  -     loratadine (Claritin) 10 MG tablet; Take 1 tablet by mouth Daily.  Dispense: 28 tablet; Refill: 0    Reviewed exam findings with the patient.Per the medical assistant, the patient declines COVID/FLU swab. Take medication as directed. Recommend a return appointment if symptoms persist.         Follow Up  Return if symptoms worsen or fail to improve.    ZULAY Guerra [facility] [address]  [facility]  [address] [address]  [phone]

## 2024-04-08 DIAGNOSIS — I48.20 CHRONIC ATRIAL FIBRILLATION: ICD-10-CM

## 2024-05-02 ENCOUNTER — HOSPITAL ENCOUNTER (OUTPATIENT)
Dept: BONE DENSITY | Facility: HOSPITAL | Age: 89
Discharge: HOME | End: 2024-05-02
Admitting: INTERNAL MEDICINE
Payer: MEDICARE

## 2024-05-02 DIAGNOSIS — N95.9 MENOPAUSAL AND POSTMENOPAUSAL DISORDER: ICD-10-CM

## 2024-05-02 PROCEDURE — 77080 DXA BONE DENSITY AXIAL: CPT

## 2024-05-06 ENCOUNTER — TELEPHONE (OUTPATIENT)
Dept: INTERNAL MEDICINE | Facility: CLINIC | Age: 89
End: 2024-05-06
Payer: MEDICARE

## 2024-07-29 ENCOUNTER — OFFICE VISIT (OUTPATIENT)
Dept: INTERNAL MEDICINE | Facility: CLINIC | Age: 89
End: 2024-07-29
Payer: MEDICARE

## 2024-07-29 ENCOUNTER — LAB (OUTPATIENT)
Dept: LAB | Facility: HOSPITAL | Age: 89
End: 2024-07-29
Payer: MEDICARE

## 2024-07-29 VITALS
SYSTOLIC BLOOD PRESSURE: 144 MMHG | OXYGEN SATURATION: 96 % | HEART RATE: 70 BPM | DIASTOLIC BLOOD PRESSURE: 82 MMHG | TEMPERATURE: 98 F | HEIGHT: 67 IN | BODY MASS INDEX: 26.53 KG/M2 | WEIGHT: 169 LBS

## 2024-07-29 DIAGNOSIS — I10 BENIGN ESSENTIAL HYPERTENSION: ICD-10-CM

## 2024-07-29 DIAGNOSIS — Z91.81 AT MODERATE RISK FOR FALL: Chronic | ICD-10-CM

## 2024-07-29 DIAGNOSIS — E55.9 VITAMIN D DEFICIENCY: ICD-10-CM

## 2024-07-29 DIAGNOSIS — Z12.11 COLON CANCER SCREENING: ICD-10-CM

## 2024-07-29 DIAGNOSIS — F33.1 MODERATE EPISODE OF RECURRENT MAJOR DEPRESSIVE DISORDER: ICD-10-CM

## 2024-07-29 DIAGNOSIS — L20.84 INTRINSIC ECZEMA: ICD-10-CM

## 2024-07-29 DIAGNOSIS — Z00.00 MEDICARE ANNUAL WELLNESS VISIT, SUBSEQUENT: Primary | ICD-10-CM

## 2024-07-29 DIAGNOSIS — E78.2 MIXED HYPERLIPIDEMIA: Chronic | ICD-10-CM

## 2024-07-29 DIAGNOSIS — H40.1131 PRIMARY OPEN ANGLE GLAUCOMA (POAG) OF BOTH EYES, MILD STAGE: ICD-10-CM

## 2024-07-29 DIAGNOSIS — Z00.00 ANNUAL PHYSICAL EXAM: ICD-10-CM

## 2024-07-29 DIAGNOSIS — R26.89 POOR BALANCE: Chronic | ICD-10-CM

## 2024-07-29 DIAGNOSIS — I48.21 PERMANENT ATRIAL FIBRILLATION: ICD-10-CM

## 2024-07-29 PROBLEM — E53.8 B12 DEFICIENCY: Status: RESOLVED | Noted: 2019-01-10 | Resolved: 2024-07-29

## 2024-07-29 LAB
25(OH)D3 SERPL-MCNC: 42.5 NG/ML (ref 30–100)
ALBUMIN SERPL-MCNC: 4.1 G/DL (ref 3.5–5.2)
ALBUMIN/GLOB SERPL: 1.5 G/DL
ALP SERPL-CCNC: 94 U/L (ref 39–117)
ALT SERPL W P-5'-P-CCNC: 17 U/L (ref 1–33)
ANION GAP SERPL CALCULATED.3IONS-SCNC: 11 MMOL/L (ref 5–15)
AST SERPL-CCNC: 22 U/L (ref 1–32)
BASOPHILS # BLD AUTO: 0.06 10*3/MM3 (ref 0–0.2)
BASOPHILS NFR BLD AUTO: 1 % (ref 0–1.5)
BILIRUB SERPL-MCNC: 0.9 MG/DL (ref 0–1.2)
BILIRUB UR QL STRIP: NEGATIVE
BUN SERPL-MCNC: 13 MG/DL (ref 8–23)
BUN/CREAT SERPL: 19.7 (ref 7–25)
CALCIUM SPEC-SCNC: 9.3 MG/DL (ref 8.6–10.5)
CHLORIDE SERPL-SCNC: 103 MMOL/L (ref 98–107)
CHOLEST SERPL-MCNC: 143 MG/DL (ref 0–200)
CLARITY UR: ABNORMAL
CO2 SERPL-SCNC: 28 MMOL/L (ref 22–29)
COLOR UR: ABNORMAL
CREAT SERPL-MCNC: 0.66 MG/DL (ref 0.57–1)
DEPRECATED RDW RBC AUTO: 43.2 FL (ref 37–54)
EGFRCR SERPLBLD CKD-EPI 2021: 85 ML/MIN/1.73
EOSINOPHIL # BLD AUTO: 0.19 10*3/MM3 (ref 0–0.4)
EOSINOPHIL NFR BLD AUTO: 3.1 % (ref 0.3–6.2)
ERYTHROCYTE [DISTWIDTH] IN BLOOD BY AUTOMATED COUNT: 12.6 % (ref 12.3–15.4)
GLOBULIN UR ELPH-MCNC: 2.8 GM/DL
GLUCOSE SERPL-MCNC: 92 MG/DL (ref 65–99)
GLUCOSE UR STRIP-MCNC: NEGATIVE MG/DL
HCT VFR BLD AUTO: 39.7 % (ref 34–46.6)
HDLC SERPL-MCNC: 66 MG/DL (ref 40–60)
HGB BLD-MCNC: 12.9 G/DL (ref 12–15.9)
HGB UR QL STRIP.AUTO: NEGATIVE
IMM GRANULOCYTES # BLD AUTO: 0.01 10*3/MM3 (ref 0–0.05)
IMM GRANULOCYTES NFR BLD AUTO: 0.2 % (ref 0–0.5)
KETONES UR QL STRIP: NEGATIVE
LDLC SERPL CALC-MCNC: 66 MG/DL (ref 0–100)
LDLC/HDLC SERPL: 1.01 {RATIO}
LEUKOCYTE ESTERASE UR QL STRIP.AUTO: NEGATIVE
LYMPHOCYTES # BLD AUTO: 1.57 10*3/MM3 (ref 0.7–3.1)
LYMPHOCYTES NFR BLD AUTO: 25.4 % (ref 19.6–45.3)
MCH RBC QN AUTO: 30.7 PG (ref 26.6–33)
MCHC RBC AUTO-ENTMCNC: 32.5 G/DL (ref 31.5–35.7)
MCV RBC AUTO: 94.5 FL (ref 79–97)
MONOCYTES # BLD AUTO: 0.54 10*3/MM3 (ref 0.1–0.9)
MONOCYTES NFR BLD AUTO: 8.7 % (ref 5–12)
NEUTROPHILS NFR BLD AUTO: 3.81 10*3/MM3 (ref 1.7–7)
NEUTROPHILS NFR BLD AUTO: 61.6 % (ref 42.7–76)
NITRITE UR QL STRIP: NEGATIVE
NRBC BLD AUTO-RTO: 0 /100 WBC (ref 0–0.2)
PH UR STRIP.AUTO: 7 [PH] (ref 5–8)
PLATELET # BLD AUTO: 216 10*3/MM3 (ref 140–450)
PMV BLD AUTO: 10.5 FL (ref 6–12)
POTASSIUM SERPL-SCNC: 4.2 MMOL/L (ref 3.5–5.2)
PROT SERPL-MCNC: 6.9 G/DL (ref 6–8.5)
PROT UR QL STRIP: ABNORMAL
RBC # BLD AUTO: 4.2 10*6/MM3 (ref 3.77–5.28)
SODIUM SERPL-SCNC: 142 MMOL/L (ref 136–145)
SP GR UR STRIP: 1.02 (ref 1–1.03)
TRIGL SERPL-MCNC: 53 MG/DL (ref 0–150)
TSH SERPL DL<=0.05 MIU/L-ACNC: 2.79 UIU/ML (ref 0.27–4.2)
UROBILINOGEN UR QL STRIP: ABNORMAL
VLDLC SERPL-MCNC: 11 MG/DL (ref 5–40)
WBC NRBC COR # BLD AUTO: 6.18 10*3/MM3 (ref 3.4–10.8)

## 2024-07-29 PROCEDURE — 1126F AMNT PAIN NOTED NONE PRSNT: CPT | Performed by: INTERNAL MEDICINE

## 2024-07-29 PROCEDURE — 82043 UR ALBUMIN QUANTITATIVE: CPT

## 2024-07-29 PROCEDURE — 81001 URINALYSIS AUTO W/SCOPE: CPT

## 2024-07-29 PROCEDURE — 80061 LIPID PANEL: CPT

## 2024-07-29 PROCEDURE — 1170F FXNL STATUS ASSESSED: CPT | Performed by: INTERNAL MEDICINE

## 2024-07-29 PROCEDURE — G0439 PPPS, SUBSEQ VISIT: HCPCS | Performed by: INTERNAL MEDICINE

## 2024-07-29 PROCEDURE — 99397 PER PM REEVAL EST PAT 65+ YR: CPT | Performed by: INTERNAL MEDICINE

## 2024-07-29 PROCEDURE — 85025 COMPLETE CBC W/AUTO DIFF WBC: CPT

## 2024-07-29 PROCEDURE — 1160F RVW MEDS BY RX/DR IN RCRD: CPT | Performed by: INTERNAL MEDICINE

## 2024-07-29 PROCEDURE — 84443 ASSAY THYROID STIM HORMONE: CPT

## 2024-07-29 PROCEDURE — 82306 VITAMIN D 25 HYDROXY: CPT

## 2024-07-29 PROCEDURE — 82570 ASSAY OF URINE CREATININE: CPT

## 2024-07-29 PROCEDURE — 1159F MED LIST DOCD IN RCRD: CPT | Performed by: INTERNAL MEDICINE

## 2024-07-29 PROCEDURE — 80053 COMPREHEN METABOLIC PANEL: CPT

## 2024-07-29 RX ORDER — BENZOCAINE/MENTHOL 6 MG-10 MG
1 LOZENGE MUCOUS MEMBRANE 2 TIMES DAILY
Qty: 56 G | Refills: 0 | Status: SHIPPED | OUTPATIENT
Start: 2024-07-29

## 2024-07-29 RX ORDER — CARVEDILOL 25 MG/1
25 TABLET ORAL 2 TIMES DAILY
Qty: 180 TABLET | Refills: 1 | Status: SHIPPED | OUTPATIENT
Start: 2024-07-29

## 2024-07-29 RX ORDER — AMLODIPINE BESYLATE 2.5 MG/1
2.5 TABLET ORAL 2 TIMES DAILY
Qty: 180 TABLET | Refills: 1 | Status: SHIPPED | OUTPATIENT
Start: 2024-07-29

## 2024-07-29 NOTE — PROGRESS NOTES
Subjective   The ABCs of the Annual Wellness Visit  Medicare Wellness Visit      Jayda Lopes is a 88 y.o. patient who presents for a Medicare Wellness Visit.    The following portions of the patient's history were reviewed and   updated as appropriate: allergies, current medications, past family history, past medical history, past social history, past surgical history, and problem list.    Compared to one year ago, the patient's physical   health is the same.  Compared to one year ago, the patient's mental   health is the same.    Recent Hospitalizations:  She was not admitted to the hospital during the last year.     Current Medical Providers:  Patient Care Team:  Akilah Rueda MD as PCP - General (Internal Medicine)  Akilah Rueda MD as PCP - Internal Medicine (Internal Medicine)  Bernadette Almodovar MD as Consulting Physician (Dermatology)  Hiren Kaufman MD as Consulting Physician (Cardiac Electrophysiology)    Outpatient Medications Prior to Visit   Medication Sig Dispense Refill    Aflibercept (EYLEA IO) Inject  into the eye Every 3 (Three) Months.      atorvastatin (LIPITOR) 20 MG tablet Take 1 tablet by mouth Every Evening. 90 tablet 3    Cholecalciferol (Vitamin D3) 50 MCG (2000 UT) capsule Take 1 capsule by mouth Daily.      fluticasone (FLONASE) 50 MCG/ACT nasal spray 1 spray into the nostril(s) as directed by provider 2 (Two) Times a Day. 16 g 5    latanoprost (XALATAN) 0.005 % ophthalmic solution Administer 1 drop to both eyes Every Night.      loratadine (Claritin) 10 MG tablet Take 1 tablet by mouth Daily. 28 tablet 0    Multiple Vitamins-Minerals (PRESERVISION AREDS 2 PO) Take 2 tablets by mouth Daily.      polyethylene glycol (GlycoLax) 17 GM/SCOOP powder Take 17 g by mouth Daily. (Patient taking differently: Take 17 g by mouth Daily. Not daily) 850 g 11    ramipril (ALTACE) 5 MG capsule Take 1 cap every morning and 2 caps every evening 270 capsule 3    rivaroxaban (Xarelto) 20 MG  tablet Take 1 tablet by mouth Daily. as directed 90 tablet 3    sertraline (ZOLOFT) 100 MG tablet TAKE 1 TABLET BY MOUTH EVERY DAY 90 tablet 1    amLODIPine (NORVASC) 2.5 MG tablet Take 1 tablet by mouth 2 (Two) Times a Day. 180 tablet 1    carvedilol (COREG) 25 MG tablet Take 1 tablet by mouth 2 (Two) Times a Day. 180 tablet 1     No facility-administered medications prior to visit.     No opioid medication identified on active medication list. I have reviewed chart for other potential  high risk medication/s and harmful drug interactions in the elderly.      Aspirin is not on active medication list.  Aspirin use is not indicated based on review of current medical condition/s. Risk of harm outweighs potential benefits.  .    Patient Active Problem List   Diagnosis    Permanent atrial fibrillation    Palpitation    Atypical chest pain    GERD without esophagitis    Macular degeneration (senile) of retina    Bradycardia    Cortical age-related cataract of both eyes    Non-rheumatic mitral regurgitation    Seasonal allergic rhinitis due to pollen    Chronic systolic (congestive) heart failure    Dilated cardiomyopathy    Primary open angle glaucoma (POAG) of both eyes, mild stage    Moderate episode of recurrent major depressive disorder    Generalized anxiety disorder    Age-related osteoporosis without current pathological fracture    Osteoarthritis    Varicose veins of both lower extremities without ulcer or inflammation    Vitamin D deficiency    Benign essential hypertension    AV block    Mixed hyperlipidemia    Constipation, slow transit    Simple chronic bronchitis    Chronic bilateral low back pain without sciatica    Shortness of breath    Pain of right hip joint    Trochanteric bursitis of right hip    Right leg pain    Anterior tibial tendonitis, right    Poor balance    Folliculitis    Presbycusis of both ears    Fall from slip, trip, or stumble, initial encounter    At moderate risk for fall    Hearing  "loss of right ear    Medicare annual wellness visit, subsequent    Annual physical exam    Change of skin color    Ganglion cyst of foot    Bunion, right foot    Non-recurrent unilateral inguinal hernia without obstruction or gangrene    Pelvic pressure in female    RUQ abdominal pain    Chronic cough    Intrinsic eczema     Advance Care Planning (Click this link to access ACP Navigator)  Advance Directive is not on file.  ACP discussion was held with the patient during this visit. Patient does not have an advance directive, information provided.        Objective   Vitals:    24 1122   BP: 144/82   BP Location: Left arm   Patient Position: Sitting   Cuff Size: Adult   Pulse: 70   Temp: 98 °F (36.7 °C)   TempSrc: Temporal   SpO2: 96%   Weight: 76.7 kg (169 lb)  Comment: pt told me. refuses weight   Height: 170.2 cm (67.01\")   PainSc: 0-No pain       Estimated body mass index is 26.46 kg/m² as calculated from the following:    Height as of this encounter: 170.2 cm (67.01\").    Weight as of this encounter: 76.7 kg (169 lb).             Does the patient have evidence of cognitive impairment? No                                                                                                Health  Risk Assessment    Smoking Status:  Social History     Tobacco Use   Smoking Status Never   Smokeless Tobacco Never     Alcohol Consumption:  Social History     Substance and Sexual Activity   Alcohol Use Yes    Alcohol/week: 3.0 standard drinks of alcohol    Types: 3 Glasses of wine per week    Comment: a few glasses weekly     Fall Risk Screen:  KAYA Fall Risk Assessment was completed, and patient is at LOW risk for falls.Assessment completed on:2024    Depression Screenin/29/2024    11:27 AM   PHQ-2/PHQ-9 Depression Screening   Little Interest or Pleasure in Doing Things 0-->not at all   Feeling Down, Depressed or Hopeless 0-->not at all   PHQ-9: Brief Depression Severity Measure Score 0     Health " Habits and Functional and Cognitive Screenin/29/2024    11:28 AM   Functional & Cognitive Status   Do you have difficulty preparing food and eating? No   Do you have difficulty bathing yourself, getting dressed or grooming yourself? No   Do you have difficulty using the toilet? No   Do you have difficulty moving around from place to place? No   Do you have trouble with steps or getting out of a bed or a chair? No   Current Diet Well Balanced Diet   Dental Exam Up to date   Eye Exam Up to date   Exercise (times per week) 0 times per week   Current Exercises Include No Regular Exercise   Do you need help using the phone?  No   Are you deaf or do you have serious difficulty hearing?  No   Do you need help to go to places out of walking distance? No   Do you need help shopping? No   Do you need help preparing meals?  No   Do you need help with housework?  No   Do you need help with laundry? No   Do you need help taking your medications? No   Do you need help managing money? No   Do you ever drive or ride in a car without wearing a seat belt? No   Have you felt unusual stress, anger or loneliness in the last month? No   Who do you live with? Spouse   If you need help, do you have trouble finding someone available to you? No   Have you been bothered in the last four weeks by sexual problems? No   Do you have difficulty concentrating, remembering or making decisions? No             Age-appropriate Screening Schedule:  Refer to the list below for future screening recommendations based on patient's age, sex and/or medical conditions. Orders for these recommended tests are listed in the plan section. The patient has been provided with a written plan.    Health Maintenance List  Health Maintenance   Topic Date Due    RSV Vaccine - Adults (1 - 1-dose 60+ series) Never done    TDAP/TD VACCINES (2 - Td or Tdap) 12/15/2020    ZOSTER VACCINE (3 of 3) 2022    COVID-19 Vaccine (2023-24 season) 2024    LIPID  PANEL  2024    INFLUENZA VACCINE  2024    ANNUAL WELLNESS VISIT  2025    BMI FOLLOWUP  2025    DXA SCAN  2027    Pneumococcal Vaccine 65+  Completed                                                                                                                                                CMS Preventative Services Quick Reference  Risk Factors Identified During Encounter  Depression/Dysphoria: Current medication is effective, no change recommended  Fall Risk-High or Moderate: Discussed Fall Prevention in the home    The above risks/problems have been discussed with the patient.  Pertinent information has been shared with the patient in the After Visit Summary.  An After Visit Summary and PPPS were made available to the patient.  MICK Montelongo is also being seen today for an annual adult preventative physical exam.        The patient is here for annual wellness visit.    The patient reports no falls, however, she has observed a decline in her balance and gait, particularly when navigating high altitudes such as the mall. She denies any associated dizziness. Her mobility is limited, and she expresses a desire to undergo physical therapy.    Her blood pressure readings at home have been within the normal range, typically around 118/70. Her current medication regimen includes amlodipine, carvedilol, and ramipril, taken twice daily.    She is on a daily regimen of Xarelto for atrial fibrillation.    She is on a regimen of atorvastatin for cholesterol management, which she tolerates well without any adverse effects.    She is under the care of the ophthalmologist for glaucoma.    She reports no recent issues with constipation.    She reports a history of polyps, which were previously removed.    She reports pruritus on her left ankle, which extends to the front of her foot.    She reports occasional sneezing and nasal congestion.    FAMILY HISTORY  Her mother  of colon cancer.         "  Objective   Vital Signs:  /82 (BP Location: Left arm, Patient Position: Sitting, Cuff Size: Adult)   Pulse 70   Temp 98 °F (36.7 °C) (Temporal)   Ht 170.2 cm (67.01\")   Wt 76.7 kg (169 lb) Comment: pt told me. refuses weight  SpO2 96%   BMI 26.46 kg/m²   Physical Exam  Vitals and nursing note reviewed.   Constitutional:       Appearance: She is well-developed.   HENT:      Head: Normocephalic.   Eyes:      Conjunctiva/sclera: Conjunctivae normal.      Pupils: Pupils are equal, round, and reactive to light.   Neck:      Thyroid: No thyromegaly.   Cardiovascular:      Rate and Rhythm: Normal rate and regular rhythm.      Heart sounds: Normal heart sounds.      Comments: Varicose veins bilateral lower extremities.  Pulmonary:      Effort: Pulmonary effort is normal.      Breath sounds: Normal breath sounds. No wheezing.   Chest:   Breasts:     Right: No inverted nipple, mass, nipple discharge, skin change or tenderness.      Left: No inverted nipple, mass, nipple discharge, skin change or tenderness.   Abdominal:      General: Bowel sounds are normal.      Palpations: Abdomen is soft.      Tenderness: There is no abdominal tenderness.   Musculoskeletal:         General: No tenderness. Normal range of motion.      Cervical back: Normal range of motion and neck supple.   Lymphadenopathy:      Cervical: No cervical adenopathy.   Skin:     General: Skin is warm and dry.      Findings: No rash.             Comments: L lateral ankle with eczematous rash and very dry skin on legs.   Neurological:      Mental Status: She is alert and oriented to person, place, and time.      Cranial Nerves: No cranial nerve deficit.      Sensory: No sensory deficit.      Coordination: Coordination normal.      Gait: Gait normal.   Psychiatric:         Attention and Perception: Attention normal.         Mood and Affect: Mood normal.         Speech: Speech normal.         Behavior: Behavior normal.         Thought Content: Thought " content normal.         Cognition and Memory: Cognition normal.         Judgment: Judgment normal.           Vital Signs  Vitals show a blood pressure of 144/82.    The following data was reviewed by: Akilah Rueda MD on 2024:         Assessment and Plan Additional age appropriate preventative wellness advice topics were discussed during today's preventative wellness exam(some topics already addressed during AWV portion of the note above):    Physical Activity: Advised cardiovascular activity 150 minutes per week as tolerated. (example brisk walk for 30 minutes, 5 days a week).     Nutrition: Discussed nutrition plan with patient. Information shared in after visit summary. Goal is for a well balanced diet to enhance overall health.     Healthy Weight: Discussed current and goal BMI with patient. Steps to attain this goal discussed. Information shared in after visit summary.          1. Medicare annual wellness visit.  The patient is current with all immunizations, with the exception of Tdap, RSV, and Shingrix, which she is advised to obtain at her pharmacy, with two at a time. An influenza vaccine is scheduled for 2023. The benefits and risks of a colonoscopy were discussed, as her mother  of colon cancer. The patient has a history of polyps, with her last colonoscopy in  showing polyps. She is doing well and likely will live more than 10 more years. A referral for a colonoscopy with Dr. Marie or another provider in his office has been made.    2. Annual physical exam.    3. Poor balance.  A referral to physical therapy has been made, for which a prescription has been provided.    4. Atrial fibrillation.  The patient will maintain her current regimen of Xarelto daily and carvedilol twice daily.    5. Hypertension.  She will maintain her current regimen of carvedilol twice daily, amlodipine twice daily, and ramipril, one capsule in the morning and two capsules in the evening. The patient will also  avoid salt in her diet.    6. Mixed hyperlipidemia.  She will continue taking atorvastatin 20 mg every evening. The patient will also maintain a low-fat diet. She will increase her physical activity, such as walking and being active around the house, and use small hand weights when sitting at home.    7. Depression.  She will maintain her current regimen of sertraline daily.    8. Moderate fall risk.  She is advised to maintain hydration and incorporate protein in her diet three times daily.    9. Glaucoma.  She will continue to closely follow up with her ophthalmologist.    10. Vitamin D deficiency.  Her vitamin D level will be rechecked today.    Follow-up  The patient is scheduled for a follow-up visit in 6 months for a fasting follow-up.       Medicare annual wellness visit, subsequent    Annual physical exam    Poor balance    Permanent atrial fibrillation    Benign essential hypertension  Hypertension is stable and controlled  Continue current treatment regimen.  Blood pressure will be reassessed in 6 months.  Mixed hyperlipidemia   Lipid abnormalities are stable    Plan:  Continue same medication/s without change.      Discussed medication dosage, use, side effects, and goals of treatment in detail.    Counseled patient on lifestyle modifications to help control hyperlipidemia.   Cholesterol lowering dietary information shared with patient.    Patient Treatment Goals:   LDL goal is under 100    Followup in 6 months.  Moderate episode of recurrent major depressive disorder  Patient's depression is a single episode that is mild without psychosis. Depression is in full remission and stable.    Plan:   Continue current medication therapy     Followup in 6 months.   At moderate risk for fall    Primary open angle glaucoma (POAG) of both eyes, mild stage    Intrinsic eczema    Vitamin D deficiency    Colon cancer screening      Orders Placed This Encounter   Procedures    Comprehensive Metabolic Panel     Standing  Status:   Future     Number of Occurrences:   1     Standing Expiration Date:   7/29/2025     Order Specific Question:   Release to patient     Answer:   Routine Release [[id number]]    Lipid Panel     Standing Status:   Future     Number of Occurrences:   1     Standing Expiration Date:   7/29/2025     Order Specific Question:   Release to patient     Answer:   Routine Release [[id number]]    Microalbumin / Creatinine Urine Ratio - Urine, Clean Catch     Standing Status:   Future     Number of Occurrences:   1     Standing Expiration Date:   7/29/2025     Order Specific Question:   Release to patient     Answer:   Routine Release [[id number]]    TSH     Standing Status:   Future     Number of Occurrences:   1     Standing Expiration Date:   7/29/2025     Order Specific Question:   Release to patient     Answer:   Routine Release [[id number]]    Vitamin D,25-Hydroxy     Standing Status:   Future     Number of Occurrences:   1     Standing Expiration Date:   7/29/2025     Order Specific Question:   Release to patient     Answer:   Routine Release [[id number]]    Ambulatory Referral to Physical Therapy for Evaluation & Treatment     Referral Priority:   Routine     Referral Type:   Physical Therapy     Referral Reason:   Specialty Services Required     Requested Specialty:   Physical Therapy     Number of Visits Requested:   1    Ambulatory Referral For Screening Colonoscopy     Referral Priority:   Routine     Referral Type:   Diagnostic Medical     Referral Reason:   Specialty Services Required     Referred to Provider:   Samuel Marie MD     Requested Specialty:   Colon and Rectal Surgery     Number of Visits Requested:   1    CBC & Differential     Standing Status:   Future     Number of Occurrences:   1     Standing Expiration Date:   7/29/2025     Order Specific Question:   Manual Differential     Answer:   No     Order Specific Question:   Release to patient     Answer:   Routine Release [[id number]]     Urinalysis With Microscopic - Urine, Clean Catch     Standing Status:   Future     Number of Occurrences:   1     Standing Expiration Date:   7/29/2025     Order Specific Question:   Release to patient     Answer:   Routine Release [[id number]]     New Medications Ordered This Visit   Medications    carvedilol (COREG) 25 MG tablet     Sig: Take 1 tablet by mouth 2 (Two) Times a Day.     Dispense:  180 tablet     Refill:  1    amLODIPine (NORVASC) 2.5 MG tablet     Sig: Take 1 tablet by mouth 2 (Two) Times a Day.     Dispense:  180 tablet     Refill:  1    hydrocortisone 1 % cream     Sig: Apply 1 Application topically to the appropriate area as directed 2 (Two) Times a Day.     Dispense:  56 g     Refill:  0          Follow Up   Return in about 6 months (around 1/29/2025) for Recheck, labs today.  Patient was given instructions and counseling regarding her condition or for health maintenance advice. Please see specific information pulled into the AVS if appropriate.  Patient or patient representative verbalized consent for the use of Ambient Listening during the visit with  Akilah Rueda MD for chart documentation. 7/29/2024  12:36 EDT

## 2024-07-29 NOTE — PATIENT INSTRUCTIONS
Problem List Items Addressed This Visit          Advance Directives and General Issues    At moderate risk for fall (Chronic)       Cardiac and Vasculature    Mixed hyperlipidemia (Chronic)    Overview     Taking atorvastatin every evening.          Current Assessment & Plan      Lipid abnormalities are stable    Plan:  Continue same medication/s without change.      Discussed medication dosage, use, side effects, and goals of treatment in detail.    Counseled patient on lifestyle modifications to help control hyperlipidemia.   Cholesterol lowering dietary information shared with patient.    Patient Treatment Goals:   LDL goal is under 100    Followup in 6 months.         Relevant Medications    atorvastatin (LIPITOR) 20 MG tablet    Other Relevant Orders    Lipid Panel    TSH    Permanent atrial fibrillation    Overview     Taking Xarelto and carvedilol.  Sees Dr. Kaufman.          Relevant Medications    rivaroxaban (Xarelto) 20 MG tablet    carvedilol (COREG) 25 MG tablet    amLODIPine (NORVASC) 2.5 MG tablet    Benign essential hypertension    Overview     Taking amlodipine and carvedilol and ramipril twice a day.           Current Assessment & Plan     Hypertension is stable and controlled  Continue current treatment regimen.  Blood pressure will be reassessed in 6 months.         Relevant Medications    ramipril (ALTACE) 5 MG capsule    carvedilol (COREG) 25 MG tablet    amLODIPine (NORVASC) 2.5 MG tablet    Other Relevant Orders    CBC & Differential    Comprehensive Metabolic Panel    Microalbumin / Creatinine Urine Ratio - Urine, Clean Catch    Urinalysis With Microscopic - Urine, Clean Catch       Endocrine and Metabolic    Vitamin D deficiency    Relevant Orders    Vitamin D,25-Hydroxy       Eye    Primary open angle glaucoma (POAG) of both eyes, mild stage    Overview     2/8/2021 Akilah Rueda MD    Patient reports being back on drops for glaucoma.  She will follow up with the ophthalmologist  as planned.         Relevant Medications    Aflibercept (EYLEA IO)    latanoprost (XALATAN) 0.005 % ophthalmic solution       Health Encounters    Medicare annual wellness visit, subsequent - Primary    Annual physical exam       Mental Health    Moderate episode of recurrent major depressive disorder    Overview     Taking sertraline 100 mg tablet daily.               Current Assessment & Plan     Patient's depression is a single episode that is mild without psychosis. Depression is in full remission and stable.    Plan:   Continue current medication therapy     Followup in 6 months.          Relevant Medications    sertraline (ZOLOFT) 100 MG tablet       Skin    Intrinsic eczema    Relevant Medications    hydrocortisone 1 % cream       Symptoms and Signs    Poor balance (Chronic)    Overview     7/21/2021 Akilah Rueda MD    Doing  regular exercise can help with balance. Go to the gym 3 days a week and use the exercise bike and some of the upper body weight machines.         Relevant Orders    Ambulatory Referral to Physical Therapy for Evaluation & Treatment (Completed)     Other Visit Diagnoses       Colon cancer screening        Relevant Orders    Ambulatory Referral For Screening Colonoscopy (Completed)          Expand All Collapse All     Subjective  The ABCs of the Annual Wellness Visit  Medicare Wellness Visit        Jayda Lopes is a 88 y.o. patient who presents for a Medicare Wellness Visit.     The following portions of the patient's history were reviewed and   updated as appropriate: allergies, current medications, past family history, past medical history, past social history, past surgical history, and problem list.     Compared to one year ago, the patient's physical   health is the same.  Compared to one year ago, the patient's mental   health is the same.     Recent Hospitalizations:  She was not admitted to the hospital during the last year.      Current Medical Providers:  Patient Care  Team:  Akilah Rueda MD as PCP - General (Internal Medicine)  Akilah Rueda MD as PCP - Internal Medicine (Internal Medicine)  Bernadette Almodovar MD as Consulting Physician (Dermatology)  Hiren Kaufman MD as Consulting Physician (Cardiac Electrophysiology)     Medications Prior to Visit          Outpatient Medications Prior to Visit   Medication Sig Dispense Refill    Aflibercept (EYLEA IO) Inject  into the eye Every 3 (Three) Months.        atorvastatin (LIPITOR) 20 MG tablet Take 1 tablet by mouth Every Evening. 90 tablet 3    Cholecalciferol (Vitamin D3) 50 MCG (2000 UT) capsule Take 1 capsule by mouth Daily.        fluticasone (FLONASE) 50 MCG/ACT nasal spray 1 spray into the nostril(s) as directed by provider 2 (Two) Times a Day. 16 g 5    latanoprost (XALATAN) 0.005 % ophthalmic solution Administer 1 drop to both eyes Every Night.        loratadine (Claritin) 10 MG tablet Take 1 tablet by mouth Daily. 28 tablet 0    Multiple Vitamins-Minerals (PRESERVISION AREDS 2 PO) Take 2 tablets by mouth Daily.        polyethylene glycol (GlycoLax) 17 GM/SCOOP powder Take 17 g by mouth Daily. (Patient taking differently: Take 17 g by mouth Daily. Not daily) 850 g 11    ramipril (ALTACE) 5 MG capsule Take 1 cap every morning and 2 caps every evening 270 capsule 3    rivaroxaban (Xarelto) 20 MG tablet Take 1 tablet by mouth Daily. as directed 90 tablet 3    sertraline (ZOLOFT) 100 MG tablet TAKE 1 TABLET BY MOUTH EVERY DAY 90 tablet 1    amLODIPine (NORVASC) 2.5 MG tablet Take 1 tablet by mouth 2 (Two) Times a Day. 180 tablet 1    carvedilol (COREG) 25 MG tablet Take 1 tablet by mouth 2 (Two) Times a Day. 180 tablet 1      No facility-administered medications prior to visit.         No opioid medication identified on active medication list. I have reviewed chart for other potential  high risk medication/s and harmful drug interactions in the elderly.        Aspirin is not on active medication list.  Aspirin  use is not indicated based on review of current medical condition/s. Risk of harm outweighs potential benefits.  .     Problem List       Patient Active Problem List   Diagnosis    Permanent atrial fibrillation    Palpitation    Atypical chest pain    GERD without esophagitis    Macular degeneration (senile) of retina    Bradycardia    Cortical age-related cataract of both eyes    Non-rheumatic mitral regurgitation    Seasonal allergic rhinitis due to pollen    Chronic systolic (congestive) heart failure    Dilated cardiomyopathy    Primary open angle glaucoma (POAG) of both eyes, mild stage    Moderate episode of recurrent major depressive disorder    Generalized anxiety disorder    Age-related osteoporosis without current pathological fracture    Osteoarthritis    Varicose veins of both lower extremities without ulcer or inflammation    Vitamin D deficiency    Benign essential hypertension    AV block    Mixed hyperlipidemia    Constipation, slow transit    Simple chronic bronchitis    Chronic bilateral low back pain without sciatica    Shortness of breath    Pain of right hip joint    Trochanteric bursitis of right hip    Right leg pain    Anterior tibial tendonitis, right    Poor balance    Folliculitis    Presbycusis of both ears    Fall from slip, trip, or stumble, initial encounter    At moderate risk for fall    Hearing loss of right ear    Medicare annual wellness visit, subsequent    Annual physical exam    Change of skin color    Ganglion cyst of foot    Bunion, right foot    Non-recurrent unilateral inguinal hernia without obstruction or gangrene    Pelvic pressure in female    RUQ abdominal pain    Chronic cough    Intrinsic eczema               Advance Care Planning  (Click this link to access ACP Navigator)  Advance Directive is not on file.  ACP discussion was held with the patient during this visit. Patient does not have an advance directive, information provided.              Objective  Vitals      "  Vitals:     24 1122   BP: 144/82   BP Location: Left arm   Patient Position: Sitting   Cuff Size: Adult   Pulse: 70   Temp: 98 °F (36.7 °C)   TempSrc: Temporal   SpO2: 96%   Weight: 76.7 kg (169 lb)  Comment: pt told me. refuses weight   Height: 170.2 cm (67.01\")   PainSc: 0-No pain            Estimated body mass index is 26.46 kg/m² as calculated from the following:    Height as of this encounter: 170.2 cm (67.01\").    Weight as of this encounter: 76.7 kg (169 lb).               Does the patient have evidence of cognitive impairment? No                                                                                               Health  Risk Assessment     Smoking Status:  Tobacco Use History   Social History          Tobacco Use   Smoking Status Never   Smokeless Tobacco Never         Alcohol Consumption:  Social History           Substance and Sexual Activity   Alcohol Use Yes    Alcohol/week: 3.0 standard drinks of alcohol    Types: 3 Glasses of wine per week     Comment: a few glasses weekly      Fall Risk Screen:  KRISTAADI Fall Risk Assessment was completed, and patient is at LOW risk for falls.Assessment completed on:2024     Depression Screenin/29/2024    11:27 AM   PHQ-2/PHQ-9 Depression Screening   Little Interest or Pleasure in Doing Things 0-->not at all   Feeling Down, Depressed or Hopeless 0-->not at all   PHQ-9: Brief Depression Severity Measure Score 0      Health Habits and Functional and Cognitive Screenin/29/2024    11:28 AM   Functional & Cognitive Status   Do you have difficulty preparing food and eating? No   Do you have difficulty bathing yourself, getting dressed or grooming yourself? No   Do you have difficulty using the toilet? No   Do you have difficulty moving around from place to place? No   Do you have trouble with steps or getting out of a bed or a chair? No   Current Diet Well Balanced Diet   Dental Exam Up to date   Eye Exam Up to date   Exercise (times " per week) 0 times per week   Current Exercises Include No Regular Exercise   Do you need help using the phone?  No   Are you deaf or do you have serious difficulty hearing?  No   Do you need help to go to places out of walking distance? No   Do you need help shopping? No   Do you need help preparing meals?  No   Do you need help with housework?  No   Do you need help with laundry? No   Do you need help taking your medications? No   Do you need help managing money? No   Do you ever drive or ride in a car without wearing a seat belt? No   Have you felt unusual stress, anger or loneliness in the last month? No   Who do you live with? Spouse   If you need help, do you have trouble finding someone available to you? No   Have you been bothered in the last four weeks by sexual problems? No   Do you have difficulty concentrating, remembering or making decisions? No              Age-appropriate Screening Schedule:  Refer to the list below for future screening recommendations based on patient's age, sex and/or medical conditions. Orders for these recommended tests are listed in the plan section. The patient has been provided with a written plan.     Health Maintenance List       Health Maintenance   Topic Date Due    RSV Vaccine - Adults (1 - 1-dose 60+ series) Never done    TDAP/TD VACCINES (2 - Td or Tdap) 12/15/2020    ZOSTER VACCINE (3 of 3) 12/16/2022    COVID-19 Vaccine (7 - 2023-24 season) 01/28/2024    LIPID PANEL  07/26/2024    INFLUENZA VACCINE  08/01/2024    ANNUAL WELLNESS VISIT  07/29/2025    BMI FOLLOWUP  07/29/2025    DXA SCAN  05/02/2027    Pneumococcal Vaccine 65+  Completed                                                                                                                                                  CMS Preventative Services Quick Reference  Risk Factors Identified During Encounter  Depression/Dysphoria: Current medication is effective, no change recommended  Fall Risk-High or Moderate:  "Discussed Fall Prevention in the home     The above risks/problems have been discussed with the patient.  Pertinent information has been shared with the patient in the After Visit Summary.  An After Visit Summary and PPPS were made available to the patient.  MICK Montelongo is also being seen today for an annual adult preventative physical exam.         The patient is here for annual wellness visit.     The patient reports no falls, however, she has observed a decline in her balance and gait, particularly when navigating high altitudes such as the mall. She denies any associated dizziness. Her mobility is limited, and she expresses a desire to undergo physical therapy.     Her blood pressure readings at home have been within the normal range, typically around 118/70. Her current medication regimen includes amlodipine, carvedilol, and ramipril, taken twice daily.     She is on a daily regimen of Xarelto for atrial fibrillation.     She is on a regimen of atorvastatin for cholesterol management, which she tolerates well without any adverse effects.     She is under the care of the ophthalmologist for glaucoma.     She reports no recent issues with constipation.     She reports a history of polyps, which were previously removed.     She reports pruritus on her left ankle, which extends to the front of her foot.     She reports occasional sneezing and nasal congestion.     FAMILY HISTORY  Her mother  of colon cancer.             Objective  Vital Signs:  /82 (BP Location: Left arm, Patient Position: Sitting, Cuff Size: Adult)   Pulse 70   Temp 98 °F (36.7 °C) (Temporal)   Ht 170.2 cm (67.01\")   Wt 76.7 kg (169 lb) Comment: pt told me. refuses weight  SpO2 96%   BMI 26.46 kg/m²   Physical Exam  Vitals and nursing note reviewed.   Constitutional:       Appearance: She is well-developed.   HENT:      Head: Normocephalic.   Eyes:      Conjunctiva/sclera: Conjunctivae normal.      Pupils: Pupils are equal, round, " and reactive to light.   Neck:      Thyroid: No thyromegaly.   Cardiovascular:      Rate and Rhythm: Normal rate and regular rhythm.      Heart sounds: Normal heart sounds.      Comments: Varicose veins bilateral lower extremities.  Pulmonary:      Effort: Pulmonary effort is normal.      Breath sounds: Normal breath sounds. No wheezing.   Chest:   Breasts:     Right: No inverted nipple, mass, nipple discharge, skin change or tenderness.      Left: No inverted nipple, mass, nipple discharge, skin change or tenderness.   Abdominal:      General: Bowel sounds are normal.      Palpations: Abdomen is soft.      Tenderness: There is no abdominal tenderness.   Musculoskeletal:         General: No tenderness. Normal range of motion.      Cervical back: Normal range of motion and neck supple.   Lymphadenopathy:      Cervical: No cervical adenopathy.   Skin:     General: Skin is warm and dry.      Findings: No rash.              Comments: L lateral ankle with eczematous rash and very dry skin on legs.   Neurological:      Mental Status: She is alert and oriented to person, place, and time.      Cranial Nerves: No cranial nerve deficit.      Sensory: No sensory deficit.      Coordination: Coordination normal.      Gait: Gait normal.   Psychiatric:         Attention and Perception: Attention normal.         Mood and Affect: Mood normal.         Speech: Speech normal.         Behavior: Behavior normal.         Thought Content: Thought content normal.         Cognition and Memory: Cognition normal.         Judgment: Judgment normal.            Vital Signs  Vitals show a blood pressure of 144/82.     The following data was reviewed by: Akilah Rueda MD on 07/29/2024:           Assessment  Assessment and Plan Additional age appropriate preventative wellness advice topics were discussed during today's preventative wellness exam(some topics already addressed during AWV portion of the note above):    Physical Activity: Advised  cardiovascular activity 150 minutes per week as tolerated. (example brisk walk for 30 minutes, 5 days a week).     Nutrition: Discussed nutrition plan with patient. Information shared in after visit summary. Goal is for a well balanced diet to enhance overall health.     Healthy Weight: Discussed current and goal BMI with patient. Steps to attain this goal discussed. Information shared in after visit summary.           1. Medicare annual wellness visit.  The patient is current with all immunizations, with the exception of Tdap, RSV, and Shingrix, which she is advised to obtain at her pharmacy, with two at a time. An influenza vaccine is scheduled for 2023. The benefits and risks of a colonoscopy were discussed, as her mother  of colon cancer. The patient has a history of polyps, with her last colonoscopy in 2019 showing polyps. She is doing well and likely will live more than 10 more years. A referral for a colonoscopy with Dr. Marie or another provider in his office has been made.     2. Annual physical exam.     3. Poor balance.  A referral to physical therapy has been made, for which a prescription has been provided.     4. Atrial fibrillation.  The patient will maintain her current regimen of Xarelto daily and carvedilol twice daily.     5. Hypertension.  She will maintain her current regimen of carvedilol twice daily, amlodipine twice daily, and ramipril, one capsule in the morning and two capsules in the evening. The patient will also avoid salt in her diet.     6. Mixed hyperlipidemia.  She will continue taking atorvastatin 20 mg every evening. The patient will also maintain a low-fat diet. She will increase her physical activity, such as walking and being active around the house, and use small hand weights when sitting at home.     7. Depression.  She will maintain her current regimen of sertraline daily.     8. Moderate fall risk.  She is advised to maintain hydration and incorporate protein in her  diet three times daily.     9. Glaucoma.  She will continue to closely follow up with her ophthalmologist.     10. Vitamin D deficiency.  Her vitamin D level will be rechecked today.     Follow-up  The patient is scheduled for a follow-up visit in 6 months for a fasting follow-up.   Exercising to Stay Healthy  To become healthy and stay healthy, it is recommended that you do moderate-intensity and vigorous-intensity exercise. You can tell that you are exercising at a moderate intensity if your heart starts beating faster and you start breathing faster but can still hold a conversation. You can tell that you are exercising at a vigorous intensity if you are breathing much harder and faster and cannot hold a conversation while exercising.  How can exercise benefit me?  Exercising regularly is important. It has many health benefits, such as:  Improving overall fitness, flexibility, and endurance.  Increasing bone density.  Helping with weight control.  Decreasing body fat.  Increasing muscle strength and endurance.  Reducing stress and tension, anxiety, depression, or anger.  Improving overall health.  What guidelines should I follow while exercising?  Before you start a new exercise program, talk with your health care provider.  Do not exercise so much that you hurt yourself, feel dizzy, or get very short of breath.  Wear comfortable clothes and wear shoes with good support.  Drink plenty of water while you exercise to prevent dehydration or heat stroke.  Work out until your breathing and your heartbeat get faster (moderate intensity).  How often should I exercise?  Choose an activity that you enjoy, and set realistic goals. Your health care provider can help you make an activity plan that is individually designed and works best for you.  Exercise regularly as told by your health care provider. This may include:  Doing strength training two times a week, such as:  Lifting weights.  Using resistance  bands.  Push-ups.  Sit-ups.  Yoga.  Doing a certain intensity of exercise for a given amount of time. Choose from these options:  A total of 150 minutes of moderate-intensity exercise every week.  A total of 75 minutes of vigorous-intensity exercise every week.  A mix of moderate-intensity and vigorous-intensity exercise every week.  Children, pregnant women, people who have not exercised regularly, people who are overweight, and older adults may need to talk with a health care provider about what activities are safe to perform. If you have a medical condition, be sure to talk with your health care provider before you start a new exercise program.  What are some exercise ideas?  Moderate-intensity exercise ideas include:  Walking 1 mile (1.6 km) in about 15 minutes.  Biking.  Hiking.  Golfing.  Dancing.  Water aerobics.  Vigorous-intensity exercise ideas include:  Walking 4.5 miles (7.2 km) or more in about 1 hour.  Jogging or running 5 miles (8 km) in about 1 hour.  Biking 10 miles (16.1 km) or more in about 1 hour.  Lap swimming.  Roller-skating or in-line skating.  Cross-country skiing.  Vigorous competitive sports, such as football, basketball, and soccer.  Jumping rope.  Aerobic dancing.  What are some everyday activities that can help me get exercise?  Yard work, such as:  Pushing a .  Raking and bagging leaves.  Washing your car.  Pushing a stroller.  Shoveling snow.  Gardening.  Washing windows or floors.  How can I be more active in my day-to-day activities?  Use stairs instead of an elevator.  Take a walk during your lunch break.  If you drive, park your car farther away from your work or school.  If you take public transportation, get off one stop early and walk the rest of the way.  Stand up or walk around during all of your indoor phone calls.  Get up, stretch, and walk around every 30 minutes throughout the day.  Enjoy exercise with a friend. Support to continue exercising will help you keep  a regular routine of activity.  Where to find more information  You can find more information about exercising to stay healthy from:  [place] Department of Health and Human Services: www.hhs.gov  Centers for Disease Control and Prevention (CDC): www.cdc.gov  Summary  Exercising regularly is important. It will improve your overall fitness, flexibility, and endurance.  Regular exercise will also improve your overall health. It can help you control your weight, reduce stress, and improve your bone density.  Do not exercise so much that you hurt yourself, feel dizzy, or get very short of breath.  Before you start a new exercise program, talk with your health care provider.  This information is not intended to replace advice given to you by your health care provider. Make sure you discuss any questions you have with your health care provider.  Document Revised: 04/15/2022 Document Reviewed: 04/15/2022  [employer] Patient Education © 2023 [employer] Heart-Healthy Eating Plan  Many factors influence your heart (coronary) health, including eating and exercise habits. Coronary risk increases with abnormal blood fat (lipid) levels. Heart-healthy meal planning includes limiting unhealthy fats, increasing healthy fats, and making other diet and lifestyle changes.  What is my plan?  Your health care provider may recommend that you:  Limit your fat intake to _________% or less of your total calories each day.  Limit your saturated fat intake to _________% or less of your total calories each day.  Limit the amount of cholesterol in your diet to less than _________ mg per day.  What are tips for following this plan?  Cooking  Cook foods using methods other than frying. Baking, boiling, grilling, and broiling are all good options. Other ways to reduce fat include:  Removing the skin from poultry.  Removing all visible fats from meats.  Steaming vegetables in water or broth.  Meal planning  A plate with examples of foods in a healthy  diet.      At meals, imagine dividing your plate into fourths:  Fill one-half of your plate with vegetables and green salads.  Fill one-fourth of your plate with whole grains.  Fill one-fourth of your plate with lean protein foods.  Eat 4-5 servings of vegetables per day. One serving equals 1 cup raw or cooked vegetable, or 2 cups raw leafy greens.  Eat 4-5 servings of fruit per day. One serving equals 1 medium whole fruit, ¼ cup dried fruit, ½ cup fresh, frozen, or canned fruit, or ½ cup 100% fruit juice.  Eat more foods that contain soluble fiber. Examples include apples, broccoli, carrots, beans, peas, and barley. Aim to get 25-30 g of fiber per day.  Increase your consumption of legumes, nuts, and seeds to 4-5 servings per week. One serving of dried beans or legumes equals ½ cup cooked, 1 serving of nuts is ¼ cup, and 1 serving of seeds equals 1 tablespoon.  Fats  Choose healthy fats more often. Choose monounsaturated and polyunsaturated fats, such as olive and canola oils, flaxseeds, walnuts, almonds, and seeds.  Eat more omega-3 fats. Choose salmon, mackerel, sardines, tuna, flaxseed oil, and ground flaxseeds. Aim to eat fish at least 2 times each week.  Check food labels carefully to identify foods with trans fats or high amounts of saturated fat.  Limit saturated fats. These are found in animal products, such as meats, butter, and cream. Plant sources of saturated fats include palm oil, palm kernel oil, and coconut oil.  Avoid foods with partially hydrogenated oils in them. These contain trans fats. Examples are stick margarine, some tub margarines, cookies, crackers, and other baked goods.  Avoid fried foods.  General information  Eat more home-cooked food and less restaurant, buffet, and fast food.  Limit or avoid alcohol.  Limit foods that are high in starch and sugar.  Lose weight if you are overweight. Losing just 5-10% of your body weight can help your overall health and prevent diseases such as  diabetes and heart disease.  Monitor your salt (sodium) intake, especially if you have high blood pressure. Talk with your health care provider about your sodium intake.  Try to incorporate more vegetarian meals weekly.  What foods can I eat?  Fruits  All fresh, canned (in natural juice), or frozen fruits.  Vegetables  Fresh or frozen vegetables (raw, steamed, roasted, or grilled). Green salads.  Grains  Most grains. Choose whole wheat and whole grains most of the time. Rice and pasta, including brown rice and pastas made with whole wheat.  Meats and other proteins  Lean, well-trimmed beef, veal, pork, and lamb. Chicken and turkey without skin. All fish and shellfish. Wild duck, rabbit, pheasant, and venison. Egg whites or low-cholesterol egg substitutes. Dried beans, peas, lentils, and tofu. Seeds and most nuts.  Dairy  Low-fat or nonfat cheeses, including ricotta and mozzarella. Skim or 1% milk (liquid, powdered, or evaporated). Buttermilk made with low-fat milk. Nonfat or low-fat yogurt.  Fats and oils  Non-hydrogenated (trans-free) margarines. Vegetable oils, including soybean, sesame, sunflower, olive, peanut, safflower, corn, canola, and cottonseed. Salad dressings or mayonnaise made with a vegetable oil.  Beverages  Water (mineral or sparkling). Coffee and tea. Diet carbonated beverages.  Sweets and desserts  Sherbet, gelatin, and fruit ice. Small amounts of dark chocolate.  Limit all sweets and desserts.  Seasonings and condiments  All seasonings and condiments.  The items listed above may not be a complete list of foods and beverages you can eat. Contact a dietitian for more options.  What foods are not recommended?  Fruits  Canned fruit in heavy syrup. Fruit in cream or butter sauce. Fried fruit. Limit coconut.  Vegetables  Vegetables cooked in cheese, cream, or butter sauce. Fried vegetables.  Grains  Breads made with saturated or trans fats, oils, or whole milk. Croissants. Sweet rolls. Donuts. High-fat  crackers, such as cheese crackers.  Meats and other proteins  Fatty meats, such as hot dogs, ribs, sausage, whitaker, rib-eye roast or steak. High-fat deli meats, such as salami and bologna. Caviar. Domestic duck and goose. Organ meats, such as liver.  Dairy  Cream, sour cream, cream cheese, and creamed cottage cheese. Whole-milk cheeses. Whole or 2% milk (liquid, evaporated, or condensed). Whole buttermilk. Cream sauce or high-fat cheese sauce. Whole-milk yogurt.  Fats and oils  Meat fat, or shortening. Cocoa butter, hydrogenated oils, palm oil, coconut oil, palm kernel oil. Solid fats and shortenings, including whitaker fat, salt pork, lard, and butter. Nondairy cream substitutes. Salad dressings with cheese or sour cream.  Beverages  Regular sodas and any drinks with added sugar.  Sweets and desserts  Frosting. Pudding. Cookies. Cakes. Pies. Milk chocolate or white chocolate. Buttered syrups. Full-fat ice cream or ice cream drinks.  The items listed above may not be a complete list of foods and beverages to avoid. Contact a dietitian for more information.  Summary  Heart-healthy meal planning includes limiting unhealthy fats, increasing healthy fats, and making other diet and lifestyle changes.  Lose weight if you are overweight. Losing just 5-10% of your body weight can help your overall health and prevent diseases such as diabetes and heart disease.  Focus on eating a balance of foods, including fruits and vegetables, low-fat or nonfat dairy, lean protein, nuts and legumes, whole grains, and heart-healthy oils and fats.  This information is not intended to replace advice given to you by your health care provider. Make sure you discuss any questions you have with your health care provider.  Document Revised: 04/28/2022 Document Reviewed: 04/28/2022  Elsevier Patient Education © 2022 Elsevier Inc.    Fall Prevention in the Home, Adult  Falls can cause injuries and affect people of all ages. There are many simple things  that you can do to make your home safe and to help prevent falls.  If you need it, ask for help making these changes.  What actions can I take to prevent falls?  General information  Use good lighting in all rooms. Make sure to:  Replace any light bulbs that burn out.  Turn on lights if it is dark and use night-lights.  Keep items that you use often in easy-to-reach places. Lower the shelves around your home if needed.  Move furniture so that there are clear paths around it.  Do not keep throw rugs or other things on the floor that can make you trip.  If any of your floors are uneven, fix them.  Add color or contrast paint or tape to clearly fito and help you see:  Grab bars or handrails.  First and last steps of staircases.  Where the edge of each step is.  If you use a ladder or stepladder:  Make sure that it is fully opened. Do not climb a closed ladder.  Make sure the sides of the ladder are locked in place.  Have someone hold the ladder while you use it.  Know where your pets are as you move through your home.  What can I do in the bathroom?         Keep the floor dry. Clean up any water that is on the floor right away.  Remove soap buildup in the bathtub or shower. Buildup makes bathtubs and showers slippery.  Use non-skid mats or decals on the floor of the bathtub or shower.  Attach bath mats securely with double-sided, non-slip rug tape.  If you need to sit down while you are in the shower, use a non-slip stool.  Install grab bars by the toilet and in the bathtub and shower. Do not use towel bars as grab bars.  What can I do in the bedroom?  Make sure that you have a light by your bed that is easy to reach.  Do not use any sheets or blankets on your bed that hang to the floor.  Have a firm bench or chair with side arms that you can use for support when you get dressed.  What can I do in the kitchen?  Clean up any spills right away.  If you need to reach something above you, use a sturdy step stool that has a  grab bar.  Keep electrical cables out of the way.  Do not use floor polish or wax that makes floors slippery.  What can I do with my stairs?  Do not leave anything on the stairs.  Make sure that you have a light switch at the top and the bottom of the stairs. Have them installed if you do not have them.  Make sure that there are handrails on both sides of the stairs. Fix handrails that are broken or loose. Make sure that handrails are as long as the staircases.  Install non-slip stair treads on all stairs in your home if they do not have carpet.  Avoid having throw rugs at the top or bottom of stairs, or secure the rugs with carpet tape to prevent them from moving.  Choose a carpet design that does not hide the edge of steps on the stairs. Make sure that carpet is firmly attached to the stairs. Fix any carpet that is loose or worn.  What can I do on the outside of my home?  Use bright outdoor lighting.  Repair the edges of walkways and driveways and fix any cracks. Clear paths of anything that can make you trip, such as tools or rocks.  Add color or contrast paint or tape to clearly fito and help you see high doorway thresholds.  Trim any bushes or trees on the main path into your home.  Check that handrails are securely fastened and in good repair. Both sides of all steps should have handrails.  Install guardrails along the edges of any raised decks or porches.  Have leaves, snow, and ice cleared regularly. Use sand, salt, or ice melt on walkways during winter months if you live where there is ice and snow.  In the garage, clean up any spills right away, including grease or oil spills.  What other actions can I take?  Review your medicines with your health care provider. Some medicines can make you confused or feel dizzy. This can increase your chance of falling.  Wear closed-toe shoes that fit well and support your feet. Wear shoes that have rubber soles and low heels.  Use a cane, walker, scooter, or crutches  that help you move around if needed.  Talk with your provider about other ways that you can decrease your risk of falls. This may include seeing a physical therapist to learn to do exercises to improve movement and strength.  Where to find more information  Centers for Disease Control and PreventionKAYA: cdc.gov  National [place] on Aging: rudolph.nih.gov  National [place] on Aging: rudolph.nih.gov  Contact a health care provider if:  You are afraid of falling at home.  You feel weak, drowsy, or dizzy at home.  You fall at home.  Get help right away if you:  Lose consciousness or have trouble moving after a fall.  Have a fall that causes a head injury.  These symptoms may be an emergency. Get help right away. Call 911.  Do not wait to see if the symptoms will go away.  Do not drive yourself to the hospital.  This information is not intended to replace advice given to you by your health care provider. Make sure you discuss any questions you have with your health care provider.  Document Revised: 08/21/2023 Document Reviewed: 08/21/2023  Elsevier Patient Education © 2024 Elsevier Inc.

## 2024-07-30 LAB
ALBUMIN UR-MCNC: 1.3 MG/DL
AMORPH URATE CRY URNS QL MICRO: PRESENT /HPF
BACTERIA UR QL AUTO: ABNORMAL /HPF
COD CRY URNS QL: PRESENT /HPF
CREAT UR-MCNC: 107.2 MG/DL
HYALINE CASTS UR QL AUTO: ABNORMAL /LPF
MICROALBUMIN/CREAT UR: 12.1 MG/G (ref 0–29)
RBC # UR STRIP: ABNORMAL /HPF
REF LAB TEST METHOD: ABNORMAL
SQUAMOUS #/AREA URNS HPF: ABNORMAL /HPF
WBC # UR STRIP: ABNORMAL /HPF

## 2024-07-30 RX ORDER — ACETAMINOPHEN 160 MG
4000 TABLET,DISINTEGRATING ORAL DAILY
Start: 2024-07-30

## 2024-07-31 ENCOUNTER — TELEPHONE (OUTPATIENT)
Dept: INTERNAL MEDICINE | Facility: CLINIC | Age: 89
End: 2024-07-31
Payer: MEDICARE

## 2024-07-31 NOTE — TELEPHONE ENCOUNTER
"Pt's spouse called, wanting lab results for pt.     \"LDL (bad cholesterol) is very good at 66.  Goal is less than 70.  HDL (good cholesterol) is very good at 66.  Goal is greater than 50 in women.     Increase over-the-counter vitamin D3 to 4000 units daily to get it a bit higher for prevention of osteoporosis.     Urinalysis, thyroid level, blood cell counts, kidney and liver function, are all acceptable.\"    Spouse verbalized understanding. He requested lab results be mailed. Request has been completed.   "

## 2024-09-01 DIAGNOSIS — F33.1 MODERATE EPISODE OF RECURRENT MAJOR DEPRESSIVE DISORDER: ICD-10-CM

## 2024-09-01 DIAGNOSIS — E78.2 MIXED HYPERLIPIDEMIA: Chronic | ICD-10-CM

## 2024-09-03 RX ORDER — SERTRALINE HYDROCHLORIDE 100 MG/1
100 TABLET, FILM COATED ORAL DAILY
Qty: 90 TABLET | Refills: 0 | Status: SHIPPED | OUTPATIENT
Start: 2024-09-03

## 2024-09-03 RX ORDER — ATORVASTATIN CALCIUM 20 MG/1
20 TABLET, FILM COATED ORAL EVERY EVENING
Qty: 90 TABLET | Refills: 0 | Status: SHIPPED | OUTPATIENT
Start: 2024-09-03

## 2024-12-04 ENCOUNTER — OFFICE VISIT (OUTPATIENT)
Dept: CARDIOLOGY | Facility: CLINIC | Age: 89
End: 2024-12-04
Payer: MEDICARE

## 2024-12-04 VITALS
OXYGEN SATURATION: 96 % | HEIGHT: 68 IN | WEIGHT: 169 LBS | HEART RATE: 70 BPM | SYSTOLIC BLOOD PRESSURE: 144 MMHG | BODY MASS INDEX: 25.61 KG/M2 | DIASTOLIC BLOOD PRESSURE: 82 MMHG

## 2024-12-04 DIAGNOSIS — I50.22 CHRONIC SYSTOLIC (CONGESTIVE) HEART FAILURE: ICD-10-CM

## 2024-12-04 DIAGNOSIS — I48.21 PERMANENT ATRIAL FIBRILLATION: ICD-10-CM

## 2024-12-04 DIAGNOSIS — I48.21 PERMANENT ATRIAL FIBRILLATION: Primary | ICD-10-CM

## 2024-12-04 DIAGNOSIS — I44.2 CHB (COMPLETE HEART BLOCK): ICD-10-CM

## 2024-12-04 DIAGNOSIS — R06.09 DOE (DYSPNEA ON EXERTION): Primary | ICD-10-CM

## 2024-12-04 DIAGNOSIS — I34.0 NON-RHEUMATIC MITRAL REGURGITATION: ICD-10-CM

## 2024-12-04 DIAGNOSIS — I10 PRIMARY HYPERTENSION: ICD-10-CM

## 2024-12-04 DIAGNOSIS — I42.0 DILATED CARDIOMYOPATHY: ICD-10-CM

## 2024-12-04 NOTE — PROGRESS NOTES
Jayda Lopes  1935  [phone]    12/04/2024    [facility] CARDIOLOGY     Referring Provider: No ref. provider found     Akilah Rueda MD  [address] [address]    Chief Complaint   Patient presents with    Permanent atrial fibrillation       Problem List:   Atrial fibrillation:  Hospitalization with external cardioversion on 07/26/2004 with initiation of Rythmol therapy.   Palpitations with event recorder, December 2005, consistent with PACs.   Episode of atrial fibrillation approximately 60-90 minutes on Christmas 2009 with subsequent ER visit.  External cardioversion to normal sinus rhythm, 06/08/2012.   Hospitalization with initiation of Tikosyn, August 2012.   Pulmonary vein isolation procedure with extensive ablation of multiple sites and subsequent significant bradyarrhythmias after internal cardioversion to normal sinus rhythm with discontinuation of Tikosyn on 03/25/2014.  Unsuccessful external cardioversion, 05/15/2014, and subsequent successful internal cardioversion with early recurrence of atrial fibrillation, 05/15/2014.   Echocardiogram, 08/12/2014: EF less than 20% with severe global hypokinesis. Right ventricle mild to moderately dilated. Mild MR and mild TR.   09/08/2014: Implantation of a [employer]ure Quadra biventricular pacemaker, serial #[id number], and AV node ablation.   CHF class III:  Holter monitor in February 2002 with frequent PACs and PVCs.   Echocardiogram in March 2000 with normal LV size and function: LVEF of 60%. Mild TR, mild MR.   Echocardiogram on 07/26/2004: Normal LV size and function. No significant regurgitation from the mitral valve or tricuspid valve.   Echocardiogram, 02/06/2008: Normal LV size and function, mild MR.   Echocardiogram, 08/22/2012: Left ventricular ejection fraction 35% to 40%. Moderate MR, mild TR.  Echocardiogram, 02/13/2013: Left ventricular ejection fraction of 50% to 55%. Mild TR,  mild-to-moderate MR. LA 5.2.  Echocardiogram, 03/23/2015: EF 30-35%. Mild concentric LVH. Mild-to-moderate AI. Mild MR. Mild to moderate TR  Echocardiogram, 07/22/2015: Limited for EF only 50% to 55%.  Atypical chest pain:  Acceptable nuclear GXT with normal LV size and function and no ischemia in August 2000 and April 2004.  UMAIR, 03/25/2014: Ejection fraction of 50% to 55%. Mild mitral regurgitation.  Hypertension.   Hyperlipidemia.   Mitral valve prolapse.   Anxiety.   Gastroesophageal reflux disease.   Macular degeneration.   Surgical history:  Hysterectomy.  Partial thyroidectomy.  Ovarian cystectomy.   Cataract surgery  Allergies  Allergies   Allergen Reactions    Phenazopyridine Hcl Unknown (See Comments)     Pyridium       Current Medications    Current Outpatient Medications:     Aflibercept (EYLEA IO), Inject  into the eye Every 3 (Three) Months., Disp: , Rfl:     amLODIPine (NORVASC) 2.5 MG tablet, Take 1 tablet by mouth 2 (Two) Times a Day., Disp: 180 tablet, Rfl: 1    atorvastatin (LIPITOR) 20 MG tablet, TAKE 1 TABLET BY MOUTH IN THE EVENING, Disp: 90 tablet, Rfl: 0    carvedilol (COREG) 25 MG tablet, Take 1 tablet by mouth 2 (Two) Times a Day., Disp: 180 tablet, Rfl: 1    Cholecalciferol (Vitamin D3) 50 MCG (2000 UT) capsule, Take 2 capsules by mouth Daily., Disp: , Rfl:     fluticasone (FLONASE) 50 MCG/ACT nasal spray, 1 spray into the nostril(s) as directed by provider 2 (Two) Times a Day. (Patient taking differently: Administer 1 spray into the nostril(s) as directed by provider 2 (Two) Times a Day As Needed.), Disp: 16 g, Rfl: 5    hydrocortisone 1 % cream, Apply 1 Application topically to the appropriate area as directed 2 (Two) Times a Day., Disp: 56 g, Rfl: 0    latanoprost (XALATAN) 0.005 % ophthalmic solution, Administer 1 drop to both eyes Every Night., Disp: , Rfl:     loratadine (Claritin) 10 MG tablet, Take 1 tablet by mouth Daily. (Patient taking differently: Take 1 tablet by mouth Daily  "As Needed.), Disp: 28 tablet, Rfl: 0    Multiple Vitamins-Minerals (PRESERVISION AREDS 2 PO), Take 2 tablets by mouth Daily., Disp: , Rfl:     ramipril (ALTACE) 5 MG capsule, Take 1 cap every morning and 2 caps every evening, Disp: 270 capsule, Rfl: 3    rivaroxaban (Xarelto) 20 MG tablet, Take 1 tablet by mouth Daily. as directed, Disp: 90 tablet, Rfl: 3    sertraline (ZOLOFT) 100 MG tablet, TAKE 1 TABLET BY MOUTH EVERY DAY, Disp: 90 tablet, Rfl: 0    polyethylene glycol (GlycoLax) 17 GM/SCOOP powder, Take 17 g by mouth Daily. (Patient not taking: Reported on 12/4/2024), Disp: 850 g, Rfl: 11    History of Present Illness     Pt presents for follow up of AF/CHF/HTN/MVP . Since we last saw the pt, pt denies any AF episodes, CP. Denies any hospitalizations, ER visits, bleeding, or TIA/CVA symptoms.  Blood pressures have been reasonably well-controlled at home.  No near-syncope or syncope.  Does complain of fatigue and dyspnea on exertion that she feels is somewhat new.      Vitals:    12/04/24 1308   BP: 144/82   BP Location: Left arm   Patient Position: Sitting   Pulse: 70   SpO2: 96%   Weight: 76.7 kg (169 lb)   Height: 172.7 cm (68\")     Body mass index is 25.7 kg/m².  PE:  General: NAD  Neck: no JVD, no carotid bruits, no TM  Heart RRR, NL S1, S2, no rubs, murmurs  Lungs: CTA, no wheezes, rhonchi, or rales  Abd: soft, non-tender, NL BS  Ext: No musculoskeletal deformities, no edema, cyanosis, or clubbing  Psych: normal mood and affect    Diagnostic Data:        ECG 12 Lead    Date/Time: 12/4/2024 2:00 PM  Performed by: Hiren Kaufman MD    Authorized by: Hiren Kaufman MD  Comparison: not compared with previous ECG   Rhythm: atrial fibrillation and paced  BPM: 70        PM interrogation; normal biventricular pacemaker function.  Permanent atrial fibrillation.  99% BiV paced.  Battery voltage 7.8 M NSVT         1. ZAFAR (dyspnea on exertion)    2. Permanent atrial fibrillation    3. CHB (complete heart block) "    4. Chronic systolic (congestive) heart failure    5. Primary hypertension          Plan:    1.  Fatigue/dyspnea on exertion.  Will pursue echocardiogram to assess LV size and function since has been in quite some time compared to her last echo and there is concern that her LVEF now is diminished.  This would be important to know since she is going to need a pacemaker generator change out soon.      2.Permanent Afib: AVN RFA. BIVPPM with normal function. Xarelto 20mg daily.  See #1    3. HTN: Normally controlled, elevated today. At home 130mmHg.  on BB, ACE, Norvasc.     4. Chronic SHF Class I EF 55% in 2015.  See #1    5. Anticoagulation: Xarelto 20mg Daily    F/up in 6 months

## 2024-12-09 DIAGNOSIS — E78.2 MIXED HYPERLIPIDEMIA: Chronic | ICD-10-CM

## 2024-12-09 RX ORDER — ATORVASTATIN CALCIUM 20 MG/1
20 TABLET, FILM COATED ORAL EVERY EVENING
Qty: 90 TABLET | Refills: 0 | Status: SHIPPED | OUTPATIENT
Start: 2024-12-09

## 2024-12-09 NOTE — TELEPHONE ENCOUNTER
Rx Refill Note  Requested Prescriptions     Pending Prescriptions Disp Refills    atorvastatin (LIPITOR) 20 MG tablet [Pharmacy Med Name: Atorvastatin Calcium Oral Tablet 20 MG] 90 tablet 0     Sig: TAKE 1 TABLET BY MOUTH IN THE EVENING      Last office visit with prescribing clinician: 11/13/2023   Last telemedicine visit with prescribing clinician: Visit date not found   Next office visit with prescribing clinician: Visit date not found                         Would you like a call back once the refill request has been completed: [] Yes [] No    If the office needs to give you a call back, can they leave a voicemail: [] Yes [] No    Brooke Encinas MA  12/09/24, 11:05 EST

## 2024-12-20 RX ORDER — LATANOPROST 50 UG/ML
1 SOLUTION/ DROPS OPHTHALMIC NIGHTLY
Qty: 7.5 ML | Refills: 5 | Status: SHIPPED | OUTPATIENT
Start: 2024-12-20

## 2024-12-20 NOTE — TELEPHONE ENCOUNTER
Caller: Ana Lilia Lopesara ROSALIA    Relationship: Self    Best call back number: [phone]     Requested Prescriptions:   Requested Prescriptions     Pending Prescriptions Disp Refills    latanoprost (XALATAN) 0.005 % ophthalmic solution       Sig: Administer 1 drop to both eyes Every Night.        Pharmacy where request should be sent: [facility][id number] - [place], [place] - [address] - [phone]  - [phone] FX     Last office visit with prescribing clinician: 7/29/2024   Last telemedicine visit with prescribing clinician: Visit date not found   Next office visit with prescribing clinician: 1/29/2025     Additional details provided by patient: PATIENT HAS CALLED REQUESTING A REFILL ON ABOVE MEDICATION.     Does the patient have less than a 3 day supply:  [x] Yes  [] No    Would you like a call back once the refill request has been completed: [] Yes [x] No    If the office needs to give you a call back, can they leave a voicemail: [] Yes [x] No    Gertrudis Lazaro   12/20/24 09:35 EST

## 2025-01-02 DIAGNOSIS — F33.1 MODERATE EPISODE OF RECURRENT MAJOR DEPRESSIVE DISORDER: ICD-10-CM

## 2025-01-02 RX ORDER — SERTRALINE HYDROCHLORIDE 100 MG/1
100 TABLET, FILM COATED ORAL DAILY
Qty: 90 TABLET | Refills: 0 | Status: SHIPPED | OUTPATIENT
Start: 2025-01-02

## 2025-01-02 NOTE — TELEPHONE ENCOUNTER
Rx Refill Note  Requested Prescriptions     Pending Prescriptions Disp Refills    sertraline (ZOLOFT) 100 MG tablet [Pharmacy Med Name: Sertraline HCl Oral Tablet 100 MG] 90 tablet 0     Sig: TAKE 1 TABLET BY MOUTH EVERY DAY      Last office visit with prescribing clinician: 7/29/2024   Last telemedicine visit with prescribing clinician: Visit date not found   Next office visit with prescribing clinician: 1/29/2025                         Would you like a call back once the refill request has been completed: [] Yes [] No    If the office needs to give you a call back, can they leave a voicemail: [] Yes [] No    Marifer Moyer MA  01/02/25, 10:07 EST

## 2025-01-29 ENCOUNTER — OFFICE VISIT (OUTPATIENT)
Dept: INTERNAL MEDICINE | Facility: CLINIC | Age: 89
End: 2025-01-29
Payer: MEDICARE

## 2025-01-29 VITALS
OXYGEN SATURATION: 95 % | HEIGHT: 68 IN | DIASTOLIC BLOOD PRESSURE: 80 MMHG | WEIGHT: 169 LBS | SYSTOLIC BLOOD PRESSURE: 130 MMHG | BODY MASS INDEX: 25.61 KG/M2 | HEART RATE: 72 BPM | TEMPERATURE: 97.8 F

## 2025-01-29 DIAGNOSIS — I50.22 CHRONIC SYSTOLIC (CONGESTIVE) HEART FAILURE: ICD-10-CM

## 2025-01-29 DIAGNOSIS — H61.21 IMPACTED CERUMEN OF RIGHT EAR: Chronic | ICD-10-CM

## 2025-01-29 DIAGNOSIS — F41.1 GENERALIZED ANXIETY DISORDER: ICD-10-CM

## 2025-01-29 DIAGNOSIS — Z91.81 AT MODERATE RISK FOR FALL: Chronic | ICD-10-CM

## 2025-01-29 DIAGNOSIS — R05.3 CHRONIC COUGH: Chronic | ICD-10-CM

## 2025-01-29 DIAGNOSIS — I10 BENIGN ESSENTIAL HYPERTENSION: ICD-10-CM

## 2025-01-29 DIAGNOSIS — E78.2 MIXED HYPERLIPIDEMIA: Chronic | ICD-10-CM

## 2025-01-29 DIAGNOSIS — H91.13 PRESBYCUSIS OF BOTH EARS: Chronic | ICD-10-CM

## 2025-01-29 DIAGNOSIS — F33.1 MODERATE EPISODE OF RECURRENT MAJOR DEPRESSIVE DISORDER: ICD-10-CM

## 2025-01-29 DIAGNOSIS — J30.1 SEASONAL ALLERGIC RHINITIS DUE TO POLLEN: Primary | ICD-10-CM

## 2025-01-29 DIAGNOSIS — I48.20 CHRONIC ATRIAL FIBRILLATION: ICD-10-CM

## 2025-01-29 PROCEDURE — 1126F AMNT PAIN NOTED NONE PRSNT: CPT | Performed by: INTERNAL MEDICINE

## 2025-01-29 PROCEDURE — G2211 COMPLEX E/M VISIT ADD ON: HCPCS | Performed by: INTERNAL MEDICINE

## 2025-01-29 PROCEDURE — 99214 OFFICE O/P EST MOD 30 MIN: CPT | Performed by: INTERNAL MEDICINE

## 2025-01-29 PROCEDURE — 1159F MED LIST DOCD IN RCRD: CPT | Performed by: INTERNAL MEDICINE

## 2025-01-29 PROCEDURE — 69210 REMOVE IMPACTED EAR WAX UNI: CPT | Performed by: INTERNAL MEDICINE

## 2025-01-29 PROCEDURE — 1160F RVW MEDS BY RX/DR IN RCRD: CPT | Performed by: INTERNAL MEDICINE

## 2025-01-29 RX ORDER — FLUTICASONE PROPIONATE 50 MCG
1 SPRAY, SUSPENSION (ML) NASAL 2 TIMES DAILY
Qty: 16 G | Refills: 5 | Status: SHIPPED | OUTPATIENT
Start: 2025-01-29

## 2025-01-29 RX ORDER — AMLODIPINE BESYLATE 2.5 MG/1
2.5 TABLET ORAL 2 TIMES DAILY
Qty: 180 TABLET | Refills: 3 | Status: SHIPPED | OUTPATIENT
Start: 2025-01-29

## 2025-01-29 RX ORDER — CARVEDILOL 25 MG/1
25 TABLET ORAL 2 TIMES DAILY
Qty: 180 TABLET | Refills: 1 | Status: SHIPPED | OUTPATIENT
Start: 2025-01-29

## 2025-01-29 RX ORDER — RAMIPRIL 5 MG/1
CAPSULE ORAL
Qty: 270 CAPSULE | Refills: 3 | Status: SHIPPED | OUTPATIENT
Start: 2025-01-29

## 2025-01-29 RX ORDER — ATORVASTATIN CALCIUM 20 MG/1
20 TABLET, FILM COATED ORAL EVERY EVENING
Qty: 90 TABLET | Refills: 3 | Status: SHIPPED | OUTPATIENT
Start: 2025-01-29

## 2025-01-29 NOTE — PROGRESS NOTES
[facility]     Jayda Lopes  1935   [id number]      Patient Care Team:  Akilah Rueda MD as PCP - General (Internal Medicine)  Akilah Rueda MD as PCP - Internal Medicine (Internal Medicine)  Bernadette Almodovar MD as Consulting Physician (Dermatology)  Hiren Kaufman MD as Consulting Physician (Cardiac Electrophysiology)    Chief Complaint   Patient presents with    6 mo f/u    Hypertension    Allergies    Hyperlipidemia        Patient is a 89 y.o. female.   History of Present Illness  The patient is a 89-year-old female who presents for follow-up.    She reports a persistent cough, accompanied by nasal drainage and postnasal drip, characterized by the expectoration of yellowish sputum. She does not feel sick. She has not been utilizing Flonase nasal spray, which she acknowledges as beneficial in alleviating her symptoms. She occasionally uses cough syrup to manage her cough at night. She does not possess a lung inhaler.    She experiences occasional shortness of breath, unrelated to physical activity. She does not engage in strenuous activities but maintains mobility by climbing stairs. She does not report any episodes of tachycardia. Her blood pressure readings at home are consistently within the normal range, although they tend to elevate slightly during clinic visits. She does not experience peripheral edema.    She also reports a slight hearing impairment, with her last audiological evaluation conducted several years ago.    She expresses feelings of boredom and lethargy, attributing these to a lack of social engagement. She finds sertraline helpful in managing her mood.    SOCIAL HISTORY  She does not smoke. She occasionally has a glass of wine.    MEDICATIONS  Current: Sertraline, Xarelto, carvedilol, amlodipine, ramipril, atorvastatin, Flonase nasal spray (not using currently).         CHRONIC CONDITIONS      Past Medical History:   Diagnosis Date    Acute bronchitis due  to infection 01/10/2019    Anxiety     Atrial fibrillation     Atypical chest pain     B12 deficiency 01/10/2019    Bradycardia     Contusion of right lower leg 01/05/2021 2/8/2021 Akilah Rueda MD  Contusion R shin after fall.  Improving.  There is still some tenderness to touch.      GERD (gastroesophageal reflux disease)     Hyperlipidemia     Hypertension     Impacted cerumen of right ear 07/20/2020    Continue debrox as directed.  She will need another week or two of drops to help soften.    Macular degeneration     Mitral valve prolapse     Palpitation     Right leg swelling 01/05/2021 1/5/2021 Akilah Rueda MD  Mild R lower leg swelling due to contusion and injury after fall.   Continue to elevate the leg several times a day.        Past Surgical History:   Procedure Laterality Date    CYSTECTOMY      h/o Ovarian    HYSTERECTOMY Bilateral     OOPHORECTOMY Bilateral 1993    THYROIDECTOMY      h/o thyroid surgery sub-total        Family History   Problem Relation Age of Onset    Colon cancer Mother     Other Mother         cardiac arrhythmia    Heart failure Mother     Cancer Mother     Lung cancer Father     Cancer Father     No Known Problems Brother     Hypertension Maternal Grandmother     Breast cancer Neg Hx     Ovarian cancer Neg Hx        Social History     Socioeconomic History    Marital status:    Tobacco Use    Smoking status: Never    Smokeless tobacco: Never   Vaping Use    Vaping status: Never Used    Passive vaping exposure: Yes   Substance and Sexual Activity    Alcohol use: Yes     Alcohol/week: 3.0 standard drinks of alcohol     Types: 3 Glasses of wine per week     Comment: a few glasses weekly    Drug use: Never    Sexual activity: Defer       Allergies   Allergen Reactions    Phenazopyridine Hcl Unknown (See Comments)     Pyridium       Review of Systems:     Review of Systems    Vital Signs  Vitals:    01/29/25 1312   BP: 130/80   BP Location: Left arm   Patient  "Position: Sitting   Cuff Size: Adult   Pulse: 72   Temp: 97.8 °F (36.6 °C)   TempSrc: Infrared   SpO2: 95%   Weight: 76.7 kg (169 lb)   Height: 172.7 cm (67.99\")   PainSc: 0-No pain     Body mass index is 25.7 kg/m².  BMI is >= 25 and <30. (Overweight) The following options were offered after discussion;: exercise counseling/recommendations, nutrition counseling/recommendations, Information on healthy weight added to patient's after visit summary., and eat some protein in the diet 3 times a day.          Current Outpatient Medications:     Aflibercept (EYLEA IO), Inject  into the eye Every 3 (Three) Months., Disp: , Rfl:     amLODIPine (NORVASC) 2.5 MG tablet, Take 1 tablet by mouth 2 (Two) Times a Day., Disp: 180 tablet, Rfl: 3    atorvastatin (LIPITOR) 20 MG tablet, Take 1 tablet by mouth Every Evening., Disp: 90 tablet, Rfl: 3    carvedilol (COREG) 25 MG tablet, Take 1 tablet by mouth 2 (Two) Times a Day., Disp: 180 tablet, Rfl: 1    Cholecalciferol (Vitamin D3) 50 MCG (2000 UT) capsule, Take 2 capsules by mouth Daily., Disp: , Rfl:     fluticasone (FLONASE) 50 MCG/ACT nasal spray, Administer 1 spray into the nostril(s) as directed by provider 2 (Two) Times a Day., Disp: 16 g, Rfl: 5    hydrocortisone 1 % cream, Apply 1 Application topically to the appropriate area as directed 2 (Two) Times a Day., Disp: 56 g, Rfl: 0    latanoprost (XALATAN) 0.005 % ophthalmic solution, Administer 1 drop to both eyes Every Night., Disp: 7.5 mL, Rfl: 5    Multiple Vitamins-Minerals (PRESERVISION AREDS 2 PO), Take 2 tablets by mouth Daily., Disp: , Rfl:     polyethylene glycol (GlycoLax) 17 GM/SCOOP powder, Take 17 g by mouth Daily., Disp: 850 g, Rfl: 11    ramipril (ALTACE) 5 MG capsule, Take 1 cap every morning and 2 caps every evening, Disp: 270 capsule, Rfl: 3    rivaroxaban (Xarelto) 20 MG tablet, Take 1 tablet by mouth Daily. as directed, Disp: 90 tablet, Rfl: 3    sertraline (ZOLOFT) 100 MG tablet, TAKE 1 TABLET BY MOUTH " EVERY DAY, Disp: 90 tablet, Rfl: 0    loratadine (Claritin) 10 MG tablet, Take 1 tablet by mouth Daily. (Patient not taking: Reported on 1/29/2025), Disp: 28 tablet, Rfl: 0    Physical Exam:    Physical Exam  Vitals and nursing note reviewed.   Constitutional:       Appearance: She is well-developed.   HENT:      Head: Normocephalic.      Right Ear: Tympanic membrane normal. There is impacted cerumen.      Left Ear: Tympanic membrane and ear canal normal.   Eyes:      Conjunctiva/sclera: Conjunctivae normal.      Pupils: Pupils are equal, round, and reactive to light.   Neck:      Thyroid: No thyromegaly.   Cardiovascular:      Rate and Rhythm: Normal rate and regular rhythm.      Heart sounds: Normal heart sounds.   Pulmonary:      Effort: Pulmonary effort is normal.      Breath sounds: Normal breath sounds. No wheezing.   Musculoskeletal:         General: Normal range of motion.      Cervical back: Normal range of motion and neck supple.   Lymphadenopathy:      Cervical: No cervical adenopathy.   Skin:     General: Skin is warm and dry.   Neurological:      Mental Status: She is alert and oriented to person, place, and time.   Psychiatric:         Thought Content: Thought content normal.          ACE III MINI        Results Review:    I reviewed the patient's new clinical results.  Results         CMP:  Lab Results   Component Value Date    Glucose 92 07/29/2024    Glucose, UA Negative 07/29/2024    BUN 13 07/29/2024    BUN/Creatinine Ratio 19.7 07/29/2024    Creatinine 0.66 07/29/2024    Creatinine, Urine 107.2 07/29/2024    Natrecor Unknown 08/12/2014    Ketones, UA Negative 07/29/2024    CO2 28.0 07/29/2024    Calcium 9.3 07/29/2024    Albumin 4.1 07/29/2024    AST (SGOT) 22 07/29/2024    ALT (SGPT) 17 07/29/2024     HbA1c:  Lab Results   Component Value Date    HGBA1C 6.0 09/07/2014    HGBA1C 6.1 (H) 05/14/2014     Microalbumin:  Lab Results   Component Value Date    MICROALBUR 1.3 07/29/2024     Lipid  Panel  Lab Results   Component Value Date    CHOL 143 07/29/2024    TRIG 53 07/29/2024    HDL 66 (H) 07/29/2024    LDL 66 07/29/2024    AST 22 07/29/2024    ALT 17 07/29/2024       Medication Review: Medications reviewed and noted  Patient Instructions   Problem List Items Addressed This Visit          Advance Directives and General Issues    At moderate risk for fall (Chronic)       Allergies and Adverse Reactions    Seasonal allergic rhinitis due to pollen - Primary       Cardiac and Vasculature    Mixed hyperlipidemia (Chronic)    Overview     Taking atorvastatin every evening.          Relevant Medications    atorvastatin (LIPITOR) 20 MG tablet    Chronic systolic (congestive) heart failure    Overview     Taking carvedilol, ramipril, Xarelto, and statin.           Relevant Medications    carvedilol (COREG) 25 MG tablet    amLODIPine (NORVASC) 2.5 MG tablet    Benign essential hypertension    Overview     Taking amlodipine and carvedilol and ramipril twice a day.           Relevant Medications    carvedilol (COREG) 25 MG tablet    amLODIPine (NORVASC) 2.5 MG tablet    ramipril (ALTACE) 5 MG capsule    Chronic atrial fibrillation    Overview     Taking Xarelto daily and carvedilol twice a day.  She sees Dr. Kaufman.         Relevant Medications    rivaroxaban (Xarelto) 20 MG tablet    carvedilol (COREG) 25 MG tablet    amLODIPine (NORVASC) 2.5 MG tablet       ENT    Presbycusis of both ears (Chronic)    Overview     Patient was given a prescription for hearing testing.  She may go to [place] [facility] on [facility] or to [facility] on [facility].         Impacted cerumen of right ear (Chronic)    Overview     After informed consent, cerumen removed by irrigation.  Patient tolerated procedure well.            Mental Health    Moderate episode of recurrent major depressive disorder    Overview     Taking sertraline 100 mg tablet daily.               Relevant Medications     sertraline (ZOLOFT) 100 MG tablet    Generalized anxiety disorder    Relevant Medications    sertraline (ZOLOFT) 100 MG tablet       Pulmonary and Pneumonias    Chronic cough (Chronic)          Diagnosis Plan   1. Seasonal allergic rhinitis due to pollen        2. Benign essential hypertension  carvedilol (COREG) 25 MG tablet    amLODIPine (NORVASC) 2.5 MG tablet      3. Mixed hyperlipidemia  atorvastatin (LIPITOR) 20 MG tablet      4. Chronic systolic (congestive) heart failure        5. Moderate episode of recurrent major depressive disorder        6. Generalized anxiety disorder        7. Chronic cough        8. Chronic atrial fibrillation  rivaroxaban (Xarelto) 20 MG tablet      9. At moderate risk for fall        10. Presbycusis of both ears        11. Impacted cerumen of right ear          Assessment & Plan  1. Seasonal allergic rhinitis.  She is advised to resume the use of Flonase nasal spray, administering 1 spray in each nostril twice daily. If her rhinorrhea persists, she may supplement her treatment with an evening Claritin tablet as needed.    2. Permanent atrial fibrillation.  She will maintain her current regimen of Xarelto and carvedilol. Follow-up appointments with Dr. Bae will proceed as scheduled.    3. Benign essential hypertension.  She will continue her current medication regimen, which includes low-dose amlodipine 2.5 mg twice daily, ramipril 1 capsule in the morning and 2 capsules every evening, and carvedilol twice daily. She is also advised to persist with a low-salt, healthy diet.    4. Mixed hyperlipidemia.  She will continue her nightly dose of atorvastatin. She is encouraged to maintain a low-fat, healthy diet and increase her physical activity around the house. Daily chair yoga and stretches are recommended, along with the use of an exercise bike.    5. Chronic systolic heart failure.  She will continue her current regimen of carvedilol, ramipril, statin, and Xarelto. She is  advised to report any unexplained weight gain of 3 pounds or more.    6. Depression and anxiety.  She will continue her current dose of sertraline daily. Physical activity is also recommended to help manage symptoms of stress, boredom, anxiety, and mood.    7. Chronic cough.  She will continue to use cough syrup at home as needed. The use of Flonase nasal spray twice daily is also expected to help manage the cough.    8. Fall risk.  She is advised to increase her dietary protein intake and ensure adequate hydration to help prevent falls and injuries. Daily exercise is also recommended as a preventive measure against falls and injuries.    9. Health maintenance.  She will receive another pneumonia vaccine this summer. She is advised to get the RSV vaccine from her pharmacy. Physicals are scheduled for June.    10. Hearing impairment.  She is advised to visit an audiologist for a hearing check. An order will be provided for this purpose. Recommended locations include [facility] on [facility] and two audiology offices on [facility]. The right ear has a lot of wax and will be washed out, which might help some.    Follow-up  The patient will follow up in June for annual wellness visit.         Plan of care reviewed with patient at the conclusion of today's visit. Education was provided regarding diagnosis, management, and any prescribed or recommended OTC medications. Patient verbalizes understanding of and agreement with management plan.         01/29/25   13:14 EST    Patient or patient representative verbalized consent for the use of [employer]ient List[employer] during the visit with  Akilah Rueda MD for chart documentation. 1/29/2025  14:23 EST

## 2025-01-29 NOTE — PATIENT INSTRUCTIONS
Patient Instructions  Problem List Items Addressed This Visit          Advance Directives and General Issues    At moderate risk for fall (Chronic)       Allergies and Adverse Reactions    Seasonal allergic rhinitis due to pollen - Primary       Cardiac and Vasculature    Mixed hyperlipidemia (Chronic)    Overview     Taking atorvastatin every evening.          Relevant Medications    atorvastatin (LIPITOR) 20 MG tablet    Chronic systolic (congestive) heart failure    Overview     Taking carvedilol, ramipril, Xarelto, and statin.           Relevant Medications    carvedilol (COREG) 25 MG tablet    amLODIPine (NORVASC) 2.5 MG tablet    Benign essential hypertension    Overview     Taking amlodipine and carvedilol and ramipril twice a day.           Relevant Medications    carvedilol (COREG) 25 MG tablet    amLODIPine (NORVASC) 2.5 MG tablet    ramipril (ALTACE) 5 MG capsule    Chronic atrial fibrillation    Overview     Taking Xarelto daily and carvedilol twice a day.  She sees Dr. Kaufman.         Relevant Medications    rivaroxaban (Xarelto) 20 MG tablet    carvedilol (COREG) 25 MG tablet    amLODIPine (NORVASC) 2.5 MG tablet       ENT    Presbycusis of both ears (Chronic)    Overview     Patient was given a prescription for hearing testing.  She may go to [facility] or to [facility] on [facility].         Impacted cerumen of right ear (Chronic)    Overview     After informed consent, cerumen removed by irrigation.  Patient tolerated procedure well.            Mental Health    Moderate episode of recurrent major depressive disorder    Overview     Taking sertraline 100 mg tablet daily.               Relevant Medications    sertraline (ZOLOFT) 100 MG tablet    Generalized anxiety disorder    Relevant Medications    sertraline (ZOLOFT) 100 MG tablet       Pulmonary and Pneumonias    Chronic cough (Chronic)       Exercising to Stay Healthy  To become healthy  and stay healthy, it is recommended that you do moderate-intensity and vigorous-intensity exercise. You can tell that you are exercising at a moderate intensity if your heart starts beating faster and you start breathing faster but can still hold a conversation. You can tell that you are exercising at a vigorous intensity if you are breathing much harder and faster and cannot hold a conversation while exercising.  How can exercise benefit me?  Exercising regularly is important. It has many health benefits, such as:  Improving overall fitness, flexibility, and endurance.  Increasing bone density.  Helping with weight control.  Decreasing body fat.  Increasing muscle strength and endurance.  Reducing stress and tension, anxiety, depression, or anger.  Improving overall health.  What guidelines should I follow while exercising?  Before you start a new exercise program, talk with your health care provider.  Do not exercise so much that you hurt yourself, feel dizzy, or get very short of breath.  Wear comfortable clothes and wear shoes with good support.  Drink plenty of water while you exercise to prevent dehydration or heat stroke.  Work out until your breathing and your heartbeat get faster (moderate intensity).  How often should I exercise?  Choose an activity that you enjoy, and set realistic goals. Your health care provider can help you make an activity plan that is individually designed and works best for you.  Exercise regularly as told by your health care provider. This may include:  Doing strength training two times a week, such as:  Lifting weights.  Using resistance bands.  Push-ups.  Sit-ups.  Yoga.  Doing a certain intensity of exercise for a given amount of time. Choose from these options:  A total of 150 minutes of moderate-intensity exercise every week.  A total of 75 minutes of vigorous-intensity exercise every week.  A mix of moderate-intensity and vigorous-intensity exercise every week.  Children,  pregnant women, people who have not exercised regularly, people who are overweight, and older adults may need to talk with a health care provider about what activities are safe to perform. If you have a medical condition, be sure to talk with your health care provider before you start a new exercise program.  What are some exercise ideas?  Moderate-intensity exercise ideas include:  Walking 1 mile (1.6 km) in about 15 minutes.  Biking.  Hiking.  Golfing.  Dancing.  Water aerobics.  Vigorous-intensity exercise ideas include:  Walking 4.5 miles (7.2 km) or more in about 1 hour.  Jogging or running 5 miles (8 km) in about 1 hour.  Biking 10 miles (16.1 km) or more in about 1 hour.  Lap swimming.  Roller-skating or in-line skating.  Cross-country skiing.  Vigorous competitive sports, such as football, basketball, and soccer.  Jumping rope.  Aerobic dancing.  What are some everyday activities that can help me get exercise?  Yard work, such as:  Pushing a .  Raking and bagging leaves.  Washing your car.  Pushing a stroller.  Shoveling snow.  Gardening.  Washing windows or floors.  How can I be more active in my day-to-day activities?  Use stairs instead of an elevator.  Take a walk during your lunch break.  If you drive, park your car farther away from your work or school.  If you take public transportation, get off one stop early and walk the rest of the way.  Stand up or walk around during all of your indoor phone calls.  Get up, stretch, and walk around every 30 minutes throughout the day.  Enjoy exercise with a friend. Support to continue exercising will help you keep a regular routine of activity.  Where to find more information  You can find more information about exercising to stay healthy from:  [place] Department of Health and Human Services: www.hhs.gov  Centers for Disease Control and Prevention (CDC): www.cdc.gov  Summary  Exercising regularly is important. It will improve your overall fitness,  flexibility, and endurance.  Regular exercise will also improve your overall health. It can help you control your weight, reduce stress, and improve your bone density.  Do not exercise so much that you hurt yourself, feel dizzy, or get very short of breath.  Before you start a new exercise program, talk with your health care provider.  This information is not intended to replace advice given to you by your health care provider. Make sure you discuss any questions you have with your health care provider.  Document Revised: 04/15/2022 Document Reviewed: 04/15/2022  Elsevier Patient Education © 2023 [employer] Heart-Healthy Eating Plan  Many factors influence your heart (coronary) health, including eating and exercise habits. Coronary risk increases with abnormal blood fat (lipid) levels. Heart-healthy meal planning includes limiting unhealthy fats, increasing healthy fats, and making other diet and lifestyle changes.  What is my plan?  Your health care provider may recommend that you:  Limit your fat intake to _________% or less of your total calories each day.  Limit your saturated fat intake to _________% or less of your total calories each day.  Limit the amount of cholesterol in your diet to less than _________ mg per day.  What are tips for following this plan?  Cooking  Cook foods using methods other than frying. Baking, boiling, grilling, and broiling are all good options. Other ways to reduce fat include:  Removing the skin from poultry.  Removing all visible fats from meats.  Steaming vegetables in water or broth.  Meal planning  A plate with examples of foods in a healthy diet.      At meals, imagine dividing your plate into fourths:  Fill one-half of your plate with vegetables and green salads.  Fill one-fourth of your plate with whole grains.  Fill one-fourth of your plate with lean protein foods.  Eat 4-5 servings of vegetables per day. One serving equals 1 cup raw or cooked vegetable, or 2 cups raw  leafy greens.  Eat 4-5 servings of fruit per day. One serving equals 1 medium whole fruit, ¼ cup dried fruit, ½ cup fresh, frozen, or canned fruit, or ½ cup 100% fruit juice.  Eat more foods that contain soluble fiber. Examples include apples, broccoli, carrots, beans, peas, and barley. Aim to get 25-30 g of fiber per day.  Increase your consumption of legumes, nuts, and seeds to 4-5 servings per week. One serving of dried beans or legumes equals ½ cup cooked, 1 serving of nuts is ¼ cup, and 1 serving of seeds equals 1 tablespoon.  Fats  Choose healthy fats more often. Choose monounsaturated and polyunsaturated fats, such as olive and canola oils, flaxseeds, walnuts, almonds, and seeds.  Eat more omega-3 fats. Choose salmon, mackerel, sardines, tuna, flaxseed oil, and ground flaxseeds. Aim to eat fish at least 2 times each week.  Check food labels carefully to identify foods with trans fats or high amounts of saturated fat.  Limit saturated fats. These are found in animal products, such as meats, butter, and cream. Plant sources of saturated fats include palm oil, palm kernel oil, and coconut oil.  Avoid foods with partially hydrogenated oils in them. These contain trans fats. Examples are stick margarine, some tub margarines, cookies, crackers, and other baked goods.  Avoid fried foods.  General information  Eat more home-cooked food and less restaurant, buffet, and fast food.  Limit or avoid alcohol.  Limit foods that are high in starch and sugar.  Lose weight if you are overweight. Losing just 5-10% of your body weight can help your overall health and prevent diseases such as diabetes and heart disease.  Monitor your salt (sodium) intake, especially if you have high blood pressure. Talk with your health care provider about your sodium intake.  Try to incorporate more vegetarian meals weekly.  What foods can I eat?  Fruits  All fresh, canned (in natural juice), or frozen fruits.  Vegetables  Fresh or frozen  vegetables (raw, steamed, roasted, or grilled). Green salads.  Grains  Most grains. Choose whole wheat and whole grains most of the time. Rice and pasta, including brown rice and pastas made with whole wheat.  Meats and other proteins  Lean, well-trimmed beef, veal, pork, and lamb. Chicken and turkey without skin. All fish and shellfish. Wild duck, rabbit, pheasant, and venison. Egg whites or low-cholesterol egg substitutes. Dried beans, peas, lentils, and tofu. Seeds and most nuts.  Dairy  Low-fat or nonfat cheeses, including ricotta and mozzarella. Skim or 1% milk (liquid, powdered, or evaporated). Buttermilk made with low-fat milk. Nonfat or low-fat yogurt.  Fats and oils  Non-hydrogenated (trans-free) margarines. Vegetable oils, including soybean, sesame, sunflower, olive, peanut, safflower, corn, canola, and cottonseed. Salad dressings or mayonnaise made with a vegetable oil.  Beverages  Water (mineral or sparkling). Coffee and tea. Diet carbonated beverages.  Sweets and desserts  Sherbet, gelatin, and fruit ice. Small amounts of dark chocolate.  Limit all sweets and desserts.  Seasonings and condiments  All seasonings and condiments.  The items listed above may not be a complete list of foods and beverages you can eat. Contact a dietitian for more options.  What foods are not recommended?  Fruits  Canned fruit in heavy syrup. Fruit in cream or butter sauce. Fried fruit. Limit coconut.  Vegetables  Vegetables cooked in cheese, cream, or butter sauce. Fried vegetables.  Grains  Breads made with saturated or trans fats, oils, or whole milk. Croissants. Sweet rolls. Donuts. High-fat crackers, such as cheese crackers.  Meats and other proteins  Fatty meats, such as hot dogs, ribs, sausage, whitaker, rib-eye roast or steak. High-fat deli meats, such as salami and bologna. Caviar. Domestic duck and goose. Organ meats, such as liver.  Dairy  Cream, sour cream, cream cheese, and creamed cottage cheese. Whole-milk cheeses.  Whole or 2% milk (liquid, evaporated, or condensed). Whole buttermilk. Cream sauce or high-fat cheese sauce. Whole-milk yogurt.  Fats and oils  Meat fat, or shortening. Cocoa butter, hydrogenated oils, palm oil, coconut oil, palm kernel oil. Solid fats and shortenings, including whitaker fat, salt pork, lard, and butter. Nondairy cream substitutes. Salad dressings with cheese or sour cream.  Beverages  Regular sodas and any drinks with added sugar.  Sweets and desserts  Frosting. Pudding. Cookies. Cakes. Pies. Milk chocolate or white chocolate. Buttered syrups. Full-fat ice cream or ice cream drinks.  The items listed above may not be a complete list of foods and beverages to avoid. Contact a dietitian for more information.  Summary  Heart-healthy meal planning includes limiting unhealthy fats, increasing healthy fats, and making other diet and lifestyle changes.  Lose weight if you are overweight. Losing just 5-10% of your body weight can help your overall health and prevent diseases such as diabetes and heart disease.  Focus on eating a balance of foods, including fruits and vegetables, low-fat or nonfat dairy, lean protein, nuts and legumes, whole grains, and heart-healthy oils and fats.  This information is not intended to replace advice given to you by your health care provider. Make sure you discuss any questions you have with your health care provider.  Document Revised: 04/28/2022 Document Reviewed: 04/28/2022  [employer] Patient Education © 2022 Elsevier Inc.    BMI for Adults  Body mass index (BMI) is a number found using a person's weight and height. BMI can help tell how much of a person's weight is made up of fat. BMI does not measure body fat directly. It is used instead of tests that directly measure body fat, which can be difficult and expensive.  What are BMI measurements used for?  BMI is useful to:  Find out if your weight puts you at higher risk for medical problems.  Help recommend changes, such as  "in diet and exercise. This can help you reach a healthy weight. BMI screening can be done again to see if these changes are working.  How is BMI calculated?  Your height and weight are measured. The BMI is found from those numbers. This can be done with [place] or metric measurements. Note that charts and online BMI calculators are available to help you find your BMI quickly and easily without doing these calculations.  To calculate your BMI in [place] measurements:  Measure your weight in pounds (lb).  Multiply the number of pounds by 703.  So, for an adult who weighs 150 lb, multiply that number by 703: 150 x 703, which equals 105,450.  Measure your height in inches. Then multiply that number by itself to get a measurement called \"inches squared.\"  So, for an adult who is 70 inches tall, the \"inches squared\" measurement is 70 inches x 70 inches, which equals 4,900 inches squared.  Divide the total from step 2 (number of lb x 703) by the total from step 3 (inches squared): 105,450 ÷ 4,900 = 21.5. This is your BMI.  To calculate your BMI in metric measurements:    Measure your weight in kilograms (kg).  For this example, the weight is 70 kg.  Measure your height in meters (m). Then multiply that number by itself to get a measurement called \"meters squared.\"  So, for an adult who is 1.75 m tall, the \"meters squared\" measurement is 1.75 m x 1.75 m, which equals 3.1 meters squared.  Divide the number of kilograms (your weight) by the meters squared number. In this example: 70 ÷ 3.1 = 22.6. This is your BMI.  What do the results mean?  BMI charts are used to see if you are underweight, normal weight, overweight, or obese. The following guidelines will be used:  Underweight: BMI less than 18.5.  Normal weight: BMI between 18.5 and 24.9.  Overweight: BMI between 25 and 29.9.  Obese: BMI of 30 or above.  BMI is a tool and cannot diagnose a condition. Talk with your health care provider about what your BMI means for you. Keep " these notes in mind:  Weight includes fat and muscle. Someone with a muscular build, such as an athlete, may have a BMI that is higher than 24.9. In cases like these, BMI is not a correct measure of body fat.  If you have a BMI of 25 or higher, your provider may need to do more testing to find out if excess body fat is the cause.  BMI is measured the same way for males and females. Females usually have more body fat than males of the same height and weight.  Where to find more information  For more information about BMI, including tools to quickly find your BMI, go to:  Centers for Disease Control and Prevention: cdc.gov  American Heart Association: heart.org  National Heart, Lung, and Blood [place]: nhlbi.nih.gov  This information is not intended to replace advice given to you by your health care provider. Make sure you discuss any questions you have with your health care provider.  Document Revised: 09/07/2023 Document Reviewed: 08/31/2023  [employer] Patient Education © 2024 [employer]Fall Prevention in the Home, Adult  Falls can cause injuries and affect people of all ages. There are many simple things that you can do to make your home safe and to help prevent falls.  If you need it, ask for help making these changes.  What actions can I take to prevent falls?  General information  Use good lighting in all rooms. Make sure to:  Replace any light bulbs that burn out.  Turn on lights if it is dark and use night-lights.  Keep items that you use often in easy-to-reach places. Lower the shelves around your home if needed.  Move furniture so that there are clear paths around it.  Do not keep throw rugs or other things on the floor that can make you trip.  If any of your floors are uneven, fix them.  Add color or contrast paint or tape to clearly fito and help you see:  Grab bars or handrails.  First and last steps of staircases.  Where the edge of each step is.  If you use a ladder or stepladder:  Make sure that it  is fully opened. Do not climb a closed ladder.  Make sure the sides of the ladder are locked in place.  Have someone hold the ladder while you use it.  Know where your pets are as you move through your home.  What can I do in the bathroom?         Keep the floor dry. Clean up any water that is on the floor right away.  Remove soap buildup in the bathtub or shower. Buildup makes bathtubs and showers slippery.  Use non-skid mats or decals on the floor of the bathtub or shower.  Attach bath mats securely with double-sided, non-slip rug tape.  If you need to sit down while you are in the shower, use a non-slip stool.  Install grab bars by the toilet and in the bathtub and shower. Do not use towel bars as grab bars.  What can I do in the bedroom?  Make sure that you have a light by your bed that is easy to reach.  Do not use any sheets or blankets on your bed that hang to the floor.  Have a firm bench or chair with side arms that you can use for support when you get dressed.  What can I do in the kitchen?  Clean up any spills right away.  If you need to reach something above you, use a sturdy step stool that has a grab bar.  Keep electrical cables out of the way.  Do not use floor polish or wax that makes floors slippery.  What can I do with my stairs?  Do not leave anything on the stairs.  Make sure that you have a light switch at the top and the bottom of the stairs. Have them installed if you do not have them.  Make sure that there are handrails on both sides of the stairs. Fix handrails that are broken or loose. Make sure that handrails are as long as the staircases.  Install non-slip stair treads on all stairs in your home if they do not have carpet.  Avoid having throw rugs at the top or bottom of stairs, or secure the rugs with carpet tape to prevent them from moving.  Choose a carpet design that does not hide the edge of steps on the stairs. Make sure that carpet is firmly attached to the stairs. Fix any carpet  that is loose or worn.  What can I do on the outside of my home?  Use bright outdoor lighting.  Repair the edges of walkways and driveways and fix any cracks. Clear paths of anything that can make you trip, such as tools or rocks.  Add color or contrast paint or tape to clearly fito and help you see high doorway thresholds.  Trim any bushes or trees on the main path into your home.  Check that handrails are securely fastened and in good repair. Both sides of all steps should have handrails.  Install guardrails along the edges of any raised decks or porches.  Have leaves, snow, and ice cleared regularly. Use sand, salt, or ice melt on walkways during winter months if you live where there is ice and snow.  In the garage, clean up any spills right away, including grease or oil spills.  What other actions can I take?  Review your medicines with your health care provider. Some medicines can make you confused or feel dizzy. This can increase your chance of falling.  Wear closed-toe shoes that fit well and support your feet. Wear shoes that have rubber soles and low heels.  Use a cane, walker, scooter, or crutches that help you move around if needed.  Talk with your provider about other ways that you can decrease your risk of falls. This may include seeing a physical therapist to learn to do exercises to improve movement and strength.  Where to find more information  Centers for Disease Control and Prevention, [place]: cdc.gov  National [place] on Aging: rudolph.nih.gov  National [place] on Aging: rudolph.nih.gov  Contact a health care provider if:  You are afraid of falling at home.  You feel weak, drowsy, or dizzy at home.  You fall at home.  Get help right away if you:  Lose consciousness or have trouble moving after a fall.  Have a fall that causes a head injury.  These symptoms may be an emergency. Get help right away. Call 911.  Do not wait to see if the symptoms will go away.  Do not drive yourself to the hospital.  This  information is not intended to replace advice given to you by your health care provider. Make sure you discuss any questions you have with your health care provider.  Document Revised: 08/21/2023 Document Reviewed: 08/21/2023  Elsevier Patient Education © 2024 Elsevier Inc.

## 2025-02-05 DIAGNOSIS — I10 BENIGN ESSENTIAL HYPERTENSION: ICD-10-CM

## 2025-02-05 RX ORDER — CARVEDILOL 25 MG/1
25 TABLET ORAL 2 TIMES DAILY
Qty: 180 TABLET | Refills: 0 | Status: SHIPPED | OUTPATIENT
Start: 2025-02-05

## 2025-04-02 PROCEDURE — 93294 REM INTERROG EVL PM/LDLS PM: CPT | Performed by: INTERNAL MEDICINE

## 2025-04-02 PROCEDURE — 93296 REM INTERROG EVL PM/IDS: CPT | Performed by: INTERNAL MEDICINE

## 2025-05-02 DIAGNOSIS — F33.1 MODERATE EPISODE OF RECURRENT MAJOR DEPRESSIVE DISORDER: ICD-10-CM

## 2025-05-02 RX ORDER — SERTRALINE HYDROCHLORIDE 100 MG/1
100 TABLET, FILM COATED ORAL DAILY
Qty: 90 TABLET | Refills: 0 | Status: SHIPPED | OUTPATIENT
Start: 2025-05-02

## 2025-05-02 NOTE — TELEPHONE ENCOUNTER
Caller: Jayda Lopes    Relationship: Self    Best call back number: [phone]     Requested Prescriptions:   Requested Prescriptions     Pending Prescriptions Disp Refills    sertraline (ZOLOFT) 100 MG tablet 90 tablet 0     Sig: Take 1 tablet by mouth Daily.        Pharmacy where request should be sent: [id number] - [place], [place] - [address] - [phone]  - [phone] FX     Last office visit with prescribing clinician: 1/29/2025   Last telemedicine visit with prescribing clinician: Visit date not found   Next office visit with prescribing clinician: 6/11/2025     Additional details provided by patient: PATIENT IS OUT     Does the patient have less than a 3 day supply:  [x] Yes  [] No    Would you like a call back once the refill request has been completed: [] Yes [x] No    If the office needs to give you a call back, can they leave a voicemail: [] Yes [x] No    Ema Finnegan Rep   05/02/25 14:02 EDT         
No

## 2025-06-11 ENCOUNTER — OFFICE VISIT (OUTPATIENT)
Dept: INTERNAL MEDICINE | Facility: CLINIC | Age: 89
End: 2025-06-11
Payer: MEDICARE

## 2025-06-11 VITALS
SYSTOLIC BLOOD PRESSURE: 130 MMHG | HEIGHT: 67 IN | HEART RATE: 70 BPM | DIASTOLIC BLOOD PRESSURE: 84 MMHG | BODY MASS INDEX: 26.53 KG/M2 | TEMPERATURE: 98 F | WEIGHT: 169 LBS | OXYGEN SATURATION: 98 %

## 2025-06-11 DIAGNOSIS — I34.0 NON-RHEUMATIC MITRAL REGURGITATION: ICD-10-CM

## 2025-06-11 DIAGNOSIS — I48.20 CHRONIC ATRIAL FIBRILLATION: ICD-10-CM

## 2025-06-11 DIAGNOSIS — Z00.00 MEDICARE ANNUAL WELLNESS VISIT, SUBSEQUENT: Primary | ICD-10-CM

## 2025-06-11 DIAGNOSIS — F33.1 MODERATE EPISODE OF RECURRENT MAJOR DEPRESSIVE DISORDER: ICD-10-CM

## 2025-06-11 DIAGNOSIS — I10 BENIGN ESSENTIAL HYPERTENSION: ICD-10-CM

## 2025-06-11 DIAGNOSIS — R26.89 POOR BALANCE: Chronic | ICD-10-CM

## 2025-06-11 DIAGNOSIS — E78.2 MIXED HYPERLIPIDEMIA: Chronic | ICD-10-CM

## 2025-06-11 DIAGNOSIS — I42.0 DILATED CARDIOMYOPATHY: ICD-10-CM

## 2025-06-11 DIAGNOSIS — Z00.00 ANNUAL PHYSICAL EXAM: ICD-10-CM

## 2025-06-11 DIAGNOSIS — Z23 NEED FOR VACCINATION: ICD-10-CM

## 2025-06-11 DIAGNOSIS — M81.0 AGE-RELATED OSTEOPOROSIS WITHOUT CURRENT PATHOLOGICAL FRACTURE: Chronic | ICD-10-CM

## 2025-06-11 RX ORDER — CARVEDILOL 25 MG/1
25 TABLET ORAL 2 TIMES DAILY
Qty: 180 TABLET | Refills: 3 | Status: SHIPPED | OUTPATIENT
Start: 2025-06-11

## 2025-06-11 RX ORDER — ALENDRONATE SODIUM 70 MG/1
70 TABLET ORAL
Qty: 15 TABLET | Refills: 3 | Status: SHIPPED | OUTPATIENT
Start: 2025-06-11

## 2025-06-11 RX ORDER — SERTRALINE HYDROCHLORIDE 100 MG/1
100 TABLET, FILM COATED ORAL DAILY
Qty: 90 TABLET | Refills: 3 | Status: SHIPPED | OUTPATIENT
Start: 2025-06-11

## 2025-06-11 NOTE — PROGRESS NOTES
Subjective   The ABCs of the Annual Wellness Visit  Medicare Wellness Visit      Jayda Lopes is a 89 y.o. patient who presents for a Medicare Wellness Visit.    The following portions of the patient's history were reviewed and   updated as appropriate: allergies, current medications, past family history, past medical history, past social history, past surgical history, and problem list.    Compared to one year ago, the patient's physical   health is the same.  Compared to one year ago, the patient's mental   health is the same.    Recent Hospitalizations:  She was not admitted to the hospital during the last year.     Current Medical Providers:  Patient Care Team:  Akilah Rueda MD as PCP - General (Internal Medicine)  Akilah Rueda MD as PCP - Internal Medicine (Internal Medicine)  Bernadette Almodovar MD as Consulting Physician (Dermatology)  Hiren Kaufman MD as Consulting Physician (Cardiac Electrophysiology)  Doris Romeo PA as Physician Assistant (Cardiology)    Outpatient Medications Prior to Visit   Medication Sig Dispense Refill    Aflibercept (EYLEA IO) Inject  into the eye Every 3 (Three) Months.      amLODIPine (NORVASC) 2.5 MG tablet Take 1 tablet by mouth 2 (Two) Times a Day. 180 tablet 3    atorvastatin (LIPITOR) 20 MG tablet Take 1 tablet by mouth Every Evening. 90 tablet 3    Cholecalciferol (Vitamin D3) 50 MCG (2000 UT) capsule Take 2 capsules by mouth Daily.      fluticasone (FLONASE) 50 MCG/ACT nasal spray Administer 1 spray into the nostril(s) as directed by provider 2 (Two) Times a Day. 16 g 5    hydrocortisone 1 % cream Apply 1 Application topically to the appropriate area as directed 2 (Two) Times a Day. 56 g 0    latanoprost (XALATAN) 0.005 % ophthalmic solution Administer 1 drop to both eyes Every Night. 7.5 mL 5    Multiple Vitamins-Minerals (PRESERVISION AREDS 2 PO) Take 2 tablets by mouth Daily.      polyethylene glycol (GlycoLax) 17 GM/SCOOP powder Take 17 g by  mouth Daily. 850 g 11    ramipril (ALTACE) 5 MG capsule Take 1 cap every morning and 2 caps every evening 270 capsule 3    rivaroxaban (Xarelto) 20 MG tablet Take 1 tablet by mouth Daily. as directed 90 tablet 3    carvedilol (COREG) 25 MG tablet TAKE 1 TABLET BY MOUTH 2 TIMES A  tablet 0    sertraline (ZOLOFT) 100 MG tablet Take 1 tablet by mouth Daily. 90 tablet 0    loratadine (Claritin) 10 MG tablet Take 1 tablet by mouth Daily. (Patient not taking: Reported on 6/11/2025) 28 tablet 0     No facility-administered medications prior to visit.     No opioid medication identified on active medication list. I have reviewed chart for other potential  high risk medication/s and harmful drug interactions in the elderly.      Aspirin is not on active medication list.  Aspirin use is not indicated based on review of current medical condition/s. Risk of harm outweighs potential benefits.  .    Patient Active Problem List   Diagnosis    Palpitation    Atypical chest pain    GERD without esophagitis    Macular degeneration (senile) of retina    Bradycardia    Cortical age-related cataract of both eyes    Non-rheumatic mitral regurgitation    Seasonal allergic rhinitis due to pollen    Chronic systolic (congestive) heart failure    Dilated cardiomyopathy    Primary open angle glaucoma (POAG) of both eyes, mild stage    Moderate episode of recurrent major depressive disorder    Generalized anxiety disorder    Age-related osteoporosis without current pathological fracture    Osteoarthritis    Varicose veins of both lower extremities without ulcer or inflammation    Vitamin D deficiency    Benign essential hypertension    AV block    Mixed hyperlipidemia    Constipation, slow transit    Simple chronic bronchitis    Chronic bilateral low back pain without sciatica    Shortness of breath    Pain of right hip joint    Trochanteric bursitis of right hip    Right leg pain    Anterior tibial tendonitis, right    Poor balance     "Folliculitis    Presbycusis of both ears    Impacted cerumen of right ear    Fall from slip, trip, or stumble, initial encounter    At moderate risk for fall    Hearing loss of right ear    Medicare annual wellness visit, subsequent    Annual physical exam    Change of skin color    Ganglion cyst of foot    Bunion, right foot    Non-recurrent unilateral inguinal hernia without obstruction or gangrene    Pelvic pressure in female    RUQ abdominal pain    Chronic cough    Intrinsic eczema    Chronic atrial fibrillation     Advance Care Planning Advance Directive is not on file.  ACP discussion was held with the patient during this visit. Patient does not have an advance directive, information provided.            Objective   Vitals:    06/11/25 1402   BP: 130/84   BP Location: Left arm   Patient Position: Sitting   Cuff Size: Adult   Pulse: 70   Temp: 98 °F (36.7 °C)   TempSrc: Infrared   SpO2: 98%   Weight: 76.7 kg (169 lb)   Height: 170.2 cm (67\")   PainSc: 0-No pain       Estimated body mass index is 26.47 kg/m² as calculated from the following:    Height as of this encounter: 170.2 cm (67\").    Weight as of this encounter: 76.7 kg (169 lb).                Does the patient have evidence of cognitive impairment? No                                                                                                Health  Risk Assessment    Smoking Status:  Social History     Tobacco Use   Smoking Status Never   Smokeless Tobacco Never     Alcohol Consumption:  Social History     Substance and Sexual Activity   Alcohol Use Yes    Alcohol/week: 3.0 standard drinks of alcohol    Types: 3 Glasses of wine per week    Comment: a few glasses weekly       Fall Risk Screen  KRISTAADI Fall Risk Assessment was completed, and patient is at LOW risk for falls.Assessment completed on:6/11/2025    Depression Screening   Little interest or pleasure in doing things? Not at all   Feeling down, depressed, or hopeless? Not at all   PHQ-2 Total " Score 0      Health Habits and Functional and Cognitive Screenin/11/2025     2:00 PM   Functional & Cognitive Status   Do you have difficulty preparing food and eating? No   Do you have difficulty bathing yourself, getting dressed or grooming yourself? No   Do you have difficulty using the toilet? No   Do you have difficulty moving around from place to place? No   Do you have trouble with steps or getting out of a bed or a chair? No   Current Diet Well Balanced Diet   Dental Exam Up to date   Eye Exam Up to date   Exercise (times per week) 0 times per week   Current Exercises Include No Regular Exercise   Do you need help using the phone?  No   Are you deaf or do you have serious difficulty hearing?  No   Do you need help to go to places out of walking distance? No   Do you need help shopping? No   Do you need help preparing meals?  No   Do you need help with housework?  No   Do you need help with laundry? No   Do you need help taking your medications? No   Do you need help managing money? No   Do you ever drive or ride in a car without wearing a seat belt? No   Have you felt unusual stress, anger or loneliness in the last month? No   Who do you live with? Spouse   If you need help, do you have trouble finding someone available to you? No   Have you been bothered in the last four weeks by sexual problems? No   Do you have difficulty concentrating, remembering or making decisions? No           Age-appropriate Screening Schedule:  Refer to the list below for future screening recommendations based on patient's age, sex and/or medical conditions. Orders for these recommended tests are listed in the plan section. The patient has been provided with a written plan.    Health Maintenance List  Health Maintenance   Topic Date Due    RSV Vaccine - Adults (1 - 1-dose 75+ series) Never done    TDAP/TD VACCINES (2 - Td or Tdap) 12/15/2020    ZOSTER VACCINE (3 of 3) 2022    COVID-19 Vaccine (2024- season)  03/26/2025    INFLUENZA VACCINE  07/01/2025    LIPID PANEL  07/29/2025    ANNUAL WELLNESS VISIT  06/11/2026    DXA SCAN  05/02/2027    Pneumococcal Vaccine 50+  Completed                                                                                                                                                CMS Preventative Services Quick Reference  Risk Factors Identified During Encounter  Depression/Dysphoria: Current medication is effective, no change recommended  Fall Risk-High or Moderate: Information on Fall Prevention Shared in After Visit Summary    The above risks/problems have been discussed with the patient.  Pertinent information has been shared with the patient in the After Visit Summary.  An After Visit Summary and PPPS were made available to the patient.    Follow Up:   Next Medicare Wellness visit to be scheduled in 1 year.         Additional E&M Note during same encounter follows:  Patient has additional, significant, and separately identifiable condition(s)/problem(s) that require work above and beyond the Medicare Wellness Visit     Chief Complaint  Medicare Wellness-subsequent, Hypertension, and Hyperlipidemia    Subjective    HPI  Jayda is also being seen today for an annual adult preventative physical exam.        The patient presents for an annual wellness visit.    She reports experiencing mild instability during ambulation, which has led to a cessation of her usual mall walks. She has not had any falls or episodes of dizziness. She does not engage in regular exercise but maintains an active lifestyle, including occasional use of a stationary bicycle. She experiences intermittent shortness of breath but does not have any pedal edema. She also reports a sensation of ear fullness.    Her blood pressure readings at home have been consistent with today's reading, typically lower in the morning and never exceeding 130. She is on a regimen of amlodipine and carvedilol, both administered  "twice daily, and ramipril, taken once in the morning and twice at night.    She does not experience palpitations or tachycardia. She has been informed by Dr. Newton that her pacemaker battery may require replacement during her appointment in 12/2025. She occasionally experiences an increase in heart rate upon exertion, which normalizes upon rest. She is on a daily dose of Xarelto.    She acknowledges the need for new eyeglasses and reports a film over her lens replacement, which requires cleaning.    She does not report any recent constipation. She experiences a protrusion from her right-sided hernia by the end of the day, which is not present this morning. The hernia is not associated with pain or exacerbation during physical activity and has not changed in size over the past year.    She does not report any feelings of depression, except for concerns related to her 's health. Her sleep quality is satisfactory.    She has been experiencing upper back pain, which resolves with rest. She does not require analgesics for this discomfort.    She is currently taking calcium and vitamin D supplements for bone health. She has previously been on alendronate but does not recall the duration of treatment. She did not experience any gastrointestinal side effects or other adverse reactions from alendronate.          Objective   Vital Signs:  /84 (BP Location: Left arm, Patient Position: Sitting, Cuff Size: Adult)   Pulse 70   Temp 98 °F (36.7 °C) (Infrared)   Ht 170.2 cm (67\")   Wt 76.7 kg (169 lb)   SpO2 98%   BMI 26.47 kg/m²   Physical Exam  Vitals and nursing note reviewed.   Constitutional:       Appearance: She is well-developed.   HENT:      Head: Normocephalic.   Eyes:      Conjunctiva/sclera: Conjunctivae normal.      Pupils: Pupils are equal, round, and reactive to light.   Neck:      Thyroid: No thyromegaly.   Cardiovascular:      Rate and Rhythm: Normal rate and regular rhythm.      Heart sounds: " Murmur heard.      Diastolic murmur is present.      Comments: Varicose veins bilateral legs.  Pulmonary:      Effort: Pulmonary effort is normal.      Breath sounds: Normal breath sounds. No wheezing.   Abdominal:      General: Bowel sounds are normal.      Palpations: Abdomen is soft.      Tenderness: There is no abdominal tenderness.   Musculoskeletal:         General: No tenderness. Normal range of motion.      Cervical back: Normal range of motion and neck supple.      Right lower leg: No edema.      Left lower leg: No edema.   Lymphadenopathy:      Cervical: No cervical adenopathy.   Skin:     General: Skin is warm and dry.      Findings: No rash.   Neurological:      Mental Status: She is alert and oriented to person, place, and time.      Cranial Nerves: No cranial nerve deficit.      Sensory: No sensory deficit.      Coordination: Coordination normal.      Gait: Gait normal.   Psychiatric:         Attention and Perception: Attention normal.         Mood and Affect: Mood normal.         Speech: Speech normal.         Behavior: Behavior normal.         Thought Content: Thought content normal.         Cognition and Memory: Cognition normal.         Judgment: Judgment normal.           Ears: External ear canals and tympanic membranes intact  Respiratory: Clear to auscultation, no wheezing, rales or rhonchi  Cardiovascular: Regular rate and rhythm, no murmurs, rubs, or gallops  Extremities: No edema, no cyanosis    The following data was reviewed by: Akilah Rueda MD on 06/11/2025:  Data reviewed : Radiologic studies DEXA 5/24  CMP          7/29/2024    12:43   CMP   Glucose 92    BUN 13    Creatinine 0.66    EGFR 85.0    Sodium 142    Potassium 4.2    Chloride 103    Calcium 9.3    Total Protein 6.9    Albumin 4.1    Globulin 2.8    Total Bilirubin 0.9    Alkaline Phosphatase 94    AST (SGOT) 22    ALT (SGPT) 17    Albumin/Globulin Ratio 1.5    BUN/Creatinine Ratio 19.7    Anion Gap 11.0      CBC           7/29/2024    12:43   CBC   WBC 6.18    RBC 4.20    Hemoglobin 12.9    Hematocrit 39.7    MCV 94.5    MCH 30.7    MCHC 32.5    RDW 12.6    Platelets 216      CBC w/diff          7/29/2024    12:43   CBC w/Diff   WBC 6.18    RBC 4.20    Hemoglobin 12.9    Hematocrit 39.7    MCV 94.5    MCH 30.7    MCHC 32.5    RDW 12.6    Platelets 216    Neutrophil Rel % 61.6    Immature Granulocyte Rel % 0.2    Lymphocyte Rel % 25.4    Monocyte Rel % 8.7    Eosinophil Rel % 3.1    Basophil Rel % 1.0      Lipid Panel          7/29/2024    12:43   Lipid Panel   Total Cholesterol 143    Triglycerides 53    HDL Cholesterol 66    VLDL Cholesterol 11    LDL Cholesterol  66    LDL/HDL Ratio 1.01      TSH          7/29/2024    12:43   TSH   TSH 2.790        Results  Imaging   - DEXA scan: 05/2024, Osteoporosis in the hips, spine looked normal           Assessment and Plan Additional age appropriate preventative wellness advice topics were discussed during today's preventative wellness exam(some topics already addressed during AWV portion of the note above):    Physical Activity: Advised cardiovascular activity 150 minutes per week as tolerated. (example brisk walk for 30 minutes, 5 days a week).     Nutrition: Discussed nutrition plan with patient. Information shared in after visit summary. Goal is for a well balanced diet to enhance overall health.     Healthy Weight: Discussed current and goal BMI with patient. Steps to attain this goal discussed. Information shared in after visit summary.       Benign essential hypertension.  - The current regimen of amlodipine, carvedilol, and ramipril will be maintained.  - Dietary modifications, specifically the avoidance of salt, will continue.  - Blood pressure at home is typically <130.  - Refills for carvedilol will be sent to the pharmacy.    Chronic atrial fibrillation.  - The daily administration of Xarelto and twice-daily carvedilol will persist.  - No palpitations or fast heartbeat  reported.  - Dietary restrictions, including the avoidance of excessive caffeine and chocolate, will be upheld.    Dilated cardiomyopathy.  Nonrheumatic mitral regurgitation.  - The continuation of Xarelto, carvedilol, amlodipine, and ramipril is planned.  - No new symptoms reported.  - Regular follow-up with cardiologist for pacemaker battery check in 12/2025.  - Monitor for any changes in symptoms.    Mixed hyperlipidemia.  - The nightly administration of atorvastatin will continue.  - A low-fat, healthy diet and regular exercise will be maintained.  - The use of an exercise bike and small hand weights while seated at home is recommended.    Depression.  - The daily administration of sertraline will continue.  - Physical activity is encouraged as it aids in reducing symptoms of stress, anxiety, and mood.  - Monitor for any changes in mood or symptoms.    Poor balance.  - The use of an exercise bike and weight exercises while seated and watching TV is recommended.  - No recent falls or dizziness reported.  - Adequate hydration and a protein-rich diet three times a day are advised.  - Monitor for any changes in balance or coordination.    Osteoporosis.  - The resumption of alendronate 70 mg tablet once a week is planned.  - This should be taken first thing in the morning with a glass of water, followed by a 30-minute wait before consuming other medications, beverages, or food.  - Any stomach upset from the medication should be reported.  - Continue calcium and vitamin D supplementation.  - Continue working in the house on the feet.  Use the bike every day.  Use small hand weights while sitting at home watching TV.    Health maintenance.  - A pneumonia shot will be administered today.  - Urine and blood tests will be conducted.  - Regular eye doctor visits   - Continue to monitor for any new health concerns.    Follow-up  The patient will follow up in 6 months.         Follow Up   No follow-ups on file.  Patient was  given instructions and counseling regarding her condition or for health maintenance advice. Please see specific information pulled into the AVS if appropriate.  Patient or patient representative verbalized consent for the use of Ambient Listening during the visit with  Akilah Rueda MD for chart documentation. 6/11/2025  14:52 EDT

## 2025-06-11 NOTE — LETTER
[facility]  Vaccine Consent Form    Patient Name:  Jadya Lopes  Patient :  1935  [id number]      Screening Checklist  The following questions should be completed prior to vaccination. If you answer “yes” to any question, it does not necessarily mean you should not be vaccinated. It just means we may need to clarify or ask more questions. If a question is unclear, please ask your healthcare provider to explain it.    Yes No   Any fever or moderate to severe illness today (mild illness and/or antibiotic treatment are not contraindications)?     Do you have a history of a serious reaction to any previous vaccinations, such as anaphylaxis, encephalopathy within 7 days, Guillain-[place] syndrome within 6 weeks, seizure?     Have you received any live vaccine(s) (e.g MMR, BLANCA) or any other vaccines in the last month (to ensure duplicate doses aren't given)?     Do you have an anaphylactic allergy to latex (DTaP, DTaP-IPV, Hep A, Hep B, MenB, RV, Td, Tdap), baker’s yeast (Hep B, HPV), polysorbates (RSV, nirsevimab, PCV 20 and 21, Rotavirrus, Tdap, Shingrix), or gelatin (BLANCA, MMR)?     Do you have an anaphylactic allergy to neomycin (Rabies, BLANCA, MMR, IPV, Hep A), polymyxin B (IPV), or streptomycin (IPV)?      Any cancer, leukemia, AIDS, or other immune system disorder? (BLANCA, MMR, RV)     Do you have a parent, brother, or sister with an immune system problem (if immune competence of vaccine recipient clinically verified, can proceed)? (MMR, BLANCA)     Any recent steroid treatments for >2 weeks, chemotherapy, or radiation treatment? (BLANCA, MMR)     Have you received antibody-containing blood transfusions or IVIG in the past 11 months (recommended interval is dependent on product)? (MMR, BLANCA)     Have you taken antiviral drugs (acyclovir, famciclovir, valacyclovir for BLANCA) in the last 24 or 48 hours, respectively?      Are you pregnant or planning to become pregnant within 1 month? (BLANCA, MMR, HPV, IPV, MenB,  "Abrexvy; For Hep B- refer to Engerix-B; For RSV - Abrysvo is indicated for 32-36 weeks of pregnancy from September to January)     For infants, have you ever been told your child has had intussusception or a medical emergency involving obstruction of the intestine (Rotavirus)? If not for an infant, can skip this question.         *Ordering Physicians/APC should be consulted if \"yes\" is checked by the patient or guardian above.  I have received, read, and understand the Vaccine Information Statement (VIS) for each vaccine ordered.  I have considered my or my child's health status as well as the health status of my close contacts.  I have taken the opportunity to discuss my vaccine questions with my or my child's health care provider.   I have requested that the ordered vaccine(s) be given to me or my child.  I understand the benefits and risks of the vaccines.  I understand that I should remain in the clinic for 15 minutes after receiving the vaccine(s).  _________________________________________________________  Signature of Patient or Parent/Legal Guardian ____________________  Date     "

## 2025-06-11 NOTE — PATIENT INSTRUCTIONS
Problem List Items Addressed This Visit          Cardiac and Vasculature    Mixed hyperlipidemia (Chronic)    Overview   Taking atorvastatin every evening.          Relevant Medications    atorvastatin (LIPITOR) 20 MG tablet    Other Relevant Orders    Lipid Panel    TSH    Vitamin B12    Non-rheumatic mitral regurgitation    Relevant Medications    amLODIPine (NORVASC) 2.5 MG tablet    carvedilol (COREG) 25 MG tablet    Dilated cardiomyopathy    Overview   7/21/2021 Akilah Rueda MD    Continue carvedilol, amlodipine, ramipril, Xarelto, and statin.    Let us know if there is a sudden unexplained weight gain of 3 pounds or more, or increased shortness of breath, or edema.           Relevant Medications    amLODIPine (NORVASC) 2.5 MG tablet    carvedilol (COREG) 25 MG tablet    Benign essential hypertension    Overview   Taking amlodipine and carvedilol and ramipril twice a day.           Relevant Medications    amLODIPine (NORVASC) 2.5 MG tablet    ramipril (ALTACE) 5 MG capsule    carvedilol (COREG) 25 MG tablet    Other Relevant Orders    CBC & Differential    Comprehensive Metabolic Panel    Microalbumin / Creatinine Urine Ratio - Urine, Clean Catch    Urinalysis With Microscopic - Urine, Clean Catch    Chronic atrial fibrillation    Overview   Taking Xarelto daily and carvedilol twice a day.  She sees Dr. Kaufman.         Relevant Medications    rivaroxaban (Xarelto) 20 MG tablet    amLODIPine (NORVASC) 2.5 MG tablet    carvedilol (COREG) 25 MG tablet       Health Encounters    Medicare annual wellness visit, subsequent - Primary    Annual physical exam       Mental Health    Moderate episode of recurrent major depressive disorder    Overview   Taking sertraline 100 mg tablet daily.               Relevant Medications    sertraline (ZOLOFT) 100 MG tablet       Musculoskeletal and Injuries    Age-related osteoporosis without current pathological fracture (Chronic)    Overview   DEXA 2017 revealed osteopenia  with a T score in the spine of -1.9 and a T score in the hip of -2.1.      DEXA 10/19/2021 showed worsening of bone density.  T score at the hip is now in the osteoporosis range with a T score of -3.1.  Spine T score is -2.1.  6/2025 Restart alendronate (Fosamax) tablet.           Relevant Orders    Vitamin D,25-Hydroxy       Symptoms and Signs    Poor balance (Chronic)    Overview   7/21/2021 Akilah Rueda MD    Doing  regular exercise can help with balance. Go to the gym 3 days a week and use the exercise bike and some of the upper body weight machines.          Other Visit Diagnoses         Need for vaccination        Relevant Orders    Pneumococcal Conjugate Vaccine 21-Valent All (Completed)

## 2025-07-02 LAB
MDC_IDC_MSMT_BATTERY_REMAINING_LONGEVITY: 1 MO
MDC_IDC_MSMT_BATTERY_REMAINING_PERCENTAGE: 2 %
MDC_IDC_MSMT_BATTERY_RRT_TRIGGER: 2.62
MDC_IDC_MSMT_BATTERY_STATUS: NORMAL
MDC_IDC_MSMT_BATTERY_VOLTAGE: 2.69
MDC_IDC_MSMT_LEADCHNL_LV_DTM: NORMAL
MDC_IDC_MSMT_LEADCHNL_LV_IMPEDANCE_VALUE: 700
MDC_IDC_MSMT_LEADCHNL_LV_PACING_THRESHOLD_AMPLITUDE: 0.62
MDC_IDC_MSMT_LEADCHNL_LV_PACING_THRESHOLD_POLARITY: NORMAL
MDC_IDC_MSMT_LEADCHNL_LV_PACING_THRESHOLD_PULSEWIDTH: 0.6
MDC_IDC_MSMT_LEADCHNL_RV_DTM: NORMAL
MDC_IDC_MSMT_LEADCHNL_RV_IMPEDANCE_VALUE: 480
MDC_IDC_MSMT_LEADCHNL_RV_PACING_THRESHOLD_AMPLITUDE: 0.5
MDC_IDC_MSMT_LEADCHNL_RV_PACING_THRESHOLD_POLARITY: NORMAL
MDC_IDC_MSMT_LEADCHNL_RV_PACING_THRESHOLD_PULSEWIDTH: 0.4
MDC_IDC_MSMT_LEADCHNL_RV_SENSING_INTR_AMPL: 12
MDC_IDC_PG_IMPLANT_DTM: NORMAL
MDC_IDC_PG_MFG: NORMAL
MDC_IDC_PG_MODEL: NORMAL
MDC_IDC_PG_SERIAL: NORMAL
MDC_IDC_PG_TYPE: NORMAL
MDC_IDC_SESS_DTM: NORMAL
MDC_IDC_SESS_TYPE: NORMAL
MDC_IDC_SET_BRADY_LOWRATE: 70
MDC_IDC_SET_BRADY_MAX_SENSOR_RATE: 130
MDC_IDC_SET_BRADY_MODE: NORMAL
MDC_IDC_SET_CRT_LVRV_DELAY: 0
MDC_IDC_SET_CRT_PACED_CHAMBERS: NORMAL
MDC_IDC_SET_LEADCHNL_LV_PACING_AMPLITUDE: 2
MDC_IDC_SET_LEADCHNL_LV_PACING_POLARITY: NORMAL
MDC_IDC_SET_LEADCHNL_LV_PACING_PULSEWIDTH: 0.6
MDC_IDC_SET_LEADCHNL_RA_PACING_POLARITY: NORMAL
MDC_IDC_SET_LEADCHNL_RA_SENSING_POLARITY: NORMAL
MDC_IDC_SET_LEADCHNL_RV_PACING_AMPLITUDE: 2
MDC_IDC_SET_LEADCHNL_RV_PACING_POLARITY: NORMAL
MDC_IDC_SET_LEADCHNL_RV_PACING_PULSEWIDTH: 0.4
MDC_IDC_SET_LEADCHNL_RV_SENSING_POLARITY: NORMAL
MDC_IDC_SET_LEADCHNL_RV_SENSING_SENSITIVITY: 2
MDC_IDC_STAT_CRT_PERCENT_PACED: 99

## 2025-07-17 ENCOUNTER — OFFICE VISIT (OUTPATIENT)
Dept: INTERNAL MEDICINE | Facility: CLINIC | Age: 89
End: 2025-07-17
Payer: MEDICARE

## 2025-07-17 VITALS
HEART RATE: 72 BPM | TEMPERATURE: 98.4 F | OXYGEN SATURATION: 96 % | BODY MASS INDEX: 26.53 KG/M2 | DIASTOLIC BLOOD PRESSURE: 86 MMHG | WEIGHT: 169 LBS | HEIGHT: 67 IN | SYSTOLIC BLOOD PRESSURE: 160 MMHG

## 2025-07-17 DIAGNOSIS — R30.0 DYSURIA: Primary | ICD-10-CM

## 2025-07-17 DIAGNOSIS — M81.0 AGE-RELATED OSTEOPOROSIS WITHOUT CURRENT PATHOLOGICAL FRACTURE: Chronic | ICD-10-CM

## 2025-07-17 LAB
BILIRUB BLD-MCNC: NEGATIVE MG/DL
CLARITY, POC: ABNORMAL
COLOR UR: ABNORMAL
EXPIRATION DATE: ABNORMAL
GLUCOSE UR STRIP-MCNC: NEGATIVE MG/DL
KETONES UR QL: NEGATIVE
LEUKOCYTE EST, POC: ABNORMAL
Lab: ABNORMAL
NITRITE UR-MCNC: POSITIVE MG/ML
PH UR: 6.5 [PH] (ref 5–8)
PROT UR STRIP-MCNC: ABNORMAL MG/DL
RBC # UR STRIP: ABNORMAL /UL
SP GR UR: 1.02 (ref 1–1.03)
UROBILINOGEN UR QL: ABNORMAL

## 2025-07-17 PROCEDURE — 87086 URINE CULTURE/COLONY COUNT: CPT | Performed by: STUDENT IN AN ORGANIZED HEALTH CARE EDUCATION/TRAINING PROGRAM

## 2025-07-17 PROCEDURE — 87077 CULTURE AEROBIC IDENTIFY: CPT | Performed by: STUDENT IN AN ORGANIZED HEALTH CARE EDUCATION/TRAINING PROGRAM

## 2025-07-17 PROCEDURE — 87186 SC STD MICRODIL/AGAR DIL: CPT | Performed by: STUDENT IN AN ORGANIZED HEALTH CARE EDUCATION/TRAINING PROGRAM

## 2025-07-17 RX ORDER — NITROFURANTOIN 25; 75 MG/1; MG/1
100 CAPSULE ORAL 2 TIMES DAILY
Qty: 10 CAPSULE | Refills: 0 | Status: SHIPPED | OUTPATIENT
Start: 2025-07-17 | End: 2025-07-22

## 2025-07-17 NOTE — PROGRESS NOTES
Office Note     Name: Jayda Lopes    : 1935     MRN: [id number]     Chief Complaint  Difficulty Urinating (Pressure/Cannot control it/Burning) and Back Pain    Subjective     History of Present Illness:  Jayda Lopes is a 89 y.o. female who presents today for dysuria    History of Present Illness  The patient presents for evaluation of painful urination.    She has been experiencing symptoms of a urinary tract infection (UTI) for the past 2 days, including a burning sensation during urination and a constant urge to use the bathroom. There is also a feeling of needing to have a bowel movement, although she has not been able to produce anything significant. These symptoms are more pronounced in the evening, often leading to instances where she does not make it to the bathroom in time. Additionally, she experiences pain in her hands while attempting to urinate. She reports that her last UTI occurred about 5 years ago.    She also reports back pain, which she attributes to lifting her  who has difficulty walking. She has osteopenia.    Review of Systems:   Review of Systems   Genitourinary:  Positive for decreased urine volume, difficulty urinating, dysuria, frequency and urgency.       Past Medical History:   Past Medical History:   Diagnosis Date    Acute bronchitis due to infection 01/10/2019    Anxiety     Atrial fibrillation     Atypical chest pain     B12 deficiency 01/10/2019    Bradycardia     Contusion of right lower leg 2021 Akilah Rueda MD  Contusion R shin after fall.  Improving.  There is still some tenderness to touch.      GERD (gastroesophageal reflux disease)     Hyperlipidemia     Hypertension     Impacted cerumen of right ear 2020    Continue debrox as directed.  She will need another week or two of drops to help soften.    Macular degeneration     Mitral valve prolapse     Palpitation     Right leg swelling 2021 Akilah Rueda,  MD  Mild R lower leg swelling due to contusion and injury after fall.   Continue to elevate the leg several times a day.        Past Surgical History:   Past Surgical History:   Procedure Laterality Date    CYSTECTOMY      h/o Ovarian    HYSTERECTOMY Bilateral     OOPHORECTOMY Bilateral 1993    THYROIDECTOMY      h/o thyroid surgery sub-total        Family History:   Family History   Problem Relation Age of Onset    Colon cancer Mother     Other Mother         cardiac arrhythmia    Heart failure Mother     Cancer Mother     Lung cancer Father     Cancer Father     No Known Problems Brother     Hypertension Maternal Grandmother     Breast cancer Neg Hx     Ovarian cancer Neg Hx        Social History:   Social History     Socioeconomic History    Marital status:    Tobacco Use    Smoking status: Never    Smokeless tobacco: Never   Vaping Use    Vaping status: Never Used    Passive vaping exposure: Yes   Substance and Sexual Activity    Alcohol use: Yes     Alcohol/week: 3.0 standard drinks of alcohol     Types: 3 Glasses of wine per week     Comment: a few glasses weekly    Drug use: Never    Sexual activity: Defer       Immunizations:   Immunization History   Administered Date(s) Administered    COVID-19 (PFIZER) 12YRS+ (COMIRNATY) 09/28/2023, 09/26/2024    COVID-19 (PFIZER) BIVALENT 12+YRS 09/17/2022    COVID-19 (PFIZER) Purple Cap Monovalent 01/27/2021, 02/19/2021, 09/25/2021    Covid-19 (Pfizer) Gray Cap Monovalent 03/31/2022    FLUAD TRI 65YR+ 11/08/2019    Fluad Quad 65+ 09/14/2020, 10/18/2021, 10/21/2022    Fluzone High-Dose 65+YRS 09/23/2014, 10/28/2015, 10/21/2016, 10/24/2017, 10/29/2020, 09/26/2024    Fluzone High-Dose 65+yrs 09/28/2023    Fluzone Quad >6mos (Multi-dose) 10/30/2018    Hepatitis A 07/09/2018    INFLUENZA SPLIT TRI 10/05/2012, 10/10/2013    Influenza TIV (IM) 09/14/2009, 10/05/2010, 09/14/2011    PCV21 (CAPVAXIVE) 06/11/2025    Pneumococcal Conjugate 13-Valent (PCV13) 11/19/2015     Pneumococcal Polysaccharide (PPSV23) 08/14/2009, 11/06/2014, 08/13/2020    Shingrix 10/21/2022    Tdap 12/15/2010    Zostavax 07/24/2015        Medications:     Current Outpatient Medications:     Aflibercept (EYLEA IO), Inject  into the eye Every 3 (Three) Months., Disp: , Rfl:     alendronate (FOSAMAX) 70 MG tablet, Take 1 tablet by mouth Every 7 (Seven) Days., Disp: 15 tablet, Rfl: 3    amLODIPine (NORVASC) 2.5 MG tablet, Take 1 tablet by mouth 2 (Two) Times a Day., Disp: 180 tablet, Rfl: 3    atorvastatin (LIPITOR) 20 MG tablet, Take 1 tablet by mouth Every Evening., Disp: 90 tablet, Rfl: 3    carvedilol (COREG) 25 MG tablet, Take 1 tablet by mouth 2 (Two) Times a Day., Disp: 180 tablet, Rfl: 3    Cholecalciferol (Vitamin D3) 50 MCG (2000 UT) capsule, Take 2 capsules by mouth Daily., Disp: , Rfl:     fluticasone (FLONASE) 50 MCG/ACT nasal spray, Administer 1 spray into the nostril(s) as directed by provider 2 (Two) Times a Day., Disp: 16 g, Rfl: 5    hydrocortisone 1 % cream, Apply 1 Application topically to the appropriate area as directed 2 (Two) Times a Day., Disp: 56 g, Rfl: 0    latanoprost (XALATAN) 0.005 % ophthalmic solution, Administer 1 drop to both eyes Every Night., Disp: 7.5 mL, Rfl: 5    Multiple Vitamins-Minerals (PRESERVISION AREDS 2 PO), Take 2 tablets by mouth Daily., Disp: , Rfl:     polyethylene glycol (GlycoLax) 17 GM/SCOOP powder, Take 17 g by mouth Daily., Disp: 850 g, Rfl: 11    ramipril (ALTACE) 5 MG capsule, Take 1 cap every morning and 2 caps every evening, Disp: 270 capsule, Rfl: 3    rivaroxaban (Xarelto) 20 MG tablet, Take 1 tablet by mouth Daily. as directed, Disp: 90 tablet, Rfl: 3    sertraline (ZOLOFT) 100 MG tablet, Take 1 tablet by mouth Daily., Disp: 90 tablet, Rfl: 3    loratadine (Claritin) 10 MG tablet, Take 1 tablet by mouth Daily. (Patient not taking: Reported on 7/17/2025), Disp: 28 tablet, Rfl: 0    nitrofurantoin, macrocrystal-monohydrate, (Macrobid) 100 MG capsule,  "Take 1 capsule by mouth 2 (Two) Times a Day for 5 days., Disp: 10 capsule, Rfl: 0    Allergies:   Allergies   Allergen Reactions    Phenazopyridine Hcl Unknown (See Comments)     Pyridium       Objective     Vital Signs  /86 (BP Location: Left arm, Patient Position: Sitting, Cuff Size: Adult)   Pulse 72   Temp 98.4 °F (36.9 °C)   Ht 170.2 cm (67.01\")   Wt 76.7 kg (169 lb)   SpO2 96%   BMI 26.46 kg/m²   Body mass index is 26.46 kg/m².            Physical Exam  Constitutional:       Appearance: Normal appearance.   HENT:      Head: Normocephalic and atraumatic.   Eyes:      Extraocular Movements: Extraocular movements intact.      Conjunctiva/sclera: Conjunctivae normal.   Pulmonary:      Effort: Pulmonary effort is normal. No respiratory distress.   Neurological:      General: No focal deficit present.      Mental Status: She is alert and oriented to person, place, and time.   Psychiatric:         Mood and Affect: Mood normal.         Behavior: Behavior normal.            Results:  Recent Results (from the past 24 hours)   POC Urinalysis Dipstick, Automated    Collection Time: 07/17/25  1:54 PM    Specimen: Urine   Result Value Ref Range    Color Dark Yellow Yellow, Straw, Dark Yellow, Monique    Clarity, UA Turbid (A) Clear    Specific Gravity  1.020 1.005 - 1.030    pH, Urine 6.5 5.0 - 8.0    Leukocytes Large (3+) (A) Negative    Nitrite, UA Positive (A) Negative    Protein,  mg/dL (A) Negative mg/dL    Glucose, UA Negative Negative mg/dL    Ketones, UA Negative Negative    Urobilinogen, UA 4 E.U./dL (A) Normal, 0.2 E.U./dL    Bilirubin Negative Negative    Blood, UA Moderate (A) Negative    Lot Number [id number]     Expiration Date 02/28/26         Assessment and Plan     Assessment/Plan:  Diagnoses and all orders for this visit:    1. Dysuria (Primary)  -Symptoms include urinary frequency, dysuria, urinary urgency.  Onset 2 days ago.  -Urinalysis notable for large leukocytes, positive nitrites, " moderate blood  -Will treat for UTI with Macrobid twice daily for 5 days  -Urine culture ordered  -     POC Urinalysis Dipstick, Automated  -     nitrofurantoin, macrocrystal-monohydrate, (Macrobid) 100 MG capsule; Take 1 capsule by mouth 2 (Two) Times a Day for 5 days.  Dispense: 10 capsule; Refill: 0  -     Urine Culture - Urine, Urine, Clean Catch; Future    2. Age-related osteoporosis without current pathological fracture  Overview:  DEXA 2017 revealed osteopenia with a T score in the spine of -1.9 and a T score in the hip of -2.1.      DEXA 10/19/2021 showed worsening of bone density.  T score at the hip is now in the osteoporosis range with a T score of -3.1.  Spine T score is -2.1.  6/2025 Restart alendronate (Fosamax) tablet.      Orders:  -     Vitamin D,25-Hydroxy    Follow Up  No follow-ups on file.    Patient or patient representative verbalized consent for the use of Ambient Listening during the visit with  Apolonia Polanco MD for chart documentation. 7/17/2025  14:18 EDT      Apolonia Polanco MD   [facility] [place] [id number]

## 2025-07-18 DIAGNOSIS — E55.9 VITAMIN D DEFICIENCY: ICD-10-CM

## 2025-07-18 DIAGNOSIS — M81.0 AGE-RELATED OSTEOPOROSIS WITHOUT CURRENT PATHOLOGICAL FRACTURE: Primary | ICD-10-CM

## 2025-07-20 LAB — BACTERIA SPEC AEROBE CULT: ABNORMAL

## 2025-07-29 RX ORDER — FLUTICASONE PROPIONATE 50 MCG
SPRAY, SUSPENSION (ML) NASAL
Qty: 16 G | Refills: 1 | Status: SHIPPED | OUTPATIENT
Start: 2025-07-29